# Patient Record
Sex: FEMALE | Race: WHITE | Employment: OTHER | URBAN - METROPOLITAN AREA
[De-identification: names, ages, dates, MRNs, and addresses within clinical notes are randomized per-mention and may not be internally consistent; named-entity substitution may affect disease eponyms.]

---

## 2017-01-05 ENCOUNTER — OFFICE VISIT (OUTPATIENT)
Dept: FAMILY MEDICINE CLINIC | Age: 82
End: 2017-01-05

## 2017-01-05 ENCOUNTER — TELEPHONE (OUTPATIENT)
Dept: CARDIOLOGY CLINIC | Age: 82
End: 2017-01-05

## 2017-01-05 ENCOUNTER — HOSPITAL ENCOUNTER (OUTPATIENT)
Dept: NON INVASIVE DIAGNOSTICS | Age: 82
Discharge: HOME | End: 2017-01-05
Attending: FAMILY MEDICINE | Admitting: FAMILY MEDICINE

## 2017-01-05 VITALS
WEIGHT: 152 LBS | BODY MASS INDEX: 26.93 KG/M2 | HEIGHT: 63 IN | HEART RATE: 56 BPM | OXYGEN SATURATION: 97 % | TEMPERATURE: 97.4 F | SYSTOLIC BLOOD PRESSURE: 136 MMHG | DIASTOLIC BLOOD PRESSURE: 60 MMHG

## 2017-01-05 DIAGNOSIS — E55.9 VITAMIN D DEFICIENCY: ICD-10-CM

## 2017-01-05 DIAGNOSIS — E78.2 MIXED HYPERLIPIDEMIA: Chronic | ICD-10-CM

## 2017-01-05 DIAGNOSIS — R73.09 ELEVATED GLUCOSE: ICD-10-CM

## 2017-01-05 DIAGNOSIS — E03.2 HYPOTHYROIDISM DUE TO MEDICATION: ICD-10-CM

## 2017-01-05 DIAGNOSIS — I48.0 PAROXYSMAL ATRIAL FIBRILLATION (HCC): ICD-10-CM

## 2017-01-05 DIAGNOSIS — E78.2 MIXED HYPERLIPIDEMIA: Primary | Chronic | ICD-10-CM

## 2017-01-05 DIAGNOSIS — J06.9 UPPER RESPIRATORY TRACT INFECTION, UNSPECIFIED TYPE: ICD-10-CM

## 2017-01-05 LAB
A/G RATIO: 1.5 (ref 1.1–2.2)
ALBUMIN SERPL-MCNC: 4.2 G/DL (ref 3.4–5)
ALP BLD-CCNC: 84 U/L (ref 40–129)
ALT SERPL-CCNC: 20 U/L (ref 10–40)
ANION GAP SERPL CALCULATED.3IONS-SCNC: 12 MMOL/L (ref 3–16)
AST SERPL-CCNC: 18 U/L (ref 15–37)
BILIRUB SERPL-MCNC: 0.3 MG/DL (ref 0–1)
BUN BLDV-MCNC: 16 MG/DL (ref 7–20)
CALCIUM SERPL-MCNC: 8.8 MG/DL (ref 8.3–10.6)
CHLORIDE BLD-SCNC: 90 MMOL/L (ref 99–110)
CO2: 30 MMOL/L (ref 21–32)
CREAT SERPL-MCNC: 1.1 MG/DL (ref 0.6–1.2)
ESTIMATED AVERAGE GLUCOSE: 114 MG/DL
GFR AFRICAN AMERICAN: 57
GFR NON-AFRICAN AMERICAN: 47
GLOBULIN: 2.8 G/DL
GLUCOSE BLD-MCNC: 98 MG/DL (ref 70–99)
HBA1C MFR BLD: 5.6 %
POTASSIUM SERPL-SCNC: 4 MMOL/L (ref 3.5–5.1)
SODIUM BLD-SCNC: 132 MMOL/L (ref 136–145)
T4 FREE: 1.1 NG/DL (ref 0.9–1.8)
TOTAL PROTEIN: 7 G/DL (ref 6.4–8.2)
TSH SERPL DL<=0.05 MIU/L-ACNC: 13.45 UIU/ML (ref 0.27–4.2)
VITAMIN D 25-HYDROXY: 20 NG/ML

## 2017-01-05 PROCEDURE — 99214 OFFICE O/P EST MOD 30 MIN: CPT | Performed by: FAMILY MEDICINE

## 2017-01-05 ASSESSMENT — ENCOUNTER SYMPTOMS
NAUSEA: 0
BLOOD IN STOOL: 0
ABDOMINAL DISTENTION: 0
CONSTIPATION: 0
ABDOMINAL PAIN: 0
CHEST TIGHTNESS: 0
DIARRHEA: 0
VOMITING: 0

## 2017-01-09 ENCOUNTER — TELEPHONE (OUTPATIENT)
Dept: FAMILY MEDICINE CLINIC | Age: 82
End: 2017-01-09

## 2017-01-11 RX ORDER — APIXABAN 5 MG/1
TABLET, FILM COATED ORAL
Qty: 60 TABLET | Refills: 3 | Status: SHIPPED | OUTPATIENT
Start: 2017-01-11 | End: 2017-05-23 | Stop reason: SDUPTHER

## 2017-01-16 ENCOUNTER — TELEPHONE (OUTPATIENT)
Dept: FAMILY MEDICINE CLINIC | Age: 82
End: 2017-01-16

## 2017-01-16 RX ORDER — ROPINIROLE 1 MG/1
1 TABLET, FILM COATED ORAL EVERY EVENING
Qty: 30 TABLET | Refills: 1 | Status: SHIPPED | OUTPATIENT
Start: 2017-01-16 | End: 2017-03-23 | Stop reason: SDUPTHER

## 2017-01-24 RX ORDER — AMITRIPTYLINE HYDROCHLORIDE 10 MG/1
TABLET, FILM COATED ORAL
Qty: 30 TABLET | Refills: 1 | Status: SHIPPED | OUTPATIENT
Start: 2017-01-24 | End: 2017-03-13 | Stop reason: SDUPTHER

## 2017-01-30 ENCOUNTER — OFFICE VISIT (OUTPATIENT)
Dept: FAMILY MEDICINE CLINIC | Age: 82
End: 2017-01-30

## 2017-01-30 VITALS
TEMPERATURE: 98 F | HEIGHT: 63 IN | SYSTOLIC BLOOD PRESSURE: 120 MMHG | BODY MASS INDEX: 26.19 KG/M2 | HEART RATE: 56 BPM | DIASTOLIC BLOOD PRESSURE: 60 MMHG | WEIGHT: 147.8 LBS

## 2017-01-30 DIAGNOSIS — E78.2 MIXED HYPERLIPIDEMIA: Primary | Chronic | ICD-10-CM

## 2017-01-30 DIAGNOSIS — E03.8 OTHER SPECIFIED HYPOTHYROIDISM: ICD-10-CM

## 2017-01-30 PROBLEM — E03.9 HYPOTHYROIDISM: Status: ACTIVE | Noted: 2017-01-30

## 2017-01-30 PROCEDURE — 99213 OFFICE O/P EST LOW 20 MIN: CPT | Performed by: FAMILY MEDICINE

## 2017-01-30 RX ORDER — LEVOTHYROXINE SODIUM 0.05 MG/1
75 TABLET ORAL DAILY
Qty: 45 TABLET | Refills: 3 | Status: SHIPPED | OUTPATIENT
Start: 2017-01-30 | End: 2017-03-02 | Stop reason: SDUPTHER

## 2017-01-30 RX ORDER — LANOLIN ALCOHOL/MO/W.PET/CERES
1000 CREAM (GRAM) TOPICAL DAILY
COMMUNITY
End: 2020-07-09

## 2017-01-30 RX ORDER — AMIODARONE HYDROCHLORIDE 200 MG/1
100 TABLET ORAL DAILY
Qty: 60 TABLET | Refills: 6
Start: 2017-01-30 | End: 2017-03-01 | Stop reason: SDUPTHER

## 2017-01-31 ENCOUNTER — TELEPHONE (OUTPATIENT)
Dept: FAMILY MEDICINE CLINIC | Age: 82
End: 2017-01-31

## 2017-02-03 ENCOUNTER — CARE COORDINATION (OUTPATIENT)
Dept: FAMILY MEDICINE CLINIC | Age: 82
End: 2017-02-03

## 2017-02-08 ENCOUNTER — TELEPHONE (OUTPATIENT)
Dept: FAMILY MEDICINE CLINIC | Age: 82
End: 2017-02-08

## 2017-02-17 ENCOUNTER — TELEPHONE (OUTPATIENT)
Dept: FAMILY MEDICINE CLINIC | Age: 82
End: 2017-02-17

## 2017-03-01 ENCOUNTER — OFFICE VISIT (OUTPATIENT)
Dept: FAMILY MEDICINE CLINIC | Age: 82
End: 2017-03-01

## 2017-03-01 VITALS
TEMPERATURE: 97.6 F | BODY MASS INDEX: 26.44 KG/M2 | DIASTOLIC BLOOD PRESSURE: 72 MMHG | HEART RATE: 72 BPM | SYSTOLIC BLOOD PRESSURE: 122 MMHG | HEIGHT: 63 IN | WEIGHT: 149.2 LBS

## 2017-03-01 DIAGNOSIS — I83.92 VARICOSE VEINS OF LEFT LOWER EXTREMITY: ICD-10-CM

## 2017-03-01 DIAGNOSIS — E03.8 OTHER SPECIFIED HYPOTHYROIDISM: Primary | ICD-10-CM

## 2017-03-01 DIAGNOSIS — E03.8 OTHER SPECIFIED HYPOTHYROIDISM: ICD-10-CM

## 2017-03-01 LAB
T4 FREE: 1.2 NG/DL (ref 0.9–1.8)
TSH SERPL DL<=0.05 MIU/L-ACNC: 5.14 UIU/ML (ref 0.27–4.2)

## 2017-03-01 PROCEDURE — 99213 OFFICE O/P EST LOW 20 MIN: CPT | Performed by: FAMILY MEDICINE

## 2017-03-02 RX ORDER — AMIODARONE HYDROCHLORIDE 200 MG/1
TABLET ORAL
Qty: 15 TABLET | Refills: 6 | Status: SHIPPED | OUTPATIENT
Start: 2017-03-02 | End: 2017-09-23 | Stop reason: SDUPTHER

## 2017-03-02 RX ORDER — LEVOTHYROXINE SODIUM 88 UG/1
88 TABLET ORAL DAILY
Qty: 30 TABLET | Refills: 4 | Status: SHIPPED | OUTPATIENT
Start: 2017-03-02 | End: 2017-05-26 | Stop reason: SDUPTHER

## 2017-03-02 RX ORDER — AMIODARONE HYDROCHLORIDE 200 MG/1
100 TABLET ORAL DAILY
Qty: 30 TABLET | Refills: 2 | Status: SHIPPED | OUTPATIENT
Start: 2017-03-02 | End: 2017-04-03

## 2017-03-13 RX ORDER — AMITRIPTYLINE HYDROCHLORIDE 10 MG/1
TABLET, FILM COATED ORAL
Qty: 30 TABLET | Refills: 0 | Status: SHIPPED | OUTPATIENT
Start: 2017-03-13 | End: 2017-04-10 | Stop reason: SDUPTHER

## 2017-03-22 ENCOUNTER — TELEPHONE (OUTPATIENT)
Dept: FAMILY MEDICINE CLINIC | Age: 82
End: 2017-03-22

## 2017-03-23 RX ORDER — ROPINIROLE 1 MG/1
2 TABLET, FILM COATED ORAL NIGHTLY
Qty: 60 TABLET | Refills: 2 | Status: SHIPPED | OUTPATIENT
Start: 2017-03-23 | End: 2017-06-01 | Stop reason: SDUPTHER

## 2017-03-27 ENCOUNTER — TELEPHONE (OUTPATIENT)
Dept: CARDIOLOGY CLINIC | Age: 82
End: 2017-03-27

## 2017-04-03 ENCOUNTER — OFFICE VISIT (OUTPATIENT)
Dept: FAMILY MEDICINE CLINIC | Age: 82
End: 2017-04-03

## 2017-04-03 ENCOUNTER — HOSPITAL ENCOUNTER (OUTPATIENT)
Dept: NON INVASIVE DIAGNOSTICS | Age: 82
Discharge: HOME | End: 2017-04-03
Attending: FAMILY MEDICINE | Admitting: FAMILY MEDICINE

## 2017-04-03 VITALS
BODY MASS INDEX: 25.91 KG/M2 | DIASTOLIC BLOOD PRESSURE: 60 MMHG | HEART RATE: 56 BPM | HEIGHT: 63 IN | WEIGHT: 146.25 LBS | SYSTOLIC BLOOD PRESSURE: 112 MMHG | TEMPERATURE: 98 F

## 2017-04-03 DIAGNOSIS — M79.601 PAIN OF RIGHT ARM: ICD-10-CM

## 2017-04-03 DIAGNOSIS — M25.552 PAIN OF LEFT HIP JOINT: ICD-10-CM

## 2017-04-03 DIAGNOSIS — M25.552 PAIN OF LEFT HIP JOINT: Primary | ICD-10-CM

## 2017-04-03 PROCEDURE — 99213 OFFICE O/P EST LOW 20 MIN: CPT | Performed by: FAMILY MEDICINE

## 2017-04-03 PROCEDURE — 3288F FALL RISK ASSESSMENT DOCD: CPT | Performed by: FAMILY MEDICINE

## 2017-04-11 RX ORDER — AMITRIPTYLINE HYDROCHLORIDE 10 MG/1
TABLET, FILM COATED ORAL
Qty: 30 TABLET | Refills: 0 | Status: SHIPPED | OUTPATIENT
Start: 2017-04-11 | End: 2017-05-08 | Stop reason: SDUPTHER

## 2017-04-26 DIAGNOSIS — J45.909 MODERATE ASTHMA: ICD-10-CM

## 2017-05-03 ENCOUNTER — CARE COORDINATION (OUTPATIENT)
Dept: FAMILY MEDICINE CLINIC | Age: 82
End: 2017-05-03

## 2017-05-03 ENCOUNTER — OFFICE VISIT (OUTPATIENT)
Dept: PULMONOLOGY | Age: 82
End: 2017-05-03

## 2017-05-03 VITALS
HEART RATE: 54 BPM | HEIGHT: 63 IN | OXYGEN SATURATION: 93 % | DIASTOLIC BLOOD PRESSURE: 60 MMHG | BODY MASS INDEX: 27.07 KG/M2 | SYSTOLIC BLOOD PRESSURE: 128 MMHG | TEMPERATURE: 97.8 F | WEIGHT: 152.8 LBS | RESPIRATION RATE: 16 BRPM

## 2017-05-03 DIAGNOSIS — J30.2 SEASONAL ALLERGIC RHINITIS, UNSPECIFIED ALLERGIC RHINITIS TRIGGER: ICD-10-CM

## 2017-05-03 DIAGNOSIS — R05.9 COUGH: ICD-10-CM

## 2017-05-03 DIAGNOSIS — J45.909 MODERATE ASTHMA: Primary | ICD-10-CM

## 2017-05-03 PROCEDURE — 99214 OFFICE O/P EST MOD 30 MIN: CPT | Performed by: INTERNAL MEDICINE

## 2017-05-03 RX ORDER — BENZONATATE 100 MG/1
200 CAPSULE ORAL 3 TIMES DAILY PRN
Qty: 60 CAPSULE | Refills: 1 | Status: SHIPPED | OUTPATIENT
Start: 2017-05-03 | End: 2017-05-10

## 2017-05-03 ASSESSMENT — SLEEP AND FATIGUE QUESTIONNAIRES
HOW LIKELY ARE YOU TO NOD OFF OR FALL ASLEEP WHEN YOU ARE A PASSENGER IN A CAR FOR AN HOUR WITHOUT A BREAK: 0
HOW LIKELY ARE YOU TO NOD OFF OR FALL ASLEEP WHILE SITTING INACTIVE IN A PUBLIC PLACE: 0
HOW LIKELY ARE YOU TO NOD OFF OR FALL ASLEEP WHILE WATCHING TV: 0
HOW LIKELY ARE YOU TO NOD OFF OR FALL ASLEEP WHILE LYING DOWN TO REST IN THE AFTERNOON WHEN CIRCUMSTANCES PERMIT: 0
HOW LIKELY ARE YOU TO NOD OFF OR FALL ASLEEP WHILE SITTING AND READING: 0

## 2017-05-03 ASSESSMENT — ASTHMA QUESTIONNAIRES
QUESTION_1 LAST FOUR WEEKS HOW MUCH OF THE TIME DID YOUR ASTHMA KEEP YOU FROM GETTING AS MUCH DONE AT WORK, SCHOOL OR AT HOME: 2
QUESTION_4 LAST FOUR WEEKS HOW OFTEN HAVE YOU USED YOUR RESCUE INHALER OR NEBULIZER MEDICATION (SUCH AS ALBUTEROL): 1
QUESTION_2 LAST FOUR WEEKS HOW OFTEN HAVE YOU HAD SHORTNESS OF BREATH: 2
QUESTION_3 LAST FOUR WEEKS HOW OFTEN DID YOUR ASTHMA SYMPTOMS (WHEEZING, COUGHING, SHORTNESS OF BREATH, CHEST TIGHTNESS OR PAIN) WAKE YOU UP AT NIGHT OR EARLIER THAN USUAL IN THE MORNING: 1
ACT_TOTALSCORE: 8
QUESTION_5 LAST FOUR WEEKS HOW WOULD YOU RATE YOUR ASTHMA CONTROL: 2

## 2017-05-08 RX ORDER — AMITRIPTYLINE HYDROCHLORIDE 10 MG/1
TABLET, FILM COATED ORAL
Qty: 30 TABLET | Refills: 1 | Status: SHIPPED | OUTPATIENT
Start: 2017-05-08 | End: 2017-06-23 | Stop reason: ALTCHOICE

## 2017-05-17 ENCOUNTER — OFFICE VISIT (OUTPATIENT)
Dept: FAMILY MEDICINE CLINIC | Age: 82
End: 2017-05-17

## 2017-05-17 VITALS
BODY MASS INDEX: 26.26 KG/M2 | SYSTOLIC BLOOD PRESSURE: 124 MMHG | HEART RATE: 60 BPM | HEIGHT: 63 IN | DIASTOLIC BLOOD PRESSURE: 70 MMHG | WEIGHT: 148.2 LBS

## 2017-05-17 DIAGNOSIS — E55.9 VITAMIN D DEFICIENCY: Primary | ICD-10-CM

## 2017-05-17 DIAGNOSIS — E03.8 OTHER SPECIFIED HYPOTHYROIDISM: ICD-10-CM

## 2017-05-17 DIAGNOSIS — J06.9 UPPER RESPIRATORY TRACT INFECTION, UNSPECIFIED TYPE: ICD-10-CM

## 2017-05-17 PROCEDURE — 99213 OFFICE O/P EST LOW 20 MIN: CPT | Performed by: FAMILY MEDICINE

## 2017-05-17 RX ORDER — DOXYCYCLINE HYCLATE 100 MG/1
100 CAPSULE ORAL 2 TIMES DAILY
Qty: 20 CAPSULE | Refills: 0 | Status: SHIPPED | OUTPATIENT
Start: 2017-05-17 | End: 2017-05-27

## 2017-05-17 RX ORDER — ERGOCALCIFEROL 1.25 MG/1
50000 CAPSULE ORAL WEEKLY
Qty: 4 CAPSULE | Refills: 2 | Status: SHIPPED | OUTPATIENT
Start: 2017-05-17 | End: 2017-07-03 | Stop reason: ALTCHOICE

## 2017-05-26 ENCOUNTER — OFFICE VISIT (OUTPATIENT)
Dept: FAMILY MEDICINE CLINIC | Age: 82
End: 2017-05-26

## 2017-05-26 VITALS
HEIGHT: 63 IN | DIASTOLIC BLOOD PRESSURE: 60 MMHG | SYSTOLIC BLOOD PRESSURE: 136 MMHG | TEMPERATURE: 97.6 F | WEIGHT: 150 LBS | BODY MASS INDEX: 26.58 KG/M2 | HEART RATE: 56 BPM

## 2017-05-26 DIAGNOSIS — E03.8 OTHER SPECIFIED HYPOTHYROIDISM: Primary | ICD-10-CM

## 2017-05-26 DIAGNOSIS — R41.3 MEMORY LOSS: ICD-10-CM

## 2017-05-26 DIAGNOSIS — R60.9 DEPENDENT EDEMA: ICD-10-CM

## 2017-05-26 DIAGNOSIS — E55.9 VITAMIN D DEFICIENCY: ICD-10-CM

## 2017-05-26 PROCEDURE — 99213 OFFICE O/P EST LOW 20 MIN: CPT | Performed by: FAMILY MEDICINE

## 2017-05-26 RX ORDER — LANOLIN ALCOHOL/MO/W.PET/CERES
400 CREAM (GRAM) TOPICAL DAILY
COMMUNITY
End: 2017-10-25 | Stop reason: ALTCHOICE

## 2017-05-26 RX ORDER — LEVOTHYROXINE SODIUM 88 UG/1
88 TABLET ORAL DAILY
Qty: 30 TABLET | Refills: 2 | Status: SHIPPED | OUTPATIENT
Start: 2017-05-26 | End: 2017-08-14 | Stop reason: SDUPTHER

## 2017-05-26 RX ORDER — DONEPEZIL HYDROCHLORIDE 5 MG/1
5 TABLET, FILM COATED ORAL NIGHTLY
Qty: 30 TABLET | Refills: 2 | Status: SHIPPED | OUTPATIENT
Start: 2017-05-26 | End: 2017-10-30 | Stop reason: DRUGHIGH

## 2017-06-01 RX ORDER — ROPINIROLE 1 MG/1
TABLET, FILM COATED ORAL
Qty: 60 TABLET | Refills: 2 | Status: SHIPPED | OUTPATIENT
Start: 2017-06-01 | End: 2017-08-22 | Stop reason: SDUPTHER

## 2017-06-12 ENCOUNTER — OFFICE VISIT (OUTPATIENT)
Dept: CARDIOLOGY CLINIC | Age: 82
End: 2017-06-12

## 2017-06-12 VITALS
HEIGHT: 63 IN | HEART RATE: 54 BPM | OXYGEN SATURATION: 96 % | SYSTOLIC BLOOD PRESSURE: 138 MMHG | WEIGHT: 147 LBS | DIASTOLIC BLOOD PRESSURE: 66 MMHG | BODY MASS INDEX: 26.05 KG/M2

## 2017-06-12 DIAGNOSIS — I10 ESSENTIAL HYPERTENSION: ICD-10-CM

## 2017-06-12 DIAGNOSIS — I48.0 PAROXYSMAL ATRIAL FIBRILLATION (HCC): Primary | ICD-10-CM

## 2017-06-12 DIAGNOSIS — E78.2 MIXED HYPERLIPIDEMIA: Chronic | ICD-10-CM

## 2017-06-12 DIAGNOSIS — R00.2 PALPITATIONS: ICD-10-CM

## 2017-06-12 PROCEDURE — 93000 ELECTROCARDIOGRAM COMPLETE: CPT | Performed by: INTERNAL MEDICINE

## 2017-06-12 PROCEDURE — 99214 OFFICE O/P EST MOD 30 MIN: CPT | Performed by: INTERNAL MEDICINE

## 2017-06-19 ENCOUNTER — TELEPHONE (OUTPATIENT)
Dept: FAMILY MEDICINE CLINIC | Age: 82
End: 2017-06-19

## 2017-06-23 ENCOUNTER — OFFICE VISIT (OUTPATIENT)
Dept: FAMILY MEDICINE CLINIC | Age: 82
End: 2017-06-23

## 2017-06-23 ENCOUNTER — TELEPHONE (OUTPATIENT)
Dept: FAMILY MEDICINE CLINIC | Age: 82
End: 2017-06-23

## 2017-06-23 VITALS
HEIGHT: 63 IN | SYSTOLIC BLOOD PRESSURE: 138 MMHG | WEIGHT: 149 LBS | BODY MASS INDEX: 26.4 KG/M2 | DIASTOLIC BLOOD PRESSURE: 70 MMHG | TEMPERATURE: 97.6 F

## 2017-06-23 DIAGNOSIS — E03.8 OTHER SPECIFIED HYPOTHYROIDISM: ICD-10-CM

## 2017-06-23 DIAGNOSIS — F03.90 DEMENTIA WITHOUT BEHAVIORAL DISTURBANCE, UNSPECIFIED DEMENTIA TYPE: Primary | ICD-10-CM

## 2017-06-23 DIAGNOSIS — E55.9 VITAMIN D DEFICIENCY: ICD-10-CM

## 2017-06-23 LAB
T4 FREE: 1.6 NG/DL (ref 0.9–1.8)
TSH SERPL DL<=0.05 MIU/L-ACNC: 0.87 UIU/ML (ref 0.27–4.2)
VITAMIN D 25-HYDROXY: 20.1 NG/ML

## 2017-06-23 PROCEDURE — 99213 OFFICE O/P EST LOW 20 MIN: CPT | Performed by: FAMILY MEDICINE

## 2017-06-23 RX ORDER — FUROSEMIDE 40 MG/1
40 TABLET ORAL DAILY
Qty: 30 TABLET | Refills: 2 | Status: SHIPPED | OUTPATIENT
Start: 2017-06-23 | End: 2017-09-29 | Stop reason: SDUPTHER

## 2017-06-26 ENCOUNTER — CARE COORDINATION (OUTPATIENT)
Dept: CARE COORDINATION | Age: 82
End: 2017-06-26

## 2017-06-29 ENCOUNTER — OFFICE VISIT (OUTPATIENT)
Dept: ORTHOPEDIC SURGERY | Age: 82
End: 2017-06-29

## 2017-06-29 VITALS
TEMPERATURE: 98.1 F | HEART RATE: 49 BPM | DIASTOLIC BLOOD PRESSURE: 53 MMHG | SYSTOLIC BLOOD PRESSURE: 142 MMHG | BODY MASS INDEX: 26.4 KG/M2 | HEIGHT: 63 IN | RESPIRATION RATE: 16 BRPM | WEIGHT: 149 LBS

## 2017-06-29 DIAGNOSIS — S80.11XA CONTUSION OF RIGHT LEG, INITIAL ENCOUNTER: ICD-10-CM

## 2017-06-29 DIAGNOSIS — S80.01XA CONTUSION OF RIGHT KNEE, INITIAL ENCOUNTER: Primary | ICD-10-CM

## 2017-06-29 PROCEDURE — 99203 OFFICE O/P NEW LOW 30 MIN: CPT | Performed by: ORTHOPAEDIC SURGERY

## 2017-07-03 ENCOUNTER — OFFICE VISIT (OUTPATIENT)
Dept: FAMILY MEDICINE CLINIC | Age: 82
End: 2017-07-03

## 2017-07-03 VITALS
DIASTOLIC BLOOD PRESSURE: 82 MMHG | HEART RATE: 52 BPM | SYSTOLIC BLOOD PRESSURE: 122 MMHG | TEMPERATURE: 97.7 F | HEIGHT: 63 IN | WEIGHT: 150 LBS | BODY MASS INDEX: 26.58 KG/M2

## 2017-07-03 DIAGNOSIS — J45.909 MODERATE ASTHMA: ICD-10-CM

## 2017-07-03 DIAGNOSIS — I83.93 VARICOSE VEINS OF BOTH LOWER EXTREMITIES: Primary | ICD-10-CM

## 2017-07-03 PROCEDURE — 99213 OFFICE O/P EST LOW 20 MIN: CPT | Performed by: FAMILY MEDICINE

## 2017-07-03 RX ORDER — AMITRIPTYLINE HYDROCHLORIDE 10 MG/1
10 TABLET, FILM COATED ORAL NIGHTLY PRN
Qty: 30 TABLET | Refills: 1 | Status: SHIPPED | OUTPATIENT
Start: 2017-07-03 | End: 2017-09-05 | Stop reason: SDUPTHER

## 2017-07-03 RX ORDER — BUDESONIDE AND FORMOTEROL FUMARATE DIHYDRATE 160; 4.5 UG/1; UG/1
AEROSOL RESPIRATORY (INHALATION)
Qty: 11 INHALER | Refills: 0 | Status: SHIPPED | OUTPATIENT
Start: 2017-07-03 | End: 2017-08-03 | Stop reason: SDUPTHER

## 2017-07-10 ENCOUNTER — HOSPITAL ENCOUNTER (OUTPATIENT)
Dept: NON INVASIVE DIAGNOSTICS | Age: 82
Discharge: HOME | End: 2017-07-10
Attending: FAMILY MEDICINE | Admitting: FAMILY MEDICINE

## 2017-07-10 ENCOUNTER — OFFICE VISIT (OUTPATIENT)
Dept: FAMILY MEDICINE CLINIC | Age: 82
End: 2017-07-10

## 2017-07-10 ENCOUNTER — TELEPHONE (OUTPATIENT)
Dept: FAMILY MEDICINE CLINIC | Age: 82
End: 2017-07-10

## 2017-07-10 VITALS
HEIGHT: 63 IN | DIASTOLIC BLOOD PRESSURE: 70 MMHG | TEMPERATURE: 97.7 F | WEIGHT: 150 LBS | BODY MASS INDEX: 26.58 KG/M2 | SYSTOLIC BLOOD PRESSURE: 136 MMHG

## 2017-07-10 DIAGNOSIS — M79.672 LEFT FOOT PAIN: ICD-10-CM

## 2017-07-10 DIAGNOSIS — M79.672 LEFT FOOT PAIN: Primary | ICD-10-CM

## 2017-07-10 PROCEDURE — 99213 OFFICE O/P EST LOW 20 MIN: CPT | Performed by: FAMILY MEDICINE

## 2017-07-10 RX ORDER — DOXYCYCLINE HYCLATE 100 MG/1
100 CAPSULE ORAL 2 TIMES DAILY
Qty: 14 CAPSULE | Refills: 0 | Status: SHIPPED | OUTPATIENT
Start: 2017-07-10 | End: 2017-07-17

## 2017-07-31 ENCOUNTER — TELEPHONE (OUTPATIENT)
Dept: FAMILY MEDICINE CLINIC | Age: 82
End: 2017-07-31

## 2017-07-31 ENCOUNTER — HOSPITAL ENCOUNTER (OUTPATIENT)
Dept: NON INVASIVE DIAGNOSTICS | Age: 82
Discharge: HOME | End: 2017-07-31
Attending: FAMILY MEDICINE | Admitting: FAMILY MEDICINE

## 2017-07-31 DIAGNOSIS — R05.9 COUGH: ICD-10-CM

## 2017-07-31 DIAGNOSIS — R05.9 COUGH: Primary | ICD-10-CM

## 2017-08-01 RX ORDER — PREDNISONE 10 MG/1
TABLET ORAL
Qty: 12 TABLET | Refills: 0 | Status: SHIPPED | OUTPATIENT
Start: 2017-08-01 | End: 2017-08-08 | Stop reason: ALTCHOICE

## 2017-08-03 DIAGNOSIS — J45.909 MODERATE ASTHMA: ICD-10-CM

## 2017-08-03 RX ORDER — BUDESONIDE AND FORMOTEROL FUMARATE DIHYDRATE 160; 4.5 UG/1; UG/1
AEROSOL RESPIRATORY (INHALATION)
Qty: 11 INHALER | Refills: 0 | Status: SHIPPED | OUTPATIENT
Start: 2017-08-03 | End: 2017-09-01 | Stop reason: SDUPTHER

## 2017-08-08 ENCOUNTER — OFFICE VISIT (OUTPATIENT)
Dept: FAMILY MEDICINE CLINIC | Age: 82
End: 2017-08-08

## 2017-08-08 VITALS
TEMPERATURE: 98 F | WEIGHT: 152.8 LBS | HEIGHT: 63 IN | DIASTOLIC BLOOD PRESSURE: 60 MMHG | BODY MASS INDEX: 27.07 KG/M2 | SYSTOLIC BLOOD PRESSURE: 120 MMHG

## 2017-08-08 DIAGNOSIS — R05.9 COUGH: Primary | ICD-10-CM

## 2017-08-08 PROCEDURE — 99213 OFFICE O/P EST LOW 20 MIN: CPT | Performed by: FAMILY MEDICINE

## 2017-08-08 RX ORDER — SULFAMETHOXAZOLE AND TRIMETHOPRIM 800; 160 MG/1; MG/1
1 TABLET ORAL 2 TIMES DAILY
Qty: 14 TABLET | Refills: 0 | Status: SHIPPED | OUTPATIENT
Start: 2017-08-08 | End: 2017-08-15

## 2017-08-14 RX ORDER — LEVOTHYROXINE SODIUM 88 UG/1
TABLET ORAL
Qty: 30 TABLET | Refills: 5 | Status: SHIPPED | OUTPATIENT
Start: 2017-08-14 | End: 2018-01-16 | Stop reason: SDUPTHER

## 2017-08-22 RX ORDER — ROPINIROLE 1 MG/1
TABLET, FILM COATED ORAL
Qty: 60 TABLET | Refills: 1 | Status: SHIPPED | OUTPATIENT
Start: 2017-08-22 | End: 2017-10-12 | Stop reason: SDUPTHER

## 2017-08-28 ENCOUNTER — TELEPHONE (OUTPATIENT)
Dept: FAMILY MEDICINE CLINIC | Age: 82
End: 2017-08-28

## 2017-08-28 ENCOUNTER — OFFICE VISIT (OUTPATIENT)
Dept: FAMILY MEDICINE CLINIC | Age: 82
End: 2017-08-28

## 2017-08-28 VITALS
WEIGHT: 151 LBS | SYSTOLIC BLOOD PRESSURE: 124 MMHG | TEMPERATURE: 97.8 F | BODY MASS INDEX: 26.75 KG/M2 | HEART RATE: 64 BPM | HEIGHT: 63 IN | DIASTOLIC BLOOD PRESSURE: 76 MMHG

## 2017-08-28 DIAGNOSIS — E55.9 VITAMIN D DEFICIENCY: ICD-10-CM

## 2017-08-28 DIAGNOSIS — F03.90 DEMENTIA WITHOUT BEHAVIORAL DISTURBANCE, UNSPECIFIED DEMENTIA TYPE: ICD-10-CM

## 2017-08-28 DIAGNOSIS — R79.9 ABNORMAL BLOOD CHEMISTRY: ICD-10-CM

## 2017-08-28 DIAGNOSIS — M79.673 PAIN OF FOOT, UNSPECIFIED LATERALITY: ICD-10-CM

## 2017-08-28 DIAGNOSIS — R79.9 ABNORMAL BLOOD CHEMISTRY: Primary | ICD-10-CM

## 2017-08-28 LAB
ANION GAP SERPL CALCULATED.3IONS-SCNC: 13 MMOL/L (ref 3–16)
BUN BLDV-MCNC: 23 MG/DL (ref 7–20)
CALCIUM SERPL-MCNC: 9.2 MG/DL (ref 8.3–10.6)
CHLORIDE BLD-SCNC: 94 MMOL/L (ref 99–110)
CO2: 31 MMOL/L (ref 21–32)
CREAT SERPL-MCNC: 1 MG/DL (ref 0.6–1.2)
GFR AFRICAN AMERICAN: >60
GFR NON-AFRICAN AMERICAN: 52
GLUCOSE BLD-MCNC: 113 MG/DL (ref 70–99)
POTASSIUM SERPL-SCNC: 5 MMOL/L (ref 3.5–5.1)
SODIUM BLD-SCNC: 138 MMOL/L (ref 136–145)
VITAMIN D 25-HYDROXY: 24.7 NG/ML

## 2017-08-28 PROCEDURE — 99213 OFFICE O/P EST LOW 20 MIN: CPT | Performed by: FAMILY MEDICINE

## 2017-08-30 ENCOUNTER — TELEPHONE (OUTPATIENT)
Dept: FAMILY MEDICINE CLINIC | Age: 82
End: 2017-08-30

## 2017-09-01 DIAGNOSIS — J45.909 MODERATE ASTHMA: ICD-10-CM

## 2017-09-01 RX ORDER — BUDESONIDE AND FORMOTEROL FUMARATE DIHYDRATE 160; 4.5 UG/1; UG/1
AEROSOL RESPIRATORY (INHALATION)
Qty: 11 INHALER | Refills: 3 | Status: SHIPPED | OUTPATIENT
Start: 2017-09-01 | End: 2018-10-05 | Stop reason: SDUPTHER

## 2017-09-05 ENCOUNTER — OFFICE VISIT (OUTPATIENT)
Dept: FAMILY MEDICINE CLINIC | Age: 82
End: 2017-09-05

## 2017-09-05 VITALS
SYSTOLIC BLOOD PRESSURE: 138 MMHG | WEIGHT: 152.2 LBS | HEIGHT: 63 IN | HEART RATE: 72 BPM | DIASTOLIC BLOOD PRESSURE: 62 MMHG | TEMPERATURE: 97.9 F | BODY MASS INDEX: 26.97 KG/M2

## 2017-09-05 DIAGNOSIS — M79.672 LEFT FOOT PAIN: ICD-10-CM

## 2017-09-05 DIAGNOSIS — R05.9 COUGH: ICD-10-CM

## 2017-09-05 DIAGNOSIS — L84 PRE-ULCERATIVE CORN OR CALLOUS: Primary | ICD-10-CM

## 2017-09-05 DIAGNOSIS — Z23 NEEDS FLU SHOT: ICD-10-CM

## 2017-09-05 PROCEDURE — 90662 IIV NO PRSV INCREASED AG IM: CPT | Performed by: FAMILY MEDICINE

## 2017-09-05 PROCEDURE — 99213 OFFICE O/P EST LOW 20 MIN: CPT | Performed by: FAMILY MEDICINE

## 2017-09-05 PROCEDURE — G0008 ADMIN INFLUENZA VIRUS VAC: HCPCS | Performed by: FAMILY MEDICINE

## 2017-09-05 RX ORDER — MONTELUKAST SODIUM 10 MG/1
10 TABLET ORAL NIGHTLY
Qty: 30 TABLET | Refills: 3 | Status: SHIPPED | OUTPATIENT
Start: 2017-09-05 | End: 2018-01-09 | Stop reason: SDUPTHER

## 2017-09-05 RX ORDER — AMITRIPTYLINE HYDROCHLORIDE 10 MG/1
TABLET, FILM COATED ORAL
Qty: 30 TABLET | Refills: 1 | Status: SHIPPED | OUTPATIENT
Start: 2017-09-05 | End: 2017-09-11 | Stop reason: SDUPTHER

## 2017-09-07 ENCOUNTER — TELEPHONE (OUTPATIENT)
Dept: FAMILY MEDICINE CLINIC | Age: 82
End: 2017-09-07

## 2017-09-11 ENCOUNTER — OFFICE VISIT (OUTPATIENT)
Dept: FAMILY MEDICINE CLINIC | Age: 82
End: 2017-09-11

## 2017-09-11 VITALS
WEIGHT: 149.4 LBS | SYSTOLIC BLOOD PRESSURE: 134 MMHG | HEIGHT: 63 IN | BODY MASS INDEX: 26.47 KG/M2 | TEMPERATURE: 97.9 F | DIASTOLIC BLOOD PRESSURE: 74 MMHG

## 2017-09-11 DIAGNOSIS — R05.9 COUGH: Primary | ICD-10-CM

## 2017-09-11 PROCEDURE — 99213 OFFICE O/P EST LOW 20 MIN: CPT | Performed by: FAMILY MEDICINE

## 2017-09-11 RX ORDER — AMITRIPTYLINE HYDROCHLORIDE 10 MG/1
15 TABLET, FILM COATED ORAL NIGHTLY
Qty: 45 TABLET | Refills: 1 | Status: SHIPPED | OUTPATIENT
Start: 2017-09-11 | End: 2017-10-25 | Stop reason: ALTCHOICE

## 2017-09-12 ENCOUNTER — TELEPHONE (OUTPATIENT)
Dept: FAMILY MEDICINE CLINIC | Age: 82
End: 2017-09-12

## 2017-09-12 DIAGNOSIS — F03.90 DEMENTIA WITHOUT BEHAVIORAL DISTURBANCE, UNSPECIFIED DEMENTIA TYPE: Primary | ICD-10-CM

## 2017-09-13 ENCOUNTER — TELEPHONE (OUTPATIENT)
Dept: FAMILY MEDICINE CLINIC | Age: 82
End: 2017-09-13

## 2017-09-15 ENCOUNTER — TELEPHONE (OUTPATIENT)
Dept: FAMILY MEDICINE CLINIC | Age: 82
End: 2017-09-15

## 2017-09-18 RX ORDER — APIXABAN 5 MG/1
TABLET, FILM COATED ORAL
Qty: 60 TABLET | Refills: 3 | Status: SHIPPED | OUTPATIENT
Start: 2017-09-18 | End: 2018-02-06 | Stop reason: SDUPTHER

## 2017-09-19 ENCOUNTER — TELEPHONE (OUTPATIENT)
Dept: FAMILY MEDICINE CLINIC | Age: 82
End: 2017-09-19

## 2017-09-22 ENCOUNTER — TELEPHONE (OUTPATIENT)
Dept: FAMILY MEDICINE CLINIC | Age: 82
End: 2017-09-22

## 2017-09-25 RX ORDER — AMIODARONE HYDROCHLORIDE 200 MG/1
TABLET ORAL
Qty: 15 TABLET | Refills: 5 | Status: SHIPPED | OUTPATIENT
Start: 2017-09-25 | End: 2018-03-30 | Stop reason: SDUPTHER

## 2017-09-29 RX ORDER — FUROSEMIDE 40 MG/1
TABLET ORAL
Qty: 30 TABLET | Refills: 1 | Status: SHIPPED | OUTPATIENT
Start: 2017-09-29 | End: 2017-10-25 | Stop reason: ALTCHOICE

## 2017-10-02 ENCOUNTER — TELEPHONE (OUTPATIENT)
Dept: FAMILY MEDICINE CLINIC | Age: 82
End: 2017-10-02

## 2017-10-04 ENCOUNTER — TELEPHONE (OUTPATIENT)
Dept: FAMILY MEDICINE CLINIC | Age: 82
End: 2017-10-04

## 2017-10-04 ENCOUNTER — OFFICE VISIT (OUTPATIENT)
Dept: FAMILY MEDICINE CLINIC | Age: 82
End: 2017-10-04

## 2017-10-04 VITALS
DIASTOLIC BLOOD PRESSURE: 60 MMHG | TEMPERATURE: 97.4 F | HEIGHT: 63 IN | SYSTOLIC BLOOD PRESSURE: 130 MMHG | HEART RATE: 68 BPM | BODY MASS INDEX: 26.75 KG/M2 | WEIGHT: 151 LBS

## 2017-10-04 DIAGNOSIS — R35.0 URINE FREQUENCY: ICD-10-CM

## 2017-10-04 DIAGNOSIS — R20.0 NUMBNESS OF FINGER: Primary | ICD-10-CM

## 2017-10-04 DIAGNOSIS — R05.9 COUGH: ICD-10-CM

## 2017-10-04 LAB
BILIRUBIN URINE: NEGATIVE
BLOOD, URINE: NEGATIVE
CLARITY: CLEAR
COLOR: YELLOW
EPITHELIAL CELLS, UA: 0 /HPF (ref 0–5)
GLUCOSE URINE: NEGATIVE MG/DL
HYALINE CASTS: 0 /LPF (ref 0–8)
KETONES, URINE: NEGATIVE MG/DL
LEUKOCYTE ESTERASE, URINE: NEGATIVE
MICROSCOPIC EXAMINATION: NORMAL
NITRITE, URINE: NEGATIVE
PH UA: 6.5
PROTEIN UA: NEGATIVE MG/DL
RBC UA: 1 /HPF (ref 0–4)
SPECIFIC GRAVITY UA: 1.01
UROBILINOGEN, URINE: 0.2 E.U./DL
WBC UA: 0 /HPF (ref 0–5)

## 2017-10-04 PROCEDURE — 99214 OFFICE O/P EST MOD 30 MIN: CPT | Performed by: FAMILY MEDICINE

## 2017-10-04 RX ORDER — DIVALPROEX SODIUM 125 MG/1
250 CAPSULE, COATED PELLETS ORAL 2 TIMES DAILY
Status: ON HOLD | COMMUNITY
Start: 2017-09-19 | End: 2019-09-03

## 2017-10-04 RX ORDER — TRAZODONE HYDROCHLORIDE 50 MG/1
25 TABLET ORAL NIGHTLY
Status: ON HOLD | COMMUNITY
Start: 2017-09-19 | End: 2019-09-12 | Stop reason: HOSPADM

## 2017-10-04 NOTE — TELEPHONE ENCOUNTER
Eva Tavera pt's PT called to let us know he set up for pt to have PT in the home  This is a courtesy call

## 2017-10-04 NOTE — PROGRESS NOTES
Left Arm        Position:     Sitting        Cuff Size:  Medium Adult      Pulse:         68           Temp:   97.4 °F (36.3 °C)   TempSrc:      Oral          Weight: 151 lb (68.5 kg)    Height:   5' 3\" (1.6 m)    ---------------------------  Body mass index is 26.75 kg/(m^2). Wt Readings from Last 3 Encounters:  10/04/17 : 151 lb (68.5 kg)  09/11/17 : 149 lb 6.4 oz (67.8 kg)  09/05/17 : 152 lb 3.2 oz (69 kg)    BP Readings from Last 3 Encounters:  10/04/17 : 130/60  09/11/17 : 134/74  09/05/17 : 138/62                        Review of Systems    Objective:   Physical Exam   Constitutional: She appears well-developed and well-nourished. No distress. HENT:   Head: Normocephalic and atraumatic. Mouth/Throat: Uvula is midline, oropharynx is clear and moist and mucous membranes are normal. No oral lesions. Eyes: No scleral icterus. Neck: Neck supple. No thyroid mass and no thyromegaly present. Cardiovascular: Normal rate, regular rhythm and normal heart sounds. Exam reveals no gallop and no friction rub. No murmur heard. Pulses:       Radial pulses are 2+ on the right side   She has also felt a right ulnar pulse  Trace edema  Lower legs   Pulmonary/Chest: Effort normal and breath sounds normal. No accessory muscle usage. No tachypnea. No respiratory distress. She has no decreased breath sounds. She has no wheezes. She has no rhonchi. She has no rales. Abdominal: Soft. Normal appearance and bowel sounds are normal. She exhibits no distension, no abdominal bruit, no pulsatile midline mass and no mass. There is no hepatosplenomegaly. There is no tenderness. There is no rigidity, no guarding and no CVA tenderness. Musculoskeletal:   rom of the hand and fingers are fine and no swelling no marks and pink and warm both sides  Normal cap refill of the fingers.  She has normal movement of the wrist as well  She does have mild oa changes at the mcp joint and dip joint Lymphadenopathy:        Head (right side): No submental, no submandibular, no preauricular and no posterior auricular adenopathy present. Head (left side): No submental, no submandibular, no preauricular and no posterior auricular adenopathy present. She has no cervical adenopathy. Right axillary: No pectoral and no lateral adenopathy present. Right: No supraclavicular and no epitrochlear adenopathy present. Left: No supraclavicular adenopathy present. Neurological: She is alert. Reflex Scores:       Tricep reflexes are 2+ on the right side. Bicep reflexes are 2+ on the right side. Skin: Skin is warm, dry and intact. She is not diaphoretic. No cyanosis. No pallor. Nails show no clubbing. No markings on the right arm or hand   Psychiatric: She has a normal mood and affect. Her speech is normal.       Assessment:       1. Numbness of finger     2. Cough     3.  Urine frequency  Urinalysis with Microscopic    Urine Culture       I again discussed the need for the staying off the fosamax due to her inability to take meds correctly at times and because she is on eliquis  I did tell the daughter to be sure she is using her meds especially the inhalers      Plan:      Get the urine testing  Do see the lung doctor  Continue the current medication  See in 3 months

## 2017-10-04 NOTE — MR AVS SNAPSHOT
Additional Information about your Body Mass Index (BMI)           Your BMI as listed above is considered overweight (25.0-29.9). BMI is an estimate of body fat, calculated from your height and weight. The higher your BMI, the greater your risk of heart disease, high blood pressure, type 2 diabetes, stroke, gallstones, arthritis, sleep apnea, and certain cancers. BMI is not perfect. It may overestimate body fat in athletes and people who are more muscular. If your body fat is high you can improve your BMI by decreasing your calorie intake and becoming more physically active.      Learn more at: [email or web address]          Instructions      Get the urine testing  Do see the lung doctor  Continue the current medication  See in 3 months              Medications and Orders      Your Current Medications Are              traZODone (DESYREL) 50 MG tablet Take 25 mg by mouth nightly     divalproex (DEPAKOTE SPRINKLE) 125 MG capsule     furosemide (LASIX) 40 MG tablet TAKE 1 TABLET BY MOUTH DAILY    amiodarone (CORDARONE) 200 MG tablet TAKE ONE-HALF OF A TABLET DAILY    ELIQUIS 5 MG TABS tablet TAKE 1 TABLET BY MOUTH 2 TIMES DAILY    amitriptyline (ELAVIL) 10 MG tablet Take 1.5 tablets by mouth nightly    montelukast (SINGULAIR) 10 MG tablet Take 1 tablet by mouth nightly    SYMBICORT 160-4.5 MCG/ACT AERO INHALE 2 PUFFS TWICE A DAY INTO THE LUNGS    Cholecalciferol (VITAMIN D3) 5000 units TABS Take by mouth    rOPINIRole (REQUIP) 1 MG tablet TAKE TWO TABLETS BY MOUTH NIGHTLY    levothyroxine (SYNTHROID) 88 MCG tablet TAKE 1 TABLET BY MOUTH DAILY    metoprolol tartrate (LOPRESSOR) 25 MG tablet TAKE 1 TABLET BY MOUTH 2 TIMES DAILY    folic acid (FOLVITE) 845 MCG tablet Take 400 mcg by mouth daily    donepezil (ARICEPT) 5 MG tablet Take 1 tablet by mouth nightly    PROAIR  (90 BASE) MCG/ACT inhaler 2 PUFFS INTO LUNGS EVERY 4 HOURS AS NEEDED FOR WHEEZING OR FORSHORTNESS OF BREATH

## 2017-10-05 LAB — URINE CULTURE, ROUTINE: NORMAL

## 2017-10-13 RX ORDER — ROPINIROLE 1 MG/1
TABLET, FILM COATED ORAL
Qty: 60 TABLET | Refills: 0 | Status: SHIPPED | OUTPATIENT
Start: 2017-10-13 | End: 2017-11-07 | Stop reason: SDUPTHER

## 2017-10-19 ENCOUNTER — OFFICE VISIT (OUTPATIENT)
Dept: FAMILY MEDICINE CLINIC | Age: 82
End: 2017-10-19

## 2017-10-19 VITALS
HEART RATE: 60 BPM | TEMPERATURE: 97.9 F | DIASTOLIC BLOOD PRESSURE: 76 MMHG | BODY MASS INDEX: 26.93 KG/M2 | WEIGHT: 152 LBS | SYSTOLIC BLOOD PRESSURE: 126 MMHG | HEIGHT: 63 IN

## 2017-10-19 DIAGNOSIS — J01.00 ACUTE MAXILLARY SINUSITIS, RECURRENCE NOT SPECIFIED: Primary | ICD-10-CM

## 2017-10-19 PROCEDURE — 99213 OFFICE O/P EST LOW 20 MIN: CPT | Performed by: FAMILY MEDICINE

## 2017-10-19 RX ORDER — SULFAMETHOXAZOLE AND TRIMETHOPRIM 800; 160 MG/1; MG/1
1 TABLET ORAL 2 TIMES DAILY
Qty: 20 TABLET | Refills: 0 | Status: SHIPPED | OUTPATIENT
Start: 2017-10-19 | End: 2017-10-29

## 2017-10-19 ASSESSMENT — ENCOUNTER SYMPTOMS
SINUS PRESSURE: 1
SHORTNESS OF BREATH: 0
RHINORRHEA: 1
WHEEZING: 0
CHEST TIGHTNESS: 0
COUGH: 1

## 2017-10-19 NOTE — PROGRESS NOTES
Subjective:      Patient ID: Abena Duvall is a 80 y.o. female. HPI  Chief Complaint   Patient presents with    URI     cough, chest/nasal congestion, and sinus pressure x 3 days. no otc meds. Here for c/o cough nasal congestion, mucus x 3 days. No fever  + asthma- not using alb inhaler  Feels little SOB but no wheezing  Used otc thraot lozenges  Vitals:    10/19/17 0956   BP: 126/76   Pulse: 60   Temp: 97.9 °F (36.6 °C)   TempSrc: Oral   Weight: 152 lb (68.9 kg)   Height: 5' 3\" (1.6 m)     Body mass index is 26.93 kg/m². Wt Readings from Last 3 Encounters:   10/19/17 152 lb (68.9 kg)   10/04/17 151 lb (68.5 kg)   09/11/17 149 lb 6.4 oz (67.8 kg)     BP Readings from Last 3 Encounters:   10/19/17 126/76   10/04/17 130/60   09/11/17 134/74        Review of Systems   Constitutional: Negative for chills and fever. HENT: Positive for congestion, postnasal drip, rhinorrhea and sinus pressure. Respiratory: Positive for cough. Negative for chest tightness, shortness of breath and wheezing. Cardiovascular: Negative for chest pain. Objective:   Physical Exam   Constitutional: She is oriented to person, place, and time. She appears well-developed and well-nourished. No distress. HENT:   Head: Normocephalic. Right Ear: External ear normal.   Left Ear: External ear normal.   ++nasal edema and erythema   Post pharynx red   Eyes: EOM are normal. Pupils are equal, round, and reactive to light. Right eye exhibits no discharge. Left eye exhibits no discharge. Neck: Neck supple. Cardiovascular: Normal rate, regular rhythm and normal heart sounds. Pulmonary/Chest: Effort normal and breath sounds normal. No respiratory distress. She has no wheezes. Musculoskeletal: Normal range of motion. Neurological: She is alert and oriented to person, place, and time.    Psychiatric: Judgment normal.       Assessment:      Sinusitis- add abx      Plan:      Orders Placed This Encounter   Medications   

## 2017-10-19 NOTE — PATIENT INSTRUCTIONS
Please call your pharmacy if you need any refills of your medication(s). Please call our office at [phone] if you don't hear from us about your test results. Bring an accurate list of your medications with you at every appointment to ensure that we have the correct information. Our office hours are: Monday - Friday 7 am- 5 pm; Saturdays vary on doctor working.     Phone lines turn on at 8 am.

## 2017-10-24 PROCEDURE — G0180 MD CERTIFICATION HHA PATIENT: HCPCS | Performed by: FAMILY MEDICINE

## 2017-10-25 ENCOUNTER — OFFICE VISIT (OUTPATIENT)
Dept: FAMILY MEDICINE CLINIC | Age: 82
End: 2017-10-25

## 2017-10-25 ENCOUNTER — HOSPITAL ENCOUNTER (OUTPATIENT)
Dept: CT IMAGING | Age: 82
Discharge: HOME | End: 2017-10-25

## 2017-10-25 VITALS
SYSTOLIC BLOOD PRESSURE: 126 MMHG | HEART RATE: 52 BPM | HEIGHT: 63 IN | DIASTOLIC BLOOD PRESSURE: 78 MMHG | BODY MASS INDEX: 27.32 KG/M2 | WEIGHT: 154.2 LBS | TEMPERATURE: 97.4 F

## 2017-10-25 DIAGNOSIS — R26.9 GAIT ABNORMALITY: ICD-10-CM

## 2017-10-25 DIAGNOSIS — R42 DIZZY: Primary | ICD-10-CM

## 2017-10-25 DIAGNOSIS — R42 DIZZY: ICD-10-CM

## 2017-10-25 DIAGNOSIS — R35.0 FREQUENT URINATION: ICD-10-CM

## 2017-10-25 LAB
BILIRUBIN, POC: NORMAL
BLOOD URINE, POC: NORMAL
CLARITY, POC: CLEAR
COLOR, POC: YELLOW
GLUCOSE URINE, POC: NORMAL
KETONES, POC: NORMAL
LEUKOCYTE EST, POC: NORMAL
NITRITE, POC: NORMAL
PH, POC: 7
PROTEIN, POC: NORMAL
SPECIFIC GRAVITY, POC: 1.01
UROBILINOGEN, POC: 0.2

## 2017-10-25 PROCEDURE — 81002 URINALYSIS NONAUTO W/O SCOPE: CPT | Performed by: FAMILY MEDICINE

## 2017-10-25 PROCEDURE — 99213 OFFICE O/P EST LOW 20 MIN: CPT | Performed by: FAMILY MEDICINE

## 2017-10-25 RX ORDER — DONEPEZIL HYDROCHLORIDE 10 MG/1
TABLET, FILM COATED ORAL
COMMUNITY
Start: 2017-10-19 | End: 2017-12-04 | Stop reason: ALTCHOICE

## 2017-10-26 ENCOUNTER — TELEPHONE (OUTPATIENT)
Dept: FAMILY MEDICINE CLINIC | Age: 82
End: 2017-10-26

## 2017-10-26 DIAGNOSIS — R00.2 PALPITATIONS: Primary | ICD-10-CM

## 2017-10-26 ASSESSMENT — ENCOUNTER SYMPTOMS
ABDOMINAL PAIN: 0
EYE REDNESS: 0
DIARRHEA: 0
BLOOD IN STOOL: 0
CONSTIPATION: 0
COUGH: 0
CHEST TIGHTNESS: 0
WHEEZING: 0
RHINORRHEA: 0
SHORTNESS OF BREATH: 0
EYE PAIN: 0
VOMITING: 0
SINUS PRESSURE: 0
EYE DISCHARGE: 0

## 2017-10-26 NOTE — PROGRESS NOTES
Subjective:      Patient ID: Sherlyn Dumont is a 80 y.o. female. HPI  Chief Complaint   Patient presents with    Dizziness     dizzy, wobbly, shaky, frequent urination x 3 days. states she was prescibed bactrim on 10/19 and symptoms worsened since she started the abx.  Urinary Frequency     Here for c/o dizziness. Brought in by daughter, states after starting bactrim dizziness worsened  States walk wobbly today. Unsteady. Needing walker  States not positional dizziness  No HA, vomiting or diarrhea  Also states inc urination  X 3 days. No blood in urine  Vitals:    10/25/17 1543   BP: 126/78   Pulse: 52   Temp: 97.4 °F (36.3 °C)   TempSrc: Oral   Weight: 154 lb 3.2 oz (69.9 kg)   Height: 5' 3\" (1.6 m)     Body mass index is 27.32 kg/m². Wt Readings from Last 3 Encounters:   10/25/17 154 lb 3.2 oz (69.9 kg)   10/19/17 152 lb (68.9 kg)   10/04/17 151 lb (68.5 kg)     BP Readings from Last 3 Encounters:   10/25/17 126/78   10/19/17 126/76   10/04/17 130/60        Review of Systems   Constitutional: Negative for fatigue, fever and unexpected weight change. HENT: Negative for ear discharge, ear pain, hearing loss, rhinorrhea, sinus pressure and tinnitus. Eyes: Negative for pain, discharge, redness and visual disturbance. Respiratory: Negative for cough, chest tightness, shortness of breath and wheezing. Cardiovascular: Negative for chest pain and palpitations. Gastrointestinal: Negative for abdominal pain, blood in stool, constipation, diarrhea and vomiting. Genitourinary: Negative for difficulty urinating, dysuria and hematuria. Musculoskeletal: Positive for gait problem. Neurological: Negative for seizures, syncope and headaches. Psychiatric/Behavioral: Negative for dysphoric mood and sleep disturbance. The patient is not nervous/anxious. Objective:   Physical Exam   Constitutional: She is oriented to person, place, and time. She appears well-developed and well-nourished.  No distress. HENT:   Head: Normocephalic. Mouth/Throat: Oropharynx is clear and moist. No oropharyngeal exudate. Eyes: Conjunctivae and EOM are normal. Pupils are equal, round, and reactive to light.   + left sided nystagmus   Neck: Neck supple. Cardiovascular: Regular rhythm. Bradycardic- this is her norm   Pulmonary/Chest: Effort normal and breath sounds normal.   Abdominal: Soft. Bowel sounds are normal. There is no tenderness. There is no guarding. Musculoskeletal: Normal range of motion. Neurological: She is alert and oriented to person, place, and time. No cranial nerve deficit. Coordination abnormal.   Skin: Skin is warm. Psychiatric: She has a normal mood and affect.  Her behavior is normal. Judgment and thought content normal.     Results for POC orders placed in visit on 10/25/17   POCT Urinalysis no Micro   Result Value Ref Range    Color, UA YELLOW     Clarity, UA CLEAR     Glucose, UA POC NEG     Bilirubin, UA NEG     Ketones, UA NEG     Spec Grav, UA 1.010     Blood, UA POC NEG     pH, UA 7.0     Protein, UA POC NEG     Urobilinogen, UA 0.2     Leukocytes, UA NEG     Nitrite, UA NEG        Assessment:      Dizziness- acute change, check ct for cerebellar stroke  Gait abn  Urine freq- UA neg      Plan:      Orders Placed This Encounter   Procedures    CT Head WO Contrast     Standing Status:   Future     Number of Occurrences:   1     Standing Expiration Date:   10/25/2018     Order Specific Question:   Reason for exam:     Answer:   dizzy, gait abnormality    POCT Urinalysis no Micro     Stop bactrim

## 2017-10-27 ENCOUNTER — TELEPHONE (OUTPATIENT)
Dept: FAMILY MEDICINE CLINIC | Age: 82
End: 2017-10-27

## 2017-10-27 PROCEDURE — G0180 MD CERTIFICATION HHA PATIENT: HCPCS | Performed by: FAMILY MEDICINE

## 2017-10-27 NOTE — TELEPHONE ENCOUNTER
Michelle Castelan w/ [facility] would like to speak w/ Dr. Luna Breaker    Pt had a fall last night  - no injuries, walking ok   Weakness using her walker     Michelle Castelan would like to know if you want to   See again ? Or Order for PT for eval     Pl advise.    [phone]

## 2017-10-30 ENCOUNTER — OFFICE VISIT (OUTPATIENT)
Dept: CARDIOLOGY CLINIC | Age: 82
End: 2017-10-30

## 2017-10-30 VITALS
OXYGEN SATURATION: 96 % | BODY MASS INDEX: 27.11 KG/M2 | HEIGHT: 63 IN | DIASTOLIC BLOOD PRESSURE: 60 MMHG | HEART RATE: 41 BPM | SYSTOLIC BLOOD PRESSURE: 138 MMHG | WEIGHT: 153 LBS

## 2017-10-30 DIAGNOSIS — R53.83 FATIGUE, UNSPECIFIED TYPE: Primary | ICD-10-CM

## 2017-10-30 DIAGNOSIS — I48.0 PAROXYSMAL ATRIAL FIBRILLATION (HCC): ICD-10-CM

## 2017-10-30 DIAGNOSIS — R00.1 BRADYCARDIA: ICD-10-CM

## 2017-10-30 DIAGNOSIS — R00.2 PALPITATIONS: ICD-10-CM

## 2017-10-30 PROCEDURE — 99214 OFFICE O/P EST MOD 30 MIN: CPT | Performed by: NURSE PRACTITIONER

## 2017-10-30 PROCEDURE — 93000 ELECTROCARDIOGRAM COMPLETE: CPT | Performed by: NURSE PRACTITIONER

## 2017-10-30 NOTE — PROGRESS NOTES
family member; Stroke in her father. Review of System:    · General ROS: negative for chills, fever, change in weight   · Psychological ROS: negative for  anxiety or depression  · Ophthalmic ROS: negative for  eye pain or loss of vision  · ENT ROS: negative for  headaches, sore throat   · Allergy and Immunology ROS: negative for  hives  · Hematological and Lymphatic ROS: negative for  bleeding problems, blood clots, bruising or jaundice  · Endocrine ROS: negative for  skin changes, temperature intolerance or unexpected weight changes  · Respiratory ROS: negative for  cough, sputum, wheezing, shortness of breath   · Cardiovascular ROS: Per HPI. · Gastrointestinal ROS: negative for abdominal pain, diarrhea, nausea/vomiting, bleeding   · Musculoskeletal ROS: negative for  joint swelling, pain    · Neurological ROS: negative for  confusion, numbness/tingling, seizures, weakness  · Dermatological ROS: negative for rash, changes in skin color, lesions     Physical Examination:  /60 (Site: Left Arm, Position: Sitting, Cuff Size: Large Adult)   Pulse (!) 41   Ht 5' 3\" (1.6 m)   Wt 153 lb (69.4 kg)   SpO2 96%   BMI 27.10 kg/m²     · Constitutional: Oriented. No distress. · Head: Normocephalic and atraumatic. · Mouth/Throat: Oropharynx is clear and moist.   · Eyes: Conjunctivae normal. EOM are normal.   · Neck: Normal range of motion. Neck supple. No rigidity. No JVD present. · Cardiovascular: Normal rate, regular rhythm, S1&S2 and intact distal pulses. · Pulmonary/Chest: Bilateral respiratory sounds. No wheezes. No rhonchi. · Abdominal: Soft. Bowel sounds present. No distension, No tenderness. · Musculoskeletal: No tenderness. No edema    · Neurological: Alert and oriented. Cranial nerve appears intact, No Gross deficit   · Skin: Skin is warm and dry. No rash noted.    · Psychiatric: Has a normal mood, forgetful, flat affect       Labs:  Lab Results   Component Value Date    CREATININE 1.0 08/28/2017    BUN 23 (H) 08/28/2017     08/28/2017    K 5.0 08/28/2017    CL 94 (L) 08/28/2017    CO2 31 08/28/2017       ECG: personally reviewed     Echo: 6/30/15  Patient appears to be in atrial fibrillation. Left ventricle size is normal. Normal left ventricular wall thickness. Ejection fraction is visually estimated to be 55-60 %. No regional wall motion abnormalities are noted. Normal right ventricular size and function. The left atrium is mildly dilated. Mild mitral, tricuspid, and pulmonic regurgitation. Trace aortic regurgitation. Estimated pulmonary artery systolic pressure is 30 mmHg assuming a right atrial pressure of 5 mmHg.     Stress Test: 3/2013  No EKG evidence for stress induced ischemia. No echocardiographic evidence for stress induced ischemia. Normal stress echo. Assessment:   Patient Active Problem List    Diagnosis Date Noted    Palpitations 07/18/2011     Priority: High    Hypothyroidism 01/30/2017    Alopecia 11/22/2016    Actinic keratosis 11/22/2016    Bradycardia 06/15/2016    Vitamin D deficiency 09/28/2015    Low back pain 09/21/2015    Atrial fibrillation ([facility] [address] 06/29/2015    Chest pain 06/29/2015    Anxiety 03/30/2015    Sinusitis 12/16/2014    Hematoma 81/29/6192    Uncomplicated asthma 30/07/1875    Abdominal wall pain 04/16/2014    Osteoarthritis of left hip 12/18/2013    Lumbar radiculopathy 12/18/2013    Back pain 11/20/2013    Mixed hyperlipidemia 08/24/2011    Osteoporosis 08/24/2011    Insomnia 08/24/2011    Restless leg syndrome 08/24/2011    Fatigue 07/19/2011    Dyspnea 07/19/2011    Cervical pain 07/30/2010    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  Bradycardia   On beta-blocker therapy    Stop beta-blocker   2. Fatigue and dizziness   Patient's heart rate is stable compared to previous   OK to trial stopping beta-blocker therapy however would also discuss with neurology consideration of decreasing Aricept   Family reports onset of symptoms correlate to dosage increase    Continue to monitor BP and HR off beta-blocker      3. Atrial Fibrillation    Paroxysmal    Symptomatic with shortness of breath and palpitations   Hold beta-blocker secondary to worsening symptoms    On anticoagulation therapy    Continue     Thank you for allowing me to participate in the care of Army Molt. Further evaluation will be based upon the patient's clinical course and testing results. All questions and concerns were addressed to the patient/family. Alternatives to my treatment were discussed. Anthony Ruiz, [address]  Electrophysiology   10/30/2017  11:48 AM      Patient has atrial fibrillation: Yes     CHADSVASC:  3   EF: 55-60  Patient is on anticoagulation therapy:  Yes     Patient has CAD:  No      Patient instructed on life style modifications   Tobacco use was discussed with the patient and patient educated on the negative effects. I have asked the patient to not utilize these agents.       FASTING LIPID PANEL:  Lab Results   Component Value Date    HDL 42 02/29/2016    HDL 44 04/27/2012    LDLCALC 71 02/29/2016    TRIG 74 02/29/2016

## 2017-10-30 NOTE — PATIENT INSTRUCTIONS
Stop taking the Lopressor    Call your neurologist about the dosage of the Aricept        Please monitor your heart rate

## 2017-11-01 ENCOUNTER — TELEPHONE (OUTPATIENT)
Dept: FAMILY MEDICINE CLINIC | Age: 82
End: 2017-11-01

## 2017-11-01 DIAGNOSIS — F03.90 SENILE DEMENTIA, UNCOMPLICATED (HCC): Primary | ICD-10-CM

## 2017-11-07 ENCOUNTER — OFFICE VISIT (OUTPATIENT)
Dept: FAMILY MEDICINE CLINIC | Age: 82
End: 2017-11-07

## 2017-11-07 VITALS
BODY MASS INDEX: 26.86 KG/M2 | DIASTOLIC BLOOD PRESSURE: 82 MMHG | TEMPERATURE: 97.5 F | SYSTOLIC BLOOD PRESSURE: 148 MMHG | HEIGHT: 63 IN | WEIGHT: 151.6 LBS

## 2017-11-07 DIAGNOSIS — N39.0 URINARY TRACT INFECTION WITHOUT HEMATURIA, SITE UNSPECIFIED: ICD-10-CM

## 2017-11-07 DIAGNOSIS — N39.0 URINARY TRACT INFECTION WITHOUT HEMATURIA, SITE UNSPECIFIED: Primary | ICD-10-CM

## 2017-11-07 LAB
BILIRUBIN URINE: NEGATIVE
BLOOD, URINE: NEGATIVE
CLARITY: CLEAR
COLOR: YELLOW
EPITHELIAL CELLS, UA: 0 /HPF (ref 0–5)
GLUCOSE URINE: NEGATIVE MG/DL
HYALINE CASTS: 0 /LPF (ref 0–8)
KETONES, URINE: NEGATIVE MG/DL
LEUKOCYTE ESTERASE, URINE: NEGATIVE
MICROSCOPIC EXAMINATION: NORMAL
NITRITE, URINE: NEGATIVE
PH UA: 7
PROTEIN UA: NEGATIVE MG/DL
RBC UA: 1 /HPF (ref 0–4)
SPECIFIC GRAVITY UA: 1.01
UROBILINOGEN, URINE: 0.2 E.U./DL
WBC UA: 0 /HPF (ref 0–5)

## 2017-11-07 PROCEDURE — 99213 OFFICE O/P EST LOW 20 MIN: CPT | Performed by: FAMILY MEDICINE

## 2017-11-07 RX ORDER — NITROFURANTOIN 25; 75 MG/1; MG/1
100 CAPSULE ORAL 2 TIMES DAILY
Qty: 14 CAPSULE | Refills: 0 | Status: SHIPPED | OUTPATIENT
Start: 2017-11-07 | End: 2017-12-29 | Stop reason: ALTCHOICE

## 2017-11-07 NOTE — PROGRESS NOTES
Subjective:      Patient ID: Olman Martínez is a 80 y.o. female. Patient presents with:  Urinary Tract Infection: frequency, weakness   Shaking    3 days of frequency and some suprapubic pain  No fever no flank pain  No blood  Still has not seen the lung doctor for the cough  I did again offer to make appt and her and daughter declined    She has these episodes of tremors or shakes some times with anxiety her daughter thinks  However it seems also to have coincided with the increase in the aricept     height is 5' 3\" (1.6 m) and weight is 151 lb 9.6 oz (68.8 kg). Her oral temperature is 97.5 °F (36.4 °C). Her blood pressure is 148/82 (abnormal). Review of Systems    Objective:   Physical Exam   Constitutional: She appears well-developed and well-nourished. No distress. Abdominal: Soft. Normal appearance. She exhibits no distension, no pulsatile midline mass and no mass. There is no hepatosplenomegaly. There is no tenderness. There is no rigidity, no guarding and no CVA tenderness. Neurological: She is alert. Skin: Skin is warm and dry. She is not diaphoretic. No pallor. Assessment:      1.  Urinary tract infection without hematuria, site unspecified  Urinalysis with Microscopic    Urine Culture     I do not see any tremors or shakes at this time no tics  I see that aricept could cause some tremors       Plan:      Do see the lung doctor for the cough  Do stop the aricept for 3 days then restart it at one half pill a day      Orders Placed This Encounter   Medications    nitrofurantoin, macrocrystal-monohydrate, (MACROBID) 100 MG capsule     Sig: Take 1 capsule by mouth 2 times daily     Dispense:  14 capsule     Refill:  0

## 2017-11-08 DIAGNOSIS — J45.909 MODERATE ASTHMA: ICD-10-CM

## 2017-11-08 RX ORDER — ROPINIROLE 1 MG/1
TABLET, FILM COATED ORAL
Qty: 60 TABLET | Refills: 1 | Status: SHIPPED | OUTPATIENT
Start: 2017-11-08 | End: 2017-12-20 | Stop reason: SDUPTHER

## 2017-11-09 LAB — URINE CULTURE, ROUTINE: NORMAL

## 2017-11-10 ENCOUNTER — TELEPHONE (OUTPATIENT)
Dept: FAMILY MEDICINE CLINIC | Age: 82
End: 2017-11-10

## 2017-11-14 ENCOUNTER — TELEPHONE (OUTPATIENT)
Dept: FAMILY MEDICINE CLINIC | Age: 82
End: 2017-11-14

## 2017-11-16 ENCOUNTER — TELEPHONE (OUTPATIENT)
Dept: FAMILY MEDICINE CLINIC | Age: 82
End: 2017-11-16

## 2017-11-16 NOTE — TELEPHONE ENCOUNTER
[phone] - Is not the correct number    [phone] - Corie Revering advised and verbalized understanding.

## 2017-11-16 NOTE — TELEPHONE ENCOUNTER
Pt stated that she is needing something to calm her down, she has the shakes and she is very nervous. Pl advise.   [phone]

## 2017-11-16 NOTE — TELEPHONE ENCOUNTER
I will not give her any sedatives due to safety concerns  She will need to discuss this with dr Kelvin Cortez office  I am not sure what his policy might be

## 2017-11-20 ENCOUNTER — CARE COORDINATION (OUTPATIENT)
Dept: CARE COORDINATION | Age: 82
End: 2017-11-20

## 2017-11-20 NOTE — CARE COORDINATION
Ambulatory Care Coordination ED Follow up Call       Reason for ED Visit:  Epistaxis    Care Management Risk Score: CMRS 5  How are you feeling? :     significantly improved  Patient Reports the following:  none             Contact RN[facility] regarding any worsening symptoms from above. Did you call your PCP prior to going to the ED? No          Post Discharge Status:  What health concerns since you left the Emergency Room? None     Do you have wounds that you are caring for at home? No    Do you have a follow up appt scheduled?  no - Did not want to schedule a follow up appointment with PCP Dr. Sayra Razo     Review of Instructions:                                 Do you have any questions regarding your discharge instructions?:  No  Medications:    What questions do you have about your medications? None   Are you taking your medications as directed? If not - why? Yes   Can you afford your medications? yes  ADLS:  Do you need assistance of any kind at home? No   What assistance is needed? FU appts/Provider:    Future Appointments  Date Time Provider Marysol Cabral   11/20/2017 3:00 PM Jef Garcia [facility]   12/4/2017 10:15 AM Paul Mullins NP [facility]   1/10/2018 10:00 AM Jef Garcia [facility]       There are no preventive care reminders to display for this patient. Patient advised to contact PCP office to have HM items/records faxed to PCP Office directly?   [facility]   [phone]

## 2017-11-27 ENCOUNTER — OFFICE VISIT (OUTPATIENT)
Dept: FAMILY MEDICINE CLINIC | Age: 82
End: 2017-11-27

## 2017-11-27 VITALS
BODY MASS INDEX: 26.05 KG/M2 | SYSTOLIC BLOOD PRESSURE: 138 MMHG | WEIGHT: 147 LBS | TEMPERATURE: 97.6 F | HEIGHT: 63 IN | DIASTOLIC BLOOD PRESSURE: 72 MMHG

## 2017-11-27 DIAGNOSIS — K59.01 SLOW TRANSIT CONSTIPATION: ICD-10-CM

## 2017-11-27 DIAGNOSIS — R35.0 FREQUENCY OF URINATION: ICD-10-CM

## 2017-11-27 LAB
BILIRUBIN, POC: NEGATIVE
BLOOD URINE, POC: NEGATIVE
CLARITY, POC: CLEAR
COLOR, POC: YELLOW
GLUCOSE URINE, POC: NEGATIVE
KETONES, POC: NEGATIVE
LEUKOCYTE EST, POC: NEGATIVE
NITRITE, POC: NEGATIVE
PH, POC: 7.5
PROTEIN, POC: ABNORMAL
SPECIFIC GRAVITY, POC: 1.01
UROBILINOGEN, POC: 0.2

## 2017-11-27 PROCEDURE — 81002 URINALYSIS NONAUTO W/O SCOPE: CPT | Performed by: INTERNAL MEDICINE

## 2017-11-27 PROCEDURE — 99213 OFFICE O/P EST LOW 20 MIN: CPT | Performed by: INTERNAL MEDICINE

## 2017-11-27 ASSESSMENT — ENCOUNTER SYMPTOMS
VOMITING: 0
BLOOD IN STOOL: 0
CONSTIPATION: 0
ABDOMINAL DISTENTION: 1
NAUSEA: 0
COUGH: 1
DIARRHEA: 0

## 2017-11-27 NOTE — PROGRESS NOTES
Subjective:      Patient ID: Pascale Yousif is a 80 y.o. female.     HPI   Chief Complaint   Patient presents with    Abdominal Pain     feels bloated and pain radiating around to back x 1 week    Cough     complains of still spitting up yellow phlegm     Pascale Yousif is a 80 y.o. female with the following history as recorded in [employer]:  Patient Active Problem List    Diagnosis Date Noted    Palpitations 07/18/2011     Priority: High    Hypothyroidism 01/30/2017    Alopecia 11/22/2016    Actinic keratosis 11/22/2016    Bradycardia 06/15/2016    Vitamin D deficiency 09/28/2015    Low back pain 09/21/2015    Atrial fibrillation (HCC) 06/29/2015    Chest pain 06/29/2015    Anxiety 03/30/2015    Sinusitis 12/16/2014    Hematoma 20/69/0462    Uncomplicated asthma 82/11/9264    Abdominal wall pain 04/16/2014    Osteoarthritis of left hip 12/18/2013    Lumbar radiculopathy 12/18/2013    Back pain 11/20/2013    Mixed hyperlipidemia 08/24/2011    Osteoporosis 08/24/2011    Insomnia 08/24/2011    Restless leg syndrome 08/24/2011    Fatigue 07/19/2011    Dyspnea 07/19/2011    Cervical pain 07/30/2010    Other specified extrapyramidal and movement disorders 03/12/2010    Acute upper respiratory infection 01/11/2010    Decreased potassium in the blood 10/09/2009    Anemia 10/09/2009    Acute maxillary sinusitis 08/19/2009    Cough 04/17/2009    Iron deficiency anemia 04/15/2009     Current Outpatient Prescriptions   Medication Sig Dispense Refill    rOPINIRole (REQUIP) 1 MG tablet TAKE TWO TABLETS BY MOUTH NIGHTLY 60 tablet 1    PROAIR  (90 Base) MCG/ACT inhaler 2 PUFFS INTO LUNGS EVERY 4 HOURS AS NEEDED FOR WHEEZING OR FORSHORTNESS OF BREATH 9 Inhaler 0    nitrofurantoin, macrocrystal-monohydrate, (MACROBID) 100 MG capsule Take 1 capsule by mouth 2 times daily 14 capsule 0    Polyethylene Glycol 3350 (MIRALAX PO) Take by mouth as needed (constipation)      donepezil (ARICEPT) 10 MG tablet       traZODone (DESYREL) 50 MG tablet Take 25 mg by mouth nightly       divalproex (DEPAKOTE SPRINKLE) 125 MG capsule 2 times daily       amiodarone (CORDARONE) 200 MG tablet TAKE ONE-HALF OF A TABLET DAILY 15 tablet 5    ELIQUIS 5 MG TABS tablet TAKE 1 TABLET BY MOUTH 2 TIMES DAILY 60 tablet 3    montelukast (SINGULAIR) 10 MG tablet Take 1 tablet by mouth nightly 30 tablet 3    SYMBICORT 160-4.5 MCG/ACT AERO INHALE 2 PUFFS TWICE A DAY INTO THE LUNGS 11 Inhaler 3    Cholecalciferol (VITAMIN D3) 5000 units TABS Take by mouth      levothyroxine (SYNTHROID) 88 MCG tablet TAKE 1 TABLET BY MOUTH DAILY 30 tablet 5    vitamin B-12 (CYANOCOBALAMIN) 1000 MCG tablet Take 1,000 mcg by mouth daily      Compression Stockings MISC by Does not apply route Bilateral lower extremity compression stockings, 15-20 mmHg 2 each 1    mometasone (NASONEX) 50 MCG/ACT nasal spray 2 sprays by Nasal route daily 1 Inhaler 0    Spacer/Aero-Holding Chambers (E-Z SPACER) POLLY 1 Device by Does not apply route daily as needed. 1 Device 0    Calcium Carbonate-Vit D-Min (CALTRATE PLUS PO) Take  by mouth. No current facility-administered medications for this visit. Allergies: Augmentin [amoxicillin-pot clavulanate]; Demerol; Lidocaine hcl; Other; Procaine; Tetanus toxoid, adsorbed; Vicodin [hydrocodone-acetaminophen];  Hydrocodone-acetaminophen; Meperidine; Prednisone; and Tetanus toxoids  Past Medical History:   Diagnosis Date    Abdominal wall pain     Arthritis     left hip    Asthma     Atrial fibrillation (HCC)     Broken nose 2016    Cough variant asthma     Dyspnea     Fatigue     Hematoma     Hyperlipidemia     Insomnia     Lumbar radiculopathy     Osteoporosis     Palpitation     Restless leg syndrome     Shingles     Sinusitis      Past Surgical History:   Procedure Laterality Date    ANKLE SURGERY  1997    left ankle surgery    COLONOSCOPY  10/25/10    normal dr Gomez Link

## 2017-12-01 ENCOUNTER — CARE COORDINATION (OUTPATIENT)
Dept: CARE COORDINATION | Age: 82
End: 2017-12-01

## 2017-12-04 ENCOUNTER — TELEPHONE (OUTPATIENT)
Dept: FAMILY MEDICINE CLINIC | Age: 82
End: 2017-12-04

## 2017-12-04 ENCOUNTER — OFFICE VISIT (OUTPATIENT)
Dept: CARDIOLOGY CLINIC | Age: 82
End: 2017-12-04

## 2017-12-04 VITALS
OXYGEN SATURATION: 97 % | BODY MASS INDEX: 26.4 KG/M2 | DIASTOLIC BLOOD PRESSURE: 78 MMHG | SYSTOLIC BLOOD PRESSURE: 122 MMHG | HEART RATE: 80 BPM | HEIGHT: 63 IN | WEIGHT: 149 LBS

## 2017-12-04 DIAGNOSIS — I48.91 ATRIAL FIBRILLATION, UNSPECIFIED TYPE (HCC): Primary | ICD-10-CM

## 2017-12-04 DIAGNOSIS — R42 DIZZINESS AND GIDDINESS: ICD-10-CM

## 2017-12-04 DIAGNOSIS — R00.1 BRADYCARDIA: ICD-10-CM

## 2017-12-04 PROCEDURE — 93000 ELECTROCARDIOGRAM COMPLETE: CPT | Performed by: NURSE PRACTITIONER

## 2017-12-04 PROCEDURE — 99213 OFFICE O/P EST LOW 20 MIN: CPT | Performed by: NURSE PRACTITIONER

## 2017-12-04 NOTE — PROGRESS NOTES
[address]   Electrophysiology   Date: 12/4/2017  CC:    Chief Complaint   Patient presents with    Atrial Fibrillation     HLD/1 month follow up     I had the privilege of visiting Micaela Ling in the office. She presents today for follow up. Patient was last seen in office about concerns for bradycardia. Family reported increased fatigue and dizziness. At last OV beta-blocker dosage was stopped. Since OV patient's Aricept has also been stopped. Family reports patient had immediate improvement in symptoms off Aricept. Daughter reports overall she has been doing well however anxiety and memory have become worse off Aricept. Patient also was seen in the ED for a nose bleed. Patient denies lightheadedness, dizziness, shortness of breath, chest pain, palpitations, orthopnea, edema, presyncope or syncope. HPI: Micaela Ling is a 80 y.o. Past Medical History:   Diagnosis Date    Abdominal wall pain     Arthritis     left hip    Asthma     Atrial fibrillation (HCC)     Broken nose 2016    Cough variant asthma     Dyspnea     Fatigue     Hematoma     Hyperlipidemia     Insomnia     Lumbar radiculopathy     Osteoporosis     Palpitation     Restless leg syndrome     Shingles     Sinusitis         Past Surgical History:   Procedure Laterality Date    ANKLE SURGERY  1997    left ankle surgery    COLONOSCOPY  10/25/10    normal dr Tracie Lord      right elbow    OVARY REMOVAL Left        Allergies:   Allergies   Allergen Reactions    Augmentin [Amoxicillin-Pot Clavulanate] Itching    Demerol     Lidocaine Hcl     Other     Procaine     Tetanus Toxoid, Adsorbed     Vicodin [Hydrocodone-Acetaminophen]     Hydrocodone-Acetaminophen Palpitations    Meperidine Palpitations    Prednisone Palpitations    Tetanus Toxoids Palpitations       Medication:  Current Outpatient Prescriptions   Medication Sig Dispense Refill    ondansetron (ZOFRAN ODT) 4 MG disintegrating tablet Take 1 tablet by mouth every 12 hours as needed for Nausea 6 tablet 0    acetaminophen-codeine (TYLENOL/CODEINE #3) 300-30 MG per tablet Take 1 tablet by mouth every 6 hours as needed for Pain . 10 tablet 0    rOPINIRole (REQUIP) 1 MG tablet TAKE TWO TABLETS BY MOUTH NIGHTLY 60 tablet 1    PROAIR  (90 Base) MCG/ACT inhaler 2 PUFFS INTO LUNGS EVERY 4 HOURS AS NEEDED FOR WHEEZING OR FORSHORTNESS OF BREATH 9 Inhaler 0    nitrofurantoin, macrocrystal-monohydrate, (MACROBID) 100 MG capsule Take 1 capsule by mouth 2 times daily 14 capsule 0    Polyethylene Glycol 3350 (MIRALAX PO) Take by mouth as needed (constipation)      traZODone (DESYREL) 50 MG tablet Take 25 mg by mouth nightly       divalproex (DEPAKOTE SPRINKLE) 125 MG capsule 2 times daily       amiodarone (CORDARONE) 200 MG tablet TAKE ONE-HALF OF A TABLET DAILY 15 tablet 5    ELIQUIS 5 MG TABS tablet TAKE 1 TABLET BY MOUTH 2 TIMES DAILY 60 tablet 3    montelukast (SINGULAIR) 10 MG tablet Take 1 tablet by mouth nightly 30 tablet 3    SYMBICORT 160-4.5 MCG/ACT AERO INHALE 2 PUFFS TWICE A DAY INTO THE LUNGS 11 Inhaler 3    Cholecalciferol (VITAMIN D3) 5000 units TABS Take by mouth      levothyroxine (SYNTHROID) 88 MCG tablet TAKE 1 TABLET BY MOUTH DAILY 30 tablet 5    vitamin B-12 (CYANOCOBALAMIN) 1000 MCG tablet Take 1,000 mcg by mouth daily      Compression Stockings MISC by Does not apply route Bilateral lower extremity compression stockings, 15-20 mmHg 2 each 1    mometasone (NASONEX) 50 MCG/ACT nasal spray 2 sprays by Nasal route daily 1 Inhaler 0    Spacer/Aero-Holding Chambers (E-Z SPACER) POLLY 1 Device by Does not apply route daily as needed. 1 Device 0    Calcium Carbonate-Vit D-Min (CALTRATE PLUS PO) Take  by mouth. No current facility-administered medications for this visit. Social History:  Reviewed. reports that she quit smoking about 47 years ago.  She has a 5.00 pack-year smoking history. She has never used smokeless tobacco. She reports that she does not drink alcohol or use drugs. Family History:  Reviewed. family history includes Arthritis in an other family member; Asthma in an other family member; Cancer in her mother; Diabetes in an other family member; Heart Disease in an other family member; Stroke in her father. Review of System:    · General ROS: negative for chills, fever, change in weight   · Psychological ROS: negative for  anxiety or depression  · Ophthalmic ROS: negative for  eye pain or loss of vision  · ENT ROS: negative for  headaches, sore throat   · Allergy and Immunology ROS: negative for  hives  · Hematological and Lymphatic ROS: negative for  bleeding problems, blood clots, bruising or jaundice  · Endocrine ROS: negative for  skin changes, temperature intolerance or unexpected weight changes  · Respiratory ROS: negative for  cough, sputum, wheezing, shortness of breath   · Cardiovascular ROS: Per HPI. · Gastrointestinal ROS: negative for abdominal pain, diarrhea, nausea/vomiting, bleeding   · Musculoskeletal ROS: negative for  joint swelling, pain    · Neurological ROS: negative for  confusion, numbness/tingling, seizures, weakness, positive for memory issues   · Dermatological ROS: negative for rash, changes in skin color, lesions     Physical Examination:  /78 (Site: Right Arm, Position: Sitting)   Pulse 80   Ht 5' 3\" (1.6 m)   Wt 149 lb (67.6 kg) Comment: shoes on  SpO2 97%   Breastfeeding? No   BMI 26.39 kg/m²     · Constitutional: Oriented. No distress. · Head: Normocephalic and atraumatic. · Mouth/Throat: Oropharynx is clear and moist.   · Eyes: Conjunctivae normal. EOM are normal.   · Neck: Normal range of motion. Neck supple. No rigidity. No JVD present. · Cardiovascular: Normal rate, regular rhythm, S1&S2 and intact distal pulses. · Pulmonary/Chest: Bilateral respiratory sounds. No wheezes.  No rhonchi. · Abdominal: Soft. Bowel sounds present. No distension, No tenderness. · Musculoskeletal: No tenderness. No edema    · Neurological: Alert and oriented. Cranial nerve appears intact, No Gross deficit   · Skin: Skin is warm and dry. No rash noted. · Psychiatric: Has a normal mood, forgetful, flat affect       Labs:  Lab Results   Component Value Date    CREATININE 0.7 11/30/2017    BUN 12 11/30/2017     11/30/2017    K 4.2 11/30/2017    CL 97 (L) 11/30/2017    CO2 28 11/30/2017       ECG: personally reviewed   12/4/17  AF rate controlled     Echo: 6/30/15  Patient appears to be in atrial fibrillation. Left ventricle size is normal. Normal left ventricular wall thickness. Ejection fraction is visually estimated to be 55-60 %. No regional wall motion abnormalities are noted. Normal right ventricular size and function. The left atrium is mildly dilated. Mild mitral, tricuspid, and pulmonic regurgitation. Trace aortic regurgitation. Estimated pulmonary artery systolic pressure is 30 mmHg assuming a right atrial pressure of 5 mmHg.     Stress Test: 3/2013  No EKG evidence for stress induced ischemia. No echocardiographic evidence for stress induced ischemia. Normal stress echo.     Assessment:   Patient Active Problem List    Diagnosis Date Noted    Palpitations 07/18/2011     Priority: High    Frequency of urination 11/27/2017    Slow transit constipation 11/27/2017    Hypothyroidism 01/30/2017    Alopecia 11/22/2016    Actinic keratosis 11/22/2016    Bradycardia 06/15/2016    Vitamin D deficiency 09/28/2015    Low back pain 09/21/2015    Atrial fibrillation ([facility] [address] 06/29/2015    Chest pain 06/29/2015    Anxiety 03/30/2015    Sinusitis 12/16/2014    Hematoma 82/47/7826    Uncomplicated asthma 51/29/0511    Abdominal wall pain 04/16/2014    Osteoarthritis of left hip 12/18/2013    Lumbar radiculopathy 12/18/2013    Back pain 11/20/2013    Mixed hyperlipidemia 08/24/2011    Osteoporosis 08/24/2011    Insomnia 08/24/2011    Restless leg syndrome 08/24/2011    Fatigue 07/19/2011    Dyspnea 07/19/2011    Cervical pain 07/30/2010    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 Bradycardia   Improved off beta-blocker therapy      2. Fatigue and dizziness   Improved   Likely secondary to dosage of Aricept    F/u with PCP to consider restarting lower dose  3. Atrial Fibrillation    Paroxysmal    Rate controlled    On low dose Amiodarone    On Eliquis    Symptomatic with shortness of breath and palpitations  4. Alzheimer's    Defer further management to PCP       Thank you for allowing me to participate in the care of Caden Adler. Further evaluation will be based upon the patient's clinical course and testing results. All questions and concerns were addressed to the patient/family. Alternatives to my treatment were discussed. Mabel Puente, [address]  Electrophysiology   12/4/2017  10:20 AM      Patient has atrial fibrillation: Yes     CHADSVASC:  3   EF: 55-60  Patient is on anticoagulation therapy:  Yes     Patient has CAD:  No      Patient instructed on life style modifications   Tobacco use was discussed with the patient and patient educated on the negative effects. I have asked the patient to not utilize these agents.       FASTING LIPID PANEL:  Lab Results   Component Value Date    HDL 42 02/29/2016    HDL 44 04/27/2012    LDLCALC 71 02/29/2016    TRIG 74 02/29/2016

## 2017-12-04 NOTE — PATIENT INSTRUCTIONS
Follow up with Dr Meaghan Hui concerning restarting the Aricept and what dosage (did not tolerate the 10 mg)     Use normal saline spray and humidifier at night to help with nose bleeds     No changes to medications today

## 2017-12-21 RX ORDER — ROPINIROLE 1 MG/1
TABLET, FILM COATED ORAL
Qty: 60 TABLET | Refills: 3 | Status: SHIPPED | OUTPATIENT
Start: 2017-12-21 | End: 2018-05-01 | Stop reason: SDUPTHER

## 2017-12-29 PROCEDURE — G0179 MD RECERTIFICATION HHA PT: HCPCS | Performed by: FAMILY MEDICINE

## 2018-01-02 ENCOUNTER — TELEPHONE (OUTPATIENT)
Dept: FAMILY MEDICINE CLINIC | Age: 83
End: 2018-01-02

## 2018-01-02 ENCOUNTER — OFFICE VISIT (OUTPATIENT)
Dept: FAMILY MEDICINE CLINIC | Age: 83
End: 2018-01-02

## 2018-01-02 VITALS
HEIGHT: 63 IN | TEMPERATURE: 98 F | BODY MASS INDEX: 26.51 KG/M2 | WEIGHT: 149.6 LBS | DIASTOLIC BLOOD PRESSURE: 62 MMHG | SYSTOLIC BLOOD PRESSURE: 134 MMHG

## 2018-01-02 DIAGNOSIS — E03.8 OTHER SPECIFIED HYPOTHYROIDISM: ICD-10-CM

## 2018-01-02 DIAGNOSIS — M54.50 ACUTE LEFT-SIDED LOW BACK PAIN WITHOUT SCIATICA: ICD-10-CM

## 2018-01-02 DIAGNOSIS — M54.50 ACUTE LEFT-SIDED LOW BACK PAIN WITHOUT SCIATICA: Primary | ICD-10-CM

## 2018-01-02 DIAGNOSIS — R00.2 PALPITATIONS: Primary | ICD-10-CM

## 2018-01-02 LAB
BILIRUBIN URINE: NEGATIVE
BLOOD, URINE: NEGATIVE
CLARITY: CLEAR
COLOR: YELLOW
EPITHELIAL CELLS, UA: 1 /HPF (ref 0–5)
GLUCOSE URINE: NEGATIVE MG/DL
HYALINE CASTS: 0 /LPF (ref 0–8)
KETONES, URINE: NEGATIVE MG/DL
LEUKOCYTE ESTERASE, URINE: NEGATIVE
MICROSCOPIC EXAMINATION: NORMAL
NITRITE, URINE: NEGATIVE
PH UA: 7.5
PROTEIN UA: NEGATIVE MG/DL
RBC UA: 1 /HPF (ref 0–4)
SPECIFIC GRAVITY UA: 1.01
T4 FREE: 1.3 NG/DL (ref 0.9–1.8)
TSH SERPL DL<=0.05 MIU/L-ACNC: 3.26 UIU/ML (ref 0.27–4.2)
UROBILINOGEN, URINE: 0.2 E.U./DL
WBC UA: 0 /HPF (ref 0–5)

## 2018-01-02 PROCEDURE — 99213 OFFICE O/P EST LOW 20 MIN: CPT | Performed by: FAMILY MEDICINE

## 2018-01-02 NOTE — PROGRESS NOTES
Subjective:      Patient ID: Douglas Oshea is a 80 y.o. female. Patient presents with:  Cough: still with cough  Leg Swelling: right leg worse than left  Back Pain  Abdominal Pain  Anxiety    \"ineed nerve pills\"  She has cough   Some leg swelling  Back pain. Back pain on the left this weekend  She is not using the inhaler reg at all, ie the albuterol  When she uses it with the spacer the daughter notes improvement  She does see dr Alta Lundborg for the urine    I reviewed the meds with her and daughter. Patient did not know but the daughter gave me info   She is seeing the geriatrician. She is now on linzess 290.  Daughter tells me the bm are doing better and the depakote has helped a lot  No fever no cp no sob    Current Outpatient Prescriptions:     metoprolol tartrate (LOPRESSOR) 25 MG tablet,     donepezil (ARICEPT) 5 MG tablet,     rOPINIRole (REQUIP) 1 MG tablet, TAKE TWO TABLETS BY MOUTH NIGHTLY    PROAIR  (90 Base) MCG/ACT inhaler, 2 PUFFS INTO LUNGS EVERY 4 HOURS AS NEEDED FOR WHEEZING OR FORSHORTNESS OF BREATH,    Polyethylene Glycol 3350 (MIRALAX PO), Take by mouth as needed (constipation)    traZODone (DESYREL) 50 MG tablet, Take 25 mg by mouth nightly     divalproex (DEPAKOTE SPRINKLE) 125 MG capsule, 2 times daily ,    amiodarone (CORDARONE) 200 MG tablet, TAKE ONE-HALF OF A TABLET DAILY    ELIQUIS 5 MG TABS tablet, TAKE 1 TABLET BY MOUTH 2 TIMES DAILY    montelukast (SINGULAIR) 10 MG tablet, Take 1 tablet by mouth nightly    SYMBICORT 160-4.5 MCG/ACT AERO, INHALE 2 PUFFS TWICE A DAY INTO THE LUNGS    Cholecalciferol (VITAMIN D3) 5000 units TABS, Take by mouth    levothyroxine (SYNTHROID) 88 MCG tablet, TAKE 1 TABLET BY MOUTH DAILY    vitamin B-12 (CYANOCOBALAMIN) 1000 MCG tablet, Take 1,000 mcg by mouth daily    Compression Stockings MISC, by Does not apply route Bilateral lower extremity compression stockings, 15-20 mmHg    mometasone (NASONEX) 50 MCG/ACT nasal spray, 2 sprays

## 2018-01-08 ENCOUNTER — HOSPITAL ENCOUNTER (OUTPATIENT)
Dept: NON INVASIVE DIAGNOSTICS | Age: 83
Discharge: HOME | End: 2018-01-08
Attending: FAMILY MEDICINE | Admitting: FAMILY MEDICINE

## 2018-01-08 ENCOUNTER — TELEPHONE (OUTPATIENT)
Dept: FAMILY MEDICINE CLINIC | Age: 83
End: 2018-01-08

## 2018-01-08 ENCOUNTER — OFFICE VISIT (OUTPATIENT)
Dept: FAMILY MEDICINE CLINIC | Age: 83
End: 2018-01-08

## 2018-01-08 VITALS
HEIGHT: 63 IN | BODY MASS INDEX: 26.05 KG/M2 | SYSTOLIC BLOOD PRESSURE: 124 MMHG | DIASTOLIC BLOOD PRESSURE: 78 MMHG | WEIGHT: 147 LBS | TEMPERATURE: 97.6 F

## 2018-01-08 DIAGNOSIS — M79.672 LEFT FOOT PAIN: Primary | ICD-10-CM

## 2018-01-08 DIAGNOSIS — M79.672 LEFT FOOT PAIN: ICD-10-CM

## 2018-01-08 DIAGNOSIS — R10.10 PAIN LOCALIZED TO UPPER ABDOMEN: ICD-10-CM

## 2018-01-08 PROCEDURE — 99213 OFFICE O/P EST LOW 20 MIN: CPT | Performed by: FAMILY MEDICINE

## 2018-01-08 RX ORDER — SULFAMETHOXAZOLE AND TRIMETHOPRIM 800; 160 MG/1; MG/1
1 TABLET ORAL 2 TIMES DAILY
Qty: 14 TABLET | Refills: 0 | Status: SHIPPED | OUTPATIENT
Start: 2018-01-08 | End: 2018-01-29 | Stop reason: ALTCHOICE

## 2018-01-08 NOTE — PROGRESS NOTES
Subjective:      Patient ID: Stefania Angel is a 80 y.o. female. Patient presents with: Ankle Pain: left ankle swollen and red, left foot and toe numbness  Abdominal Pain: feels like cramps radiates around to back x weeks  Anxiety    Sx for 2 days per daughter here today  She has no trauma  No fever    on going abdomen discomfort. Upper abdomen. I need something for my nerves  I need something for sleep     height is 5' 3\" (1.6 m) and weight is 147 lb (66.7 kg). Her oral temperature is 97.6 °F (36.4 °C). Her blood pressure is 124/78. Review of Systems    Objective:   Physical Exam   Constitutional: She appears well-developed and well-nourished. No distress. Cardiovascular:   Pulses:       Dorsalis pedis pulses are 2+ on the left side. Posterior tibial pulses are 2+ on the left side. Varicose veins in the lower legs  Trace edema about the foot and ankle   Abdominal: Soft. Normal appearance and bowel sounds are normal. She exhibits no mass. There is no hepatosplenomegaly. There is no tenderness. There is no rigidity and no guarding. Abdomen exam is unremarkable   Musculoskeletal:   She has mild red along the lateral left foot no increase warmth. She  Has a irritated callous on the lateral aspect of the 5th MTP joint on the left no d/c no open area   Neurological: She is alert. She displays no tremor. Repetitive. Does have poor memory    Skin: Skin is warm, dry and intact. She is not diaphoretic. No pallor. Psychiatric: Her speech is normal.       Assessment:      1.  Left foot pain  XR FOOT LEFT (MIN 3 VIEWS)    CANCELED: XR FOOT LEFT (2 VIEWS)   2. Pain localized to upper abdomen  US Gallbladder Ruq     She has a hx of vague pain and has seen gi and has had w/u   Will check us of the gallbladder      Plan:      Do take the antibiotic  See me in one week   Get the xray        Orders Placed This Encounter   Medications    sulfamethoxazole-trimethoprim (BACTRIM DS) 800-160 MG per tablet

## 2018-01-09 RX ORDER — MONTELUKAST SODIUM 10 MG/1
10 TABLET ORAL NIGHTLY
Qty: 30 TABLET | Refills: 5 | Status: SHIPPED | OUTPATIENT
Start: 2018-01-09 | End: 2018-07-26 | Stop reason: SDUPTHER

## 2018-01-10 ENCOUNTER — OFFICE VISIT (OUTPATIENT)
Dept: PULMONOLOGY | Age: 83
End: 2018-01-10

## 2018-01-10 ENCOUNTER — HOSPITAL ENCOUNTER (OUTPATIENT)
Dept: ULTRASOUND IMAGING | Age: 83
Discharge: HOME | End: 2018-01-10
Attending: FAMILY MEDICINE | Admitting: FAMILY MEDICINE

## 2018-01-10 VITALS
HEART RATE: 99 BPM | BODY MASS INDEX: 26.58 KG/M2 | RESPIRATION RATE: 16 BRPM | OXYGEN SATURATION: 96 % | TEMPERATURE: 97.2 F | SYSTOLIC BLOOD PRESSURE: 132 MMHG | WEIGHT: 150 LBS | HEIGHT: 63 IN | DIASTOLIC BLOOD PRESSURE: 76 MMHG

## 2018-01-10 DIAGNOSIS — J45.40 MODERATE PERSISTENT ASTHMA WITHOUT COMPLICATION: Primary | ICD-10-CM

## 2018-01-10 DIAGNOSIS — R05.9 COUGH: ICD-10-CM

## 2018-01-10 DIAGNOSIS — J40 BRONCHITIS: ICD-10-CM

## 2018-01-10 DIAGNOSIS — R10.10 PAIN LOCALIZED TO UPPER ABDOMEN: ICD-10-CM

## 2018-01-10 DIAGNOSIS — R10.10 UPPER ABDOMINAL PAIN: ICD-10-CM

## 2018-01-10 PROCEDURE — 99214 OFFICE O/P EST MOD 30 MIN: CPT | Performed by: INTERNAL MEDICINE

## 2018-01-10 RX ORDER — METHYLPREDNISOLONE 8 MG/1
8 TABLET ORAL 2 TIMES DAILY
Qty: 14 TABLET | Refills: 0 | Status: SHIPPED | OUTPATIENT
Start: 2018-01-10 | End: 2018-01-17

## 2018-01-10 RX ORDER — AZITHROMYCIN 250 MG/1
250 TABLET, FILM COATED ORAL DAILY
Qty: 6 TABLET | Refills: 0 | Status: SHIPPED | OUTPATIENT
Start: 2018-01-10 | End: 2018-01-29 | Stop reason: ALTCHOICE

## 2018-01-10 RX ORDER — MIRABEGRON 50 MG/1
TABLET, FILM COATED, EXTENDED RELEASE ORAL
COMMUNITY
Start: 2017-12-20 | End: 2018-07-02 | Stop reason: ALTCHOICE

## 2018-01-10 RX ORDER — LINACLOTIDE 290 UG/1
290 CAPSULE, GELATIN COATED ORAL
COMMUNITY
Start: 2017-12-11

## 2018-01-10 ASSESSMENT — ASTHMA QUESTIONNAIRES
QUESTION_1 LAST FOUR WEEKS HOW MUCH OF THE TIME DID YOUR ASTHMA KEEP YOU FROM GETTING AS MUCH DONE AT WORK, SCHOOL OR AT HOME: 2
QUESTION_2 LAST FOUR WEEKS HOW OFTEN HAVE YOU HAD SHORTNESS OF BREATH: 1
ACT_TOTALSCORE: 8
QUESTION_3 LAST FOUR WEEKS HOW OFTEN DID YOUR ASTHMA SYMPTOMS (WHEEZING, COUGHING, SHORTNESS OF BREATH, CHEST TIGHTNESS OR PAIN) WAKE YOU UP AT NIGHT OR EARLIER THAN USUAL IN THE MORNING: 1
QUESTION_5 LAST FOUR WEEKS HOW WOULD YOU RATE YOUR ASTHMA CONTROL: 2
QUESTION_4 LAST FOUR WEEKS HOW OFTEN HAVE YOU USED YOUR RESCUE INHALER OR NEBULIZER MEDICATION (SUCH AS ALBUTEROL): 2

## 2018-01-10 NOTE — PROGRESS NOTES
clubbing  NEURO:  No localizing deficits, CN II-XII intact    Pulmonary Function Testing 3/9/2015  FEV1 1.66 L at 102% predicted (normal)  FVC 2.32 L at 105% predicted (normal)  FEV1/FVC ratio at 72 (normal)  TLC na L at na% predicted (na)  DLCO 15.1 at 83% predicted (normal)  Overall Normal Spirometry. Normal diffusion    Chest CT from 2/16 is reviewed. My interpretation is bilateral nodules. Non-calcified with calcified granuloma    Stress ECHO  No EKG evidence for stress induced ischemia  No echocardiographic evidence for stress induced ischemia. Normal stress echo  Lab Results   Component Value Date    WBC 9.3 11/30/2017    HGB 11.2 (L) 11/30/2017    HCT 35.2 (L) 11/30/2017    MCV 88.6 11/30/2017     11/30/2017     Total IgE Level:  6  Significant allergies:  None    ASTHMA CONTROL TEST 1/10/2018 5/3/2017 10/4/2016 10/4/2016 3/10/2016   In the past 4 weeks, how much of the time did your asthma keep you from getting as much done at work, school or at home? 2 2 5 5 3   During the past 4 weeks, how often have you had shortness of breath? 1 2 3 3 3   During the past 4 weeks, how often did your asthma symptoms (wheezing, coughing, shortness of breath, chest tightness or pain) wake you up at night or earlier than usual in the morning? 1 1 4 4 4   During the past 4 weeks, how often have you used your rescue inhaler or nebulizer medication (such as albuterol)? 2 1 1 1 2   How would you rate your asthma control during the past 4 weeks? 2 2 3 3 3   Asthma Control Test Total Score 8 8 16 16 15       No results found for: BNP    Lab Results   Component Value Date    CREATININE 0.7 11/30/2017    BUN 12 11/30/2017     11/30/2017    K 4.2 11/30/2017    CL 97 (L) 11/30/2017    CO2 28 11/30/2017     Assessment/Plan:  1. Moderate persistent asthma without complication  Slight worse due to bronchitis. Continue with symbicort, albuterol and singulair    2.  Cough  Due to acute infection/resolving infection and

## 2018-01-10 NOTE — PATIENT INSTRUCTIONS
Take medrol twice a day for 7 days    Take Zpak    Continue with inhalers and singulair    Ok to use over the counter decongestant    Follow up in 4 months

## 2018-01-12 ENCOUNTER — TELEPHONE (OUTPATIENT)
Dept: FAMILY MEDICINE CLINIC | Age: 83
End: 2018-01-12

## 2018-01-12 ENCOUNTER — TELEPHONE (OUTPATIENT)
Dept: CARDIOLOGY CLINIC | Age: 83
End: 2018-01-12

## 2018-01-12 NOTE — TELEPHONE ENCOUNTER
Salbador Cantu called in wanting to know if Dede Benavides NP would be able to send her in a prescription or something for nerves. She said that she is so shaky and doesn't know why. I asked her if she spoke to her PCP, she said that she would try them again. I told her that I would at least get a message in for her.     Salbador Cantu can be reached at [phone]

## 2018-01-15 ENCOUNTER — TELEPHONE (OUTPATIENT)
Dept: FAMILY MEDICINE CLINIC | Age: 83
End: 2018-01-15

## 2018-01-16 ENCOUNTER — OFFICE VISIT (OUTPATIENT)
Dept: FAMILY MEDICINE CLINIC | Age: 83
End: 2018-01-16

## 2018-01-16 VITALS
WEIGHT: 146.2 LBS | SYSTOLIC BLOOD PRESSURE: 132 MMHG | DIASTOLIC BLOOD PRESSURE: 70 MMHG | HEIGHT: 63 IN | BODY MASS INDEX: 25.91 KG/M2 | TEMPERATURE: 97.6 F

## 2018-01-16 DIAGNOSIS — M79.672 LEFT FOOT PAIN: Primary | ICD-10-CM

## 2018-01-16 PROBLEM — R35.0 FREQUENCY OF URINATION: Status: RESOLVED | Noted: 2017-11-27 | Resolved: 2018-01-16

## 2018-01-16 PROBLEM — K59.01 SLOW TRANSIT CONSTIPATION: Status: RESOLVED | Noted: 2017-11-27 | Resolved: 2018-01-16

## 2018-01-16 PROCEDURE — 99213 OFFICE O/P EST LOW 20 MIN: CPT | Performed by: FAMILY MEDICINE

## 2018-01-16 RX ORDER — SULFAMETHOXAZOLE AND TRIMETHOPRIM 800; 160 MG/1; MG/1
TABLET ORAL
Qty: 14 TABLET | Refills: 0 | OUTPATIENT
Start: 2018-01-16

## 2018-01-16 RX ORDER — LEVOTHYROXINE SODIUM 88 UG/1
TABLET ORAL
Qty: 30 TABLET | Refills: 3 | Status: SHIPPED | OUTPATIENT
Start: 2018-01-16 | End: 2018-05-30 | Stop reason: SDUPTHER

## 2018-01-17 ENCOUNTER — OFFICE VISIT (OUTPATIENT)
Dept: ORTHOPEDIC SURGERY | Age: 83
End: 2018-01-17

## 2018-01-17 VITALS
DIASTOLIC BLOOD PRESSURE: 68 MMHG | RESPIRATION RATE: 15 BRPM | WEIGHT: 146 LBS | BODY MASS INDEX: 25.87 KG/M2 | HEART RATE: 80 BPM | SYSTOLIC BLOOD PRESSURE: 135 MMHG | HEIGHT: 63 IN

## 2018-01-17 DIAGNOSIS — M77.42 METATARSALGIA OF LEFT FOOT: Primary | ICD-10-CM

## 2018-01-17 PROCEDURE — 99214 OFFICE O/P EST MOD 30 MIN: CPT | Performed by: ORTHOPAEDIC SURGERY

## 2018-01-17 NOTE — PROGRESS NOTES
88 MCG tablet TAKE 1 TABLET BY MOUTH DAILY 30 tablet 3    MYRBETRIQ 50 MG TB24       LINZESS 290 MCG CAPS capsule       methylPREDNISolone (MEDROL) 8 MG tablet Take 1 tablet by mouth 2 times daily for 7 days 14 tablet 0    azithromycin (ZITHROMAX) 250 MG tablet Take 1 tablet by mouth daily Two pills on first day, then one pill daily for 4 more days. 6 tablet 0    montelukast (SINGULAIR) 10 MG tablet TAKE 1 TABLET BY MOUTH NIGHTLY 30 tablet 5    sulfamethoxazole-trimethoprim (BACTRIM DS) 800-160 MG per tablet Take 1 tablet by mouth 2 times daily 14 tablet 0    donepezil (ARICEPT) 5 MG tablet       rOPINIRole (REQUIP) 1 MG tablet TAKE TWO TABLETS BY MOUTH NIGHTLY 60 tablet 3    PROAIR  (90 Base) MCG/ACT inhaler 2 PUFFS INTO LUNGS EVERY 4 HOURS AS NEEDED FOR WHEEZING OR FORSHORTNESS OF BREATH 9 Inhaler 0    Polyethylene Glycol 3350 (MIRALAX PO) Take by mouth as needed (constipation)      traZODone (DESYREL) 50 MG tablet Take 25 mg by mouth nightly       divalproex (DEPAKOTE SPRINKLE) 125 MG capsule 2 times daily       amiodarone (CORDARONE) 200 MG tablet TAKE ONE-HALF OF A TABLET DAILY 15 tablet 5    ELIQUIS 5 MG TABS tablet TAKE 1 TABLET BY MOUTH 2 TIMES DAILY 60 tablet 3    SYMBICORT 160-4.5 MCG/ACT AERO INHALE 2 PUFFS TWICE A DAY INTO THE LUNGS 11 Inhaler 3    Cholecalciferol (VITAMIN D3) 5000 units TABS Take by mouth      vitamin B-12 (CYANOCOBALAMIN) 1000 MCG tablet Take 1,000 mcg by mouth daily      Compression Stockings MISC by Does not apply route Bilateral lower extremity compression stockings, 15-20 mmHg 2 each 1    mometasone (NASONEX) 50 MCG/ACT nasal spray 2 sprays by Nasal route daily 1 Inhaler 0    Spacer/Aero-Holding Chambers (E-Z SPACER) POLLY 1 Device by Does not apply route daily as needed. 1 Device 0    Calcium Carbonate-Vit D-Min (CALTRATE PLUS PO) Take  by mouth. No current facility-administered medications on file prior to visit.         Pertinent items are noted

## 2018-01-26 ENCOUNTER — CARE COORDINATION (OUTPATIENT)
Dept: CASE MANAGEMENT | Age: 83
End: 2018-01-26

## 2018-01-29 ENCOUNTER — OFFICE VISIT (OUTPATIENT)
Dept: FAMILY MEDICINE CLINIC | Age: 83
End: 2018-01-29

## 2018-01-29 VITALS
HEIGHT: 63 IN | WEIGHT: 144.6 LBS | SYSTOLIC BLOOD PRESSURE: 134 MMHG | TEMPERATURE: 97.8 F | BODY MASS INDEX: 25.62 KG/M2 | HEART RATE: 80 BPM | DIASTOLIC BLOOD PRESSURE: 70 MMHG

## 2018-01-29 DIAGNOSIS — M79.641 PAIN IN BOTH HANDS: ICD-10-CM

## 2018-01-29 DIAGNOSIS — T14.8XXA ABRASION: Primary | ICD-10-CM

## 2018-01-29 DIAGNOSIS — M79.642 PAIN IN BOTH HANDS: ICD-10-CM

## 2018-01-29 PROCEDURE — 99213 OFFICE O/P EST LOW 20 MIN: CPT | Performed by: FAMILY MEDICINE

## 2018-02-05 ENCOUNTER — OFFICE VISIT (OUTPATIENT)
Dept: FAMILY MEDICINE CLINIC | Age: 83
End: 2018-02-05

## 2018-02-05 VITALS
DIASTOLIC BLOOD PRESSURE: 82 MMHG | HEIGHT: 63 IN | BODY MASS INDEX: 25.59 KG/M2 | SYSTOLIC BLOOD PRESSURE: 130 MMHG | TEMPERATURE: 97.8 F | WEIGHT: 144.4 LBS

## 2018-02-05 DIAGNOSIS — R10.13 EPIGASTRIC PAIN: Primary | ICD-10-CM

## 2018-02-05 PROCEDURE — 99213 OFFICE O/P EST LOW 20 MIN: CPT | Performed by: FAMILY MEDICINE

## 2018-02-05 RX ORDER — PANTOPRAZOLE SODIUM 40 MG/1
40 TABLET, DELAYED RELEASE ORAL DAILY
Qty: 30 TABLET | Refills: 0 | Status: SHIPPED | OUTPATIENT
Start: 2018-02-05 | End: 2018-02-28 | Stop reason: SDUPTHER

## 2018-02-05 NOTE — PROGRESS NOTES
Subjective:      Patient ID: Todd Crum is a 80 y.o. female. Patient presents with:  Abdominal Pain    She notes stomach pain intermittent for 2 months  She is taking the linzess  bm regular except today some diarrhea today  No v  No fever  Appetite is fair. Mid abdomen pain and aches.  And gurgles  She did not see the gi doctor and had the appt date wrong  Eating will worsen it   Nothing seems to help  She has had us of gallbladder and also a ct   Her daughter jimmy gave her tums and that took the sx away    YOB: 1929    Date of Visit:  2/5/2018     -- Augmentin (Amoxicillin-Pot Clavulanate) -- Itching   -- Demerol    -- Lidocaine Hcl    -- Other    -- Procaine    -- Tetanus Toxoid, Adsorbed    -- Vicodin (Hydrocodone-Acetaminophen)    -- Hydrocodone-Acetaminophen -- Palpitations   -- Meperidine -- Palpitations   -- Prednisone -- Palpitations   -- Tetanus Toxoids -- Palpitations    Current Outpatient Prescriptions:  levothyroxine (SYNTHROID) 88 MCG tablet, TAKE 1 TABLET BY MOUTH DAILY, Disp: 30 tablet, Rfl: 3  MYRBETRIQ 50 MG TB24, , Disp: , Rfl:   LINZESS 290 MCG CAPS capsule, , Disp: , Rfl:   montelukast (SINGULAIR) 10 MG tablet, TAKE 1 TABLET BY MOUTH NIGHTLY, Disp: 30 tablet, Rfl: 5  donepezil (ARICEPT) 5 MG tablet, , Disp: , Rfl:   rOPINIRole (REQUIP) 1 MG tablet, TAKE TWO TABLETS BY MOUTH NIGHTLY, Disp: 60 tablet, Rfl: 3  PROAIR  (90 Base) MCG/ACT inhaler, 2 PUFFS INTO LUNGS EVERY 4 HOURS AS NEEDED FOR WHEEZING OR FORSHORTNESS OF BREATH, Disp: 9 Inhaler, Rfl: 0  Polyethylene Glycol 3350 (MIRALAX PO), Take by mouth as needed (constipation), Disp: , Rfl:   traZODone (DESYREL) 50 MG tablet, Take 25 mg by mouth nightly , Disp: , Rfl:   divalproex (DEPAKOTE SPRINKLE) 125 MG capsule, 2 times daily , Disp: , Rfl:    amiodarone (CORDARONE) 200 MG tablet, TAKE ONE-HALF OF A TABLET DAILY, Disp: 15 tablet, Rfl: 5  ELIQUIS 5 MG TABS tablet, TAKE 1 TABLET BY MOUTH 2 TIMES DAILY, Disp: 60 tablet, Rfl: 3  SYMBICORT 160-4.5 MCG/ACT AERO, INHALE 2 PUFFS TWICE A DAY INTO THE LUNGS, Disp: 11 Inhaler, Rfl: 3  Cholecalciferol (VITAMIN D3) 5000 units TABS, Take by mouth, Disp: , Rfl:   vitamin B-12 (CYANOCOBALAMIN) 1000 MCG tablet, Take 1,000 mcg by mouth daily, Disp: , Rfl:   Compression Stockings MISC, by Does not apply route Bilateral lower extremity compression stockings, 15-20 mmHg, Disp: 2 each, Rfl: 1  mometasone (NASONEX) 50 MCG/ACT nasal spray, 2 sprays by Nasal route daily, Disp: 1 Inhaler, Rfl: 0  Spacer/Aero-Holding Chambers (E-Z SPACER) POLLY, 1 Device by Does not apply route daily as needed. , Disp: 1 Device, Rfl: 0  Calcium Carbonate-Vit D-Min (CALTRATE PLUS PO), Take  by mouth., Disp: , Rfl:     No current facility-administered medications for this visit.       ---------------------------------                  02/05/18                            1340            ---------------------------------   BP:             130/82            Site:          Left Arm           Position:        Sitting           Cuff Size:     Medium Adult         Temp:      97.8 °F (36.6 °C)      TempSrc:         Oral             Weight: 144 lb 6.4 oz (65.5 kg)   Height:      5' 3\" (1.6 m)       ---------------------------------  Body mass index is 25.58 kg/m². Wt Readings from Last 3 Encounters:  02/05/18 : 144 lb 6.4 oz (65.5 kg)  01/29/18 : 144 lb 9.6 oz (65.6 kg)  01/25/18 : 146 lb 6.2 oz (66.4 kg)    BP Readings from Last 3 Encounters:  02/05/18 : 130/82  01/29/18 : 134/70  01/25/18 : 117/80            Review of Systems    Objective:   Physical Exam   Constitutional: She appears well-developed and well-nourished. No distress. Abdominal: Soft. Normal appearance and bowel sounds are normal. She exhibits no distension, no abdominal bruit, no pulsatile midline mass and no mass. There is no hepatosplenomegaly. There is tenderness in the epigastric area.  There is no rigidity and no

## 2018-02-06 NOTE — TELEPHONE ENCOUNTER
Medication Refill    Medication needing refilled: eliquis     Doseage of the medication: 5mg    How are you taking this medication (QD, BID, TID, QID, PRN): 2x daily     Patient want a 30 or 90 day supply called in: 30 days    Which Pharmacy are we sending the medication to:    Pharmacy:  [address] [place] [address] - [address] [facility]

## 2018-02-08 ENCOUNTER — TELEPHONE (OUTPATIENT)
Dept: FAMILY MEDICINE CLINIC | Age: 83
End: 2018-02-08

## 2018-02-09 RX ORDER — FUROSEMIDE 40 MG/1
TABLET ORAL
Qty: 30 TABLET | Refills: 0 | Status: SHIPPED | OUTPATIENT
Start: 2018-02-09 | End: 2018-03-30 | Stop reason: SDUPTHER

## 2018-02-09 NOTE — TELEPHONE ENCOUNTER
Marcial Nephew (daughter, on HIPAA) states Blanca Oviedo had some leftover lasix and she was holding water and weight was going up and down. Marcial Nephew states the Home Nurse told her to take Lasix and it seems to be helping.

## 2018-02-13 ENCOUNTER — TELEPHONE (OUTPATIENT)
Dept: CARDIOLOGY CLINIC | Age: 83
End: 2018-02-13

## 2018-02-22 PROCEDURE — G0179 MD RECERTIFICATION HHA PT: HCPCS | Performed by: FAMILY MEDICINE

## 2018-02-27 ENCOUNTER — TELEPHONE (OUTPATIENT)
Dept: FAMILY MEDICINE CLINIC | Age: 83
End: 2018-02-27

## 2018-02-27 DIAGNOSIS — I48.91 ATRIAL FIBRILLATION, UNSPECIFIED TYPE (HCC): Primary | ICD-10-CM

## 2018-03-01 RX ORDER — PANTOPRAZOLE SODIUM 40 MG/1
40 TABLET, DELAYED RELEASE ORAL DAILY
Qty: 30 TABLET | Refills: 3 | Status: SHIPPED | OUTPATIENT
Start: 2018-03-01 | End: 2018-07-02 | Stop reason: SDUPTHER

## 2018-03-05 ENCOUNTER — TELEPHONE (OUTPATIENT)
Dept: FAMILY MEDICINE CLINIC | Age: 83
End: 2018-03-05

## 2018-03-06 ENCOUNTER — OFFICE VISIT (OUTPATIENT)
Dept: FAMILY MEDICINE CLINIC | Age: 83
End: 2018-03-06

## 2018-03-06 VITALS
DIASTOLIC BLOOD PRESSURE: 70 MMHG | TEMPERATURE: 98.2 F | BODY MASS INDEX: 25.09 KG/M2 | WEIGHT: 141.6 LBS | SYSTOLIC BLOOD PRESSURE: 122 MMHG | HEIGHT: 63 IN

## 2018-03-06 DIAGNOSIS — J06.9 UPPER RESPIRATORY TRACT INFECTION, UNSPECIFIED TYPE: Primary | ICD-10-CM

## 2018-03-06 DIAGNOSIS — H61.23 BILATERAL IMPACTED CERUMEN: ICD-10-CM

## 2018-03-06 PROCEDURE — 99213 OFFICE O/P EST LOW 20 MIN: CPT | Performed by: FAMILY MEDICINE

## 2018-03-06 RX ORDER — CEFUROXIME AXETIL 250 MG/1
250 TABLET ORAL 2 TIMES DAILY
Qty: 14 TABLET | Refills: 0 | Status: SHIPPED | OUTPATIENT
Start: 2018-03-06 | End: 2018-04-13 | Stop reason: ALTCHOICE

## 2018-03-06 NOTE — TELEPHONE ENCOUNTER
[phone] Idris Lam states she didn't have a question she just wanted to let you know that she set a record during her colonoscopy. The doctor told her that she set a record with having 17 polyps removed.

## 2018-03-06 NOTE — PROGRESS NOTES
Subjective:      Patient ID: Douglas Oshea is a 80 y.o. female. Patient presents with:  Pharyngitis: x 2 days - started with ear and went to throat  Discuss Medications: wanting cut back on Linzess    Her ears hurt and are clogged up  She has had a sore throat  Going on for 2 days  No fever  No chills  She also has a cough productive yellow     height is 5' 3\" (1.6 m) and weight is 141 lb 9.6 oz (64.2 kg). Her oral temperature is 98.2 °F (36.8 °C). Her blood pressure is 122/70. She had diarrhea with the linzess  For a few days  It is better now          Review of Systems    Objective:   Physical Exam   Constitutional: She appears well-developed and well-nourished. No distress. HENT:   Head: Normocephalic and atraumatic. Right Ear: Ear canal normal.   Left Ear: Tympanic membrane and ear canal normal.   Nose: Nose normal.   Mouth/Throat: Uvula is midline, oropharynx is clear and moist and mucous membranes are normal. No oral lesions. Wax impaction in the right canal and partial in the left  pnd in the throat   Neck: Neck supple. Pulmonary/Chest: Effort normal and breath sounds normal. No accessory muscle usage. No tachypnea. No respiratory distress. She has no decreased breath sounds. She has no wheezes. She has no rhonchi. She has no rales. Lymphadenopathy:        Head (right side): No submental and no submandibular adenopathy present. Head (left side): No submental and no submandibular adenopathy present. She has no cervical adenopathy. Right: No supraclavicular adenopathy present. Left: No supraclavicular adenopathy present. Neurological: She is alert. Skin: Skin is warm, dry and intact. She is not diaphoretic. No pallor. Assessment:       1. Upper respiratory tract infection, unspecified type     2.  Bilateral impacted cerumen         Need respite care through the home health agency, personal touch   Needs another referral  We discussed and daughter will hold linzess if needed for the diarrhea   She normally sees gi  Her abdomen sx overall are better since the med used  I spoke to patient and daughter she had itch rash to amox no breathing issues   If they do get a rash they will stop and let me know      Plan:       Do take the antibiotic  See the ENT physician for the wax in the ears  Let me know if a problem

## 2018-03-22 ENCOUNTER — TELEPHONE (OUTPATIENT)
Dept: FAMILY MEDICINE CLINIC | Age: 83
End: 2018-03-22

## 2018-03-23 ENCOUNTER — OFFICE VISIT (OUTPATIENT)
Dept: FAMILY MEDICINE CLINIC | Age: 83
End: 2018-03-23

## 2018-03-23 ENCOUNTER — HOSPITAL ENCOUNTER (OUTPATIENT)
Dept: NON INVASIVE DIAGNOSTICS | Age: 83
Discharge: HOME | End: 2018-03-23
Attending: FAMILY MEDICINE | Admitting: FAMILY MEDICINE

## 2018-03-23 VITALS
HEIGHT: 63 IN | SYSTOLIC BLOOD PRESSURE: 136 MMHG | WEIGHT: 141.1 LBS | BODY MASS INDEX: 25 KG/M2 | DIASTOLIC BLOOD PRESSURE: 82 MMHG | TEMPERATURE: 97.8 F

## 2018-03-23 DIAGNOSIS — M79.605 PAIN OF LEFT LOWER EXTREMITY: ICD-10-CM

## 2018-03-23 DIAGNOSIS — R10.84 GENERALIZED ABDOMINAL PAIN: ICD-10-CM

## 2018-03-23 DIAGNOSIS — K14.8 LESION OF TONGUE: Primary | ICD-10-CM

## 2018-03-23 PROCEDURE — G8510 SCR DEP NEG, NO PLAN REQD: HCPCS | Performed by: FAMILY MEDICINE

## 2018-03-23 PROCEDURE — 99214 OFFICE O/P EST MOD 30 MIN: CPT | Performed by: FAMILY MEDICINE

## 2018-03-23 ASSESSMENT — PATIENT HEALTH QUESTIONNAIRE - PHQ9
SUM OF ALL RESPONSES TO PHQ QUESTIONS 1-9: 0
SUM OF ALL RESPONSES TO PHQ9 QUESTIONS 1 & 2: 0
1. LITTLE INTEREST OR PLEASURE IN DOING THINGS: 0
2. FEELING DOWN, DEPRESSED OR HOPELESS: 0

## 2018-03-23 NOTE — PROGRESS NOTES
Subjective:      Patient ID: Clarice Doshi is a 80 y.o. female. Patient presents with:  Abdominal Pain  Fall: x 2 weeks ago  - left knee pain/swollen    1. She fell onto the left lower leg. Still at times tender  She is using ice. She is tender at the lateral calf she got her foot caught in the backyard. She did not have the cane or walker with her. She still walks about on the leg well she has chronic edema both sides and uses stockings     2. Spot under the tongue for 2 days. Does not recall biting it. Non tender. 3. She has noted lot of gassiness. no v no d. Her appetite is fair      No heartburn no dysphagia. bm are ok no blood no melena. No fever no urine sx. No pain no blood.       -------------------------------                 03/23/18                          1259           -------------------------------   BP:            136/82           Site:         Left Arm          Position:       Sitting          Cuff Size:    Medium Adult        Temp:     97.8 °F (36.6 °C)     TempSrc:        Oral            Weight: 141 lb 1.6 oz (64 kg)   Height:     5' 3\" (1.6 m)      -------------------------------  Wt Readings from Last 3 Encounters:  03/23/18 : 141 lb 1.6 oz (64 kg)  03/06/18 : 141 lb 9.6 oz (64.2 kg)  02/05/18 : 144 lb 6.4 oz (65.5 kg)    BP Readings from Last 3 Encounters:  03/23/18 : 136/82  03/06/18 : 122/70  02/05/18 : 130/82    Current Outpatient Prescriptions:     cefUROXime (CEFTIN) 250 MG tablet, Take 1 tablet by mouth 2 times daily, Disp: 14 tablet, Rfl: 0    pantoprazole (PROTONIX) 40 MG tablet, TAKE 1 TABLET BY MOUTH DAILY, Disp: 30 tablet, Rfl: 3    furosemide (LASIX) 40 MG tablet, TAKE 1 TABLET BY MOUTH DAILY, Disp: 30 tablet, Rfl: 0    apixaban (ELIQUIS) 5 MG TABS tablet, Take 1 tablet by mouth 2 times daily, Disp: 60 tablet, Rfl: 5    levothyroxine (SYNTHROID) 88 MCG tablet, TAKE 1 TABLET BY MOUTH DAILY, Disp: 30 tablet, Rfl: 3    MYRBETRIQ 50 MG Normal appearance and bowel sounds are normal. She exhibits no abdominal bruit, no pulsatile midline mass and no mass. There is no hepatosplenomegaly. There is no tenderness. There is no rigidity and no guarding. Able to examine easily the abdomen and no pain and benign exam   Musculoskeletal:   Aging bruise on the left knee. No local swelling no marks on the lower leg of note. She does not have calf pain to palpate and has negatvie homans on the left  She is tender to palpate the lateral and anterior mid leg she has chronic edema both sides mild  No skin markings on the left leg     Lymphadenopathy:        Head (right side): No submental and no submandibular adenopathy present. Head (left side): No submental and no submandibular adenopathy present. She has no cervical adenopathy. Right: No supraclavicular adenopathy present. Left: No supraclavicular adenopathy present. Skin: Skin is warm and dry. No rash noted. She is not diaphoretic. No pallor. Assessment:       1. Lesion of tongue     2. Pain of left lower extremity  XR TIBIA FIBULA LEFT (2 VIEWS)   3. Generalized abdominal pain         On 11/30/2017 she had a normal ct of the abdomen. Diverticulosis and some thickening of the colon but not definitive findings  US of the gallbladder on 1/10/2018 showed stones no inflammation  Consult with Gi on 2/12/18 noted and above reviewed. A colon and egd planned    We have tried repeatedly and so has her daughter to have the patient keep her devices with her and she refuses to do so. She does not use her walker or her cane.  She is at risk for fall  She has seen physical therapy        Plan:      Do try gas x over the counter  Heat to the lower leg and some tylenol  I will want to check the tongue back at the next visit  See me in 3 weeks

## 2018-03-30 RX ORDER — FUROSEMIDE 40 MG/1
TABLET ORAL
Qty: 30 TABLET | Refills: 1 | Status: SHIPPED | OUTPATIENT
Start: 2018-03-30 | End: 2018-05-24 | Stop reason: SDUPTHER

## 2018-04-02 RX ORDER — AMIODARONE HYDROCHLORIDE 200 MG/1
TABLET ORAL
Qty: 45 TABLET | Refills: 3 | Status: SHIPPED | OUTPATIENT
Start: 2018-04-02 | End: 2018-06-19 | Stop reason: ALTCHOICE

## 2018-04-04 ENCOUNTER — TELEPHONE (OUTPATIENT)
Dept: FAMILY MEDICINE CLINIC | Age: 83
End: 2018-04-04

## 2018-04-05 ENCOUNTER — TELEPHONE (OUTPATIENT)
Dept: FAMILY MEDICINE CLINIC | Age: 83
End: 2018-04-05

## 2018-04-13 ENCOUNTER — OFFICE VISIT (OUTPATIENT)
Dept: FAMILY MEDICINE CLINIC | Age: 83
End: 2018-04-13

## 2018-04-13 VITALS
WEIGHT: 139 LBS | DIASTOLIC BLOOD PRESSURE: 60 MMHG | TEMPERATURE: 97.8 F | HEIGHT: 63 IN | BODY MASS INDEX: 24.63 KG/M2 | SYSTOLIC BLOOD PRESSURE: 128 MMHG

## 2018-04-13 DIAGNOSIS — R10.84 GENERALIZED ABDOMINAL PAIN: Primary | ICD-10-CM

## 2018-04-13 DIAGNOSIS — K90.0 CELIAC DISEASE: ICD-10-CM

## 2018-04-13 PROCEDURE — 99213 OFFICE O/P EST LOW 20 MIN: CPT | Performed by: FAMILY MEDICINE

## 2018-04-23 ENCOUNTER — OFFICE VISIT (OUTPATIENT)
Dept: FAMILY MEDICINE CLINIC | Age: 83
End: 2018-04-23

## 2018-04-23 VITALS
SYSTOLIC BLOOD PRESSURE: 144 MMHG | TEMPERATURE: 97.8 F | HEART RATE: 72 BPM | HEIGHT: 63 IN | DIASTOLIC BLOOD PRESSURE: 78 MMHG | BODY MASS INDEX: 24.66 KG/M2 | WEIGHT: 139.2 LBS

## 2018-04-23 DIAGNOSIS — R05.9 COUGH: Primary | ICD-10-CM

## 2018-04-23 DIAGNOSIS — R10.84 GENERALIZED ABDOMINAL PAIN: ICD-10-CM

## 2018-04-23 PROCEDURE — 99213 OFFICE O/P EST LOW 20 MIN: CPT | Performed by: FAMILY MEDICINE

## 2018-04-23 PROCEDURE — 3288F FALL RISK ASSESSMENT DOCD: CPT | Performed by: FAMILY MEDICINE

## 2018-04-23 RX ORDER — DOXYCYCLINE HYCLATE 100 MG/1
100 CAPSULE ORAL 2 TIMES DAILY
Qty: 14 CAPSULE | Refills: 0 | Status: SHIPPED | OUTPATIENT
Start: 2018-04-23 | End: 2018-04-30

## 2018-04-25 ENCOUNTER — TELEPHONE (OUTPATIENT)
Dept: FAMILY MEDICINE CLINIC | Age: 83
End: 2018-04-25

## 2018-05-01 RX ORDER — ROPINIROLE 1 MG/1
TABLET, FILM COATED ORAL
Qty: 60 TABLET | Refills: 3 | Status: SHIPPED | OUTPATIENT
Start: 2018-05-01 | End: 2018-09-06 | Stop reason: SDUPTHER

## 2018-05-02 ENCOUNTER — HOSPITAL ENCOUNTER (OUTPATIENT)
Dept: OTHER | Age: 83
Discharge: HOME | End: 2018-05-31
Attending: FAMILY MEDICINE | Admitting: FAMILY MEDICINE

## 2018-05-07 ENCOUNTER — HOSPITAL ENCOUNTER (OUTPATIENT)
Dept: CT IMAGING | Age: 83
Discharge: HOME | End: 2018-05-07
Attending: INTERNAL MEDICINE | Admitting: INTERNAL MEDICINE

## 2018-05-07 DIAGNOSIS — R10.30 LOWER ABDOMINAL PAIN: ICD-10-CM

## 2018-05-07 DIAGNOSIS — R10.9 ABDOMINAL PAIN: ICD-10-CM

## 2018-05-07 LAB
GFR AFRICAN AMERICAN: >60
GFR NON-AFRICAN AMERICAN: >60
PERFORMED ON: ABNORMAL
POC CREATININE: 0.5 MG/DL (ref 0.6–1.2)
POC SAMPLE TYPE: ABNORMAL

## 2018-05-09 DIAGNOSIS — J45.909 MODERATE ASTHMA: ICD-10-CM

## 2018-05-11 ENCOUNTER — OFFICE VISIT (OUTPATIENT)
Dept: FAMILY MEDICINE CLINIC | Age: 83
End: 2018-05-11

## 2018-05-11 VITALS
HEART RATE: 76 BPM | DIASTOLIC BLOOD PRESSURE: 70 MMHG | WEIGHT: 137 LBS | HEIGHT: 63 IN | TEMPERATURE: 97.6 F | BODY MASS INDEX: 24.27 KG/M2 | SYSTOLIC BLOOD PRESSURE: 122 MMHG

## 2018-05-11 DIAGNOSIS — R91.8 LUNG NODULES: Primary | ICD-10-CM

## 2018-05-11 DIAGNOSIS — R22.2 NODULE OF BUTTOCK: ICD-10-CM

## 2018-05-11 PROCEDURE — 99213 OFFICE O/P EST LOW 20 MIN: CPT | Performed by: FAMILY MEDICINE

## 2018-05-17 ENCOUNTER — HOSPITAL ENCOUNTER (OUTPATIENT)
Dept: CT IMAGING | Age: 83
Discharge: HOME | End: 2018-05-17
Attending: FAMILY MEDICINE | Admitting: FAMILY MEDICINE

## 2018-05-17 DIAGNOSIS — R91.8 OTHER NONSPECIFIC ABNORMAL FINDING OF LUNG FIELD (CODE): ICD-10-CM

## 2018-05-17 DIAGNOSIS — R91.8 LUNG NODULES: ICD-10-CM

## 2018-05-23 ENCOUNTER — INITIAL CONSULT (OUTPATIENT)
Dept: SURGERY | Age: 83
End: 2018-05-23

## 2018-05-23 VITALS
HEART RATE: 95 BPM | WEIGHT: 134 LBS | BODY MASS INDEX: 23.74 KG/M2 | SYSTOLIC BLOOD PRESSURE: 147 MMHG | DIASTOLIC BLOOD PRESSURE: 80 MMHG | HEIGHT: 63 IN

## 2018-05-23 DIAGNOSIS — R22.2 BUTTOCKS NODULE: Primary | ICD-10-CM

## 2018-05-23 PROCEDURE — 99204 OFFICE O/P NEW MOD 45 MIN: CPT | Performed by: SURGERY

## 2018-05-23 ASSESSMENT — ENCOUNTER SYMPTOMS
DIARRHEA: 1
RESPIRATORY NEGATIVE: 1
NAUSEA: 0
VOMITING: 0
ABDOMINAL PAIN: 1

## 2018-05-24 RX ORDER — FUROSEMIDE 40 MG/1
TABLET ORAL
Qty: 30 TABLET | Refills: 1 | Status: SHIPPED | OUTPATIENT
Start: 2018-05-24 | End: 2018-07-19

## 2018-05-31 RX ORDER — LEVOTHYROXINE SODIUM 88 UG/1
TABLET ORAL
Qty: 30 TABLET | Refills: 5 | Status: SHIPPED | OUTPATIENT
Start: 2018-05-31 | End: 2018-12-31 | Stop reason: SDUPTHER

## 2018-06-01 ENCOUNTER — HOSPITAL ENCOUNTER (OUTPATIENT)
Dept: OTHER | Age: 83
Discharge: HOME | End: 2018-06-30
Attending: FAMILY MEDICINE | Admitting: FAMILY MEDICINE

## 2018-06-19 ENCOUNTER — OFFICE VISIT (OUTPATIENT)
Dept: CARDIOLOGY CLINIC | Age: 83
End: 2018-06-19

## 2018-06-19 VITALS
HEART RATE: 106 BPM | DIASTOLIC BLOOD PRESSURE: 74 MMHG | SYSTOLIC BLOOD PRESSURE: 120 MMHG | BODY MASS INDEX: 24.27 KG/M2 | HEIGHT: 63 IN | WEIGHT: 137 LBS | OXYGEN SATURATION: 98 %

## 2018-06-19 DIAGNOSIS — R00.2 PALPITATIONS: ICD-10-CM

## 2018-06-19 DIAGNOSIS — I48.0 PAROXYSMAL ATRIAL FIBRILLATION (HCC): Primary | ICD-10-CM

## 2018-06-19 DIAGNOSIS — E78.2 MIXED HYPERLIPIDEMIA: Chronic | ICD-10-CM

## 2018-06-19 PROCEDURE — 93000 ELECTROCARDIOGRAM COMPLETE: CPT | Performed by: INTERNAL MEDICINE

## 2018-06-19 PROCEDURE — 99214 OFFICE O/P EST MOD 30 MIN: CPT | Performed by: INTERNAL MEDICINE

## 2018-06-19 RX ORDER — METOPROLOL TARTRATE 50 MG/1
50 TABLET, FILM COATED ORAL 2 TIMES DAILY
Qty: 180 TABLET | Refills: 3 | Status: SHIPPED | OUTPATIENT
Start: 2018-06-19 | End: 2019-02-28 | Stop reason: SDUPTHER

## 2018-07-02 RX ORDER — PANTOPRAZOLE SODIUM 40 MG/1
40 TABLET, DELAYED RELEASE ORAL DAILY
Qty: 30 TABLET | Refills: 0 | Status: SHIPPED | OUTPATIENT
Start: 2018-07-02 | End: 2018-07-26 | Stop reason: SDUPTHER

## 2018-07-09 ENCOUNTER — TELEPHONE (OUTPATIENT)
Dept: FAMILY MEDICINE CLINIC | Age: 83
End: 2018-07-09

## 2018-07-09 NOTE — TELEPHONE ENCOUNTER
Pt calling requesting a back brace & neck brace, no injuries just old age per pt.         Please advise, [phone]

## 2018-07-17 ENCOUNTER — TELEPHONE (OUTPATIENT)
Dept: FAMILY MEDICINE CLINIC | Age: 83
End: 2018-07-17

## 2018-07-17 NOTE — TELEPHONE ENCOUNTER
Eugenia calling to check on paper work from [facility].  Paper work was faxed here 2x     Please advise  [phone] ext [phone]

## 2018-07-17 NOTE — TELEPHONE ENCOUNTER
[phone] ext [address] [place] [address] advised that Dr. Danielle Olvera will not sign for back or neck brace.

## 2018-07-23 ENCOUNTER — OFFICE VISIT (OUTPATIENT)
Dept: FAMILY MEDICINE CLINIC | Age: 83
End: 2018-07-23

## 2018-07-23 VITALS
HEIGHT: 64 IN | BODY MASS INDEX: 24.65 KG/M2 | TEMPERATURE: 97.7 F | DIASTOLIC BLOOD PRESSURE: 70 MMHG | SYSTOLIC BLOOD PRESSURE: 128 MMHG | WEIGHT: 144.4 LBS

## 2018-07-23 DIAGNOSIS — L03.115 CELLULITIS OF RIGHT LOWER EXTREMITY: ICD-10-CM

## 2018-07-23 DIAGNOSIS — R60.9 DEPENDENT EDEMA: Primary | ICD-10-CM

## 2018-07-23 PROCEDURE — 99213 OFFICE O/P EST LOW 20 MIN: CPT | Performed by: FAMILY MEDICINE

## 2018-07-23 RX ORDER — CEPHALEXIN 250 MG/1
250 CAPSULE ORAL 4 TIMES DAILY
Qty: 28 CAPSULE | Refills: 0 | Status: ON HOLD | OUTPATIENT
Start: 2018-07-23 | End: 2018-08-03 | Stop reason: HOSPADM

## 2018-07-23 NOTE — PROGRESS NOTES
daily , Disp: , Rfl:    SYMBICORT 160-4.5 MCG/ACT AERO, INHALE 2 PUFFS TWICE A DAY INTO THE LUNGS, Disp: 11 Inhaler, Rfl: 3  Cholecalciferol (VITAMIN D3) 5000 units TABS, Take by mouth, Disp: , Rfl:   vitamin B-12 (CYANOCOBALAMIN) 1000 MCG tablet, Take 1,000 mcg by mouth daily, Disp: , Rfl:   Compression Stockings MISC, by Does not apply route Bilateral lower extremity compression stockings, 15-20 mmHg, Disp: 2 each, Rfl: 1  mometasone (NASONEX) 50 MCG/ACT nasal spray, 2 sprays by Nasal route daily, Disp: 1 Inhaler, Rfl: 0  Spacer/Aero-Holding Chambers (E-Z SPACER) POLLY, 1 Device by Does not apply route daily as needed. , Disp: 1 Device, Rfl: 0  Calcium Carbonate-Vit D-Min (CALTRATE PLUS PO), Take  by mouth., Disp: , Rfl:     No current facility-administered medications for this visit.       ---------------------------------                  07/23/18                            1513            ---------------------------------   BP:             128/70            Site:          Left Arm           Position:        Sitting           Cuff Size:     Medium Adult         Temp:      97.7 °F (36.5 °C)      TempSrc:         Oral             Weight: 144 lb 6.4 oz (65.5 kg)   Height:     5' 4\" (1.626 m)      ---------------------------------  Body mass index is 24.79 kg/m². Wt Readings from Last 3 Encounters:  07/23/18 : 144 lb 6.4 oz (65.5 kg)  07/19/18 : 146 lb 2.6 oz (66.3 kg)  07/14/18 : 143 lb 1.3 oz (64.9 kg)    BP Readings from Last 3 Encounters:  07/23/18 : 128/70  07/19/18 : (!) 156/90  07/14/18 : 130/73          Review of Systems    Objective:   Physical Exam   Constitutional: She appears well-developed and well-nourished. No distress. Pulmonary/Chest: Effort normal. No accessory muscle usage. No respiratory distress. She has no decreased breath sounds. She has no wheezes. She has no rales.    Musculoskeletal:   She has one plus edema on the lower legs below the calves  Her

## 2018-07-23 NOTE — PATIENT INSTRUCTIONS
03/28/18 1700   Provider Notification   Provider Name/Title Dr. Anthony   Method of Notification At Bedside   Request Evaluate in Person   Notification Reason Status Update   MDA at bedside for epidural placement.    Take the medications  Keep the feet up when sitting   See in one month

## 2018-07-26 RX ORDER — MONTELUKAST SODIUM 10 MG/1
10 TABLET ORAL NIGHTLY
Qty: 30 TABLET | Refills: 5 | Status: SHIPPED | OUTPATIENT
Start: 2018-07-26 | End: 2019-02-26 | Stop reason: SDUPTHER

## 2018-07-26 RX ORDER — PANTOPRAZOLE SODIUM 40 MG/1
40 TABLET, DELAYED RELEASE ORAL DAILY
Qty: 30 TABLET | Refills: 5 | Status: SHIPPED | OUTPATIENT
Start: 2018-07-26 | End: 2018-07-31 | Stop reason: ALTCHOICE

## 2018-08-01 PROBLEM — I50.9 CHF WITH UNKNOWN LVEF (HCC): Status: ACTIVE | Noted: 2018-08-01

## 2018-08-01 PROBLEM — I10 ESSENTIAL HYPERTENSION: Status: ACTIVE | Noted: 2018-08-01

## 2018-08-04 ENCOUNTER — CARE COORDINATION (OUTPATIENT)
Dept: CASE MANAGEMENT | Age: 83
End: 2018-08-04

## 2018-08-04 NOTE — CARE COORDINATION
Lucía ORTIZ   8/22/2018 9:00 AM Coryb Benites MD [facility]       Albert Dobbins RN

## 2018-08-08 ENCOUNTER — TELEPHONE (OUTPATIENT)
Dept: FAMILY MEDICINE CLINIC | Age: 83
End: 2018-08-08

## 2018-08-08 ENCOUNTER — CARE COORDINATION (OUTPATIENT)
Dept: CASE MANAGEMENT | Age: 83
End: 2018-08-08

## 2018-08-08 NOTE — TELEPHONE ENCOUNTER
Pt needs refill on Lasix sent to [address] [place] [address] [phone].       Please advise,

## 2018-08-08 NOTE — CARE COORDINATION
[facility] Transitions Initial Follow Up Call    Call within 2 business days of discharge: Yes    Patient: Chana Gonzalez Patient : 3/2/1929   MRN: [id number]  Reason for Admission: Chest Pain   Discharge Date: 18 RARS: Readmission Risk Score: 18     Spoke with: [address]: [facility]     Non-face-to-face services provided:  Obtained and reviewed discharge summary and/or continuity of care documents    Care Transitions 24 Hour Call    Do you have any ongoing symptoms?:  Yes  Patient-reported symptoms:  Chest Pain  Interventions for patient-reported symptoms:  Other (Comment: pt states CP same as was in ED no worse )  Do you have a copy of your discharge instructions?:  Yes  Do you have all of your prescriptions and are they filled?:  Yes  Have you been contacted by a [facility] Pharmacist?:  No  Have you scheduled your follow up appointment?:  No  Were you discharged with any Home Care or Post Acute Services:  No  Do you feel like you have everything you need to keep you well at home?:  Yes  Are you an active caregiver in your home?:  No  Care Transitions Interventions     Spoke with patient who states she is doing \"alright\" patient stated I have been \"a little wobbly\". Patient reports she is using a walker and cane to help with stability. Patient also reported CP/discomfort still which is unchanged from hospitalization and stated MD is aware. Patient denied any other symptoms such as n/v, arm, jaw, or back pain. Patient reported she will obtain her new prescriptions from pharmacy today. Patient stated she would also call PCP office and schedule f/u apt. CTC encouraged patient to reach out to day and important for patient to be seen within 7 days of discharge. Patient denied any further needs at this time and agreed to follow up calls. Reminded patient that if they have any questions/concerns at anytime, they can always utilize CTC or call PCP/specialist as they have an MD on call .

## 2018-08-09 RX ORDER — FUROSEMIDE 40 MG/1
TABLET ORAL
Qty: 30 TABLET | Refills: 1 | Status: SHIPPED | OUTPATIENT
Start: 2018-08-09 | End: 2018-10-26 | Stop reason: SDUPTHER

## 2018-08-10 ENCOUNTER — CARE COORDINATION (OUTPATIENT)
Dept: CASE MANAGEMENT | Age: 83
End: 2018-08-10

## 2018-08-14 ENCOUNTER — OFFICE VISIT (OUTPATIENT)
Dept: FAMILY MEDICINE CLINIC | Age: 83
End: 2018-08-14

## 2018-08-14 VITALS
DIASTOLIC BLOOD PRESSURE: 80 MMHG | HEART RATE: 80 BPM | BODY MASS INDEX: 24.41 KG/M2 | WEIGHT: 143 LBS | TEMPERATURE: 98 F | HEIGHT: 64 IN | SYSTOLIC BLOOD PRESSURE: 116 MMHG

## 2018-08-14 DIAGNOSIS — R07.89 CHEST WALL PAIN: Primary | ICD-10-CM

## 2018-08-14 PROCEDURE — 99213 OFFICE O/P EST LOW 20 MIN: CPT | Performed by: FAMILY MEDICINE

## 2018-08-14 RX ORDER — PREDNISONE 10 MG/1
TABLET ORAL
Qty: 12 TABLET | Refills: 0 | Status: SHIPPED | OUTPATIENT
Start: 2018-08-14 | End: 2018-08-24 | Stop reason: ALTCHOICE

## 2018-08-14 NOTE — PROGRESS NOTES
Subjective:      Patient ID: Lulú Bonds is a 80 y.o. female. Patient presents with:   Follow-Up from Hospital: [facility] 8-1-2018 & 8-7-2018    She was in hospital two occ with cp  W/u negative  She did see cardiology as well  She does have Diastolic failure but no acute presentation at all  She was treated with nsaid with improvemtn    Ct of chest on 8/1 and 8/7 both ok no embolism  No failure  Chest xray plain no failure    No fever no cough  Still with some soreness  The daughter tells me that she is better  No n no v  Appetite is not good per daughter    YOB: 1929    Date of Visit:  8/14/2018     -- Augmentin (Amoxicillin-Pot Clavulanate) -- Itching   -- Demerol    -- Gluten Meal -- Diarrhea   -- Lidocaine Hcl    -- Other    -- Procaine    -- Tetanus Toxoid, Adsorbed    -- Vicodin (Hydrocodone-Acetaminophen)    -- Hydrocodone-Acetaminophen -- Palpitations   -- Meperidine -- Palpitations   -- Prednisone -- Palpitations   -- Tetanus Toxoids -- Palpitations    Current Outpatient Prescriptions:  furosemide (LASIX) 40 MG tablet, TAKE 1 TABLET BY MOUTH DAILY, Disp: 30 tablet, Rfl: 1  lidocaine (LIDODERM) 5 %, Place 1 patch onto the skin daily 12 hours on, 12 hours off., Disp: 30 patch, Rfl: 0  lidocaine (LIDODERM) 5 %, Place 1 patch onto the skin daily 12 hours on, 12 hours off., Disp: 30 patch, Rfl: 0  DULoxetine (CYMBALTA) 20 MG extended release capsule, Take 20 mg by mouth daily, Disp: , Rfl:   montelukast (SINGULAIR) 10 MG tablet, TAKE 1 TABLET BY MOUTH NIGHTLY, Disp: 30 tablet, Rfl: 5  metoprolol tartrate (LOPRESSOR) 50 MG tablet, Take 1 tablet by mouth 2 times daily, Disp: 180 tablet, Rfl: 3  levothyroxine (SYNTHROID) 88 MCG tablet, TAKE 1 TABLET BY MOUTH DAILY, Disp: 30 tablet, Rfl: 5  PROAIR  (90 Base) MCG/ACT inhaler, 2 PUFFS INTO LUNGS EVERY 4 HOURS AS NEEDED FOR WHEEZING OR FORSHORTNESS OF BREATH, Disp: 9 Inhaler, Rfl: 0  rOPINIRole (REQUIP) 1 MG tablet, TAKE TWO TABLETS BY days,  then take 1 po daily for 2 days then stop     Dispense:  12 tablet     Refill:  0       Malik Grubbs MD

## 2018-08-15 ENCOUNTER — TELEPHONE (OUTPATIENT)
Dept: CARDIOLOGY CLINIC | Age: 83
End: 2018-08-15

## 2018-08-21 ENCOUNTER — TELEPHONE (OUTPATIENT)
Dept: PHARMACY | Facility: CLINIC | Age: 83
End: 2018-08-21

## 2018-08-21 DIAGNOSIS — R07.9 CHEST PAIN, UNSPECIFIED TYPE: Primary | ICD-10-CM

## 2018-08-21 PROCEDURE — 1111F DSCHRG MED/CURRENT MED MERGE: CPT

## 2018-08-21 NOTE — LETTER
[address]  [address], [address]  Phone: [phone]  Fax: [address] [phone]           08/23/18     Dear Michel Hall,    We tried to reach you recently, after your hospital stay, to review your medications. I was unable to reach you on the telephone. We understand that medications can be confusing after a hospital stay. If you are interested in a pharmacist reviewing your medications, please call [phone] option #7.       Sincerely,     Patricio Mejia, PharmD  [address]  Phone: [phone], option 7

## 2018-08-22 ENCOUNTER — OFFICE VISIT (OUTPATIENT)
Dept: CARDIOLOGY CLINIC | Age: 83
End: 2018-08-22

## 2018-08-22 VITALS
BODY MASS INDEX: 24.1 KG/M2 | WEIGHT: 136 LBS | HEART RATE: 68 BPM | DIASTOLIC BLOOD PRESSURE: 60 MMHG | SYSTOLIC BLOOD PRESSURE: 92 MMHG | HEIGHT: 63 IN | OXYGEN SATURATION: 99 %

## 2018-08-22 DIAGNOSIS — I50.22 CHRONIC SYSTOLIC HEART FAILURE (HCC): ICD-10-CM

## 2018-08-22 DIAGNOSIS — I27.20 PULMONARY HYPERTENSION (HCC): ICD-10-CM

## 2018-08-22 DIAGNOSIS — I25.10 CORONARY ARTERY DISEASE INVOLVING NATIVE CORONARY ARTERY OF NATIVE HEART WITHOUT ANGINA PECTORIS: ICD-10-CM

## 2018-08-22 DIAGNOSIS — I48.20 CHRONIC ATRIAL FIBRILLATION (HCC): Primary | ICD-10-CM

## 2018-08-22 PROCEDURE — 99214 OFFICE O/P EST MOD 30 MIN: CPT | Performed by: INTERNAL MEDICINE

## 2018-08-22 NOTE — PROGRESS NOTES
[facility]      Cardiology Consult    Chaitanya Barron  3/2/1929    August 22, 2018    Primary Physician: Dr. Lauri Curtis  Reason for Visit: atrial fibrillation    CC: \"Feel fine\"     HPI:  The patient is 80 y.o. female with a past medical history significant for CHF, chronic atrial fibrillation, and HTN presents for follow-up. She originally presented to [facility] 6/2015 with nonexertional chest pain. The pain was a very focal left sided sensation that occurred at rest. The pain happened multiple times over 2 days so she sought medical attention. In the ED, she was found to be in atrial fibrillation. She was not having the chest pain at the time and she denied the sensation of her heart racing. She described a vague sensation of not feeling normal. On 12/20/15, she awoke with a sensation of her heart racing and skipping beats. She went to [facility] ED for evaluation and was given an extra beta blocker for her symptoms resolved. She underwent a successful CV on 2/26/16 but has since converted back to Afib. She previously followed with EP who recommended a rate control strategy and amiodarone was discontinued. She was again admitted to the hospital 8/2018 with complaints of chest pain for a week and dyspnea. She was evaluated by cardiology and had reproducible chest wall pain. Her dementia was more pronounced as she did not remember much from her admission. However, her BNP was elevated and she was found to have a mildly reduced EF. Today, she is accompanied by her daughter who reports she is generally weak and fatigued. She has chronic NEAL but denies any acute changes. The patient has no new sounding cardiac complaints. She is on chronic anticoagulation and denies any abnormal bleeding or melena. Patient currently denies palpitations, chest pain/pressure, dyspnea at rest, PND, orthopnea, lightheadedness, weight changes, and syncope. She is compliant with her medications.      Past Medical History: Review of Systems:  · Constitutional: no unanticipated weight loss. There's been no change in energy level, sleep pattern, or activity level. No fevers, chills. · Eyes: No visual changes or diplopia. No scleral icterus. · ENT: No Headaches, hearing loss or vertigo. No mouth sores or sore throat. · Cardiovascular: as reviewed in HPI  · Respiratory: No cough or wheezing, no sputum production. No hematemesis. · Gastrointestinal: No abdominal pain, appetite loss, blood in stools. No change in bowel or bladder habits. · Genitourinary: No dysuria, trouble voiding, or hematuria. · Musculoskeletal:  No gait disturbance, no joint complaints. · Integumentary: No rash or pruritis. · Neurological: No headache, diplopia, change in muscle strength, numbness or tingling. · Psychiatric: No anxiety or depression. · Endocrine: No temperature intolerance. No excessive thirst, fluid intake, or urination. No tremor. · Hematologic/Lymphatic: No abnormal bruising or bleeding, blood clots or swollen lymph nodes. · Allergic/Immunologic: No nasal congestion or hives. Physical Exam:   BP 92/60 (Site: Left Arm, Position: Sitting, Cuff Size: Medium Adult)   Pulse 68   Ht 5' 3\" (1.6 m)   Wt 136 lb (61.7 kg)   SpO2 99%   BMI 24.09 kg/m²   Wt Readings from Last 3 Encounters:   08/22/18 136 lb (61.7 kg)   08/14/18 143 lb (64.9 kg)   08/06/18 134 lb 7.7 oz (61 kg)     Constitutional: She is oriented to person and place. She appears well-developed and well-nourished. In no acute distress. Head: Normocephalic and atraumatic. Pupils equal and round. Neck: Neck supple. No JVP or carotid bruit appreciated. No mass and no thyromegaly present. No lymphadenopathy present. Cardiovascular: Irregularly irregular with a normal rate. Normal heart sounds. Exam reveals no gallop and no friction rub. No murmur heard. Pulmonary/Chest: Effort normal and breath sounds normal. No respiratory distress.  She has no wheezes, rhonchi or

## 2018-08-22 NOTE — PATIENT INSTRUCTIONS
about a medical condition or this instruction, always ask your healthcare professional. [place] [address] any warranty or liability for your use of this information.

## 2018-08-23 ENCOUNTER — TELEPHONE (OUTPATIENT)
Dept: CARDIOLOGY CLINIC | Age: 83
End: 2018-08-23

## 2018-08-24 ENCOUNTER — CARE COORDINATION (OUTPATIENT)
Dept: CASE MANAGEMENT | Age: 83
End: 2018-08-24

## 2018-08-24 RX ORDER — PANTOPRAZOLE SODIUM 40 MG/1
40 TABLET, DELAYED RELEASE ORAL DAILY
COMMUNITY
End: 2018-10-09 | Stop reason: CLARIF

## 2018-08-24 NOTE — CARE COORDINATION
[facility] Transitions Follow Up Call    2018    Patient: Chaitanya Barron  Patient : 3/2/1929   MRN: [id number]  Reason for Admission: There are no discharge diagnoses documented for the most recent discharge. Discharge Date: 18 RARS: Readmission Risk Score: 18       Spoke with: Collette Valero (patient)    Care Transitions Subsequent and Final Call    Subsequent and Final Calls  Do you have any ongoing symptoms?:  No  Have your medications changed?:  No  Do you have any questions related to your medications?:  No  Do you currently have any active services?:  No  Do you have any needs or concerns that I can assist you with?:  No  Identified Barriers:  None  Care Transitions Interventions  Other Interventions: Follow Up: Spoke with Claudia Tatum. She states she is okay. She states she is \"sore at the bottom of my neck\". Claudia Tatum states her weight today = 136lbs. She is unsure how this compares to yesterday's weight. She states she doesn't write it down and doesn't remember. Writer encouraged to write her weights down daily. Reviewed parameters of when to call her physician - Claudia Tatum wrote that all down. She states she is slightly SOB and has some slight chest pain. She denies any edema. She states she is following a low sodium/no added salt diet and watching her fluid intake. Writer began to discuss her medications and Claudia Tatum states her daughter takes care of them. She stated her daughter was there. Writer asked to speak with her. Claudia Tatum was unable to find her daughter. Writer called daughter's number and left a message. Informed Claudia Tatum this would be the final CTC call. Encouraged her to call her PCP with any future issues or concerns.   Future Appointments  Date Time Provider Marysol Cabral   2018 9:30 AM MD Rakesh Torres RN

## 2018-08-24 NOTE — TELEPHONE ENCOUNTER
Spoke with Davon Em, she reports it is the same pain she experienced in when she was in the ED 8/7/18. She stated she took the Ibuprofen and the pain is gone. She reports her neck is stiff but denies any chets pain or shortness of breath. Instructed to call with any worsening symptoms. She v/u.

## 2018-08-24 NOTE — TELEPHONE ENCOUNTER
Therapy complete and Dr. Roz Romero note states to stop ibuprofen. These are medications you told us you were taking at home, CONTINUE taking them after you leave the hospital   Medication Sig Comments    apixaban (ELIQUIS) 5 MG TABS tablet Take 1 tablet by mouth 2 times daily Taking as prescribed   Last filled 8/15/18 for 30 ds.  furosemide (LASIX) 40 MG tablet TAKE 1 TABLET BY MOUTH DAILY Taking as prescribed   Last fills 6/27/18 and 8/9/18 for 30 ds.  montelukast (SINGULAIR) 10 MG tablet TAKE 1 TABLET BY MOUTH NIGHTLY Taking as prescribed  Last fills 6/27/18 and 7/26/18 for 30 ds.  metoprolol tartrate (LOPRESSOR) 50 MG tablet Take 1 tablet by mouth 2 times daily Taking as prescribed   Latest fills: 12/4/17 for 30 ds and 6/19/18 for 90 ds.  levothyroxine (SYNTHROID) 88 MCG tablet TAKE 1 TABLET BY MOUTH DAILY Taking as prescribed  Latest fills: 5/31/18, 6/27/18, and 7/20/18 for 30 ds.  PROAIR  (90 Base) MCG/ACT inhaler 2 PUFFS INTO LUNGS EVERY 4 HOURS AS NEEDED FOR WHEEZING OR FORSHORTNESS OF BREATH Taking as prescribed   Last filled 5/9/18 for 17 day supply. Patient states she used a few times last week. Patient seemed unsure of which inhalers she was actually using.  rOPINIRole (REQUIP) 1 MG tablet TAKE TWO TABLETS BY MOUTH NIGHTLY Taking as prescribed   Last fills: 6/5/18, 7/2/18, 7/26/18 for 30 ds.  Polyethylene Glycol 3350 (MIRALAX PO) Take 17 g by mouth daily as needed (constipation)  Taking as prescribed      traZODone (DESYREL) 50 MG tablet Take 25 mg by mouth nightly  May not be taking as prescribed  Patient prescribed 50 mg tablets, 30 tablets for 30 day supply. Last fills: 5/30/18, 6/27/18, 8/6/18 for 30 ds.  divalproex (DEPAKOTE SPRINKLE) 125 MG capsule 250 mg 2 times daily  Dose appears to be increased per dispense report between June and July. Picked up 60 tablets on 6/27/18, but 120 tablets on 7/11/18 and 8/20/18. All for 30 day supply.       SYMBICORT complete  - Ibuprofen - therapy complete and PCP said to stop  - Furosemide - duplicate    Meds marked as reviewed    Meds to Beds:No    Estimated Creatinine Clearance: 26 mL/min (based on SCr of 1.2 mg/dL). eGFR = 42    Assessment/Plan:  - Medication reconciliation completed. Number of medications reviewed: 23    - Pt is not taking medications as directed by discharging physician. Number of discrepancies: 2. Instructions per discharge list provided except per below documentation. Identified medication discrepancies/issues:   · Category 3 (1):  1. Lidocaine - patient did not  Lidocaine after discharge due to cost  · Category 4 (1):  1. Donepezil - patient appears to be taking 5 mg daily, but 10 mg was given in hospital and is on her discharge paperwork. - [facility] active medication list updated:  · Medications Added (0):    · Medications Removed (3):  Ibuprofen, Prednisone, Furosemide  · Medications Changed (1):  Protonix    - Identified Potential Medication Interactions: The following clinically significant interactions were identified via [employer] Interaction Analysis as category D or higher: Metoprolol/Duloxetine (Category D) - monitor pulse and BP for metoprolol toxicity. - Renal Dosing: According to [employer], the following should have renal adjustment: Duloxetine should be avoided in patients with CrCl < 30 ml/min.     - Follow up appointment date (7 days for more severe illness, 14 days for others):    · Patient has followed up with PCP and cardiology since discharge    Thank you,    Loyda Bledsoe, PharmD  [address]  Phone: [phone]    For Pharmacy Admin Tracking Only    TCM Call Made?: No  [facility] Select Patient?: Yes  Total # of Interventions Recommended: 2 - Discontinued Medication #: 1  - Updated Order #: 1  Total # Interventions Accepted: 0  Intervention Severity:   - Level 1 Intervention Present?: No   - Level 2 #: 0   - Level 3 #: 0  Outreach

## 2018-08-30 ENCOUNTER — OFFICE VISIT (OUTPATIENT)
Dept: FAMILY MEDICINE CLINIC | Age: 83
End: 2018-08-30

## 2018-08-30 VITALS
BODY MASS INDEX: 24.27 KG/M2 | TEMPERATURE: 97.5 F | SYSTOLIC BLOOD PRESSURE: 128 MMHG | DIASTOLIC BLOOD PRESSURE: 82 MMHG | WEIGHT: 137 LBS | HEIGHT: 63 IN

## 2018-08-30 DIAGNOSIS — R05.9 COUGH: Primary | ICD-10-CM

## 2018-08-30 PROCEDURE — 99213 OFFICE O/P EST LOW 20 MIN: CPT | Performed by: FAMILY MEDICINE

## 2018-08-30 RX ORDER — AZITHROMYCIN 250 MG/1
TABLET, FILM COATED ORAL
Qty: 1 PACKET | Refills: 0 | Status: SHIPPED | OUTPATIENT
Start: 2018-08-30 | End: 2018-09-09

## 2018-08-30 NOTE — PROGRESS NOTES
morning (before breakfast) , Disp: , Rfl:   donepezil (ARICEPT) 10 MG tablet, Take 5 mg by mouth nightly , Disp: , Rfl:    Polyethylene Glycol 3350 (MIRALAX PO), Take 17 g by mouth daily as needed (constipation) , Disp: , Rfl:   traZODone (DESYREL) 50 MG tablet, Take 25 mg by mouth nightly , Disp: , Rfl:   divalproex (DEPAKOTE SPRINKLE) 125 MG capsule, Take 250 mg by mouth 2 times daily , Disp: , Rfl:   SYMBICORT 160-4.5 MCG/ACT AERO, INHALE 2 PUFFS TWICE A DAY INTO THE LUNGS, Disp: 11 Inhaler, Rfl: 3  Cholecalciferol (VITAMIN D3) 5000 units TABS, Take 1 tablet by mouth daily , Disp: , Rfl:   vitamin B-12 (CYANOCOBALAMIN) 1000 MCG tablet, Take 1,000 mcg by mouth daily, Disp: , Rfl:   Compression Stockings MISC, by Does not apply route Bilateral lower extremity compression stockings, 15-20 mmHg, Disp: 2 each, Rfl: 1  mometasone (NASONEX) 50 MCG/ACT nasal spray, 2 sprays by Nasal route daily, Disp: 1 Inhaler, Rfl: 0  Spacer/Aero-Holding Chambers (E-Z SPACER) POLLY, 1 Device by Does not apply route daily as needed. , Disp: 1 Device, Rfl: 0  Calcium Carbonate-Vit D-Min (CALTRATE PLUS PO), Take 1 tablet by mouth daily , Disp: , Rfl:     No current facility-administered medications for this visit.       ---------------------------               08/30/18                      1133         ---------------------------   BP:          128/82         Site:       Left Arm        Position:     Sitting        Cuff Size:  Medium Adult      Temp:   97.5 °F (36.4 °C)   TempSrc:      Oral          Weight: 137 lb (62.1 kg)    Height:   5' 3\" (1.6 m)    ---------------------------  Body mass index is 24.27 kg/m².      Wt Readings from Last 3 Encounters:  08/30/18 : 137 lb (62.1 kg)  08/22/18 : 136 lb (61.7 kg)  08/14/18 : 143 lb (64.9 kg)    BP Readings from Last 3 Encounters:  08/30/18 : 128/82  08/22/18 : 92/60  08/14/18 : 116/80            Review of Systems    Objective:   Physical Exam Constitutional: She appears well-developed and well-nourished. No distress. Pulmonary/Chest: Effort normal and breath sounds normal. No accessory muscle usage. No respiratory distress. She has no wheezes. She has no rhonchi. She has no rales. Abdominal: Soft. Normal appearance and bowel sounds are normal. She exhibits no distension, no abdominal bruit, no pulsatile midline mass and no mass. There is no hepatosplenomegaly. There is no tenderness. There is no rigidity, no guarding and no CVA tenderness. Very soft no guarding  She notes some generalized pain     Neurological: She is alert. Skin: Skin is warm, dry and intact. She is not diaphoretic. No pallor. Assessment:        Diagnosis Orders   1. Cough       The addomen pain c/o are not new and seem to surround bm issues and concerns  No findings of note on exam  3 unremarkable ct of the abdomen this year  She has seen gi        Plan:      In addition to fluids  Add prune juice daily  Do take the antibiotic  If worse call      Orders Placed This Encounter   Medications    azithromycin (ZITHROMAX) 250 MG tablet     Sig: Take 2 tabs (500 mg) on Day 1, and take 1 tab (250 mg) on days 2 through 5.      Dispense:  1 packet     Refill:  0       Khadar Eubanks MD

## 2018-09-06 RX ORDER — ROPINIROLE 1 MG/1
TABLET, FILM COATED ORAL
Qty: 60 TABLET | Refills: 2 | Status: SHIPPED | OUTPATIENT
Start: 2018-09-06 | End: 2018-12-12 | Stop reason: SDUPTHER

## 2018-09-07 RX ORDER — TRAMADOL HYDROCHLORIDE 50 MG/1
TABLET ORAL
Qty: 60 TABLET | Refills: 0 | OUTPATIENT
Start: 2018-09-07

## 2018-09-21 ENCOUNTER — TELEPHONE (OUTPATIENT)
Dept: FAMILY MEDICINE CLINIC | Age: 83
End: 2018-09-21

## 2018-09-21 DIAGNOSIS — R53.1 WEAKNESS: Primary | ICD-10-CM

## 2018-09-21 NOTE — TELEPHONE ENCOUNTER
Scott Lynn (daughter, on HIPAA) is requesting Physical Therapy from [address] [place] [address] to help with patient's weakness. Please advise or does she need appt?

## 2018-09-24 ENCOUNTER — TELEPHONE (OUTPATIENT)
Dept: CARDIOLOGY CLINIC | Age: 83
End: 2018-09-24

## 2018-09-24 NOTE — TELEPHONE ENCOUNTER
Spoke with Abhijeet Munguia, she reports she just feels \"jittery\" and complains of left sided focal chest pain with inspiration. She reports she is compliant with her medications. She states she feels the pain is the same as it was in August. Reviewed documentation and patient had a negative workup and was instructed to take a pain reliever and was started on Protonix. Catarina Raineyle that she should proceed to the ED if her symptoms continue to get worse. She denies any palpitations or her heart racing. Instructed Dr. Mane Swain is OOT and could arrange for a follow up with another provider and she states she would rather call Dr. Martinez Funez to get it for her anxiety. She states she will try some Tylenol and will go to the ED if her symptoms persist or worsen.

## 2018-09-25 NOTE — TELEPHONE ENCOUNTER
[facility] [place] closed operations. Leobardo Morales advised and states she will check with [facility] [employer].

## 2018-09-25 NOTE — TELEPHONE ENCOUNTER
Leonila Ortega advised and state [facility] has seen Emma Hinkle before. Faxed referral to [facility] of [address] [place] [address].

## 2018-09-28 ENCOUNTER — APPOINTMENT (OUTPATIENT)
Dept: GENERAL RADIOLOGY | Age: 83
DRG: 291 | End: 2018-09-28
Payer: MEDICARE

## 2018-09-28 ENCOUNTER — APPOINTMENT (OUTPATIENT)
Dept: CT IMAGING | Age: 83
DRG: 291 | End: 2018-09-28
Payer: MEDICARE

## 2018-09-28 ENCOUNTER — HOSPITAL ENCOUNTER (INPATIENT)
Age: 83
LOS: 4 days | Discharge: HOME | DRG: 291 | End: 2018-10-02
Attending: EMERGENCY MEDICINE | Admitting: INTERNAL MEDICINE
Payer: MEDICARE

## 2018-09-28 DIAGNOSIS — I30.9 ACUTE PERICARDIAL EFFUSION: Primary | ICD-10-CM

## 2018-09-28 DIAGNOSIS — J90 PLEURAL EFFUSION, LEFT: ICD-10-CM

## 2018-09-28 DIAGNOSIS — R07.9 CHEST PAIN, UNSPECIFIED TYPE: ICD-10-CM

## 2018-09-28 DIAGNOSIS — R00.0 TACHYCARDIA: ICD-10-CM

## 2018-09-28 PROBLEM — J18.9 PNEUMONIA: Status: ACTIVE | Noted: 2018-09-28

## 2018-09-28 LAB
A/G RATIO: 1 (ref 1.1–2.2)
ALBUMIN SERPL-MCNC: 3.4 G/DL (ref 3.4–5)
ALP BLD-CCNC: 118 U/L (ref 40–129)
ALT SERPL-CCNC: 17 U/L (ref 10–40)
ANION GAP SERPL CALCULATED.3IONS-SCNC: 11 MMOL/L (ref 3–16)
AST SERPL-CCNC: 18 U/L (ref 15–37)
BASOPHILS ABSOLUTE: 0.1 K/UL (ref 0–0.2)
BASOPHILS RELATIVE PERCENT: 0.9 %
BILIRUB SERPL-MCNC: 0.7 MG/DL (ref 0–1)
BUN BLDV-MCNC: 16 MG/DL (ref 7–20)
CALCIUM SERPL-MCNC: 8.6 MG/DL (ref 8.3–10.6)
CHLORIDE BLD-SCNC: 95 MMOL/L (ref 99–110)
CO2: 28 MMOL/L (ref 21–32)
CREAT SERPL-MCNC: 0.6 MG/DL (ref 0.6–1.2)
EOSINOPHILS ABSOLUTE: 0 K/UL (ref 0–0.6)
EOSINOPHILS RELATIVE PERCENT: 0.2 %
GFR AFRICAN AMERICAN: >60
GFR NON-AFRICAN AMERICAN: >60
GLOBULIN: 3.3 G/DL
GLUCOSE BLD-MCNC: 99 MG/DL (ref 70–99)
HCT VFR BLD CALC: 33.7 % (ref 36–48)
HEMATOLOGY PATH CONSULT: NO
HEMOGLOBIN: 10.9 G/DL (ref 12–16)
LYMPHOCYTES ABSOLUTE: 2.3 K/UL (ref 1–5.1)
LYMPHOCYTES RELATIVE PERCENT: 19.6 %
MCH RBC QN AUTO: 27.3 PG (ref 26–34)
MCHC RBC AUTO-ENTMCNC: 32.5 G/DL (ref 31–36)
MCV RBC AUTO: 83.9 FL (ref 80–100)
MONOCYTES ABSOLUTE: 1.7 K/UL (ref 0–1.3)
MONOCYTES RELATIVE PERCENT: 14.7 %
NEUTROPHILS ABSOLUTE: 7.5 K/UL (ref 1.7–7.7)
NEUTROPHILS RELATIVE PERCENT: 64.6 %
PDW BLD-RTO: 17 % (ref 12.4–15.4)
PLATELET # BLD: 192 K/UL (ref 135–450)
PMV BLD AUTO: 8.2 FL (ref 5–10.5)
POTASSIUM REFLEX MAGNESIUM: 3.7 MMOL/L (ref 3.5–5.1)
PRO-BNP: 4419 PG/ML (ref 0–449)
PROCALCITONIN: 0.06 NG/ML (ref 0–0.15)
RBC # BLD: 4.01 M/UL (ref 4–5.2)
SODIUM BLD-SCNC: 134 MMOL/L (ref 136–145)
TOTAL PROTEIN: 6.7 G/DL (ref 6.4–8.2)
TROPONIN: <0.01 NG/ML
WBC # BLD: 11.6 K/UL (ref 4–11)

## 2018-09-28 PROCEDURE — 83880 ASSAY OF NATRIURETIC PEPTIDE: CPT

## 2018-09-28 PROCEDURE — 71046 X-RAY EXAM CHEST 2 VIEWS: CPT

## 2018-09-28 PROCEDURE — 6360000002 HC RX W HCPCS: Performed by: INTERNAL MEDICINE

## 2018-09-28 PROCEDURE — 6370000000 HC RX 637 (ALT 250 FOR IP): Performed by: INTERNAL MEDICINE

## 2018-09-28 PROCEDURE — 6370000000 HC RX 637 (ALT 250 FOR IP): Performed by: PHYSICIAN ASSISTANT

## 2018-09-28 PROCEDURE — 71260 CT THORAX DX C+: CPT

## 2018-09-28 PROCEDURE — 2580000003 HC RX 258

## 2018-09-28 PROCEDURE — 84145 PROCALCITONIN (PCT): CPT

## 2018-09-28 PROCEDURE — 99285 EMERGENCY DEPT VISIT HI MDM: CPT

## 2018-09-28 PROCEDURE — 2060000000 HC ICU INTERMEDIATE R&B

## 2018-09-28 PROCEDURE — 84484 ASSAY OF TROPONIN QUANT: CPT

## 2018-09-28 PROCEDURE — 87449 NOS EACH ORGANISM AG IA: CPT

## 2018-09-28 PROCEDURE — 36415 COLL VENOUS BLD VENIPUNCTURE: CPT

## 2018-09-28 PROCEDURE — 87641 MR-STAPH DNA AMP PROBE: CPT

## 2018-09-28 PROCEDURE — 93005 ELECTROCARDIOGRAM TRACING: CPT | Performed by: PHYSICIAN ASSISTANT

## 2018-09-28 PROCEDURE — 94760 N-INVAS EAR/PLS OXIMETRY 1: CPT

## 2018-09-28 PROCEDURE — 99223 1ST HOSP IP/OBS HIGH 75: CPT | Performed by: INTERNAL MEDICINE

## 2018-09-28 PROCEDURE — 85025 COMPLETE CBC W/AUTO DIFF WBC: CPT

## 2018-09-28 PROCEDURE — 80053 COMPREHEN METABOLIC PANEL: CPT

## 2018-09-28 PROCEDURE — 94664 DEMO&/EVAL PT USE INHALER: CPT

## 2018-09-28 PROCEDURE — 94640 AIRWAY INHALATION TREATMENT: CPT

## 2018-09-28 PROCEDURE — 2580000003 HC RX 258: Performed by: INTERNAL MEDICINE

## 2018-09-28 PROCEDURE — 6360000004 HC RX CONTRAST MEDICATION: Performed by: PHYSICIAN ASSISTANT

## 2018-09-28 RX ORDER — IPRATROPIUM BROMIDE AND ALBUTEROL SULFATE 2.5; .5 MG/3ML; MG/3ML
1 SOLUTION RESPIRATORY (INHALATION)
Status: DISCONTINUED | OUTPATIENT
Start: 2018-09-28 | End: 2018-10-02 | Stop reason: HOSPADM

## 2018-09-28 RX ORDER — SODIUM CHLORIDE 0.9 % (FLUSH) 0.9 %
10 SYRINGE (ML) INJECTION PRN
Status: DISCONTINUED | OUTPATIENT
Start: 2018-09-28 | End: 2018-10-02 | Stop reason: HOSPADM

## 2018-09-28 RX ORDER — FERROUS SULFATE 325(65) MG
325 TABLET ORAL
COMMUNITY

## 2018-09-28 RX ORDER — FUROSEMIDE 10 MG/ML
40 INJECTION INTRAMUSCULAR; INTRAVENOUS 2 TIMES DAILY
Status: DISCONTINUED | OUTPATIENT
Start: 2018-09-28 | End: 2018-09-29

## 2018-09-28 RX ORDER — ASPIRIN 81 MG/1
324 TABLET, CHEWABLE ORAL ONCE
Status: COMPLETED | OUTPATIENT
Start: 2018-09-28 | End: 2018-09-28

## 2018-09-28 RX ORDER — DONEPEZIL HYDROCHLORIDE 5 MG/1
5 TABLET, FILM COATED ORAL NIGHTLY
Status: DISCONTINUED | OUTPATIENT
Start: 2018-09-28 | End: 2018-10-02 | Stop reason: HOSPADM

## 2018-09-28 RX ORDER — PANTOPRAZOLE SODIUM 40 MG/1
40 TABLET, DELAYED RELEASE ORAL DAILY
Status: DISCONTINUED | OUTPATIENT
Start: 2018-09-28 | End: 2018-10-02 | Stop reason: HOSPADM

## 2018-09-28 RX ORDER — METOPROLOL TARTRATE 50 MG/1
50 TABLET, FILM COATED ORAL 2 TIMES DAILY
Status: DISCONTINUED | OUTPATIENT
Start: 2018-09-28 | End: 2018-10-02 | Stop reason: HOSPADM

## 2018-09-28 RX ORDER — METHYLPREDNISOLONE SODIUM SUCCINATE 40 MG/ML
40 INJECTION, POWDER, LYOPHILIZED, FOR SOLUTION INTRAMUSCULAR; INTRAVENOUS DAILY
Status: DISCONTINUED | OUTPATIENT
Start: 2018-09-28 | End: 2018-09-28

## 2018-09-28 RX ORDER — FUROSEMIDE 40 MG/1
40 TABLET ORAL DAILY
Status: DISCONTINUED | OUTPATIENT
Start: 2018-09-28 | End: 2018-09-28

## 2018-09-28 RX ORDER — SODIUM CHLORIDE 0.9 % (FLUSH) 0.9 %
10 SYRINGE (ML) INJECTION EVERY 12 HOURS SCHEDULED
Status: DISCONTINUED | OUTPATIENT
Start: 2018-09-28 | End: 2018-10-02 | Stop reason: HOSPADM

## 2018-09-28 RX ORDER — SODIUM CHLORIDE 9 MG/ML
INJECTION, SOLUTION INTRAVENOUS
Status: COMPLETED
Start: 2018-09-28 | End: 2018-09-28

## 2018-09-28 RX ORDER — TRAZODONE HYDROCHLORIDE 50 MG/1
25 TABLET ORAL NIGHTLY
Status: DISCONTINUED | OUTPATIENT
Start: 2018-09-28 | End: 2018-10-02 | Stop reason: HOSPADM

## 2018-09-28 RX ORDER — MONTELUKAST SODIUM 10 MG/1
10 TABLET ORAL NIGHTLY
Status: DISCONTINUED | OUTPATIENT
Start: 2018-09-28 | End: 2018-10-02 | Stop reason: HOSPADM

## 2018-09-28 RX ORDER — DULOXETIN HYDROCHLORIDE 20 MG/1
20 CAPSULE, DELAYED RELEASE ORAL DAILY
Status: DISCONTINUED | OUTPATIENT
Start: 2018-09-28 | End: 2018-10-02 | Stop reason: HOSPADM

## 2018-09-28 RX ORDER — POLYETHYLENE GLYCOL 3350 17 G/17G
17 POWDER, FOR SOLUTION ORAL DAILY
Status: DISCONTINUED | OUTPATIENT
Start: 2018-09-28 | End: 2018-10-02 | Stop reason: HOSPADM

## 2018-09-28 RX ORDER — LANOLIN ALCOHOL/MO/W.PET/CERES
1000 CREAM (GRAM) TOPICAL DAILY
Status: DISCONTINUED | OUTPATIENT
Start: 2018-09-28 | End: 2018-10-02 | Stop reason: HOSPADM

## 2018-09-28 RX ORDER — ONDANSETRON 2 MG/ML
4 INJECTION INTRAMUSCULAR; INTRAVENOUS EVERY 6 HOURS PRN
Status: DISCONTINUED | OUTPATIENT
Start: 2018-09-28 | End: 2018-10-02 | Stop reason: HOSPADM

## 2018-09-28 RX ORDER — M-VIT,TX,IRON,MINS/CALC/FOLIC 27MG-0.4MG
1 TABLET ORAL DAILY
COMMUNITY

## 2018-09-28 RX ORDER — ROPINIROLE 1 MG/1
1 TABLET, FILM COATED ORAL NIGHTLY
Status: DISCONTINUED | OUTPATIENT
Start: 2018-09-28 | End: 2018-10-02 | Stop reason: HOSPADM

## 2018-09-28 RX ORDER — ACETAMINOPHEN 500 MG
1000 TABLET ORAL ONCE
Status: COMPLETED | OUTPATIENT
Start: 2018-09-28 | End: 2018-09-28

## 2018-09-28 RX ORDER — DIVALPROEX SODIUM 125 MG/1
250 CAPSULE, COATED PELLETS ORAL 2 TIMES DAILY
Status: DISCONTINUED | OUTPATIENT
Start: 2018-09-28 | End: 2018-10-02 | Stop reason: HOSPADM

## 2018-09-28 RX ORDER — LEVOTHYROXINE SODIUM 88 UG/1
88 TABLET ORAL DAILY
Status: DISCONTINUED | OUTPATIENT
Start: 2018-09-28 | End: 2018-10-02 | Stop reason: HOSPADM

## 2018-09-28 RX ADMIN — ASPIRIN 81 MG CHEWABLE TABLET 324 MG: 81 TABLET CHEWABLE at 06:59

## 2018-09-28 RX ADMIN — SODIUM CHLORIDE 250 ML: 9 INJECTION, SOLUTION INTRAVENOUS at 13:06

## 2018-09-28 RX ADMIN — IPRATROPIUM BROMIDE AND ALBUTEROL SULFATE 1 AMPULE: .5; 3 SOLUTION RESPIRATORY (INHALATION) at 20:49

## 2018-09-28 RX ADMIN — IOPAMIDOL 75 ML: 755 INJECTION, SOLUTION INTRAVENOUS at 07:24

## 2018-09-28 RX ADMIN — CYANOCOBALAMIN TAB 1000 MCG 1000 MCG: 1000 TAB at 11:01

## 2018-09-28 RX ADMIN — DIVALPROEX SODIUM 250 MG: 125 CAPSULE, COATED PELLETS ORAL at 11:00

## 2018-09-28 RX ADMIN — FUROSEMIDE 40 MG: 10 INJECTION, SOLUTION INTRAMUSCULAR; INTRAVENOUS at 13:07

## 2018-09-28 RX ADMIN — ACETAMINOPHEN 1000 MG: 500 TABLET ORAL at 06:59

## 2018-09-28 RX ADMIN — FUROSEMIDE 40 MG: 40 TABLET ORAL at 11:01

## 2018-09-28 RX ADMIN — DIVALPROEX SODIUM 250 MG: 125 CAPSULE, COATED PELLETS ORAL at 20:55

## 2018-09-28 RX ADMIN — Medication 10 ML: at 20:56

## 2018-09-28 RX ADMIN — APIXABAN 5 MG: 5 TABLET, FILM COATED ORAL at 10:59

## 2018-09-28 RX ADMIN — LEVOTHYROXINE SODIUM 88 MCG: 88 TABLET ORAL at 11:01

## 2018-09-28 RX ADMIN — IPRATROPIUM BROMIDE AND ALBUTEROL SULFATE 1 AMPULE: .5; 3 SOLUTION RESPIRATORY (INHALATION) at 12:00

## 2018-09-28 RX ADMIN — POLYETHYLENE GLYCOL 3350 17 G: 17 POWDER, FOR SOLUTION ORAL at 17:49

## 2018-09-28 RX ADMIN — MOMETASONE FUROATE AND FORMOTEROL FUMARATE DIHYDRATE 2 PUFF: 200; 5 AEROSOL RESPIRATORY (INHALATION) at 20:49

## 2018-09-28 RX ADMIN — Medication 10 ML: at 10:51

## 2018-09-28 RX ADMIN — PANTOPRAZOLE SODIUM 40 MG: 40 TABLET, DELAYED RELEASE ORAL at 10:59

## 2018-09-28 RX ADMIN — IPRATROPIUM BROMIDE AND ALBUTEROL SULFATE 1 AMPULE: .5; 3 SOLUTION RESPIRATORY (INHALATION) at 16:43

## 2018-09-28 RX ADMIN — TRAZODONE HYDROCHLORIDE 25 MG: 50 TABLET ORAL at 20:56

## 2018-09-28 RX ADMIN — DONEPEZIL HYDROCHLORIDE 5 MG: 5 TABLET, FILM COATED ORAL at 20:56

## 2018-09-28 RX ADMIN — METOPROLOL TARTRATE 50 MG: 50 TABLET ORAL at 11:01

## 2018-09-28 RX ADMIN — FUROSEMIDE 40 MG: 10 INJECTION, SOLUTION INTRAMUSCULAR; INTRAVENOUS at 17:49

## 2018-09-28 RX ADMIN — MONTELUKAST SODIUM 10 MG: 10 TABLET, COATED ORAL at 20:56

## 2018-09-28 RX ADMIN — CEFTRIAXONE 1 G: 1 INJECTION, POWDER, FOR SOLUTION INTRAMUSCULAR; INTRAVENOUS at 10:48

## 2018-09-28 RX ADMIN — ROPINIROLE HYDROCHLORIDE 1 MG: 1 TABLET, FILM COATED ORAL at 20:55

## 2018-09-28 RX ADMIN — METHYLPREDNISOLONE SODIUM SUCCINATE 40 MG: 40 INJECTION, POWDER, FOR SOLUTION INTRAMUSCULAR; INTRAVENOUS at 11:52

## 2018-09-28 RX ADMIN — VITAMIN D, TAB 1000IU (100/BT) 5000 UNITS: 25 TAB at 11:01

## 2018-09-28 RX ADMIN — AZITHROMYCIN MONOHYDRATE 500 MG: 500 INJECTION, POWDER, LYOPHILIZED, FOR SOLUTION INTRAVENOUS at 11:52

## 2018-09-28 RX ADMIN — METOPROLOL TARTRATE 50 MG: 50 TABLET ORAL at 20:56

## 2018-09-28 RX ADMIN — DULOXETINE HYDROCHLORIDE 20 MG: 20 CAPSULE, DELAYED RELEASE ORAL at 11:01

## 2018-09-28 ASSESSMENT — PAIN DESCRIPTION - LOCATION
LOCATION: CHEST
LOCATION: CHEST

## 2018-09-28 ASSESSMENT — PAIN - FUNCTIONAL ASSESSMENT: PAIN_FUNCTIONAL_ASSESSMENT: 0-10

## 2018-09-28 ASSESSMENT — PAIN DESCRIPTION - PAIN TYPE
TYPE: ACUTE PAIN
TYPE: ACUTE PAIN

## 2018-09-28 ASSESSMENT — PAIN SCALES - GENERAL
PAINLEVEL_OUTOF10: 4
PAINLEVEL_OUTOF10: 2
PAINLEVEL_OUTOF10: 4
PAINLEVEL_OUTOF10: 6

## 2018-09-28 ASSESSMENT — PAIN DESCRIPTION - DESCRIPTORS: DESCRIPTORS: ACHING

## 2018-09-28 ASSESSMENT — PAIN DESCRIPTION - ORIENTATION
ORIENTATION: LEFT
ORIENTATION: LEFT

## 2018-09-28 NOTE — ED TRIAGE NOTES
Pt presents to the ED with c/o left side chest pain that radiates up to her neck. States pain woke her up at approx. 3am. Describes pain as \"throbbing\". Pt states pain worsens when she takes a deep breath. Squad reports patient is in AFIB RVR. Pt denies any cardiac history. EKC completed at bedside. Pt states chest pain has subsided but her neck hurts at this time.

## 2018-09-28 NOTE — H&P
[id number], BEART, [id number], PHART, THGBART, [id number], PO2ART, [place]        Active Hospital Problems    Diagnosis Date Noted    Pneumonia [J18.9] 09/28/2018         PHYSICIANS CERTIFICATION:    I certify that Judie Ramos is expected to be hospitalized for more than 2 midnights based on the following assessment and plan:      ASSESSMENT/PLAN:      Left Pleural Effusion with pleuritic chest pain. Concerning for inflammatory effusion considering pleurisy, but afebrile, WBC mildly elevated and procalcitonin negative. This would speak against infection and may suggest malignant effusion. Can not rule out CHF effusion with significant cardiomegaly and tail tail signs of CHF on exam.   Plan:    - pulm to eval for possible thoracentesis - pt on eliquis. - cover with zithro rocephin empiric.    - get MRSA, Resp culture, legionella and strep.    - lasix to continue - note switched to IV per pulm. Afib Chronic rate controlled. Cont metoprolol BID and eliquis - may need to hold if thoracentesis planned. COPD stable - cont PTA regimen of inhalers. No need for systemic steroid at this time - stop solumedrol. CHF acute on chronic grade II diastolic. Lasix IV as above. Trend troponin. Hypothyroidism - on synthroid. DVT Prophylaxis: lovenox - (eliquis on hold) - will need to hold Sunday evening for thoracentesis Monday. Diet: DIET GENERAL;  Code Status: Full Code      Dispo - inpatient. Geri Leyva MD    Thank you Feli Barraza MD for the opportunity to be involved in this patient's care. If you have any questions or concerns please feel free to contact me at [phone].

## 2018-09-28 NOTE — PROGRESS NOTES
Patient arrived to room 5104 via stretcher at 8832. Ambulates to bed with no complications. VSS. Telemetry applied and CMU verfied atrial fibrillation. Patient oriented to room and call light but has short term memory loss. Patient in chair with chair alarm on.  Electronically signed by Miky Anglin RN on 9/28/2018 at 12:33 PM

## 2018-09-28 NOTE — ED PROVIDER NOTES
rOPINIRole (REQUIP) 1 MG tablet TAKE TWO TABLETS BY MOUTH NIGHTLY 60 tablet 2    pantoprazole (PROTONIX) 40 MG tablet Take 40 mg by mouth daily      apixaban (ELIQUIS) 5 MG TABS tablet Take 1 tablet by mouth 2 times daily 60 tablet 5    furosemide (LASIX) 40 MG tablet TAKE 1 TABLET BY MOUTH DAILY 30 tablet 1    DULoxetine (CYMBALTA) 20 MG extended release capsule Take 20 mg by mouth daily      montelukast (SINGULAIR) 10 MG tablet TAKE 1 TABLET BY MOUTH NIGHTLY 30 tablet 5    metoprolol tartrate (LOPRESSOR) 50 MG tablet Take 1 tablet by mouth 2 times daily 180 tablet 3    levothyroxine (SYNTHROID) 88 MCG tablet TAKE 1 TABLET BY MOUTH DAILY 30 tablet 5    PROAIR  (90 Base) MCG/ACT inhaler 2 PUFFS INTO LUNGS EVERY 4 HOURS AS NEEDED FOR WHEEZING OR FORSHORTNESS OF BREATH 9 Inhaler 0    LINZESS 290 MCG CAPS capsule Take 290 mcg by mouth every morning (before breakfast)       donepezil (ARICEPT) 10 MG tablet Take 5 mg by mouth nightly       Polyethylene Glycol 3350 (MIRALAX PO) Take 17 g by mouth daily as needed (constipation)       traZODone (DESYREL) 50 MG tablet Take 25 mg by mouth nightly       divalproex (DEPAKOTE SPRINKLE) 125 MG capsule Take 250 mg by mouth 2 times daily       SYMBICORT 160-4.5 MCG/ACT AERO INHALE 2 PUFFS TWICE A DAY INTO THE LUNGS 11 Inhaler 3    Cholecalciferol (VITAMIN D3) 5000 units TABS Take 1 tablet by mouth daily       vitamin B-12 (CYANOCOBALAMIN) 1000 MCG tablet Take 1,000 mcg by mouth daily      Compression Stockings MISC by Does not apply route Bilateral lower extremity compression stockings, 15-20 mmHg 2 each 1    mometasone (NASONEX) 50 MCG/ACT nasal spray 2 sprays by Nasal route daily 1 Inhaler 0    Spacer/Aero-Holding Chambers (E-Z SPACER) POLLY 1 Device by Does not apply route daily as needed.  1 Device 0    Calcium Carbonate-Vit D-Min (CALTRATE PLUS PO) Take 1 tablet by mouth daily          ALLERGIES    Allergies   Allergen Reactions    Augmentin [Amoxicillin-Pot Clavulanate] Itching    Demerol     Gluten Meal Diarrhea    Lidocaine Hcl     Other     Procaine     Tetanus Toxoid, Adsorbed     Vicodin [Hydrocodone-Acetaminophen]     Hydrocodone-Acetaminophen Palpitations    Meperidine Palpitations    Prednisone Palpitations    Tetanus Toxoids Palpitations       SOCIAL & FAMILY HISTORY    Social History     Social History    Marital status:      Spouse name: N/A    Number of children: N/A    Years of education: N/A     Occupational History    Retired teacher      Social History Main Topics    Smoking status: Former Smoker     Packs/day: 0.25     Years: 20.00     Quit date: 8/24/1970    Smokeless tobacco: Never Used    Alcohol use No    Drug use: No    Sexual activity: Not Currently     Other Topics Concern    Not on file     Social History Narrative    No narrative on file     Family History   Problem Relation Age of Onset    Cancer Mother     Stroke Father     Arthritis Other     Asthma Other     Diabetes Other     Heart Disease Other        PHYSICAL EXAM    VITAL SIGNS: BP (!) 125/59   Pulse 101   Temp 99 °F (37.2 °C) (Oral)   Resp 27   SpO2 93%    Constitutional:  Well developed, well nourished, no acute distress   HENT:  Atraumatic, moist mucus membranes  Neck: supple, no JVD   Respiratory:  Lungs clear to auscultation bilaterally, no retractions  Cardiovascular:  tachycardic rate, no murmurs  Vascular: Radial and DP pulses 2+ and equal bilaterally  GI:  Soft, nontender, normal bowel sounds  Musculoskeletal:  no acute deformities, no lower extremity edema, no lower extremity asymmetry, no calf tenderness, no thigh tenderness  Integument:  Skin warm and dry, no petechiae   Neurologic:  Alert & oriented, no slurred speech  Psych: Pleasant affect, no hallucinations    EKG    Please see the ED Physician note for EKG interpretation.     RADIOLOGY/PROCEDURES    CT Chest Pulmonary Embolism W Contrast   Preliminary Result   No (estimated RA pressure   of 15 mmHg included)   c/w moderate pulmonary HTN.   The right atrium is mildly dilated. Differential Diagnosis: Acute bronchitis, Musculoskeletal chest pain, Pleurisy, Pulmonary edema, Congestive Heart Failure, Myocardial Infarction, Pulmonary Embolus, Thoracic Dissection, Pericarditis, Pericardial Effusion, Pneumonia, Pneumothorax, Boerhaave's syndrome, Ischemic Bowel, Bowel Obstruction, PUD, GERD, Acute Cholecystitis, Pancreatitis, Hepatitis, Colitis, other    Patient is afebrile and nontoxic in appearance. Tachycardic and tachypneic upon arrival.  Mild leukocytosis of 11,600. Hgb 10.9, near baseline. Sodium 134, slightly low. Metabolic panel otherwise unremarkable. EKG nonischemic as interpreted by ED physician, troponin negative. CTA findings as above, no PE. New pericardial effusion noted. Cardiomegaly with new left-sided pleural effusion also noted. Patient was admitted in August for chest pain and ACS was ruled out. However, considering her new findings of pericardial effusion and pleural effusion I believe she would benefit from admission for a repeat ECHO. Consultation with hospitalist at 8 AM: I contacted Dr. Sean Conner via [employer], and the patient was accepted for admission. The patient was also seen and evaluated by the ED attending, Dr. Shania Mayes. Please see the attending note for further details. FINAL IMPRESSION    1. Acute pericardial effusion    2. Chest pain, unspecified type    3. Pleural effusion, left    4.  Tachycardia        PLAN  Admission to the hospital      (Please note that this note was completed with a voice recognition program.  Every attempt was made to edit the dictations, but inevitably there remain words that are mis-transcribed.)            Charlene King  09/28/18 0887

## 2018-09-28 NOTE — PLAN OF CARE
Problem: Falls - Risk of:  Goal: Will remain free from falls  Will remain free from falls   Outcome: Ongoing  Bed alarm kept on. Call light in reach. Side rails up x2. Pt reminded to use call light for assistance getting out of bed. Hourly rounding done to anticipate pt needs.    Electronically signed by Celia Carrion RN on 9/28/2018 at 6:27 PM      Problem: Discharge Planning:  Goal: Discharged to appropriate level of care  Discharged to appropriate level of care  Outcome: Ongoing

## 2018-09-28 NOTE — CONSULTS
vitamin D  5,000 Units Oral Daily    divalproex  250 mg Oral BID    donepezil  5 mg Oral Nightly    DULoxetine  20 mg Oral Daily    furosemide  40 mg Oral Daily    levothyroxine  88 mcg Oral Daily    metoprolol tartrate  50 mg Oral BID    montelukast  10 mg Oral Nightly    pantoprazole  40 mg Oral Daily    rOPINIRole  1 mg Oral Nightly    mometasone-formoterol  2 puff Inhalation BID    traZODone  25 mg Oral Nightly    vitamin B-12  1,000 mcg Oral Daily    azithromycin  500 mg Intravenous Q24H    cefTRIAXone (ROCEPHIN) IV  1 g Intravenous Q24H    sodium chloride flush  10 mL Intravenous 2 times per day    ipratropium-albuterol  1 ampule Inhalation Q4H WA    methylPREDNISolone  40 mg Intravenous Daily       Continuous Infusions:      PRN Meds:  sodium chloride flush, magnesium hydroxide, ondansetron    ALLERGIES:  Patient is allergic to augmentin [amoxicillin-pot clavulanate]; demerol; gluten meal; lidocaine hcl; other; procaine; tetanus toxoid, adsorbed; vicodin [hydrocodone-acetaminophen]; hydrocodone-acetaminophen; meperidine; prednisone; and tetanus toxoids. REVIEW OF SYSTEMS:  Constitutional: Negative for fever  HENT: Negative for sore throat  Eyes: Negative for redness   Respiratory: + for dyspnea, cough  Cardiovascular: Negative for chest pain  Gastrointestinal: Negative for vomiting, diarrhea   Genitourinary: Negative for hematuria   Musculoskeletal: Negative for arthralgias   Skin: Negative for rash  Neurological: Negative for syncope  Hematological: Negative for adenopathy  Psychiatric/Behavorial: Negative for anxiety    Objective:   PHYSICAL EXAM:  Blood pressure 109/70, pulse 79, temperature 97.8 °F (36.6 °C), temperature source Oral, resp. rate 16, height 5' 4\" (1.626 m), weight 143 lb 3.2 oz (65 kg), SpO2 98 %, not currently breastfeeding.'  Gen: No distress. Eyes: PERRL. No sclera icterus. No conjunctival injection. ENT: No discharge. Pharynx clear.  External appearance of ears weight based adjustment of the  mA/kV was utilized to reduce the radiation dose to as low as reasonably  achievable. COMPARISON:  02/29/2016, 10/21/2008    HISTORY:  ORDERING SYSTEM PROVIDED HISTORY: Nodule of left lung  TECHNOLOGIST PROVIDED HISTORY:  Ordering Physician Provided Reason for Exam: F/U pulmonary nodule  Acuity: Chronic  Type of Exam: Subsequent/Follow-up    FINDINGS:  Mediastinum: No suspicious lymphadenopathy. Calcified lymph nodes are  present related to prior granulomatous disease. Coronary artery  calcifications are present. Mild atherosclerotic calcification of the aorta  and great vessel origins. Esophagus is unremarkable. Lungs/pleura: The central airways are patent. 6 x 5 mm nodule in the right  lower lobe has been present since the 10/21/2008 study, consistent with a  benign etiology. There is a nodular opacity in the lingula which is  unchanged compared to the prior examination. This may have been present on  the 2008 study, however it appears slightly more nodular on the more recent  studies. As before, there is a linear quality to this opacity. No new  nodules identified. Lungs are otherwise clear. No pleural effusion or  pneumothorax. Upper Abdomen: Limited images of the upper abdomen demonstrate no gross  abnormality. Soft Tissues/Bones: No acute soft tissue abnormality. No destructive osseous  lesion. Impression 1. The focal nodular opacity in the lingula is again demonstrated, unchanged  from the most recent prior examination. As before, the linear nature of the  opacity favors that this represents chronic atelectasis or scarring. However, given that there is a slightly more nodular component on the more  recent studies, follow-up examination in 6 months is recommended to document  continued stability (see below for further detail). 2. 6 mm nodule in the right lower lobe, unchanged from 2008.   Given the  chronic stability, this is consistent with a benign etiology. RECOMMENDATIONS:  Fleischner Society guidelines for follow-up and management of pulmonary  nodules:    Nodule size greater than 8 mm    In low-risk and high-risk patients follow-up CT at around 3, 9, and 24  months, contrast-enhanced CT, PET, and/or biopsy. Low risk patients include individuals with minimal or absent history of  smoking and other known risk factors. High risk patients include individuals with a history of smoking or other  known risk factors. Radiology 2005; [phone]         CTPA:   Results for orders placed during the hospital encounter of 09/28/18   CT Chest Pulmonary Embolism W Contrast    Narrative EXAMINATION:  CTA OF THE CHEST 9/28/2018 7:25 am    TECHNIQUE:  CTA of the chest was performed after the administration of intravenous  contrast.  Multiplanar reformatted images are provided for review. MIP  images are provided for review. Dose modulation, iterative reconstruction,  and/or weight based adjustment of the mA/kV was utilized to reduce the  radiation dose to as low as reasonably achievable. COMPARISON:  08/07/2018    HISTORY:  ORDERING SYSTEM PROVIDED HISTORY: shortness of breath R/O PE  TECHNOLOGIST PROVIDED HISTORY:  Ordering Physician Provided Reason for Exam: chest pain Sob no surg hx  Acuity: Acute  Type of Exam: Initial    FINDINGS:  Pulmonary Arteries: There is adequate opacification of the pulmonary arteries  with contrast.  No acute pulmonary embolus is identified. Prominent central  pulmonary arteries are redemonstrated. Mediastinum: There is a small pericardial effusion which appears new compared  to the prior study. Coronary artery calcifications are noted. The heart is  enlarged. There is reflux of contrast from the right side of the heart into  the hepatic veins which suggests right heart dysfunction. Lungs/pleura:  There is a moderate sized left-sided pleural effusion which  appears new with associated moderate volume loss within transcribed using [address] [employer]. Please disregard any translational errors.     Thank you for the consult    Jaquelin Barnes [facility]  [phone]

## 2018-09-29 ENCOUNTER — APPOINTMENT (OUTPATIENT)
Dept: GENERAL RADIOLOGY | Age: 83
DRG: 291 | End: 2018-09-29
Payer: MEDICARE

## 2018-09-29 LAB
ALBUMIN SERPL-MCNC: 3.4 G/DL (ref 3.4–5)
ANION GAP SERPL CALCULATED.3IONS-SCNC: 15 MMOL/L (ref 3–16)
BASOPHILS ABSOLUTE: 0.1 K/UL (ref 0–0.2)
BASOPHILS RELATIVE PERCENT: 0.5 %
BUN BLDV-MCNC: 24 MG/DL (ref 7–20)
CALCIUM SERPL-MCNC: 8.2 MG/DL (ref 8.3–10.6)
CHLORIDE BLD-SCNC: 91 MMOL/L (ref 99–110)
CO2: 28 MMOL/L (ref 21–32)
CREAT SERPL-MCNC: 0.8 MG/DL (ref 0.6–1.2)
EKG ATRIAL RATE: 108 BPM
EKG DIAGNOSIS: NORMAL
EKG Q-T INTERVAL: 306 MS
EKG QRS DURATION: 98 MS
EKG QTC CALCULATION (BAZETT): 396 MS
EKG R AXIS: 13 DEGREES
EKG T AXIS: 12 DEGREES
EKG VENTRICULAR RATE: 101 BPM
EOSINOPHILS ABSOLUTE: 0 K/UL (ref 0–0.6)
EOSINOPHILS RELATIVE PERCENT: 0 %
GFR AFRICAN AMERICAN: >60
GFR NON-AFRICAN AMERICAN: >60
GLUCOSE BLD-MCNC: 161 MG/DL (ref 70–99)
HCT VFR BLD CALC: 31 % (ref 36–48)
HEMOGLOBIN: 10.1 G/DL (ref 12–16)
L. PNEUMOPHILA SEROGP 1 UR AG: NORMAL
LYMPHOCYTES ABSOLUTE: 0.9 K/UL (ref 1–5.1)
LYMPHOCYTES RELATIVE PERCENT: 7.6 %
MCH RBC QN AUTO: 27.1 PG (ref 26–34)
MCHC RBC AUTO-ENTMCNC: 32.4 G/DL (ref 31–36)
MCV RBC AUTO: 83.8 FL (ref 80–100)
MONOCYTES ABSOLUTE: 1 K/UL (ref 0–1.3)
MONOCYTES RELATIVE PERCENT: 7.7 %
MRSA SCREEN RT-PCR: NORMAL
NEUTROPHILS ABSOLUTE: 10.4 K/UL (ref 1.7–7.7)
NEUTROPHILS RELATIVE PERCENT: 84.2 %
PDW BLD-RTO: 17.2 % (ref 12.4–15.4)
PHOSPHORUS: 3.4 MG/DL (ref 2.5–4.9)
PLATELET # BLD: 186 K/UL (ref 135–450)
PMV BLD AUTO: 8.4 FL (ref 5–10.5)
POTASSIUM SERPL-SCNC: 3.7 MMOL/L (ref 3.5–5.1)
PROCALCITONIN: 0.06 NG/ML (ref 0–0.15)
RBC # BLD: 3.71 M/UL (ref 4–5.2)
SODIUM BLD-SCNC: 134 MMOL/L (ref 136–145)
STREP PNEUMONIAE ANTIGEN, URINE: NORMAL
WBC # BLD: 12.4 K/UL (ref 4–11)

## 2018-09-29 PROCEDURE — 94761 N-INVAS EAR/PLS OXIMETRY MLT: CPT

## 2018-09-29 PROCEDURE — 6370000000 HC RX 637 (ALT 250 FOR IP): Performed by: INTERNAL MEDICINE

## 2018-09-29 PROCEDURE — 80069 RENAL FUNCTION PANEL: CPT

## 2018-09-29 PROCEDURE — 6360000002 HC RX W HCPCS: Performed by: INTERNAL MEDICINE

## 2018-09-29 PROCEDURE — 84145 PROCALCITONIN (PCT): CPT

## 2018-09-29 PROCEDURE — 36415 COLL VENOUS BLD VENIPUNCTURE: CPT

## 2018-09-29 PROCEDURE — 93005 ELECTROCARDIOGRAM TRACING: CPT | Performed by: INTERNAL MEDICINE

## 2018-09-29 PROCEDURE — 71046 X-RAY EXAM CHEST 2 VIEWS: CPT

## 2018-09-29 PROCEDURE — 85025 COMPLETE CBC W/AUTO DIFF WBC: CPT

## 2018-09-29 PROCEDURE — 2580000003 HC RX 258: Performed by: INTERNAL MEDICINE

## 2018-09-29 PROCEDURE — 94664 DEMO&/EVAL PT USE INHALER: CPT

## 2018-09-29 PROCEDURE — 2060000000 HC ICU INTERMEDIATE R&B

## 2018-09-29 PROCEDURE — 94640 AIRWAY INHALATION TREATMENT: CPT

## 2018-09-29 PROCEDURE — 93010 ELECTROCARDIOGRAM REPORT: CPT | Performed by: INTERNAL MEDICINE

## 2018-09-29 RX ORDER — GUAIFENESIN/DEXTROMETHORPHAN 100-10MG/5
10 SYRUP ORAL EVERY 4 HOURS PRN
Status: DISCONTINUED | OUTPATIENT
Start: 2018-09-29 | End: 2018-10-02 | Stop reason: HOSPADM

## 2018-09-29 RX ORDER — FUROSEMIDE 10 MG/ML
40 INJECTION INTRAMUSCULAR; INTRAVENOUS DAILY
Status: DISCONTINUED | OUTPATIENT
Start: 2018-09-30 | End: 2018-09-30

## 2018-09-29 RX ADMIN — DILTIAZEM HYDROCHLORIDE 30 MG: 30 TABLET, FILM COATED ORAL at 16:23

## 2018-09-29 RX ADMIN — Medication 10 ML: at 09:44

## 2018-09-29 RX ADMIN — CYANOCOBALAMIN TAB 1000 MCG 1000 MCG: 1000 TAB at 09:43

## 2018-09-29 RX ADMIN — DILTIAZEM HYDROCHLORIDE 30 MG: 30 TABLET, FILM COATED ORAL at 23:30

## 2018-09-29 RX ADMIN — Medication 10 ML: at 19:56

## 2018-09-29 RX ADMIN — VITAMIN D, TAB 1000IU (100/BT) 5000 UNITS: 25 TAB at 09:43

## 2018-09-29 RX ADMIN — POLYETHYLENE GLYCOL 3350 17 G: 17 POWDER, FOR SOLUTION ORAL at 09:44

## 2018-09-29 RX ADMIN — MOMETASONE FUROATE AND FORMOTEROL FUMARATE DIHYDRATE 2 PUFF: 200; 5 AEROSOL RESPIRATORY (INHALATION) at 21:01

## 2018-09-29 RX ADMIN — DULOXETINE HYDROCHLORIDE 20 MG: 20 CAPSULE, DELAYED RELEASE ORAL at 09:43

## 2018-09-29 RX ADMIN — METOPROLOL TARTRATE 50 MG: 50 TABLET ORAL at 09:43

## 2018-09-29 RX ADMIN — FUROSEMIDE 40 MG: 10 INJECTION, SOLUTION INTRAMUSCULAR; INTRAVENOUS at 10:30

## 2018-09-29 RX ADMIN — IPRATROPIUM BROMIDE AND ALBUTEROL SULFATE 1 AMPULE: .5; 3 SOLUTION RESPIRATORY (INHALATION) at 21:01

## 2018-09-29 RX ADMIN — PANTOPRAZOLE SODIUM 40 MG: 40 TABLET, DELAYED RELEASE ORAL at 09:43

## 2018-09-29 RX ADMIN — MOMETASONE FUROATE AND FORMOTEROL FUMARATE DIHYDRATE 2 PUFF: 200; 5 AEROSOL RESPIRATORY (INHALATION) at 08:27

## 2018-09-29 RX ADMIN — MONTELUKAST SODIUM 10 MG: 10 TABLET, COATED ORAL at 19:56

## 2018-09-29 RX ADMIN — TRAZODONE HYDROCHLORIDE 25 MG: 50 TABLET ORAL at 19:56

## 2018-09-29 RX ADMIN — IPRATROPIUM BROMIDE AND ALBUTEROL SULFATE 1 AMPULE: .5; 3 SOLUTION RESPIRATORY (INHALATION) at 12:07

## 2018-09-29 RX ADMIN — ENOXAPARIN SODIUM 40 MG: 40 INJECTION SUBCUTANEOUS at 09:44

## 2018-09-29 RX ADMIN — IPRATROPIUM BROMIDE AND ALBUTEROL SULFATE 1 AMPULE: .5; 3 SOLUTION RESPIRATORY (INHALATION) at 16:11

## 2018-09-29 RX ADMIN — GUAIFENESIN AND DEXTROMETHORPHAN 10 ML: 100; 10 SYRUP ORAL at 16:23

## 2018-09-29 RX ADMIN — IPRATROPIUM BROMIDE AND ALBUTEROL SULFATE 1 AMPULE: .5; 3 SOLUTION RESPIRATORY (INHALATION) at 08:27

## 2018-09-29 RX ADMIN — CEFTRIAXONE 1 G: 1 INJECTION, POWDER, FOR SOLUTION INTRAMUSCULAR; INTRAVENOUS at 09:44

## 2018-09-29 RX ADMIN — AZITHROMYCIN MONOHYDRATE 500 MG: 500 INJECTION, POWDER, LYOPHILIZED, FOR SOLUTION INTRAVENOUS at 12:01

## 2018-09-29 RX ADMIN — METOPROLOL TARTRATE 50 MG: 50 TABLET ORAL at 19:56

## 2018-09-29 RX ADMIN — DIVALPROEX SODIUM 250 MG: 125 CAPSULE, COATED PELLETS ORAL at 19:56

## 2018-09-29 RX ADMIN — ROPINIROLE HYDROCHLORIDE 1 MG: 1 TABLET, FILM COATED ORAL at 19:56

## 2018-09-29 RX ADMIN — DIVALPROEX SODIUM 250 MG: 125 CAPSULE, COATED PELLETS ORAL at 09:43

## 2018-09-29 RX ADMIN — DONEPEZIL HYDROCHLORIDE 5 MG: 5 TABLET, FILM COATED ORAL at 19:57

## 2018-09-29 ASSESSMENT — PAIN SCALES - GENERAL: PAINLEVEL_OUTOF10: 0

## 2018-09-29 NOTE — PROGRESS NOTES
motion. Lungs: diminished breath sound left lung, no rales. Fairly clear R lung. Heart: Regular rate and rhythm with Normal S1/S2 without murmurs, rubs or gallops, point of maximum impulse non-displaced  Abdomen: Soft, non-tender or non-distended without rigidity or guarding and positive bowel sounds all four quadrants. Extremities: No clubbing, cyanosis, or edema bilaterally. Full range of motion without deformity and normal gait intact. Skin: Skin color, texture, turgor normal.  No rashes or lesions. Neurologic: Alert and oriented X 3, neurovascularly intact with sensory/motor intact upper extremities/lower extremities, bilaterally. Cranial nerves: II-XII intact, grossly non-focal.  Mental status: Alert, oriented, thought content appropriate. Capillary Refill: Acceptable  < 3 seconds  Peripheral Pulses: +3 Easily felt, not easily obliterated with pressure       Labs:   Recent Labs      09/28/18   0637  09/29/18   0522   WBC  11.6*  12.4*   HGB  10.9*  10.1*   HCT  33.7*  31.0*   PLT  192  186     Recent Labs      09/28/18   0637  09/29/18   0522   NA  134*  134*   K  3.7  3.7   CL  95*  91*   CO2  28  28   BUN  16  24*   CREATININE  0.6  0.8   CALCIUM  8.6  8.2*   PHOS   --   3.4     Recent Labs      09/28/18   0637   AST  18   ALT  17   BILITOT  0.7   ALKPHOS  118     No results for input(s): INR in the last 72 hours. Recent Labs      09/28/18   0637  09/28/18   1033  09/28/18   1535   TROPONINI  <0.01  <0.01  <0.01       Urinalysis:    Lab Results   Component Value Date    NITRU Negative 08/07/2018    WBCUA 0 01/02/2018    RBCUA 1 01/02/2018    BLOODU Negative 08/07/2018    SPECGRAV 1.020 08/07/2018    GLUCOSEU Negative 08/07/2018       Radiology:  XR CHEST STANDARD (2 VW)   Final Result   No significant change in moderate left pleural effusion left basilar opacity. CT Chest Pulmonary Embolism W Contrast   Final Result   No acute pulmonary embolus is identified.       New small pericardial effusion. Marked cardiomegaly. The right-sided cardiac chambers appear enlarged. There is contrast reflux into the hepatic veins which suggests right heart   failure or dysfunction. Clinical correlation is recommended. Echocardiography could be performed. When compared to prior CT scan referenced above, there is a new moderately   sized left-sided pleural effusion. There is moderate volume loss within the   left lower lobe with some volume loss in the inferior lingula. Pneumonia may   be present. Additionally, there is scattered ground-glass opacities in the lower lobes   with smooth interlobular septal thickening which may reflect pulmonary edema   as well. Clinical correlation is recommended. XR CHEST STANDARD (2 VW)   Final Result   Small left pleural effusion and basilar opacities which could reflect   atelectasis, aspiration or pneumonia. Assessment/Plan:    Active Hospital Problems    Diagnosis Date Noted    Pneumonia [J18.9] 09/28/2018     Left Pleural Effusion with pleuritic chest pain. Concerning for inflammatory effusion considering pleurisy, but afebrile, WBC mildly elevated and procalcitonin negative. This would speak against infection and may suggest malignant effusion. Can not rule out CHF effusion with significant cardiomegaly and tail tail signs of CHF on exam.   Plan:               - pulm involved - eliquis on hold, plan for thoracentesis Monday. - cover with zithro rocephin empiric.               - get MRSA, Resp culture, legionella and strep.               - lasix to continue - note switched to IV per pulm - will decrease to daily.         Afib Chronic - RVR this am.   Cont metoprolol BID   Add diltiazem. Eliquis on hold for thoracentesis.       COPD stable - cont PTA regimen of inhalers. No need for systemic steroid at this time - stopped solumedrol.         CHF acute on chronic grade II diastolic. Lasix IV as above.

## 2018-09-29 NOTE — PROGRESS NOTES
Patient having difficulty with short term memory retention. Has pulled leads off repeatedly. Upset that she cannot keep her \"pill box\" in her room. Does not believe she is in a hospital. States her room does not look like a hospital room. Let lab draw but wanted to call the police stating she didn't believe the  was from the lab. Will continue to monitor.

## 2018-09-30 LAB
ALBUMIN SERPL-MCNC: 3.2 G/DL (ref 3.4–5)
ANION GAP SERPL CALCULATED.3IONS-SCNC: 13 MMOL/L (ref 3–16)
BASOPHILS ABSOLUTE: 0.1 K/UL (ref 0–0.2)
BASOPHILS RELATIVE PERCENT: 0.6 %
BUN BLDV-MCNC: 34 MG/DL (ref 7–20)
CALCIUM SERPL-MCNC: 8 MG/DL (ref 8.3–10.6)
CHLORIDE BLD-SCNC: 94 MMOL/L (ref 99–110)
CO2: 29 MMOL/L (ref 21–32)
CREAT SERPL-MCNC: 0.8 MG/DL (ref 0.6–1.2)
EKG ATRIAL RATE: 136 BPM
EKG DIAGNOSIS: NORMAL
EKG Q-T INTERVAL: 314 MS
EKG QRS DURATION: 86 MS
EKG QTC CALCULATION (BAZETT): 443 MS
EKG R AXIS: -3 DEGREES
EKG T AXIS: 165 DEGREES
EKG VENTRICULAR RATE: 120 BPM
EOSINOPHILS ABSOLUTE: 0 K/UL (ref 0–0.6)
EOSINOPHILS RELATIVE PERCENT: 0.2 %
GFR AFRICAN AMERICAN: >60
GFR NON-AFRICAN AMERICAN: >60
GLUCOSE BLD-MCNC: 87 MG/DL (ref 70–99)
HCT VFR BLD CALC: 31.6 % (ref 36–48)
HEMOGLOBIN: 10.3 G/DL (ref 12–16)
LYMPHOCYTES ABSOLUTE: 1.8 K/UL (ref 1–5.1)
LYMPHOCYTES RELATIVE PERCENT: 16.9 %
MCH RBC QN AUTO: 27.3 PG (ref 26–34)
MCHC RBC AUTO-ENTMCNC: 32.7 G/DL (ref 31–36)
MCV RBC AUTO: 83.5 FL (ref 80–100)
MONOCYTES ABSOLUTE: 1.2 K/UL (ref 0–1.3)
MONOCYTES RELATIVE PERCENT: 11.4 %
NEUTROPHILS ABSOLUTE: 7.5 K/UL (ref 1.7–7.7)
NEUTROPHILS RELATIVE PERCENT: 70.9 %
PDW BLD-RTO: 17.2 % (ref 12.4–15.4)
PHOSPHORUS: 4.2 MG/DL (ref 2.5–4.9)
PLATELET # BLD: 219 K/UL (ref 135–450)
PMV BLD AUTO: 8.5 FL (ref 5–10.5)
POTASSIUM SERPL-SCNC: 4.1 MMOL/L (ref 3.5–5.1)
PROCALCITONIN: 0.06 NG/ML (ref 0–0.15)
RBC # BLD: 3.79 M/UL (ref 4–5.2)
SODIUM BLD-SCNC: 136 MMOL/L (ref 136–145)
WBC # BLD: 10.5 K/UL (ref 4–11)

## 2018-09-30 PROCEDURE — 84145 PROCALCITONIN (PCT): CPT

## 2018-09-30 PROCEDURE — 6370000000 HC RX 637 (ALT 250 FOR IP): Performed by: INTERNAL MEDICINE

## 2018-09-30 PROCEDURE — 6360000002 HC RX W HCPCS: Performed by: INTERNAL MEDICINE

## 2018-09-30 PROCEDURE — 94640 AIRWAY INHALATION TREATMENT: CPT

## 2018-09-30 PROCEDURE — 99231 SBSQ HOSP IP/OBS SF/LOW 25: CPT | Performed by: INTERNAL MEDICINE

## 2018-09-30 PROCEDURE — 85025 COMPLETE CBC W/AUTO DIFF WBC: CPT

## 2018-09-30 PROCEDURE — 2060000000 HC ICU INTERMEDIATE R&B

## 2018-09-30 PROCEDURE — 94761 N-INVAS EAR/PLS OXIMETRY MLT: CPT

## 2018-09-30 PROCEDURE — 93010 ELECTROCARDIOGRAM REPORT: CPT | Performed by: INTERNAL MEDICINE

## 2018-09-30 PROCEDURE — 36415 COLL VENOUS BLD VENIPUNCTURE: CPT

## 2018-09-30 PROCEDURE — 2580000003 HC RX 258: Performed by: INTERNAL MEDICINE

## 2018-09-30 PROCEDURE — 80069 RENAL FUNCTION PANEL: CPT

## 2018-09-30 RX ORDER — ACETAMINOPHEN 325 MG/1
650 TABLET ORAL EVERY 6 HOURS PRN
Status: DISCONTINUED | OUTPATIENT
Start: 2018-09-30 | End: 2018-10-02 | Stop reason: HOSPADM

## 2018-09-30 RX ORDER — AZITHROMYCIN 250 MG/1
250 TABLET, FILM COATED ORAL ONCE
Status: COMPLETED | OUTPATIENT
Start: 2018-09-30 | End: 2018-09-30

## 2018-09-30 RX ADMIN — LEVOTHYROXINE SODIUM 88 MCG: 88 TABLET ORAL at 07:50

## 2018-09-30 RX ADMIN — IPRATROPIUM BROMIDE AND ALBUTEROL SULFATE 1 AMPULE: .5; 3 SOLUTION RESPIRATORY (INHALATION) at 08:46

## 2018-09-30 RX ADMIN — CEFTRIAXONE 1 G: 1 INJECTION, POWDER, FOR SOLUTION INTRAMUSCULAR; INTRAVENOUS at 10:30

## 2018-09-30 RX ADMIN — ROPINIROLE HYDROCHLORIDE 1 MG: 1 TABLET, FILM COATED ORAL at 20:24

## 2018-09-30 RX ADMIN — Medication 10 ML: at 07:51

## 2018-09-30 RX ADMIN — MOMETASONE FUROATE AND FORMOTEROL FUMARATE DIHYDRATE 2 PUFF: 200; 5 AEROSOL RESPIRATORY (INHALATION) at 08:46

## 2018-09-30 RX ADMIN — DILTIAZEM HYDROCHLORIDE 30 MG: 30 TABLET, FILM COATED ORAL at 06:17

## 2018-09-30 RX ADMIN — CYANOCOBALAMIN TAB 1000 MCG 1000 MCG: 1000 TAB at 07:50

## 2018-09-30 RX ADMIN — IPRATROPIUM BROMIDE AND ALBUTEROL SULFATE 1 AMPULE: .5; 3 SOLUTION RESPIRATORY (INHALATION) at 20:08

## 2018-09-30 RX ADMIN — TRAZODONE HYDROCHLORIDE 25 MG: 50 TABLET ORAL at 20:25

## 2018-09-30 RX ADMIN — METOPROLOL TARTRATE 50 MG: 50 TABLET ORAL at 20:25

## 2018-09-30 RX ADMIN — PANTOPRAZOLE SODIUM 40 MG: 40 TABLET, DELAYED RELEASE ORAL at 07:50

## 2018-09-30 RX ADMIN — FUROSEMIDE 40 MG: 10 INJECTION, SOLUTION INTRAMUSCULAR; INTRAVENOUS at 07:50

## 2018-09-30 RX ADMIN — DIVALPROEX SODIUM 250 MG: 125 CAPSULE, COATED PELLETS ORAL at 07:50

## 2018-09-30 RX ADMIN — VITAMIN D, TAB 1000IU (100/BT) 5000 UNITS: 25 TAB at 17:49

## 2018-09-30 RX ADMIN — MOMETASONE FUROATE AND FORMOTEROL FUMARATE DIHYDRATE 2 PUFF: 200; 5 AEROSOL RESPIRATORY (INHALATION) at 20:08

## 2018-09-30 RX ADMIN — ENOXAPARIN SODIUM 40 MG: 40 INJECTION SUBCUTANEOUS at 07:50

## 2018-09-30 RX ADMIN — DONEPEZIL HYDROCHLORIDE 5 MG: 5 TABLET, FILM COATED ORAL at 20:25

## 2018-09-30 RX ADMIN — IPRATROPIUM BROMIDE AND ALBUTEROL SULFATE 1 AMPULE: .5; 3 SOLUTION RESPIRATORY (INHALATION) at 12:30

## 2018-09-30 RX ADMIN — IPRATROPIUM BROMIDE AND ALBUTEROL SULFATE 1 AMPULE: .5; 3 SOLUTION RESPIRATORY (INHALATION) at 16:39

## 2018-09-30 RX ADMIN — MONTELUKAST SODIUM 10 MG: 10 TABLET, COATED ORAL at 20:24

## 2018-09-30 RX ADMIN — ACETAMINOPHEN 650 MG: 325 TABLET, FILM COATED ORAL at 15:09

## 2018-09-30 RX ADMIN — DILTIAZEM HYDROCHLORIDE 30 MG: 30 TABLET, FILM COATED ORAL at 12:53

## 2018-09-30 RX ADMIN — DULOXETINE HYDROCHLORIDE 20 MG: 20 CAPSULE, DELAYED RELEASE ORAL at 07:50

## 2018-09-30 RX ADMIN — DIVALPROEX SODIUM 250 MG: 125 CAPSULE, COATED PELLETS ORAL at 20:24

## 2018-09-30 RX ADMIN — DILTIAZEM HYDROCHLORIDE 30 MG: 30 TABLET, FILM COATED ORAL at 17:49

## 2018-09-30 RX ADMIN — METOPROLOL TARTRATE 50 MG: 50 TABLET ORAL at 07:50

## 2018-09-30 RX ADMIN — Medication 10 ML: at 20:28

## 2018-09-30 RX ADMIN — AZITHROMYCIN 250 MG: 250 TABLET, FILM COATED ORAL at 15:09

## 2018-09-30 RX ADMIN — POLYETHYLENE GLYCOL 3350 17 G: 17 POWDER, FOR SOLUTION ORAL at 07:49

## 2018-09-30 RX ADMIN — AZITHROMYCIN MONOHYDRATE 500 MG: 500 INJECTION, POWDER, LYOPHILIZED, FOR SOLUTION INTRAVENOUS at 12:52

## 2018-09-30 ASSESSMENT — PAIN SCALES - GENERAL: PAINLEVEL_OUTOF10: 4

## 2018-10-01 ENCOUNTER — APPOINTMENT (OUTPATIENT)
Dept: GENERAL RADIOLOGY | Age: 83
DRG: 291 | End: 2018-10-01
Payer: MEDICARE

## 2018-10-01 LAB
ALBUMIN SERPL-MCNC: 3.2 G/DL (ref 3.4–5)
ANION GAP SERPL CALCULATED.3IONS-SCNC: 12 MMOL/L (ref 3–16)
BASOPHILS ABSOLUTE: 0.1 K/UL (ref 0–0.2)
BASOPHILS RELATIVE PERCENT: 1 %
BUN BLDV-MCNC: 27 MG/DL (ref 7–20)
CALCIUM SERPL-MCNC: 8.1 MG/DL (ref 8.3–10.6)
CHLORIDE BLD-SCNC: 94 MMOL/L (ref 99–110)
CO2: 29 MMOL/L (ref 21–32)
CREAT SERPL-MCNC: 0.7 MG/DL (ref 0.6–1.2)
EOSINOPHILS ABSOLUTE: 0.1 K/UL (ref 0–0.6)
EOSINOPHILS RELATIVE PERCENT: 1.4 %
GFR AFRICAN AMERICAN: >60
GFR NON-AFRICAN AMERICAN: >60
GLUCOSE BLD-MCNC: 87 MG/DL (ref 70–99)
HCT VFR BLD CALC: 29.2 % (ref 36–48)
HEMOGLOBIN: 9.6 G/DL (ref 12–16)
LYMPHOCYTES ABSOLUTE: 1.6 K/UL (ref 1–5.1)
LYMPHOCYTES RELATIVE PERCENT: 21.3 %
MCH RBC QN AUTO: 27.5 PG (ref 26–34)
MCHC RBC AUTO-ENTMCNC: 33 G/DL (ref 31–36)
MCV RBC AUTO: 83.4 FL (ref 80–100)
MONOCYTES ABSOLUTE: 1 K/UL (ref 0–1.3)
MONOCYTES RELATIVE PERCENT: 13.9 %
NEUTROPHILS ABSOLUTE: 4.6 K/UL (ref 1.7–7.7)
NEUTROPHILS RELATIVE PERCENT: 62.4 %
PDW BLD-RTO: 17.1 % (ref 12.4–15.4)
PHOSPHORUS: 4.3 MG/DL (ref 2.5–4.9)
PLATELET # BLD: 217 K/UL (ref 135–450)
PMV BLD AUTO: 8.2 FL (ref 5–10.5)
POTASSIUM SERPL-SCNC: 3.9 MMOL/L (ref 3.5–5.1)
PRO-BNP: 1956 PG/ML (ref 0–449)
PROCALCITONIN: 0.05 NG/ML (ref 0–0.15)
RBC # BLD: 3.5 M/UL (ref 4–5.2)
SODIUM BLD-SCNC: 135 MMOL/L (ref 136–145)
WBC # BLD: 7.3 K/UL (ref 4–11)

## 2018-10-01 PROCEDURE — 99232 SBSQ HOSP IP/OBS MODERATE 35: CPT | Performed by: INTERNAL MEDICINE

## 2018-10-01 PROCEDURE — 71046 X-RAY EXAM CHEST 2 VIEWS: CPT

## 2018-10-01 PROCEDURE — 94664 DEMO&/EVAL PT USE INHALER: CPT

## 2018-10-01 PROCEDURE — 94760 N-INVAS EAR/PLS OXIMETRY 1: CPT

## 2018-10-01 PROCEDURE — 6370000000 HC RX 637 (ALT 250 FOR IP): Performed by: INTERNAL MEDICINE

## 2018-10-01 PROCEDURE — 84145 PROCALCITONIN (PCT): CPT

## 2018-10-01 PROCEDURE — 2060000000 HC ICU INTERMEDIATE R&B

## 2018-10-01 PROCEDURE — 83880 ASSAY OF NATRIURETIC PEPTIDE: CPT

## 2018-10-01 PROCEDURE — 80069 RENAL FUNCTION PANEL: CPT

## 2018-10-01 PROCEDURE — 6360000002 HC RX W HCPCS: Performed by: INTERNAL MEDICINE

## 2018-10-01 PROCEDURE — 94640 AIRWAY INHALATION TREATMENT: CPT

## 2018-10-01 PROCEDURE — 2580000003 HC RX 258: Performed by: INTERNAL MEDICINE

## 2018-10-01 PROCEDURE — 93005 ELECTROCARDIOGRAM TRACING: CPT | Performed by: FAMILY MEDICINE

## 2018-10-01 PROCEDURE — 85025 COMPLETE CBC W/AUTO DIFF WBC: CPT

## 2018-10-01 PROCEDURE — 36415 COLL VENOUS BLD VENIPUNCTURE: CPT

## 2018-10-01 RX ORDER — FUROSEMIDE 10 MG/ML
20 INJECTION INTRAMUSCULAR; INTRAVENOUS ONCE
Status: COMPLETED | OUTPATIENT
Start: 2018-10-01 | End: 2018-10-01

## 2018-10-01 RX ADMIN — CYANOCOBALAMIN TAB 1000 MCG 1000 MCG: 1000 TAB at 09:17

## 2018-10-01 RX ADMIN — GUAIFENESIN AND DEXTROMETHORPHAN 10 ML: 100; 10 SYRUP ORAL at 06:16

## 2018-10-01 RX ADMIN — LEVOTHYROXINE SODIUM 88 MCG: 88 TABLET ORAL at 09:22

## 2018-10-01 RX ADMIN — Medication 10 ML: at 09:23

## 2018-10-01 RX ADMIN — MOMETASONE FUROATE AND FORMOTEROL FUMARATE DIHYDRATE 2 PUFF: 200; 5 AEROSOL RESPIRATORY (INHALATION) at 08:50

## 2018-10-01 RX ADMIN — DILTIAZEM HYDROCHLORIDE 30 MG: 30 TABLET, FILM COATED ORAL at 23:09

## 2018-10-01 RX ADMIN — IPRATROPIUM BROMIDE AND ALBUTEROL SULFATE 1 AMPULE: .5; 3 SOLUTION RESPIRATORY (INHALATION) at 08:50

## 2018-10-01 RX ADMIN — TRAZODONE HYDROCHLORIDE 25 MG: 50 TABLET ORAL at 20:07

## 2018-10-01 RX ADMIN — DULOXETINE HYDROCHLORIDE 20 MG: 20 CAPSULE, DELAYED RELEASE ORAL at 09:17

## 2018-10-01 RX ADMIN — FUROSEMIDE 20 MG: 10 INJECTION, SOLUTION INTRAMUSCULAR; INTRAVENOUS at 16:02

## 2018-10-01 RX ADMIN — IPRATROPIUM BROMIDE AND ALBUTEROL SULFATE 1 AMPULE: .5; 3 SOLUTION RESPIRATORY (INHALATION) at 11:38

## 2018-10-01 RX ADMIN — Medication 10 ML: at 20:08

## 2018-10-01 RX ADMIN — METOPROLOL TARTRATE 50 MG: 50 TABLET ORAL at 20:07

## 2018-10-01 RX ADMIN — DIVALPROEX SODIUM 250 MG: 125 CAPSULE, COATED PELLETS ORAL at 20:07

## 2018-10-01 RX ADMIN — ROPINIROLE HYDROCHLORIDE 1 MG: 1 TABLET, FILM COATED ORAL at 20:07

## 2018-10-01 RX ADMIN — CEFTRIAXONE 1 G: 1 INJECTION, POWDER, FOR SOLUTION INTRAMUSCULAR; INTRAVENOUS at 09:22

## 2018-10-01 RX ADMIN — DIVALPROEX SODIUM 250 MG: 125 CAPSULE, COATED PELLETS ORAL at 09:17

## 2018-10-01 RX ADMIN — PANTOPRAZOLE SODIUM 40 MG: 40 TABLET, DELAYED RELEASE ORAL at 09:17

## 2018-10-01 RX ADMIN — DILTIAZEM HYDROCHLORIDE 30 MG: 30 TABLET, FILM COATED ORAL at 00:30

## 2018-10-01 RX ADMIN — IPRATROPIUM BROMIDE AND ALBUTEROL SULFATE 1 AMPULE: .5; 3 SOLUTION RESPIRATORY (INHALATION) at 16:15

## 2018-10-01 RX ADMIN — DONEPEZIL HYDROCHLORIDE 5 MG: 5 TABLET, FILM COATED ORAL at 20:08

## 2018-10-01 RX ADMIN — VITAMIN D, TAB 1000IU (100/BT) 5000 UNITS: 25 TAB at 09:17

## 2018-10-01 RX ADMIN — DILTIAZEM HYDROCHLORIDE 30 MG: 30 TABLET, FILM COATED ORAL at 17:47

## 2018-10-01 RX ADMIN — GUAIFENESIN AND DEXTROMETHORPHAN 10 ML: 100; 10 SYRUP ORAL at 13:08

## 2018-10-01 RX ADMIN — DILTIAZEM HYDROCHLORIDE 30 MG: 30 TABLET, FILM COATED ORAL at 05:15

## 2018-10-01 RX ADMIN — MONTELUKAST SODIUM 10 MG: 10 TABLET, COATED ORAL at 20:15

## 2018-10-01 RX ADMIN — POLYETHYLENE GLYCOL 3350 17 G: 17 POWDER, FOR SOLUTION ORAL at 16:03

## 2018-10-01 RX ADMIN — APIXABAN 5 MG: 5 TABLET, FILM COATED ORAL at 20:07

## 2018-10-01 RX ADMIN — MOMETASONE FUROATE AND FORMOTEROL FUMARATE DIHYDRATE 2 PUFF: 200; 5 AEROSOL RESPIRATORY (INHALATION) at 19:52

## 2018-10-01 RX ADMIN — METOPROLOL TARTRATE 50 MG: 50 TABLET ORAL at 09:17

## 2018-10-01 RX ADMIN — LEVOTHYROXINE SODIUM 88 MCG: 88 TABLET ORAL at 05:15

## 2018-10-01 RX ADMIN — DILTIAZEM HYDROCHLORIDE 30 MG: 30 TABLET, FILM COATED ORAL at 13:05

## 2018-10-01 RX ADMIN — IPRATROPIUM BROMIDE AND ALBUTEROL SULFATE 1 AMPULE: .5; 3 SOLUTION RESPIRATORY (INHALATION) at 19:52

## 2018-10-01 ASSESSMENT — PAIN SCALES - GENERAL
PAINLEVEL_OUTOF10: 0

## 2018-10-01 NOTE — PROGRESS NOTES
Pulmonary Progress Note    CC:  Follow up pleural effusion    Subjective:    Some SOB but having a hard time expressing her degree of SOB  On no oxygen  Coughing up clear phlegm       Intake/Output Summary (Last 24 hours) at 10/01/18 0755  Last data filed at 09/30/18 1514   Gross per 24 hour   Intake             1010 ml   Output                0 ml   Net             1010 ml         PHYSICAL EXAM:  Blood pressure 135/72, pulse 99, temperature 97.9 °F (36.6 °C), temperature source Oral, resp. rate 16, height 5' 4\" (1.626 m), weight 137 lb 5.6 oz (62.3 kg), SpO2 90 %, not currently breastfeeding.'  Gen: No distress. Eyes: PERRL. No sclera icterus. No conjunctival injection. ENT: No discharge. Pharynx clear. External appearance of ears and nose normal.  Neck: Trachea midline. No obvious mass. Resp: Diminished . CV: Regular rate. Regular rhythm. No murmur or rub. GI: Non-tender. Non-distended. No hernia. Skin: Warm, dry, normal texture and turgor. No nodule on exposed extremities. Lymph: No cervical LAD. No supraclavicular LAD. M/S: No cyanosis. No clubbing. No joint deformity. Neuro: Moves all four extremities. CN 2-12 tested, no defect noted.   Ext:   trace edema    Medications:    Scheduled Meds:   diltiazem  30 mg Oral 4 times per day    vitamin D  5,000 Units Oral Daily    divalproex  250 mg Oral BID    donepezil  5 mg Oral Nightly    DULoxetine  20 mg Oral Daily    levothyroxine  88 mcg Oral Daily    metoprolol tartrate  50 mg Oral BID    montelukast  10 mg Oral Nightly    pantoprazole  40 mg Oral Daily    rOPINIRole  1 mg Oral Nightly    mometasone-formoterol  2 puff Inhalation BID    traZODone  25 mg Oral Nightly    vitamin B-12  1,000 mcg Oral Daily    cefTRIAXone (ROCEPHIN) IV  1 g Intravenous Q24H    sodium chloride flush  10 mL Intravenous 2 times per day    ipratropium-albuterol  1 ampule Inhalation Q4H WA    polyethylene glycol  17 g Oral Daily       Continuous

## 2018-10-02 VITALS
SYSTOLIC BLOOD PRESSURE: 115 MMHG | RESPIRATION RATE: 18 BRPM | DIASTOLIC BLOOD PRESSURE: 66 MMHG | WEIGHT: 137.57 LBS | HEIGHT: 64 IN | HEART RATE: 89 BPM | BODY MASS INDEX: 23.49 KG/M2 | OXYGEN SATURATION: 97 % | TEMPERATURE: 98.1 F

## 2018-10-02 DIAGNOSIS — J90 PLEURAL EFFUSION: Primary | ICD-10-CM

## 2018-10-02 LAB
ALBUMIN SERPL-MCNC: 3.2 G/DL (ref 3.4–5)
ANION GAP SERPL CALCULATED.3IONS-SCNC: 11 MMOL/L (ref 3–16)
BASOPHILS ABSOLUTE: 0.1 K/UL (ref 0–0.2)
BASOPHILS RELATIVE PERCENT: 1 %
BUN BLDV-MCNC: 19 MG/DL (ref 7–20)
CALCIUM SERPL-MCNC: 8.7 MG/DL (ref 8.3–10.6)
CHLORIDE BLD-SCNC: 95 MMOL/L (ref 99–110)
CO2: 28 MMOL/L (ref 21–32)
CREAT SERPL-MCNC: 0.7 MG/DL (ref 0.6–1.2)
EKG ATRIAL RATE: 178 BPM
EKG DIAGNOSIS: NORMAL
EKG Q-T INTERVAL: 354 MS
EKG QRS DURATION: 98 MS
EKG QTC CALCULATION (BAZETT): 437 MS
EKG R AXIS: 21 DEGREES
EKG T AXIS: 226 DEGREES
EKG VENTRICULAR RATE: 92 BPM
EOSINOPHILS ABSOLUTE: 0.1 K/UL (ref 0–0.6)
EOSINOPHILS RELATIVE PERCENT: 1.8 %
GFR AFRICAN AMERICAN: >60
GFR NON-AFRICAN AMERICAN: >60
GLUCOSE BLD-MCNC: 94 MG/DL (ref 70–99)
HCT VFR BLD CALC: 32.2 % (ref 36–48)
HEMOGLOBIN: 10.3 G/DL (ref 12–16)
LYMPHOCYTES ABSOLUTE: 1.4 K/UL (ref 1–5.1)
LYMPHOCYTES RELATIVE PERCENT: 21.5 %
MCH RBC QN AUTO: 26.5 PG (ref 26–34)
MCHC RBC AUTO-ENTMCNC: 32 G/DL (ref 31–36)
MCV RBC AUTO: 82.8 FL (ref 80–100)
MONOCYTES ABSOLUTE: 0.9 K/UL (ref 0–1.3)
MONOCYTES RELATIVE PERCENT: 13.5 %
NEUTROPHILS ABSOLUTE: 4 K/UL (ref 1.7–7.7)
NEUTROPHILS RELATIVE PERCENT: 62.2 %
PDW BLD-RTO: 17 % (ref 12.4–15.4)
PHOSPHORUS: 4.1 MG/DL (ref 2.5–4.9)
PLATELET # BLD: 245 K/UL (ref 135–450)
PMV BLD AUTO: 8.1 FL (ref 5–10.5)
POTASSIUM SERPL-SCNC: 3.7 MMOL/L (ref 3.5–5.1)
PROCALCITONIN: 0.03 NG/ML (ref 0–0.15)
RBC # BLD: 3.89 M/UL (ref 4–5.2)
SODIUM BLD-SCNC: 134 MMOL/L (ref 136–145)
WBC # BLD: 6.4 K/UL (ref 4–11)

## 2018-10-02 PROCEDURE — 94640 AIRWAY INHALATION TREATMENT: CPT

## 2018-10-02 PROCEDURE — 90686 IIV4 VACC NO PRSV 0.5 ML IM: CPT

## 2018-10-02 PROCEDURE — 6370000000 HC RX 637 (ALT 250 FOR IP): Performed by: INTERNAL MEDICINE

## 2018-10-02 PROCEDURE — 93010 ELECTROCARDIOGRAM REPORT: CPT | Performed by: INTERNAL MEDICINE

## 2018-10-02 PROCEDURE — 84145 PROCALCITONIN (PCT): CPT

## 2018-10-02 PROCEDURE — 2580000003 HC RX 258: Performed by: INTERNAL MEDICINE

## 2018-10-02 PROCEDURE — 99232 SBSQ HOSP IP/OBS MODERATE 35: CPT | Performed by: INTERNAL MEDICINE

## 2018-10-02 PROCEDURE — 6360000002 HC RX W HCPCS: Performed by: INTERNAL MEDICINE

## 2018-10-02 PROCEDURE — 6360000002 HC RX W HCPCS

## 2018-10-02 PROCEDURE — 80069 RENAL FUNCTION PANEL: CPT

## 2018-10-02 PROCEDURE — 36415 COLL VENOUS BLD VENIPUNCTURE: CPT

## 2018-10-02 PROCEDURE — G0008 ADMIN INFLUENZA VIRUS VAC: HCPCS

## 2018-10-02 PROCEDURE — 94760 N-INVAS EAR/PLS OXIMETRY 1: CPT

## 2018-10-02 PROCEDURE — 85025 COMPLETE CBC W/AUTO DIFF WBC: CPT

## 2018-10-02 RX ORDER — DILTIAZEM HYDROCHLORIDE 120 MG/1
120 CAPSULE, COATED, EXTENDED RELEASE ORAL DAILY
Qty: 30 CAPSULE | Refills: 0 | Status: SHIPPED | OUTPATIENT
Start: 2018-10-02 | End: 2018-10-31 | Stop reason: SDUPTHER

## 2018-10-02 RX ORDER — CEFUROXIME AXETIL 500 MG/1
500 TABLET ORAL 2 TIMES DAILY
Qty: 4 TABLET | Refills: 0 | Status: SHIPPED | OUTPATIENT
Start: 2018-10-02 | End: 2018-10-04

## 2018-10-02 RX ADMIN — PANTOPRAZOLE SODIUM 40 MG: 40 TABLET, DELAYED RELEASE ORAL at 09:09

## 2018-10-02 RX ADMIN — ONDANSETRON 4 MG: 2 INJECTION INTRAMUSCULAR; INTRAVENOUS at 10:00

## 2018-10-02 RX ADMIN — APIXABAN 5 MG: 5 TABLET, FILM COATED ORAL at 09:09

## 2018-10-02 RX ADMIN — METOPROLOL TARTRATE 50 MG: 50 TABLET ORAL at 09:09

## 2018-10-02 RX ADMIN — INFLUENZA A VIRUS A/MICHIGAN/45/2015 X-275 (H1N1) ANTIGEN (FORMALDEHYDE INACTIVATED), INFLUENZA A VIRUS A/SINGAPORE/INFIMH-16-0019/2016 IVR-186 (H3N2) ANTIGEN (FORMALDEHYDE INACTIVATED), INFLUENZA B VIRUS B/PHUKET/3073/2013 ANTIGEN (FORMALDEHYDE INACTIVATED), AND INFLUENZA B VIRUS B/MARYLAND/15/2016 BX-69A ANTIGEN (FORMALDEHYDE INACTIVATED) 0.5 ML: 15; 15; 15; 15 INJECTION, SUSPENSION INTRAMUSCULAR at 09:09

## 2018-10-02 RX ADMIN — VITAMIN D, TAB 1000IU (100/BT) 5000 UNITS: 25 TAB at 09:09

## 2018-10-02 RX ADMIN — DULOXETINE HYDROCHLORIDE 20 MG: 20 CAPSULE, DELAYED RELEASE ORAL at 09:09

## 2018-10-02 RX ADMIN — CEFTRIAXONE 1 G: 1 INJECTION, POWDER, FOR SOLUTION INTRAMUSCULAR; INTRAVENOUS at 09:10

## 2018-10-02 RX ADMIN — DILTIAZEM HYDROCHLORIDE 30 MG: 30 TABLET, FILM COATED ORAL at 12:34

## 2018-10-02 RX ADMIN — IPRATROPIUM BROMIDE AND ALBUTEROL SULFATE 1 AMPULE: .5; 3 SOLUTION RESPIRATORY (INHALATION) at 08:25

## 2018-10-02 RX ADMIN — DILTIAZEM HYDROCHLORIDE 30 MG: 30 TABLET, FILM COATED ORAL at 05:59

## 2018-10-02 RX ADMIN — POLYETHYLENE GLYCOL 3350 17 G: 17 POWDER, FOR SOLUTION ORAL at 09:09

## 2018-10-02 RX ADMIN — CYANOCOBALAMIN TAB 1000 MCG 1000 MCG: 1000 TAB at 09:09

## 2018-10-02 RX ADMIN — MOMETASONE FUROATE AND FORMOTEROL FUMARATE DIHYDRATE 2 PUFF: 200; 5 AEROSOL RESPIRATORY (INHALATION) at 08:15

## 2018-10-02 RX ADMIN — LEVOTHYROXINE SODIUM 88 MCG: 88 TABLET ORAL at 05:59

## 2018-10-02 RX ADMIN — DIVALPROEX SODIUM 250 MG: 125 CAPSULE, COATED PELLETS ORAL at 09:09

## 2018-10-02 RX ADMIN — Medication 10 ML: at 09:13

## 2018-10-02 NOTE — DISCHARGE SUMMARY
contrast reflux into the hepatic veins which suggests right heart   failure or dysfunction. Clinical correlation is recommended. Echocardiography could be performed. When compared to prior CT scan referenced above, there is a new moderately   sized left-sided pleural effusion. There is moderate volume loss within the   left lower lobe with some volume loss in the inferior lingula. Pneumonia may   be present. Additionally, there is scattered ground-glass opacities in the lower lobes   with smooth interlobular septal thickening which may reflect pulmonary edema   as well. Clinical correlation is recommended. XR CHEST STANDARD (2 VW)   Final Result   Small left pleural effusion and basilar opacities which could reflect   atelectasis, aspiration or pneumonia. Consults:     IP CONSULT TO HOSPITALIST  IP CONSULT TO PULMONOLOGY    Disposition:  home     Condition at Discharge: Stable    Discharge Instructions/Follow-up:  Cardiology, pulmonary     Code Status:  Full Code     Activity: activity as tolerated    Diet: cardiac diet      Discharge Medications:     Current Discharge Medication List           Details   ferrous sulfate 325 (65 Fe) MG tablet Take 325 mg by mouth daily (with breakfast)      Multiple Vitamins-Minerals (THERAPEUTIC MULTIVITAMIN-MINERALS) tablet Take 1 tablet by mouth daily      rOPINIRole (REQUIP) 1 MG tablet TAKE TWO TABLETS BY MOUTH NIGHTLY  Qty: 60 tablet, Refills: 2      pantoprazole (PROTONIX) 40 MG tablet Take 40 mg by mouth daily      apixaban (ELIQUIS) 5 MG TABS tablet Take 1 tablet by mouth 2 times daily  Qty: 60 tablet, Refills: 5    Comments: Please keep appt on 8/22/18.       furosemide (LASIX) 40 MG tablet TAKE 1 TABLET BY MOUTH DAILY  Qty: 30 tablet, Refills: 1      DULoxetine (CYMBALTA) 20 MG extended release capsule Take 20 mg by mouth daily      montelukast (SINGULAIR) 10 MG tablet TAKE 1 TABLET BY MOUTH NIGHTLY  Qty: 30 tablet, Refills: 5 feel free to contact me at [phone].

## 2018-10-02 NOTE — PLAN OF CARE
Problem: Falls - Risk of:  Goal: Will remain free from falls    Will remain free from falls   Outcome: Ongoing  Fall risk assessment completed every shift. All precautions in place. Pt has call light within reach at all times. Room clear of clutter. Pt aware to call for assistance when getting up. Problem: Injury - Risk of, Physical Injury:  Goal: Will remain free from falls    Will remain free from falls   Outcome: Ongoing  Fall risk assessment completed every shift. All precautions in place. Pt has call light within reach at all times. Room clear of clutter. Pt aware to call for assistance when getting up. Problem: HEMODYNAMIC STATUS  Goal: Patient has stable vital signs and fluid balance  Outcome: Ongoing  Educated patient/family on CHF signs/ symptoms, causes, treatments, limited fluid intake, daily weights, low sodium diet, and follow-up. Pt to call attending physician if weight gain of 3 pounds in one day or 5 pounds in one week.

## 2018-10-02 NOTE — PROGRESS NOTES
Pharmacy Heart Failure Medication Reconciliation Note    Pt discharged from [facility] today after admission for pleural effusion. Liset Courtney has a diagnosis of combined diastolic and systolic heart failure (last EF = 45-50% on 8/1/18). Patient taking an ACEI / ARB / Entresto:  No: EF > 40%      Patient taking a BETA BLOCKER:  Yes, lopressor, EF > 40%  Patient taking a LOOP DIURETIC: Yes  Patient taking a ALDOSTERONE RECEPTOR ANTAGONIST: No: EF > 40%     Corrections to discharge medications include:  1. Clarified trazodone instructions    Discharge Medications:         Medication List        START taking these medications      cefUROXime 500 MG tablet  Commonly known as:  CEFTIN  Take 1 tablet by mouth 2 times daily for 2 days     diltiazem 120 MG extended release capsule  Commonly known as:  CARDIZEM CD  Take 1 capsule by mouth daily            CONTINUE taking these medications      apixaban 5 MG Tabs tablet  Commonly known as:  ELIQUIS  Take 1 tablet by mouth 2 times daily     CALTRATE PLUS PO     Compression Stockings Misc  by Does not apply route Bilateral lower extremity compression stockings, 15-20 mmHg     divalproex 125 MG capsule  Commonly known as:  DEPAKOTE SPRINKLE     donepezil 10 MG tablet  Commonly known as:  ARICEPT     DULoxetine 20 MG extended release capsule  Commonly known as:  CYMBALTA     E-Z SPACER Diana  1 Device by Does not apply route daily as needed.      ferrous sulfate 325 (65 Fe) MG tablet     furosemide 40 MG tablet  Commonly known as:  LASIX  TAKE 1 TABLET BY MOUTH DAILY     levothyroxine 88 MCG tablet  Commonly known as:  SYNTHROID  TAKE 1 TABLET BY MOUTH DAILY     LINZESS 290 MCG Caps capsule  Generic drug:  linaclotide     metoprolol tartrate 50 MG tablet  Commonly known as:  LOPRESSOR  Take 1 tablet by mouth 2 times daily     MIRALAX PO     mometasone 50 MCG/ACT nasal spray  Commonly known as:  NASONEX  2 sprays by Nasal route daily     montelukast 10 MG tablet  Commonly known as:

## 2018-10-03 ENCOUNTER — CARE COORDINATION (OUTPATIENT)
Dept: CASE MANAGEMENT | Age: 83
End: 2018-10-03

## 2018-10-04 ENCOUNTER — TELEPHONE (OUTPATIENT)
Dept: PHARMACY | Facility: CLINIC | Age: 83
End: 2018-10-04

## 2018-10-04 DIAGNOSIS — J90 PLEURAL EFFUSION, LEFT: Primary | ICD-10-CM

## 2018-10-04 PROCEDURE — 1111F DSCHRG MED/CURRENT MED MERGE: CPT

## 2018-10-04 NOTE — TELEPHONE ENCOUNTER
CLINICAL PHARMACY NOTE  Post-Discharge Transitions of Care (SHIRA)    Attempted to reach patient for transitions of care follow-up after discharge from [facility] on 10/2/18. Left voicemail for patient to return phone call. Will attempt again tomorrow.      Jean-Paul Paez, PharmD, [address] [place] [address] Avenue: [phone] C: [phone]  [facility], toll free [phone], option 7

## 2018-10-05 DIAGNOSIS — J45.909 MODERATE ASTHMA: ICD-10-CM

## 2018-10-05 RX ORDER — BUDESONIDE AND FORMOTEROL FUMARATE DIHYDRATE 160; 4.5 UG/1; UG/1
AEROSOL RESPIRATORY (INHALATION)
Qty: 11 INHALER | Refills: 0 | Status: SHIPPED | OUTPATIENT
Start: 2018-10-05

## 2018-10-09 RX ORDER — IBUPROFEN 800 MG/1
800 TABLET ORAL EVERY 8 HOURS PRN
COMMUNITY
End: 2018-11-17 | Stop reason: ALTCHOICE

## 2018-10-11 ENCOUNTER — TELEPHONE (OUTPATIENT)
Dept: PULMONOLOGY | Age: 83
End: 2018-10-11

## 2018-10-11 NOTE — TELEPHONE ENCOUNTER
Left a message for pt to call back  Just needed to ensure she saw her HFU appt on her discharge summary and let her know that he would like a chest xray prior to appt, she can come an hr before her appt and he will be able to see results

## 2018-10-17 ENCOUNTER — OFFICE VISIT (OUTPATIENT)
Dept: CARDIOLOGY CLINIC | Age: 83
End: 2018-10-17
Payer: MEDICARE

## 2018-10-17 VITALS
OXYGEN SATURATION: 99 % | WEIGHT: 137 LBS | SYSTOLIC BLOOD PRESSURE: 128 MMHG | BODY MASS INDEX: 23.39 KG/M2 | HEIGHT: 64 IN | HEART RATE: 63 BPM | DIASTOLIC BLOOD PRESSURE: 62 MMHG

## 2018-10-17 DIAGNOSIS — I48.20 CHRONIC ATRIAL FIBRILLATION (HCC): Primary | ICD-10-CM

## 2018-10-17 DIAGNOSIS — F03.90 DEMENTIA WITHOUT BEHAVIORAL DISTURBANCE, UNSPECIFIED DEMENTIA TYPE: ICD-10-CM

## 2018-10-17 DIAGNOSIS — I27.20 PULMONARY HYPERTENSION (HCC): ICD-10-CM

## 2018-10-17 DIAGNOSIS — I50.22 CHRONIC SYSTOLIC HEART FAILURE (HCC): ICD-10-CM

## 2018-10-17 DIAGNOSIS — I25.10 CAD IN NATIVE ARTERY: ICD-10-CM

## 2018-10-17 PROCEDURE — 93000 ELECTROCARDIOGRAM COMPLETE: CPT | Performed by: NURSE PRACTITIONER

## 2018-10-17 PROCEDURE — 99214 OFFICE O/P EST MOD 30 MIN: CPT | Performed by: NURSE PRACTITIONER

## 2018-10-18 NOTE — COMMUNICATION BODY
HPI:  The patient is 80 y.o. female with a past medical history significant for CHF, chronic atrial fib, HTN, dementia, CAD and pulmonary HTN who is here for hospital follow up. She was last seen by Dr. Jennifer May in 8/2018. Hospitalized from 9/28/2018-10/2/2018 with PNA, pleural effusion diastolic HF, cardiomyopathy and atrial fib with RVR. She was diuresed and meds adjusted for better HR control. She was not seen by cardiology during her stay. Overall unsure of her medications. Has issues with restless legs at times. Remains busy around the house. Has chronic neck pain. Denies chest pain/discomfort, SOB, orthopnea/PND, cough, palpitations, dizziness, syncope, weight change or claudication. Has chronic edema in her ankles. Improves with compression hose. She monitors her sodium and fluid intake. Assessment:    1. Chronic atrial fibrillation (HCC)  -controlled VR on BB and CCB (she does not want to change to long acting metoprolol)  -on chronic anticoagulation with Eliquis  -CHADS VAsc score is at least 5    2. Chronic systolic heart failure (HCC)  -LVEF 45-50%  -currently well compensated-continue lasix and BB  -not on ACE-I/ARB/aldactone d/t hypotension    3. Pulmonary hypertension ([address]  -continue diuretic  -avoid hypoxia    4. CAD in native artery  -based on coronary artery calcification  -not on statin d/t advanced age and dementia    5. Dementia without behavioral disturbance, unspecified dementia type  -on Aricept    Plan:    Continue metoprolol, lasix, diltiazem, Eliquis  Emphasized and discussed low-fat/low sodium diet, monitoring of daily weights, fluid restriction, worsening signs and symptoms of heart failure and when to call, and the importance of regular exercise and activity.   Labs from 10/2/2018 reviewed  Approximately 25 minutes spent with pt and > 50% of time spent on pt education, and answering questions  Follow up in 6 months with Dr. Jennifer May in [place] office or sooner if

## 2018-10-26 ENCOUNTER — TELEPHONE (OUTPATIENT)
Dept: FAMILY MEDICINE CLINIC | Age: 83
End: 2018-10-26

## 2018-10-26 RX ORDER — FUROSEMIDE 40 MG/1
TABLET ORAL
Qty: 30 TABLET | Refills: 1 | Status: SHIPPED | OUTPATIENT
Start: 2018-10-26 | End: 2018-12-31 | Stop reason: SDUPTHER

## 2018-10-29 ENCOUNTER — TELEPHONE (OUTPATIENT)
Dept: PULMONOLOGY | Age: 83
End: 2018-10-29

## 2018-10-30 ENCOUNTER — HOSPITAL ENCOUNTER (OUTPATIENT)
Dept: GENERAL RADIOLOGY | Age: 83
Discharge: HOME | End: 2018-10-30
Payer: MEDICARE

## 2018-10-30 ENCOUNTER — HOSPITAL ENCOUNTER (OUTPATIENT)
Age: 83
Discharge: HOME | End: 2018-10-30
Payer: MEDICARE

## 2018-10-30 ENCOUNTER — OFFICE VISIT (OUTPATIENT)
Dept: PULMONOLOGY | Age: 83
End: 2018-10-30
Payer: MEDICARE

## 2018-10-30 VITALS
RESPIRATION RATE: 16 BRPM | WEIGHT: 135 LBS | TEMPERATURE: 97.2 F | SYSTOLIC BLOOD PRESSURE: 104 MMHG | BODY MASS INDEX: 23.05 KG/M2 | DIASTOLIC BLOOD PRESSURE: 58 MMHG | HEIGHT: 64 IN | OXYGEN SATURATION: 100 % | HEART RATE: 63 BPM

## 2018-10-30 DIAGNOSIS — J45.40 MODERATE PERSISTENT ASTHMA WITHOUT COMPLICATION: Primary | ICD-10-CM

## 2018-10-30 DIAGNOSIS — J90 PLEURAL EFFUSION: ICD-10-CM

## 2018-10-30 PROCEDURE — 99214 OFFICE O/P EST MOD 30 MIN: CPT | Performed by: INTERNAL MEDICINE

## 2018-10-30 PROCEDURE — 71046 X-RAY EXAM CHEST 2 VIEWS: CPT

## 2018-10-30 NOTE — PROGRESS NOTES
Chief complaint  This is a 80y.o. year old female  who comes to see me with a chief complaint of   Chief Complaint   Patient presents with   [address] from Hospital     [facility]  Here with cc on follow up asthma/hospitalization, effusion    She was inpatient last month for CHF flare and pleural effusion. Effusion was being evaluated for thoracentesis but I felt it was small. I recommended diuresis and follow up with me with CXR prior to visit. She had CXR prior to the visit. She is feeling fine. Breathing is not an issue. Her memory is poor due to dementia so I am not sure if she comprehends all my questions. She is not sick. Had flu shot already.   Using her inhalers as directed but cannot recall the names     Past Medical History:   Diagnosis Date    Abdominal wall pain     Arrhythmia     afib,bradycardia    Arthritis     left hip    Asthma     Atrial fibrillation ([address]     Broken nose 2016    CHF (congestive heart failure) ([address] 08/2018    Cough variant asthma     Dementia     Dyspnea     Essential hypertension 8/1/2018    Fatigue     Hematoma     Hyperlipidemia     Insomnia     Lumbar radiculopathy     Osteoporosis     Palpitation     Restless leg syndrome     Shingles     Sinusitis        Past Surgical History:   Procedure Laterality Date    ANKLE SURGERY  1997    left ankle surgery    APPENDECTOMY      CARDIOVERSION      COLONOSCOPY  10/25/10    normal dr bob Ramon      right elbow    OVARY REMOVAL Left      Current Outpatient Prescriptions   Medication Sig Dispense Refill    furosemide (LASIX) 40 MG tablet TAKE 1 TABLET BY MOUTH DAILY 30 tablet 1    ibuprofen (ADVIL;MOTRIN) 800 MG tablet Take 800 mg by mouth every 8 hours as needed for Pain      SYMBICORT 160-4.5 MCG/ACT AERO INHALE 2 PUFFS TWICE A DAY INTO THE LUNGS 11 Inhaler 0    diltiazem (CARDIZEM CD) 120 MG extended release capsule Take 1 capsule by mouth daily facility-administered medications for this visit. ROS:  GENERAL:  No fevers, chills, or night sweats; recent admission for CHF  HEENT:  Less nasal congestion  HEART:  No chest pain, no palpitations  LUNGS:  Less coughing, recent pleural effusion on imaging  ABDOMEN:  No abdominal pains, no changes in stools  GENITOURINARY:  No increased frequency, no blood in urine  EXTREMITIES:  No swelling, no lesions  NEURO:  No numbness or tingling, no seizures  SKIN:  No new rashes, no changes in hair or nails  LYMPH:  No masses, no swelling neck, armpits, or groin    PHYSICAL EXAM:  Vitals:    10/30/18 1134   BP: (!) 104/58   Pulse: 63   Resp: 16   Temp: 97.2 °F (36.2 °C)   SpO2: 100%     GENERAL:  Well nourished, alert, appears stated age, no distress. HEENT:  No scleral icterus, no conjunctival irritation  NECK:  No thyromegaly, no bruits  LYMPH:  No cervical or supraclavicular adenopathy  HEART:  RRR, no murmurs  LUNGS:  Clear bilaterally, some coughing during lung exam  ABDOMEN:  No distention, no organomegaly  EXTREMITIES:  No edema, no digital clubbing  NEURO:  No localizing deficits, CN II-XII intact    Pulmonary Function Testing 3/9/2015  FEV1 1.66 L at 102% predicted (normal)  FVC 2.32 L at 105% predicted (normal)  FEV1/FVC ratio at 72 (normal)  TLC na L at na% predicted (na)  DLCO 15.1 at 83% predicted (normal)  Overall Normal Spirometry. Normal diffusion    Chest CT from 9/2018 is reviewed. My interpretation is left effusion, ground glass changes, atelectasis    Chest XR from 10/2018 is reviewed. My interpretation is miniscule effusion on left    ECHO 8/2018  Left ventricle size is normal.   Normal left ventricular wall thickness.   Ejection fraction is visually estimated to be 45-50 %.    Grade II diastolic dysfunction with elevated LV filling pressures.   Mitral annular calcification is present.   The mitral valve leaflets are slightly thickened.   mild mitral regurgitation is present.   Moderate left

## 2018-10-31 RX ORDER — DILTIAZEM HYDROCHLORIDE 120 MG/1
120 CAPSULE, COATED, EXTENDED RELEASE ORAL DAILY
Qty: 30 CAPSULE | Refills: 0 | Status: SHIPPED | OUTPATIENT
Start: 2018-10-31 | End: 2018-12-10 | Stop reason: SDUPTHER

## 2018-11-13 ENCOUNTER — TELEPHONE (OUTPATIENT)
Dept: FAMILY MEDICINE CLINIC | Age: 83
End: 2018-11-13
Payer: MEDICARE

## 2018-11-13 DIAGNOSIS — I50.9 CHF WITH UNKNOWN LVEF (HCC): Primary | ICD-10-CM

## 2018-11-13 PROCEDURE — G0180 MD CERTIFICATION HHA PATIENT: HCPCS | Performed by: FAMILY MEDICINE

## 2018-11-17 ENCOUNTER — HOSPITAL ENCOUNTER (EMERGENCY)
Age: 83
Discharge: HOME | End: 2018-11-17
Attending: EMERGENCY MEDICINE
Payer: MEDICARE

## 2018-11-17 ENCOUNTER — APPOINTMENT (OUTPATIENT)
Dept: CT IMAGING | Age: 83
End: 2018-11-17
Payer: MEDICARE

## 2018-11-17 VITALS
SYSTOLIC BLOOD PRESSURE: 153 MMHG | TEMPERATURE: 97.2 F | HEIGHT: 64 IN | RESPIRATION RATE: 18 BRPM | DIASTOLIC BLOOD PRESSURE: 78 MMHG | HEART RATE: 73 BPM | OXYGEN SATURATION: 96 % | WEIGHT: 134.7 LBS | BODY MASS INDEX: 23 KG/M2

## 2018-11-17 DIAGNOSIS — S50.01XA CONTUSION OF RIGHT ELBOW, INITIAL ENCOUNTER: ICD-10-CM

## 2018-11-17 DIAGNOSIS — S30.0XXA CONTUSION OF BUTTOCK, INITIAL ENCOUNTER: ICD-10-CM
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
## 2018-11-17 LAB
BILIRUBIN URINE: NEGATIVE
BLOOD, URINE: NEGATIVE
CLARITY: CLEAR
COLOR: YELLOW
GLUCOSE URINE: NEGATIVE MG/DL
KETONES, URINE: ABNORMAL MG/DL
LEUKOCYTE ESTERASE, URINE: NEGATIVE
MICROSCOPIC EXAMINATION: ABNORMAL
NITRITE, URINE: NEGATIVE
PH UA: 6
PROTEIN UA: NEGATIVE MG/DL
SPECIFIC GRAVITY UA: 1.02
URINE REFLEX TO CULTURE: ABNORMAL
URINE TYPE: ABNORMAL
UROBILINOGEN, URINE: 1 E.U./DL

## 2018-11-17 PROCEDURE — 99284 EMERGENCY DEPT VISIT MOD MDM: CPT

## 2018-11-17 PROCEDURE — 81003 URINALYSIS AUTO W/O SCOPE: CPT

## 2018-11-17 PROCEDURE — 74176 CT ABD & PELVIS W/O CONTRAST: CPT

## 2018-11-17 PROCEDURE — 70450 CT HEAD/BRAIN W/O DYE: CPT

## 2018-11-17 ASSESSMENT — ENCOUNTER SYMPTOMS
BACK PAIN: 0
COUGH: 0
EYE PAIN: 0
ABDOMINAL PAIN: 0
WHEEZING: 0
DIARRHEA: 0
SORE THROAT: 0
NAUSEA: 0
SHORTNESS OF BREATH: 0
RHINORRHEA: 0
VOMITING: 0
EYE DISCHARGE: 0

## 2018-11-17 ASSESSMENT — PAIN - FUNCTIONAL ASSESSMENT: PAIN_FUNCTIONAL_ASSESSMENT: 0-10

## 2018-11-17 ASSESSMENT — PAIN DESCRIPTION - ORIENTATION: ORIENTATION: RIGHT

## 2018-11-17 ASSESSMENT — PAIN DESCRIPTION - LOCATION: LOCATION: BUTTOCKS

## 2018-11-17 ASSESSMENT — PAIN SCALES - GENERAL
PAINLEVEL_OUTOF10: 4
PAINLEVEL_OUTOF10: 4

## 2018-11-17 NOTE — ED TRIAGE NOTES
Patient states she slipped on the hard wood step in her home and landed on her right buttock. Was able to get up independently and is ambulating without a limp. Family wanted her checked out. They dropped her off. Unable to reconcile her medications, because patient is not sure what she takes. Also complains of right arm pain after the fall. Slight redness noted on the right buttock. No impaired skin integrity to her right arm.

## 2018-11-29 ENCOUNTER — TELEPHONE (OUTPATIENT)
Dept: FAMILY MEDICINE CLINIC | Age: 83
End: 2018-11-29

## 2018-12-03 ENCOUNTER — TELEPHONE (OUTPATIENT)
Dept: FAMILY MEDICINE CLINIC | Age: 83
End: 2018-12-03

## 2018-12-05 ENCOUNTER — TELEPHONE (OUTPATIENT)
Dept: FAMILY MEDICINE CLINIC | Age: 83
End: 2018-12-05

## 2018-12-07 ENCOUNTER — TELEPHONE (OUTPATIENT)
Dept: FAMILY MEDICINE CLINIC | Age: 83
End: 2018-12-07

## 2018-12-10 RX ORDER — DILTIAZEM HYDROCHLORIDE 120 MG/1
120 CAPSULE, COATED, EXTENDED RELEASE ORAL DAILY
Qty: 30 CAPSULE | Refills: 3 | Status: SHIPPED | OUTPATIENT
Start: 2018-12-10 | End: 2019-02-28 | Stop reason: SDUPTHER

## 2018-12-28 ENCOUNTER — TELEPHONE (OUTPATIENT)
Dept: FAMILY MEDICINE CLINIC | Age: 83
End: 2018-12-28
Payer: MEDICARE

## 2018-12-28 DIAGNOSIS — I48.20 CHRONIC ATRIAL FIBRILLATION (HCC): Primary | ICD-10-CM

## 2018-12-28 PROCEDURE — G0179 MD RECERTIFICATION HHA PT: HCPCS | Performed by: FAMILY MEDICINE

## 2018-12-31 RX ORDER — FUROSEMIDE 40 MG/1
TABLET ORAL
Qty: 30 TABLET | Refills: 1 | Status: SHIPPED | OUTPATIENT
Start: 2018-12-31 | End: 2019-02-26 | Stop reason: SDUPTHER

## 2018-12-31 RX ORDER — LEVOTHYROXINE SODIUM 88 UG/1
TABLET ORAL
Qty: 30 TABLET | Refills: 5 | Status: SHIPPED | OUTPATIENT
Start: 2018-12-31 | End: 2019-02-28 | Stop reason: SDUPTHER

## 2019-01-25 ENCOUNTER — TELEPHONE (OUTPATIENT)
Dept: FAMILY MEDICINE CLINIC | Age: 84
End: 2019-01-25

## 2019-01-28 ENCOUNTER — TELEPHONE (OUTPATIENT)
Dept: FAMILY MEDICINE CLINIC | Age: 84
End: 2019-01-28

## 2019-02-19 ENCOUNTER — TELEPHONE (OUTPATIENT)
Dept: FAMILY MEDICINE CLINIC | Age: 84
End: 2019-02-19

## 2019-02-27 ENCOUNTER — TELEPHONE (OUTPATIENT)
Dept: FAMILY MEDICINE CLINIC | Age: 84
End: 2019-02-27

## 2019-02-27 ENCOUNTER — TELEPHONE (OUTPATIENT)
Dept: CARDIOLOGY CLINIC | Age: 84
End: 2019-02-27

## 2019-02-28 RX ORDER — METOPROLOL TARTRATE 50 MG/1
50 TABLET, FILM COATED ORAL 2 TIMES DAILY
Qty: 180 TABLET | Refills: 3 | Status: ON HOLD | OUTPATIENT
Start: 2019-02-28 | End: 2019-09-06 | Stop reason: HOSPADM

## 2019-02-28 RX ORDER — DILTIAZEM HYDROCHLORIDE 120 MG/1
120 CAPSULE, COATED, EXTENDED RELEASE ORAL DAILY
Qty: 90 CAPSULE | Refills: 3 | Status: ON HOLD | OUTPATIENT
Start: 2019-02-28 | End: 2020-06-02 | Stop reason: HOSPADM

## 2019-03-01 PROCEDURE — G0179 MD RECERTIFICATION HHA PT: HCPCS | Performed by: FAMILY MEDICINE

## 2019-03-01 RX ORDER — FUROSEMIDE 40 MG/1
40 TABLET ORAL DAILY
Qty: 90 TABLET | Refills: 0 | Status: SHIPPED | OUTPATIENT
Start: 2019-03-01 | End: 2019-05-06 | Stop reason: SDUPTHER

## 2019-03-01 RX ORDER — PANTOPRAZOLE SODIUM 40 MG/1
40 TABLET, DELAYED RELEASE ORAL DAILY
Qty: 90 TABLET | Refills: 0 | Status: SHIPPED | OUTPATIENT
Start: 2019-03-01 | End: 2019-05-06 | Stop reason: SDUPTHER

## 2019-03-01 RX ORDER — ROPINIROLE 1 MG/1
2 TABLET, FILM COATED ORAL NIGHTLY
Qty: 180 TABLET | Refills: 0 | Status: SHIPPED | OUTPATIENT
Start: 2019-03-01 | End: 2019-05-06 | Stop reason: SDUPTHER

## 2019-03-01 RX ORDER — MONTELUKAST SODIUM 10 MG/1
10 TABLET ORAL NIGHTLY
Qty: 90 TABLET | Refills: 0 | Status: SHIPPED | OUTPATIENT
Start: 2019-03-01 | End: 2019-10-07 | Stop reason: SDUPTHER

## 2019-03-01 RX ORDER — LEVOTHYROXINE SODIUM 88 UG/1
88 TABLET ORAL DAILY
Qty: 90 TABLET | Refills: 0 | Status: SHIPPED | OUTPATIENT
Start: 2019-03-01 | End: 2019-08-22 | Stop reason: SDUPTHER

## 2019-03-04 ENCOUNTER — OFFICE VISIT (OUTPATIENT)
Dept: FAMILY MEDICINE CLINIC | Age: 84
End: 2019-03-04
Payer: MEDICARE

## 2019-03-04 VITALS
BODY MASS INDEX: 24.14 KG/M2 | SYSTOLIC BLOOD PRESSURE: 128 MMHG | HEIGHT: 64 IN | WEIGHT: 141.4 LBS | DIASTOLIC BLOOD PRESSURE: 70 MMHG | HEART RATE: 80 BPM

## 2019-03-04 DIAGNOSIS — I10 ESSENTIAL HYPERTENSION: ICD-10-CM

## 2019-03-04 DIAGNOSIS — I10 ESSENTIAL HYPERTENSION: Primary | ICD-10-CM

## 2019-03-04 DIAGNOSIS — E03.9 ACQUIRED HYPOTHYROIDISM: ICD-10-CM

## 2019-03-04 LAB
A/G RATIO: 1.4 (ref 1.1–2.2)
ALBUMIN SERPL-MCNC: 3.9 G/DL (ref 3.4–5)
ALP BLD-CCNC: 81 U/L (ref 40–129)
ALT SERPL-CCNC: 15 U/L (ref 10–40)
ANION GAP SERPL CALCULATED.3IONS-SCNC: 13 MMOL/L (ref 3–16)
AST SERPL-CCNC: 17 U/L (ref 15–37)
BASOPHILS ABSOLUTE: 0 K/UL (ref 0–0.2)
BASOPHILS RELATIVE PERCENT: 0.4 %
BILIRUB SERPL-MCNC: 0.4 MG/DL (ref 0–1)
BUN BLDV-MCNC: 17 MG/DL (ref 7–20)
CALCIUM SERPL-MCNC: 8.8 MG/DL (ref 8.3–10.6)
CHLORIDE BLD-SCNC: 100 MMOL/L (ref 99–110)
CO2: 25 MMOL/L (ref 21–32)
CREAT SERPL-MCNC: 0.7 MG/DL (ref 0.6–1.2)
EOSINOPHILS ABSOLUTE: 0.1 K/UL (ref 0–0.6)
EOSINOPHILS RELATIVE PERCENT: 1.6 %
GFR AFRICAN AMERICAN: >60
GFR NON-AFRICAN AMERICAN: >60
GLOBULIN: 2.8 G/DL
GLUCOSE BLD-MCNC: 75 MG/DL (ref 70–99)
HCT VFR BLD CALC: 33.7 % (ref 36–48)
HEMOGLOBIN: 10.8 G/DL (ref 12–16)
LYMPHOCYTES ABSOLUTE: 2.5 K/UL (ref 1–5.1)
LYMPHOCYTES RELATIVE PERCENT: 37.4 %
MCH RBC QN AUTO: 27.7 PG (ref 26–34)
MCHC RBC AUTO-ENTMCNC: 32.1 G/DL (ref 31–36)
MCV RBC AUTO: 86.1 FL (ref 80–100)
MONOCYTES ABSOLUTE: 1.1 K/UL (ref 0–1.3)
MONOCYTES RELATIVE PERCENT: 15.8 %
NEUTROPHILS ABSOLUTE: 3 K/UL (ref 1.7–7.7)
NEUTROPHILS RELATIVE PERCENT: 44.8 %
PDW BLD-RTO: 17.8 % (ref 12.4–15.4)
PLATELET # BLD: 183 K/UL (ref 135–450)
PMV BLD AUTO: 9.6 FL (ref 5–10.5)
POTASSIUM SERPL-SCNC: 3.8 MMOL/L (ref 3.5–5.1)
RBC # BLD: 3.92 M/UL (ref 4–5.2)
SODIUM BLD-SCNC: 138 MMOL/L (ref 136–145)
T4 FREE: 1.3 NG/DL (ref 0.9–1.8)
TOTAL PROTEIN: 6.7 G/DL (ref 6.4–8.2)
TSH SERPL DL<=0.05 MIU/L-ACNC: 2.88 UIU/ML (ref 0.27–4.2)
WBC # BLD: 6.7 K/UL (ref 4–11)

## 2019-03-04 PROCEDURE — G8510 SCR DEP NEG, NO PLAN REQD: HCPCS | Performed by: FAMILY MEDICINE

## 2019-03-04 PROCEDURE — 99213 OFFICE O/P EST LOW 20 MIN: CPT | Performed by: FAMILY MEDICINE

## 2019-03-04 ASSESSMENT — PATIENT HEALTH QUESTIONNAIRE - PHQ9
SUM OF ALL RESPONSES TO PHQ QUESTIONS 1-9: 0
SUM OF ALL RESPONSES TO PHQ QUESTIONS 1-9: 0
SUM OF ALL RESPONSES TO PHQ9 QUESTIONS 1 & 2: 0
2. FEELING DOWN, DEPRESSED OR HOPELESS: 0
1. LITTLE INTEREST OR PLEASURE IN DOING THINGS: 0

## 2019-03-05 ENCOUNTER — TELEPHONE (OUTPATIENT)
Dept: FAMILY MEDICINE CLINIC | Age: 84
End: 2019-03-05
Payer: MEDICARE

## 2019-03-05 DIAGNOSIS — I50.30 DIASTOLIC CONGESTIVE HEART FAILURE, UNSPECIFIED HF CHRONICITY (HCC): Primary | ICD-10-CM

## 2019-05-06 RX ORDER — APIXABAN 5 MG/1
TABLET, FILM COATED ORAL
Qty: 180 TABLET | Refills: 0 | Status: SHIPPED | OUTPATIENT
Start: 2019-05-06 | End: 2019-08-13 | Stop reason: SDUPTHER

## 2019-05-06 RX ORDER — PANTOPRAZOLE SODIUM 40 MG/1
40 TABLET, DELAYED RELEASE ORAL DAILY
Qty: 90 TABLET | Refills: 0 | Status: SHIPPED | OUTPATIENT
Start: 2019-05-06 | End: 2019-08-13 | Stop reason: SDUPTHER

## 2019-05-06 RX ORDER — ROPINIROLE 1 MG/1
2 TABLET, FILM COATED ORAL NIGHTLY
Qty: 180 TABLET | Refills: 0 | Status: SHIPPED | OUTPATIENT
Start: 2019-05-06 | End: 2019-08-13 | Stop reason: SDUPTHER

## 2019-05-06 RX ORDER — FUROSEMIDE 40 MG/1
TABLET ORAL
Qty: 90 TABLET | Refills: 0 | Status: SHIPPED
Start: 2019-05-06 | End: 2019-07-15

## 2019-06-12 ENCOUNTER — HOSPITAL ENCOUNTER (EMERGENCY)
Age: 84
Discharge: HOME | End: 2019-06-12
Attending: EMERGENCY MEDICINE
Payer: MEDICARE

## 2019-06-12 ENCOUNTER — HOSPITAL ENCOUNTER (OUTPATIENT)
Dept: VASCULAR LAB | Age: 84
Discharge: HOME | End: 2019-06-12
Payer: MEDICARE

## 2019-06-12 VITALS
HEART RATE: 78 BPM | RESPIRATION RATE: 14 BRPM | TEMPERATURE: 97.4 F | OXYGEN SATURATION: 95 % | DIASTOLIC BLOOD PRESSURE: 70 MMHG | BODY MASS INDEX: 24.75 KG/M2 | WEIGHT: 144.18 LBS | SYSTOLIC BLOOD PRESSURE: 119 MMHG

## 2019-06-12 DIAGNOSIS — R60.0 LEG EDEMA, RIGHT: ICD-10-CM

## 2019-06-12 DIAGNOSIS — R60.0 LEG EDEMA, RIGHT: Primary | ICD-10-CM

## 2019-06-12 LAB
ANION GAP SERPL CALCULATED.3IONS-SCNC: 11 MMOL/L (ref 3–16)
BASOPHILS ABSOLUTE: 0.1 K/UL (ref 0–0.2)
BASOPHILS RELATIVE PERCENT: 0.9 %
BUN BLDV-MCNC: 25 MG/DL (ref 7–20)
CALCIUM SERPL-MCNC: 8.7 MG/DL (ref 8.3–10.6)
CHLORIDE BLD-SCNC: 106 MMOL/L (ref 99–110)
CO2: 25 MMOL/L (ref 21–32)
CREAT SERPL-MCNC: 0.6 MG/DL (ref 0.6–1.2)
D DIMER: 1.67 UG/ML FEU
EOSINOPHILS ABSOLUTE: 0.1 K/UL (ref 0–0.6)
EOSINOPHILS RELATIVE PERCENT: 1.3 %
GFR AFRICAN AMERICAN: >60
GFR NON-AFRICAN AMERICAN: >60
GLUCOSE BLD-MCNC: 103 MG/DL (ref 70–99)
HCT VFR BLD CALC: 32.8 % (ref 36–48)
HEMOGLOBIN: 10.1 G/DL (ref 12–16)
LYMPHOCYTES ABSOLUTE: 1.7 K/UL (ref 1–5.1)
LYMPHOCYTES RELATIVE PERCENT: 27.2 %
MCH RBC QN AUTO: 25.8 PG (ref 26–34)
MCHC RBC AUTO-ENTMCNC: 30.8 G/DL (ref 31–36)
MCV RBC AUTO: 83.8 FL (ref 80–100)
MONOCYTES ABSOLUTE: 1 K/UL (ref 0–1.3)
MONOCYTES RELATIVE PERCENT: 16.8 %
NEUTROPHILS ABSOLUTE: 3.2 K/UL (ref 1.7–7.7)
NEUTROPHILS RELATIVE PERCENT: 53.8 %
PDW BLD-RTO: 16.1 % (ref 12.4–15.4)
PLATELET # BLD: 250 K/UL (ref 135–450)
PMV BLD AUTO: 8.6 FL (ref 5–10.5)
POTASSIUM SERPL-SCNC: 4.1 MMOL/L (ref 3.5–5.1)
RBC # BLD: 3.92 M/UL (ref 4–5.2)
SODIUM BLD-SCNC: 142 MMOL/L (ref 136–145)
WBC # BLD: 6.1 K/UL (ref 4–11)

## 2019-06-12 PROCEDURE — 99283 EMERGENCY DEPT VISIT LOW MDM: CPT

## 2019-06-12 PROCEDURE — 80048 BASIC METABOLIC PNL TOTAL CA: CPT

## 2019-06-12 PROCEDURE — 36415 COLL VENOUS BLD VENIPUNCTURE: CPT

## 2019-06-12 PROCEDURE — 85379 FIBRIN DEGRADATION QUANT: CPT

## 2019-06-12 PROCEDURE — 85025 COMPLETE CBC W/AUTO DIFF WBC: CPT

## 2019-06-12 PROCEDURE — 93971 EXTREMITY STUDY: CPT

## 2019-06-12 RX ORDER — AMMONIUM LACTATE 12 G/100G
CREAM TOPICAL
Refills: 6 | COMMUNITY
Start: 2019-06-06 | End: 2020-07-09

## 2019-06-12 ASSESSMENT — PAIN - FUNCTIONAL ASSESSMENT: PAIN_FUNCTIONAL_ASSESSMENT: 0-10

## 2019-06-12 ASSESSMENT — PAIN SCALES - GENERAL
PAINLEVEL_OUTOF10: 2

## 2019-06-12 ASSESSMENT — PAIN DESCRIPTION - ORIENTATION: ORIENTATION: RIGHT;LEFT

## 2019-06-12 ASSESSMENT — PAIN DESCRIPTION - PAIN TYPE: TYPE: ACUTE PAIN

## 2019-06-12 ASSESSMENT — PAIN DESCRIPTION - DESCRIPTORS: DESCRIPTORS: BURNING

## 2019-06-12 ASSESSMENT — PAIN DESCRIPTION - FREQUENCY: FREQUENCY: CONTINUOUS

## 2019-06-12 NOTE — ED NOTES
Vascular Tech at [facility] contacted to see if there is an opening for doppler study today. Spoke with Hansa Amaya and said she has one at . Will call Central scheduling.       Amelia Thornton RN  06/12/19 2337

## 2019-06-12 NOTE — ED PROVIDER NOTES
[facility]  eMERGENCY dEPARTMENT eNCOUnter      Pt Name: Melina Domínguez  MRN: [id number]  [place] 3/2/1929  Date of evaluation: 6/12/2019  Provider: MD Kelvin Salinas       Chief Complaint   Patient presents with    Rash     skin rash to bilateral lower legs noted yesterday, denies itching, \"thinks it is from the medicine she's using\"         HISTORY OF PRESENT ILLNESS  (Location/Symptom, Timing/Onset, Context/Setting, Quality, Duration, Modifying Factors, Severity.)   Melina Domínguez is a 80 y.o. female who presents to the emergency department planing of discomfort in her right leg that started yesterday. She has some burning sensation. Her right leg is swollen. She thinks there may be some rash there. Her left leg is not really bothering her. The symptoms are primarily in her right leg. No chest pain or shortness of breath. Nursing Notes were reviewed and I agree. REVIEW OF SYSTEMS    (2-9 systems for level 4, 10 or more for level 5)     General: No fever or chills. Cardiovascular: No chest pain. Pulmonary: No shortness of breath. Musculoskeletal: Right leg burning discomfort and swelling. Skin: Rash. Except as noted above the remainder of the review of systems was reviewed and negative.        PAST MEDICAL HISTORY         Diagnosis Date    Abdominal wall pain     Arrhythmia     afib,bradycardia    Arthritis     left hip    Asthma     Atrial fibrillation (HCC)     Broken nose 2016    CHF (congestive heart failure) ([facility] [address] 08/2018    Cough variant asthma     Dementia     Dyspnea     Essential hypertension 8/1/2018    Fatigue     Hematoma     Hyperlipidemia     Insomnia     Lumbar radiculopathy     Osteoporosis     Palpitation     Restless leg syndrome     Shingles     Sinusitis        SURGICAL HISTORY           Procedure Laterality Date    ANKLE SURGERY  1997    left ankle surgery    APPENDECTOMY      CARDIOVERSION  COLONOSCOPY  10/25/10    normal dr Jeanne Cross    COLONOSCOPY  02/27/2018    polyps dr Willow Goetz, no repeat needed.  no malignancy    ELBOW SURGERY      HYSTERECTOMY      JOINT REPLACEMENT      right elbow    OVARY REMOVAL Left     UPPER GASTROINTESTINAL ENDOSCOPY  02/27/2018    normal dr Lolly Menon       Previous Medications    AMMONIUM LACTATE (AMLACTIN) 12 % CREAM        CALCIUM CARBONATE-VIT D-MIN (CALTRATE PLUS PO)    Take 1 tablet by mouth daily     CHOLECALCIFEROL (VITAMIN D3) 5000 UNITS TABS    Take 1 tablet by mouth daily     COMPRESSION STOCKINGS MISC    by Does not apply route Bilateral lower extremity compression stockings, 15-20 mmHg    DILTIAZEM (CARDIZEM CD) 120 MG EXTENDED RELEASE CAPSULE    Take 1 capsule by mouth daily    DIVALPROEX (DEPAKOTE SPRINKLE) 125 MG CAPSULE    Take 250 mg by mouth 2 times daily     DONEPEZIL (ARICEPT) 10 MG TABLET    Take 10 mg by mouth nightly     DULOXETINE (CYMBALTA) 20 MG EXTENDED RELEASE CAPSULE    Take 20 mg by mouth daily    ELIQUIS 5 MG TABS TABLET    TAKE 1 TABLET BY MOUTH 2 TIMES DAILY    FERROUS SULFATE 325 (65 FE) MG TABLET    Take 325 mg by mouth daily (with breakfast)    FUROSEMIDE (LASIX) 40 MG TABLET    TAKE 1 TABLET BY MOUTH ONCE DAILY    LEVOTHYROXINE (SYNTHROID) 88 MCG TABLET    Take 1 tablet by mouth Daily    LINZESS 290 MCG CAPS CAPSULE    Take 290 mcg by mouth every morning (before breakfast)     METOPROLOL TARTRATE (LOPRESSOR) 50 MG TABLET    Take 1 tablet by mouth 2 times daily    MOMETASONE (NASONEX) 50 MCG/ACT NASAL SPRAY    2 sprays by Nasal route daily    MONTELUKAST (SINGULAIR) 10 MG TABLET    Take 1 tablet by mouth nightly    MULTIPLE VITAMINS-MINERALS (THERAPEUTIC MULTIVITAMIN-MINERALS) TABLET    Take 1 tablet by mouth daily    PANTOPRAZOLE (PROTONIX) 40 MG TABLET    TAKE 1 TABLET BY MOUTH DAILY    POLYETHYLENE GLYCOL 3350 (MIRALAX PO)    Take 17 g by mouth daily as needed (constipation)     PROAIR  (90 BASE) MCG/ACT INHALER    2 PUFFS INTO LUNGS EVERY 4 HOURS AS NEEDED FOR WHEEZING OR FORSHORTNESS OF BREATH    ROPINIROLE (REQUIP) 1 MG TABLET    TAKE 2 TABLETS BY MOUTH NIGHTLY    SPACER/AERO-HOLDING CHAMBERS (E-Z SPACER) POLLY    1 Device by Does not apply route daily as needed. SYMBICORT 160-4.5 MCG/ACT AERO    INHALE 2 PUFFS TWICE A DAY INTO THE LUNGS    TRAZODONE (DESYREL) 50 MG TABLET    Take 25 mg by mouth nightly 30-60 minutes before bedtime. May take additional 25mg if no sleep    VITAMIN B-12 (CYANOCOBALAMIN) 1000 MCG TABLET    Take 1,000 mcg by mouth daily       ALLERGIES     Augmentin [amoxicillin-pot clavulanate]; Demerol; Gluten meal; Lidocaine hcl; Other; Procaine; Tetanus toxoid, adsorbed; Vicodin [hydrocodone-acetaminophen]; Hydrocodone-acetaminophen; Meperidine; Prednisone; and Tetanus toxoids    FAMILY HISTORY           Problem Relation Age of Onset    Cancer Mother     Stroke Father     Arthritis Other     Asthma Other     Diabetes Other     Heart Disease Other      Family Status   Relation Name Status    Mother   at age 80        cancer    Father   at age 80        stroke    Other  (Not Specified)    Other  (Not Specified)    Other  (Not Specified)    Other  (Not Specified)        SOCIAL HISTORY      reports that she quit smoking about 48 years ago. She has a 5.00 pack-year smoking history. She has never used smokeless tobacco. She reports that she does not drink alcohol or use drugs. PHYSICAL EXAM    (up to 7 for level 4, 8 or more for level 5)     ED Triage Vitals [19 1029]   BP Temp Temp Source Pulse Resp SpO2 Height Weight   119/70 97.4 °F (36.3 °C) Oral 78 14 95 % -- 144 lb 2.9 oz (65.4 kg)       General: Alert elderly female no acute distress. Head: Atraumatic and normocephalic. Eyes: No conjunctival injection. Pupils equal round reactive. ENT: Verónica Shed is clear. Oropharynx is moist without erythema. Neck: Supple without adenopathy. No JVD.   Heart: Regular rate and rhythm. No murmurs or gallops noted. Lungs: Breath sounds equal bilaterally and clear. Abdomen: Soft, nondistended, nontender. Musculoskeletal: 3+ edema right lower extremity including the ankle tib-fib and thigh. There is some diffuse calf tenderness on the right. Distal pulses are not palpable due to the edema, normal capillary refill in the extremity is warm, no evidence of ischemia. Skin: No erythema. The skin over the lower legs bilaterally is somewhat dry, there is somewhat of a nodular consistency over the anterior tibia bilaterally, right greater than left. No significant erythema. Neuro: Awake, alert, appropriate. Symmetrical reactive pupils. Intact extraocular movements. No facial asymmetry. Midline tongue.       DIAGNOSTIC RESULTS     RADIOLOGY:   Non-plain film images such as CT, Ultrasound and MRI are read by the radiologist. Plain radiographic images are visualized and preliminarily interpreted by Sal Montoya MD with the below findings:      Interpretation per the Radiologist below, if available at the time of this note:    VL Extremity Venous Right    (Results Pending)       LABS:  Labs Reviewed   CBC WITH AUTO DIFFERENTIAL - Abnormal; Notable for the following components:       Result Value    RBC 3.92 (*)     Hemoglobin 10.1 (*)     Hematocrit 32.8 (*)     MCH 25.8 (*)     MCHC 30.8 (*)     RDW 16.1 (*)     All other components within normal limits    Narrative:     Performed at:  [facility]) [place] [address] [address] [facility]   Phone [phone]   BASIC METABOLIC PANEL - Abnormal; Notable for the following components:    Glucose 103 (*)     BUN 25 (*)     All other components within normal limits    Narrative:     Performed at:  [facility]) [place] [id number] [facility]   Phone [phone]   D-DIMER, QUANTITATIVE - Abnormal; Notable for the following components:    D-Dimer, Quant 1.67 (*)     All other components within normal limits    Narrative:     Performed at:  [facility]) [facility]  [address] [facility]  [facility]   Phone [phone]       All other labs were within normal range or not returned as of this dictation. EMERGENCY DEPARTMENT COURSE and DIFFERENTIAL DIAGNOSIS/MDM:   Vitals:    Vitals:    06/12/19 1029   BP: 119/70   Pulse: 78   Resp: 14   Temp: 97.4 °F (36.3 °C)   TempSrc: Oral   SpO2: 95%   Weight: 144 lb 2.9 oz (65.4 kg)       Patient has significant edema on her entire right lower leg. She is on Eliquis, but her d-dimer is elevated and I have significant concern that this could be a DVT. We have arranged for an outpatient venous Doppler at [facility].  This will be at [place] [address] today. We will have them send her to the ER if it is positive. They can determine what is most appropriate by way of a vascular consult since she is Bart[place]ome anticoagulated. PROCEDURES:  None    FINAL IMPRESSION      1.  Leg edema, right          DISPOSITION/PLAN   DISPOSITION Decision To Discharge 06/12/2019 11:23:51 AM      PATIENT REFERRED TO:  Kat Patterson MD  [address]  [phone]    In 1 day        DISCHARGE MEDICATIONS:  New Prescriptions    No medications on file       (Please note that portions of this note were completed with a voice recognition program.  Efforts were made to edit the dictations but occasionally words are mis-transcribed.)    Elaine Collazo MD  Attending Emergency Physician        Angela Modi MD  06/12/19 3840

## 2019-06-13 ENCOUNTER — APPOINTMENT (OUTPATIENT)
Dept: VASCULAR LAB | Age: 84
End: 2019-06-13
Payer: MEDICARE

## 2019-07-15 ENCOUNTER — OFFICE VISIT (OUTPATIENT)
Dept: FAMILY MEDICINE CLINIC | Age: 84
End: 2019-07-15
Payer: MEDICARE

## 2019-07-15 VITALS
DIASTOLIC BLOOD PRESSURE: 72 MMHG | SYSTOLIC BLOOD PRESSURE: 130 MMHG | TEMPERATURE: 97.8 F | HEIGHT: 64 IN | BODY MASS INDEX: 25.57 KG/M2 | WEIGHT: 149.8 LBS

## 2019-07-15 DIAGNOSIS — I87.2 VENOUS STASIS DERMATITIS OF BOTH LOWER EXTREMITIES: Primary | ICD-10-CM

## 2019-07-15 PROCEDURE — 99213 OFFICE O/P EST LOW 20 MIN: CPT | Performed by: FAMILY MEDICINE

## 2019-07-15 RX ORDER — CEPHALEXIN 250 MG/1
250 CAPSULE ORAL 4 TIMES DAILY
Qty: 20 CAPSULE | Refills: 0 | Status: SHIPPED | OUTPATIENT
Start: 2019-07-15 | End: 2019-08-27

## 2019-07-15 RX ORDER — FUROSEMIDE 40 MG/1
TABLET ORAL
Qty: 90 TABLET | Refills: 0 | Status: ON HOLD
Start: 2019-07-15 | End: 2019-09-06 | Stop reason: HOSPADM

## 2019-07-15 NOTE — PROGRESS NOTES
DULoxetine (CYMBALTA) 20 MG extended release capsule, Take 20 mg by mouth daily, Disp: , Rfl:   PROAIR  (90 Base) MCG/ACT inhaler, 2 PUFFS INTO LUNGS EVERY 4 HOURS AS NEEDED FOR WHEEZING OR FORSHORTNESS OF BREATH, Disp: 9 Inhaler, Rfl: 0  LINZESS 290 MCG CAPS capsule, Take 290 mcg by mouth every morning (before breakfast) , Disp: , Rfl:   donepezil (ARICEPT) 10 MG tablet, Take 10 mg by mouth nightly , Disp: , Rfl:   Polyethylene Glycol 3350 (MIRALAX PO), Take 17 g by mouth daily as needed (constipation) , Disp: , Rfl:   traZODone (DESYREL) 50 MG tablet, Take 25 mg by mouth nightly 30-60 minutes before bedtime. May take additional 25mg if no sleep, Disp: , Rfl:   divalproex (DEPAKOTE SPRINKLE) 125 MG capsule, Take 250 mg by mouth 2 times daily , Disp: , Rfl:   Cholecalciferol (VITAMIN D3) 5000 units TABS, Take 1 tablet by mouth daily , Disp: , Rfl:   vitamin B-12 (CYANOCOBALAMIN) 1000 MCG tablet, Take 1,000 mcg by mouth daily, Disp: , Rfl:   Compression Stockings MISC, by Does not apply route Bilateral lower extremity compression stockings, 15-20 mmHg, Disp: 2 each, Rfl: 1  mometasone (NASONEX) 50 MCG/ACT nasal spray, 2 sprays by Nasal route daily, Disp: 1 Inhaler, Rfl: 0  Spacer/Aero-Holding Chambers (E-Z SPACER) POLLY, 1 Device by Does not apply route daily as needed. , Disp: 1 Device, Rfl: 0  Calcium Carbonate-Vit D-Min (CALTRATE PLUS PO), Take 1 tablet by mouth daily , Disp: , Rfl:     No current facility-administered medications for this visit.       ----------------------------------                   07/15/19                             1457            ----------------------------------   BP:              130/72            Site:        Left Upper Arm        Position:        Sitting            Cuff Size:      Medium Adult         Temp:      97.8 °F (36.6 °C)       TempSrc:          Oral             Weight: 149 lb 12.8 oz (67.9 kg)   Height:     5' 4\" (1.626 m)

## 2019-07-30 ENCOUNTER — TELEPHONE (OUTPATIENT)
Dept: FAMILY MEDICINE CLINIC | Age: 84
End: 2019-07-30

## 2019-08-13 RX ORDER — ROPINIROLE 1 MG/1
TABLET, FILM COATED ORAL
Qty: 180 TABLET | Refills: 1 | Status: ON HOLD
Start: 2019-08-13 | End: 2020-09-25 | Stop reason: HOSPADM

## 2019-08-13 RX ORDER — PANTOPRAZOLE SODIUM 40 MG/1
TABLET, DELAYED RELEASE ORAL
Qty: 90 TABLET | Refills: 1 | Status: SHIPPED | OUTPATIENT
Start: 2019-08-13

## 2019-08-13 RX ORDER — APIXABAN 5 MG/1
TABLET, FILM COATED ORAL
Qty: 180 TABLET | Refills: 1 | Status: SHIPPED | OUTPATIENT
Start: 2019-08-13 | End: 2019-10-01 | Stop reason: ALTCHOICE

## 2019-08-27 ENCOUNTER — APPOINTMENT (OUTPATIENT)
Dept: GENERAL RADIOLOGY | Age: 84
End: 2019-08-27
Payer: MEDICARE

## 2019-08-27 ENCOUNTER — HOSPITAL ENCOUNTER (EMERGENCY)
Age: 84
Discharge: HOME | End: 2019-08-27
Attending: EMERGENCY MEDICINE
Payer: MEDICARE

## 2019-08-27 VITALS
OXYGEN SATURATION: 97 % | RESPIRATION RATE: 16 BRPM | DIASTOLIC BLOOD PRESSURE: 87 MMHG | BODY MASS INDEX: 29.02 KG/M2 | HEART RATE: 78 BPM | SYSTOLIC BLOOD PRESSURE: 142 MMHG | HEIGHT: 63 IN | WEIGHT: 163.8 LBS | TEMPERATURE: 97.8 F

## 2019-08-27 DIAGNOSIS — S02.2XXA CLOSED FRACTURE OF NASAL BONE, INITIAL ENCOUNTER: Primary | ICD-10-CM

## 2019-08-27 DIAGNOSIS — T14.8XXA ABRASION: ICD-10-CM

## 2019-08-27 PROCEDURE — 99283 EMERGENCY DEPT VISIT LOW MDM: CPT

## 2019-08-27 PROCEDURE — 70160 X-RAY EXAM OF NASAL BONES: CPT

## 2019-08-27 PROCEDURE — 6370000000 HC RX 637 (ALT 250 FOR IP): Performed by: EMERGENCY MEDICINE

## 2019-08-27 RX ORDER — ACETAMINOPHEN 500 MG
1000 TABLET ORAL ONCE
Status: COMPLETED | OUTPATIENT
Start: 2019-08-27 | End: 2019-08-27

## 2019-08-27 RX ADMIN — ACETAMINOPHEN 1000 MG: 500 TABLET ORAL at 18:49

## 2019-08-27 ASSESSMENT — PAIN DESCRIPTION - DESCRIPTORS: DESCRIPTORS: ACHING

## 2019-08-27 ASSESSMENT — PAIN DESCRIPTION - PAIN TYPE
TYPE: ACUTE PAIN
TYPE: ACUTE PAIN

## 2019-08-27 ASSESSMENT — PAIN DESCRIPTION - LOCATION
LOCATION: NOSE
LOCATION: HEAD;NOSE

## 2019-08-27 ASSESSMENT — PAIN - FUNCTIONAL ASSESSMENT: PAIN_FUNCTIONAL_ASSESSMENT: 0-10

## 2019-08-27 ASSESSMENT — PAIN SCALES - GENERAL
PAINLEVEL_OUTOF10: 3
PAINLEVEL_OUTOF10: 2
PAINLEVEL_OUTOF10: 3

## 2019-08-27 NOTE — ED PROVIDER NOTES
 Asthma Other     Diabetes Other     Heart Disease Other      Social History     Socioeconomic History    Marital status:      Spouse name: Not on file    Number of children: Not on file    Years of education: Not on file    Highest education level: Not on file   Occupational History    Occupation: Retired teacher   Social Needs    Financial resource strain: Not on file    Food insecurity:     Worry: Not on file     Inability: Not on file   [employer] needs:     Medical: Not on file     Non-medical: Not on file   Tobacco Use    Smoking status: Former Smoker     Packs/day: 0.25     Years: 20.00     Pack years: 5.00     Last attempt to quit: 1970     Years since quittin.0    Smokeless tobacco: Never Used   Substance and Sexual Activity    Alcohol use: No    Drug use: No    Sexual activity: Not Currently   Lifestyle    Physical activity:     Days per week: Not on file     Minutes per session: Not on file    Stress: Not on file   Relationships    Social connections:     Talks on phone: Not on file     Gets together: Not on file     Attends Rastafari service: Not on file     Active member of club or organization: Not on file     Attends meetings of clubs or organizations: Not on file     Relationship status: Not on file    Intimate partner violence:     Fear of current or ex partner: Not on file     Emotionally abused: Not on file     Physically abused: Not on file     Forced sexual activity: Not on file   Other Topics Concern    Not on file   Social History Narrative    Not on file     No current facility-administered medications for this encounter.       Current Outpatient Medications   Medication Sig Dispense Refill    levothyroxine (SYNTHROID) 88 MCG tablet TAKE 1 TABLET BY MOUTH DAILY 90 tablet 1    ELIQUIS 5 MG TABS tablet TAKE ONE (1) TABLET BY MOUTH TWICE DAILY 180 tablet 1    pantoprazole (PROTONIX) 40 MG tablet TAKE (1) TABLET BY MOUTH DAILY 90 tablet 1    rOPINIRole (REQUIP) 1 MG tablet TAKE TWO (2) TABLETS BY MOUTH NIGHTLY 180 tablet 1    furosemide (LASIX) 40 MG tablet Full tablet in the am and take one half tablet around  1 pm in the afternoon 90 tablet 0    ammonium lactate (AMLACTIN) 12 % cream   6    montelukast (SINGULAIR) 10 MG tablet Take 1 tablet by mouth nightly 90 tablet 0    metoprolol tartrate (LOPRESSOR) 50 MG tablet Take 1 tablet by mouth 2 times daily 180 tablet 3    diltiazem (CARDIZEM CD) 120 MG extended release capsule Take 1 capsule by mouth daily 90 capsule 3    SYMBICORT 160-4.5 MCG/ACT AERO INHALE 2 PUFFS TWICE A DAY INTO THE LUNGS 11 Inhaler 0    ferrous sulfate 325 (65 Fe) MG tablet Take 325 mg by mouth daily (with breakfast)      Multiple Vitamins-Minerals (THERAPEUTIC MULTIVITAMIN-MINERALS) tablet Take 1 tablet by mouth daily      DULoxetine (CYMBALTA) 20 MG extended release capsule Take 20 mg by mouth daily      PROAIR  (90 Base) MCG/ACT inhaler 2 PUFFS INTO LUNGS EVERY 4 HOURS AS NEEDED FOR WHEEZING OR FORSHORTNESS OF BREATH 9 Inhaler 0    LINZESS 290 MCG CAPS capsule Take 290 mcg by mouth every morning (before breakfast)       donepezil (ARICEPT) 10 MG tablet Take 10 mg by mouth nightly       Polyethylene Glycol 3350 (MIRALAX PO) Take 17 g by mouth daily as needed (constipation)       traZODone (DESYREL) 50 MG tablet Take 25 mg by mouth nightly 30-60 minutes before bedtime.  May take additional 25mg if no sleep      divalproex (DEPAKOTE SPRINKLE) 125 MG capsule Take 250 mg by mouth 2 times daily       Cholecalciferol (VITAMIN D3) 5000 units TABS Take 1 tablet by mouth daily       vitamin B-12 (CYANOCOBALAMIN) 1000 MCG tablet Take 1,000 mcg by mouth daily      Compression Stockings MISC by Does not apply route Bilateral lower extremity compression stockings, 15-20 mmHg 2 each 1    mometasone (NASONEX) 50 MCG/ACT nasal spray 2 sprays by Nasal route daily 1 Inhaler 0    Spacer/Aero-Holding Chambers (E-Z SPACER) and sensation is intact. LABS  No results found for this visit on 08/27/19. RADIOLOGY  XR NASAL BONE (MIN 3 VIEWS )   Final Result   Bilateral nasal bone fractures, especially on the left, with overlying soft   tissue swelling. I ordered and interpreted the x-rays of her nose. She has a nondisplaced fracture through the nasal bones, without deviation of the septum. ED COURSE/MDM  Patient seen and evaluated. Patient was evaluated after falling at home. She was up ambulating in the emergency department with no gait disturbance. I considered the potential for intracranial injury, septal hematoma, or other trauma from the fall. She has a nasal fracture, but no bleeding at this point. None is found on examination. She lives with her daughter, who can observe her at home. Since she is on our request, I advised her of the potential that her nose could bleed, and she would need to return to the emergency department. Patient was given:   Medications   acetaminophen (TYLENOL) tablet 1,000 mg (1,000 mg Oral Given 8/27/19 1849)       Pt is to follow up in 2-3 days for a recheck. Patient was given scripts for the following medications. I counseled patient how to take these medications. Current Discharge Medication List              CLINICAL IMPRESSION  1. Closed fracture of nasal bone, initial encounter    2. Abrasion        Blood pressure (!) 142/87, pulse 78, temperature 97.8 °F (36.6 °C), temperature source Oral, resp. rate 16, height 5' 3\" (1.6 m), weight 163 lb 12.8 oz (74.3 kg), SpO2 97 %, not currently breastfeeding. DISPOSITION        Comment: Please note this report has been produced using speech recognition software and may contain errors related to that system including errors in grammar, punctuation, and spelling, as well as words and phrases that may be inappropriate. If there are any questions or concerns please feel free to contact the dictating provider for clarification. Erin Morgan MD  08/27/19 9401

## 2019-08-27 NOTE — ED TRIAGE NOTES
Pt. Tripped over clothes in bedroom, fell hitting her glasses on blanket, abrasion on bridge of nose from glasses. No LOSS OF CONSCIOUSNESS.

## 2019-08-29 ENCOUNTER — APPOINTMENT (OUTPATIENT)
Dept: GENERAL RADIOLOGY | Age: 84
End: 2019-08-29
Payer: MEDICARE

## 2019-08-29 ENCOUNTER — APPOINTMENT (OUTPATIENT)
Dept: CT IMAGING | Age: 84
End: 2019-08-29
Payer: MEDICARE

## 2019-08-29 ENCOUNTER — HOSPITAL ENCOUNTER (EMERGENCY)
Age: 84
Discharge: HOME | End: 2019-08-29
Payer: MEDICARE

## 2019-08-29 VITALS
BODY MASS INDEX: 32.69 KG/M2 | HEART RATE: 80 BPM | DIASTOLIC BLOOD PRESSURE: 80 MMHG | OXYGEN SATURATION: 97 % | TEMPERATURE: 98.4 F | SYSTOLIC BLOOD PRESSURE: 135 MMHG | RESPIRATION RATE: 18 BRPM | WEIGHT: 184.53 LBS

## 2019-08-29 DIAGNOSIS — R04.0 EPISTAXIS: Primary | ICD-10-CM
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
## 2019-08-29 DIAGNOSIS — S00.31XD ABRASION OF NOSE, SUBSEQUENT ENCOUNTER: ICD-10-CM

## 2019-08-29 LAB
ANION GAP SERPL CALCULATED.3IONS-SCNC: 13 MMOL/L (ref 3–16)
BASOPHILS ABSOLUTE: 0.1 K/UL (ref 0–0.2)
BASOPHILS RELATIVE PERCENT: 1.2 %
BILIRUBIN URINE: NEGATIVE
BLOOD, URINE: NEGATIVE
BUN BLDV-MCNC: 17 MG/DL (ref 7–20)
CALCIUM SERPL-MCNC: 8.9 MG/DL (ref 8.3–10.6)
CHLORIDE BLD-SCNC: 99 MMOL/L (ref 99–110)
CLARITY: CLEAR
CO2: 28 MMOL/L (ref 21–32)
COLOR: ABNORMAL
CREAT SERPL-MCNC: 0.7 MG/DL (ref 0.6–1.2)
EKG ATRIAL RATE: 78 BPM
EKG DIAGNOSIS: NORMAL
EKG Q-T INTERVAL: 350 MS
EKG QRS DURATION: 122 MS
EKG QTC CALCULATION (BAZETT): 545 MS
EKG R AXIS: 28 DEGREES
EKG T AXIS: -34 DEGREES
EKG VENTRICULAR RATE: 146 BPM
EOSINOPHILS ABSOLUTE: 0.1 K/UL (ref 0–0.6)
EOSINOPHILS RELATIVE PERCENT: 1.6 %
GFR AFRICAN AMERICAN: >60
GFR NON-AFRICAN AMERICAN: >60
GLUCOSE BLD-MCNC: 84 MG/DL (ref 70–99)
GLUCOSE URINE: NEGATIVE MG/DL
HCT VFR BLD CALC: 31.4 % (ref 36–48)
HEMOGLOBIN: 10 G/DL (ref 12–16)
KETONES, URINE: ABNORMAL MG/DL
LEUKOCYTE ESTERASE, URINE: NEGATIVE
LYMPHOCYTES ABSOLUTE: 1.5 K/UL (ref 1–5.1)
LYMPHOCYTES RELATIVE PERCENT: 24.3 %
MCH RBC QN AUTO: 25.4 PG (ref 26–34)
MCHC RBC AUTO-ENTMCNC: 31.8 G/DL (ref 31–36)
MCV RBC AUTO: 79.8 FL (ref 80–100)
MICROSCOPIC EXAMINATION: ABNORMAL
MONOCYTES ABSOLUTE: 1.1 K/UL (ref 0–1.3)
MONOCYTES RELATIVE PERCENT: 17.5 %
NEUTROPHILS ABSOLUTE: 3.4 K/UL (ref 1.7–7.7)
NEUTROPHILS RELATIVE PERCENT: 55.4 %
NITRITE, URINE: NEGATIVE
PDW BLD-RTO: 19.5 % (ref 12.4–15.4)
PH UA: 6.5 (ref 5–8)
PLATELET # BLD: 271 K/UL (ref 135–450)
PMV BLD AUTO: 8.9 FL (ref 5–10.5)
POTASSIUM SERPL-SCNC: 3.6 MMOL/L (ref 3.5–5.1)
PROTEIN UA: NEGATIVE MG/DL
RBC # BLD: 3.93 M/UL (ref 4–5.2)
SODIUM BLD-SCNC: 140 MMOL/L (ref 136–145)
SPECIFIC GRAVITY UA: 1.02 (ref 1–1.03)
TROPONIN: <0.01 NG/ML
URINE REFLEX TO CULTURE: ABNORMAL
URINE TYPE: ABNORMAL
UROBILINOGEN, URINE: 1 E.U./DL
WBC # BLD: 6.2 K/UL (ref 4–11)

## 2019-08-29 PROCEDURE — 6370000000 HC RX 637 (ALT 250 FOR IP): Performed by: PHYSICIAN ASSISTANT

## 2019-08-29 PROCEDURE — 85025 COMPLETE CBC W/AUTO DIFF WBC: CPT

## 2019-08-29 PROCEDURE — 73560 X-RAY EXAM OF KNEE 1 OR 2: CPT

## 2019-08-29 PROCEDURE — 80048 BASIC METABOLIC PNL TOTAL CA: CPT

## 2019-08-29 PROCEDURE — 73030 X-RAY EXAM OF SHOULDER: CPT

## 2019-08-29 PROCEDURE — 84484 ASSAY OF TROPONIN QUANT: CPT

## 2019-08-29 PROCEDURE — 70486 CT MAXILLOFACIAL W/O DYE: CPT

## 2019-08-29 PROCEDURE — 93005 ELECTROCARDIOGRAM TRACING: CPT | Performed by: PHYSICIAN ASSISTANT

## 2019-08-29 PROCEDURE — 81003 URINALYSIS AUTO W/O SCOPE: CPT

## 2019-08-29 PROCEDURE — 99284 EMERGENCY DEPT VISIT MOD MDM: CPT

## 2019-08-29 PROCEDURE — 93010 ELECTROCARDIOGRAM REPORT: CPT | Performed by: INTERNAL MEDICINE

## 2019-08-29 PROCEDURE — 70450 CT HEAD/BRAIN W/O DYE: CPT

## 2019-08-29 PROCEDURE — 73502 X-RAY EXAM HIP UNI 2-3 VIEWS: CPT

## 2019-08-29 PROCEDURE — 72125 CT NECK SPINE W/O DYE: CPT

## 2019-08-29 RX ORDER — OXYMETAZOLINE HYDROCHLORIDE 0.05 G/100ML
2 SPRAY NASAL ONCE
Status: COMPLETED | OUTPATIENT
Start: 2019-08-29 | End: 2019-08-29

## 2019-08-29 RX ORDER — METOPROLOL TARTRATE 50 MG/1
50 TABLET, FILM COATED ORAL ONCE
Status: COMPLETED | OUTPATIENT
Start: 2019-08-29 | End: 2019-08-29

## 2019-08-29 RX ORDER — OXYMETAZOLINE HYDROCHLORIDE 0.05 G/100ML
SPRAY NASAL
Qty: 1 BOTTLE | Refills: 0 | Status: SHIPPED | OUTPATIENT
Start: 2019-08-29 | End: 2020-07-09

## 2019-08-29 RX ORDER — DILTIAZEM HYDROCHLORIDE 120 MG/1
120 CAPSULE, COATED, EXTENDED RELEASE ORAL ONCE
Status: COMPLETED | OUTPATIENT
Start: 2019-08-29 | End: 2019-08-29

## 2019-08-29 RX ADMIN — METOPROLOL TARTRATE 50 MG: 50 TABLET ORAL at 06:54

## 2019-08-29 RX ADMIN — DILTIAZEM HYDROCHLORIDE 120 MG: 120 CAPSULE, COATED, EXTENDED RELEASE ORAL at 06:54

## 2019-08-29 RX ADMIN — OXYMETAZOLINE HCL 2 SPRAY: 0.05 SPRAY NASAL at 06:54

## 2019-08-29 ASSESSMENT — PAIN DESCRIPTION - LOCATION
LOCATION: GENERALIZED
LOCATION: LEG
LOCATION: ABDOMEN

## 2019-08-29 ASSESSMENT — PAIN DESCRIPTION - FREQUENCY
FREQUENCY: CONTINUOUS
FREQUENCY: CONTINUOUS

## 2019-08-29 ASSESSMENT — PAIN SCALES - GENERAL
PAINLEVEL_OUTOF10: 3
PAINLEVEL_OUTOF10: 6
PAINLEVEL_OUTOF10: 6

## 2019-08-29 ASSESSMENT — ENCOUNTER SYMPTOMS
SHORTNESS OF BREATH: 0
COUGH: 0
BACK PAIN: 0
VOMITING: 0
SORE THROAT: 0
NAUSEA: 0
ABDOMINAL PAIN: 0

## 2019-08-29 ASSESSMENT — PAIN - FUNCTIONAL ASSESSMENT
PAIN_FUNCTIONAL_ASSESSMENT: ACTIVITIES ARE NOT PREVENTED
PAIN_FUNCTIONAL_ASSESSMENT: 0-10
PAIN_FUNCTIONAL_ASSESSMENT: PREVENTS OR INTERFERES SOME ACTIVE ACTIVITIES AND ADLS

## 2019-08-29 ASSESSMENT — PAIN DESCRIPTION - ORIENTATION
ORIENTATION: LEFT
ORIENTATION: LEFT

## 2019-08-29 ASSESSMENT — PAIN DESCRIPTION - PAIN TYPE
TYPE: ACUTE PAIN

## 2019-08-29 ASSESSMENT — PAIN DESCRIPTION - DESCRIPTORS
DESCRIPTORS: SORE
DESCRIPTORS: TENDER;SORE

## 2019-08-29 ASSESSMENT — PAIN DESCRIPTION - PROGRESSION
CLINICAL_PROGRESSION: RESOLVED
CLINICAL_PROGRESSION: NOT CHANGED

## 2019-08-29 ASSESSMENT — PAIN DESCRIPTION - ONSET: ONSET: ON-GOING

## 2019-08-29 NOTE — ED TRIAGE NOTES
EMT states that the pt fell yesterday and this morning and had a nose bleed.  She states that she is sore on the left side and that her stomach hurts

## 2019-08-29 NOTE — ED PROVIDER NOTES
toxoids    FAMILY HISTORY           Problem Relation Age of Onset    Cancer Mother     Stroke Father     Arthritis Other     Asthma Other     Diabetes Other     Heart Disease Other      Family Status   Relation Name Status    Mother   at age 80        cancer   [place] Father   at age 80        stroke    Other  (Not Specified)    Other  (Not Specified)    Other  (Not Specified)    Other  (Not Specified)        SOCIAL HISTORY    reports that she quit smoking about 49 years ago. She has a 5.00 pack-year smoking history. She has never used smokeless tobacco. She reports that she does not drink alcohol or use drugs. PHYSICAL EXAM    (up to 7 for level 4, 8 or more for level 5)     ED Triage Vitals [19 0616]   BP Temp Temp Source Pulse Resp SpO2 Height Weight   134/78 98.4 °F (36.9 °C) Oral 60 16 93 % -- 184 lb 8.4 oz (83.7 kg)       Physical Exam   Constitutional: She is oriented to person, place, and time. She appears well-developed and well-nourished. No distress. HENT:   Head: Normocephalic and atraumatic. Mouth/Throat: Oropharynx is clear and moist.   Dried blood at the base of the nares but no active epistaxis. There is a healing abrasion to the bridge of the nose. Eyes: Conjunctivae are normal.   Neck: Neck supple. Cardiovascular: An irregularly irregular rhythm present. Tachycardia present. Pulmonary/Chest: Effort normal and breath sounds normal. No respiratory distress. She has no wheezes. She has no rales. Abdominal: Soft. There is no tenderness. Musculoskeletal: Normal range of motion. She exhibits edema (2+ bilateral lower extremities) and tenderness (mild diffuse tenderness of left knee, shoulder and hip without acute deformity or loss of ROM). Neurological: She is alert and oriented to person, place, and time. Skin: Skin is warm and dry. Psychiatric: She has a normal mood and affect. Her behavior is normal.   Nursing note and vitals reviewed. DIAGNOSTIC RESULTS     EK-lead EKG interpreted as afib with RVR with RBBB with a rate of 146 bpm. No ST elevation or depression on my review. Please see Dr. Jalil Sanderson note for full interpretation. RADIOLOGY:   Non-plain film images such as CT, Ultrasound and MRI are read by the radiologist.Plain radiographic images are visualized and preliminarily interpreted by Tano Aly PA-C with the below findings:        Interpretation per the Radiologist below, if available at the time of this note:    CT FACIAL BONES WO CONTRAST   Final Result   1. Cerebral atrophy and chronic small vessel ischemic changes in the white   matter. No acute intracranial abnormality. 2. No acute fracture of the cervical spine of facial bones. Old nasal bone   fracture. 3. Moderate diffuse degenerative changes in the cervical spine. CT Head WO Contrast   Final Result   1. Cerebral atrophy and chronic small vessel ischemic changes in the white   matter. No acute intracranial abnormality. 2. No acute fracture of the cervical spine of facial bones. Old nasal bone   fracture. 3. Moderate diffuse degenerative changes in the cervical spine. CT Cervical Spine WO Contrast   Final Result   1. Cerebral atrophy and chronic small vessel ischemic changes in the white   matter. No acute intracranial abnormality. 2. No acute fracture of the cervical spine of facial bones. Old nasal bone   fracture. 3. Moderate diffuse degenerative changes in the cervical spine. XR HIP LEFT (2-3 VIEWS)   Final Result   No evidence for acute fracture or dislocation in the left knee, left shoulder   or left hip. Moderate osteoarthrosis right hip showing progression from 2018. XR KNEE LEFT (1-2 VIEWS)   Final Result   No evidence for acute fracture or dislocation in the left knee, left shoulder   or left hip. Moderate osteoarthrosis right hip showing progression from 2018.          XR SHOULDER LEFT (MIN

## 2019-08-29 NOTE — ED NOTES
Fall risk screening completed. Fall risk bracelet applied to patient. Non-skid socks provided and placed on patient. The fall risk is indicated using  dome light . Based on score, a bed alarm was indicated and applied. The call light is within the patient's reach, and instructions for use were provided. The bed is in the lowest position with wheels locked. The patient has been advised to notify staff, using the call light, if there is a need to get up or use restroom. The patient verbalized understanding of safety precautions and how to contact staff for assistance.         Eduardo Varghese RN  08/29/19 8990

## 2019-08-29 NOTE — ED NOTES
D/C: Order noted for d/c. Pt confirmed d/c paperwork and one prescription have correct name. Discharge and education instructions reviewed with patient. Teach-back successful. Pt verbalized understanding and signed d/c papers. Pt denied questions at this time. No acute distress noted. Patient instructed to follow-up as noted - return to emergency department if symptoms worsen. Patient verbalized understanding. Discharged per EDMD with discharge instructions. Pt discharged to private vehicle. Patient stable upon departure. Thanked patient for choosing [phone]  for care.         Sundar Gardner RN  08/29/19 2822

## 2019-09-02 ENCOUNTER — APPOINTMENT (OUTPATIENT)
Dept: GENERAL RADIOLOGY | Age: 84
DRG: 291 | End: 2019-09-02
Payer: MEDICARE

## 2019-09-02 ENCOUNTER — HOSPITAL ENCOUNTER (INPATIENT)
Age: 84
LOS: 9 days | Discharge: HOME HEALTH | DRG: 291 | End: 2019-09-12
Attending: INTERNAL MEDICINE | Admitting: INTERNAL MEDICINE
Payer: MEDICARE

## 2019-09-02 DIAGNOSIS — R60.0 BILATERAL LOWER EXTREMITY EDEMA: ICD-10-CM

## 2019-09-02 DIAGNOSIS — R09.02 HYPOXIA: Primary | ICD-10-CM

## 2019-09-02 DIAGNOSIS — J22 ACUTE RESPIRATORY INFECTION: ICD-10-CM

## 2019-09-02 DIAGNOSIS — R05.9 COUGH: ICD-10-CM

## 2019-09-02 LAB
A/G RATIO: 1.5 (ref 1.1–2.2)
ALBUMIN SERPL-MCNC: 3.8 G/DL (ref 3.4–5)
ALP BLD-CCNC: 109 U/L (ref 40–129)
ALT SERPL-CCNC: 11 U/L (ref 10–40)
ANION GAP SERPL CALCULATED.3IONS-SCNC: 12 MMOL/L (ref 3–16)
ANISOCYTOSIS: ABNORMAL
AST SERPL-CCNC: 21 U/L (ref 15–37)
BASOPHILS ABSOLUTE: 0 K/UL (ref 0–0.2)
BASOPHILS RELATIVE PERCENT: 0 %
BILIRUB SERPL-MCNC: 1 MG/DL (ref 0–1)
BILIRUBIN URINE: NEGATIVE
BLOOD, URINE: NEGATIVE
BUN BLDV-MCNC: 22 MG/DL (ref 7–20)
CALCIUM SERPL-MCNC: 8.8 MG/DL (ref 8.3–10.6)
CHLORIDE BLD-SCNC: 96 MMOL/L (ref 99–110)
CLARITY: ABNORMAL
CO2: 26 MMOL/L (ref 21–32)
COLOR: YELLOW
CREAT SERPL-MCNC: 0.6 MG/DL (ref 0.6–1.2)
EOSINOPHILS ABSOLUTE: 0 K/UL (ref 0–0.6)
EOSINOPHILS RELATIVE PERCENT: 0 %
EPITHELIAL CELLS, UA: 4 /HPF (ref 0–5)
GFR AFRICAN AMERICAN: >60
GFR NON-AFRICAN AMERICAN: >60
GLOBULIN: 2.5 G/DL
GLUCOSE BLD-MCNC: 145 MG/DL (ref 70–99)
GLUCOSE URINE: NEGATIVE MG/DL
HCT VFR BLD CALC: 30.9 % (ref 36–48)
HEMOGLOBIN: 10 G/DL (ref 12–16)
HYALINE CASTS: 1 /LPF (ref 0–8)
HYPOCHROMIA: ABNORMAL
KETONES, URINE: NEGATIVE MG/DL
LACTIC ACID, SEPSIS: 1.3 MMOL/L (ref 0.4–1.9)
LEUKOCYTE ESTERASE, URINE: ABNORMAL
LYMPHOCYTES ABSOLUTE: 1.9 K/UL (ref 1–5.1)
LYMPHOCYTES RELATIVE PERCENT: 19 %
MCH RBC QN AUTO: 25.4 PG (ref 26–34)
MCHC RBC AUTO-ENTMCNC: 32.5 G/DL (ref 31–36)
MCV RBC AUTO: 78.1 FL (ref 80–100)
MICROCYTES: ABNORMAL
MICROSCOPIC EXAMINATION: YES
MONOCYTES ABSOLUTE: 0.8 K/UL (ref 0–1.3)
MONOCYTES RELATIVE PERCENT: 8 %
NEUTROPHILS ABSOLUTE: 7.3 K/UL (ref 1.7–7.7)
NEUTROPHILS RELATIVE PERCENT: 73 %
NITRITE, URINE: NEGATIVE
PDW BLD-RTO: 19.3 % (ref 12.4–15.4)
PH UA: 6 (ref 5–8)
PLATELET # BLD: 250 K/UL (ref 135–450)
PLATELET SLIDE REVIEW: ADEQUATE
PMV BLD AUTO: 8.5 FL (ref 5–10.5)
POTASSIUM SERPL-SCNC: 3.3 MMOL/L (ref 3.5–5.1)
PROTEIN UA: NEGATIVE MG/DL
RAPID INFLUENZA  B AGN: NEGATIVE
RAPID INFLUENZA A AGN: NEGATIVE
RBC # BLD: 3.95 M/UL (ref 4–5.2)
RBC UA: 13 /HPF (ref 0–4)
SLIDE REVIEW: ABNORMAL
SODIUM BLD-SCNC: 134 MMOL/L (ref 136–145)
SPECIFIC GRAVITY UA: 1.01 (ref 1–1.03)
TARGET CELLS: ABNORMAL
TOTAL PROTEIN: 6.3 G/DL (ref 6.4–8.2)
URINE REFLEX TO CULTURE: YES
URINE TYPE: ABNORMAL
UROBILINOGEN, URINE: 1 E.U./DL
WBC # BLD: 10 K/UL (ref 4–11)
WBC UA: 1 /HPF (ref 0–5)

## 2019-09-02 PROCEDURE — 80053 COMPREHEN METABOLIC PANEL: CPT

## 2019-09-02 PROCEDURE — 2580000003 HC RX 258: Performed by: NURSE PRACTITIONER

## 2019-09-02 PROCEDURE — 99285 EMERGENCY DEPT VISIT HI MDM: CPT

## 2019-09-02 PROCEDURE — 83605 ASSAY OF LACTIC ACID: CPT

## 2019-09-02 PROCEDURE — 87086 URINE CULTURE/COLONY COUNT: CPT

## 2019-09-02 PROCEDURE — 71045 X-RAY EXAM CHEST 1 VIEW: CPT

## 2019-09-02 PROCEDURE — 6370000000 HC RX 637 (ALT 250 FOR IP): Performed by: NURSE PRACTITIONER

## 2019-09-02 PROCEDURE — 81001 URINALYSIS AUTO W/SCOPE: CPT

## 2019-09-02 PROCEDURE — 93005 ELECTROCARDIOGRAM TRACING: CPT | Performed by: NURSE PRACTITIONER

## 2019-09-02 PROCEDURE — 2700000000 HC OXYGEN THERAPY PER DAY

## 2019-09-02 PROCEDURE — G0378 HOSPITAL OBSERVATION PER HR: HCPCS

## 2019-09-02 PROCEDURE — 96365 THER/PROPH/DIAG IV INF INIT: CPT

## 2019-09-02 PROCEDURE — 94640 AIRWAY INHALATION TREATMENT: CPT

## 2019-09-02 PROCEDURE — 2500000003 HC RX 250 WO HCPCS: Performed by: NURSE PRACTITIONER

## 2019-09-02 PROCEDURE — 87804 INFLUENZA ASSAY W/OPTIC: CPT

## 2019-09-02 PROCEDURE — 85025 COMPLETE CBC W/AUTO DIFF WBC: CPT

## 2019-09-02 RX ORDER — IPRATROPIUM BROMIDE AND ALBUTEROL SULFATE 2.5; .5 MG/3ML; MG/3ML
1 SOLUTION RESPIRATORY (INHALATION) ONCE
Status: COMPLETED | OUTPATIENT
Start: 2019-09-02 | End: 2019-09-02

## 2019-09-02 RX ADMIN — DOXYCYCLINE 100 MG: 100 INJECTION, POWDER, LYOPHILIZED, FOR SOLUTION INTRAVENOUS at 22:54

## 2019-09-02 RX ADMIN — IPRATROPIUM BROMIDE AND ALBUTEROL SULFATE 1 AMPULE: .5; 3 SOLUTION RESPIRATORY (INHALATION) at 23:54

## 2019-09-02 ASSESSMENT — PAIN DESCRIPTION - PAIN TYPE: TYPE: ACUTE PAIN

## 2019-09-02 ASSESSMENT — PAIN DESCRIPTION - ONSET: ONSET: ON-GOING

## 2019-09-02 ASSESSMENT — ENCOUNTER SYMPTOMS: COUGH: 1

## 2019-09-02 ASSESSMENT — PAIN DESCRIPTION - LOCATION: LOCATION: ABDOMEN

## 2019-09-02 ASSESSMENT — PAIN SCALES - GENERAL: PAINLEVEL_OUTOF10: 3

## 2019-09-02 ASSESSMENT — PAIN DESCRIPTION - PROGRESSION: CLINICAL_PROGRESSION: NOT CHANGED

## 2019-09-02 ASSESSMENT — PAIN - FUNCTIONAL ASSESSMENT: PAIN_FUNCTIONAL_ASSESSMENT: ACTIVITIES ARE NOT PREVENTED

## 2019-09-02 ASSESSMENT — PAIN DESCRIPTION - FREQUENCY: FREQUENCY: CONTINUOUS

## 2019-09-02 ASSESSMENT — PAIN DESCRIPTION - DESCRIPTORS: DESCRIPTORS: ACHING

## 2019-09-03 LAB
A/G RATIO: 1.2 (ref 1.1–2.2)
ALBUMIN SERPL-MCNC: 3.3 G/DL (ref 3.4–5)
ALP BLD-CCNC: 96 U/L (ref 40–129)
ALT SERPL-CCNC: 11 U/L (ref 10–40)
ANION GAP SERPL CALCULATED.3IONS-SCNC: 13 MMOL/L (ref 3–16)
AST SERPL-CCNC: 19 U/L (ref 15–37)
BILIRUB SERPL-MCNC: 1 MG/DL (ref 0–1)
BUN BLDV-MCNC: 19 MG/DL (ref 7–20)
CALCIUM SERPL-MCNC: 8.5 MG/DL (ref 8.3–10.6)
CHLORIDE BLD-SCNC: 97 MMOL/L (ref 99–110)
CO2: 27 MMOL/L (ref 21–32)
CREAT SERPL-MCNC: 0.6 MG/DL (ref 0.6–1.2)
EKG ATRIAL RATE: 108 BPM
EKG ATRIAL RATE: 52 BPM
EKG DIAGNOSIS: NORMAL
EKG DIAGNOSIS: NORMAL
EKG P AXIS: 70 DEGREES
EKG P-R INTERVAL: 164 MS
EKG Q-T INTERVAL: 398 MS
EKG Q-T INTERVAL: 466 MS
EKG QRS DURATION: 140 MS
EKG QRS DURATION: 92 MS
EKG QTC CALCULATION (BAZETT): 433 MS
EKG QTC CALCULATION (BAZETT): 505 MS
EKG R AXIS: -4 DEGREES
EKG R AXIS: 59 DEGREES
EKG T AXIS: -60 DEGREES
EKG T AXIS: 37 DEGREES
EKG VENTRICULAR RATE: 52 BPM
EKG VENTRICULAR RATE: 97 BPM
GFR AFRICAN AMERICAN: >60
GFR NON-AFRICAN AMERICAN: >60
GLOBULIN: 2.7 G/DL
GLUCOSE BLD-MCNC: 89 MG/DL (ref 70–99)
HCT VFR BLD CALC: 30.3 % (ref 36–48)
HEMOGLOBIN: 9.6 G/DL (ref 12–16)
LACTIC ACID, SEPSIS: 1.6 MMOL/L (ref 0.4–1.9)
MAGNESIUM: 1.8 MG/DL (ref 1.8–2.4)
MCH RBC QN AUTO: 25.4 PG (ref 26–34)
MCHC RBC AUTO-ENTMCNC: 31.6 G/DL (ref 31–36)
MCV RBC AUTO: 80.4 FL (ref 80–100)
PDW BLD-RTO: 19 % (ref 12.4–15.4)
PLATELET # BLD: 217 K/UL (ref 135–450)
PMV BLD AUTO: 8.3 FL (ref 5–10.5)
POTASSIUM REFLEX MAGNESIUM: 3.3 MMOL/L (ref 3.5–5.1)
PRO-BNP: 8389 PG/ML (ref 0–449)
RBC # BLD: 3.77 M/UL (ref 4–5.2)
SODIUM BLD-SCNC: 137 MMOL/L (ref 136–145)
TOTAL PROTEIN: 6 G/DL (ref 6.4–8.2)
TROPONIN: <0.01 NG/ML
WBC # BLD: 11 K/UL (ref 4–11)

## 2019-09-03 PROCEDURE — 6360000002 HC RX W HCPCS: Performed by: INTERNAL MEDICINE

## 2019-09-03 PROCEDURE — 93010 ELECTROCARDIOGRAM REPORT: CPT | Performed by: INTERNAL MEDICINE

## 2019-09-03 PROCEDURE — 83880 ASSAY OF NATRIURETIC PEPTIDE: CPT

## 2019-09-03 PROCEDURE — G0378 HOSPITAL OBSERVATION PER HR: HCPCS

## 2019-09-03 PROCEDURE — 93005 ELECTROCARDIOGRAM TRACING: CPT | Performed by: EMERGENCY MEDICINE

## 2019-09-03 PROCEDURE — 6370000000 HC RX 637 (ALT 250 FOR IP): Performed by: INTERNAL MEDICINE

## 2019-09-03 PROCEDURE — 83605 ASSAY OF LACTIC ACID: CPT

## 2019-09-03 PROCEDURE — 36415 COLL VENOUS BLD VENIPUNCTURE: CPT

## 2019-09-03 PROCEDURE — 94640 AIRWAY INHALATION TREATMENT: CPT

## 2019-09-03 PROCEDURE — 96367 TX/PROPH/DG ADDL SEQ IV INF: CPT

## 2019-09-03 PROCEDURE — 84484 ASSAY OF TROPONIN QUANT: CPT

## 2019-09-03 PROCEDURE — 80053 COMPREHEN METABOLIC PANEL: CPT

## 2019-09-03 PROCEDURE — 85027 COMPLETE CBC AUTOMATED: CPT

## 2019-09-03 PROCEDURE — 2580000003 HC RX 258: Performed by: INTERNAL MEDICINE

## 2019-09-03 PROCEDURE — 1200000000 HC SEMI PRIVATE

## 2019-09-03 PROCEDURE — 83735 ASSAY OF MAGNESIUM: CPT

## 2019-09-03 PROCEDURE — 2700000000 HC OXYGEN THERAPY PER DAY

## 2019-09-03 PROCEDURE — 94761 N-INVAS EAR/PLS OXIMETRY MLT: CPT

## 2019-09-03 RX ORDER — ONDANSETRON 2 MG/ML
4 INJECTION INTRAMUSCULAR; INTRAVENOUS EVERY 6 HOURS PRN
Status: DISCONTINUED | OUTPATIENT
Start: 2019-09-03 | End: 2019-09-12 | Stop reason: HOSPADM

## 2019-09-03 RX ORDER — LEVOFLOXACIN 5 MG/ML
500 INJECTION, SOLUTION INTRAVENOUS EVERY 24 HOURS
Status: DISCONTINUED | OUTPATIENT
Start: 2019-09-03 | End: 2019-09-05

## 2019-09-03 RX ORDER — TRAZODONE HYDROCHLORIDE 50 MG/1
25 TABLET ORAL NIGHTLY
Status: DISCONTINUED | OUTPATIENT
Start: 2019-09-03 | End: 2019-09-12

## 2019-09-03 RX ORDER — LEVOTHYROXINE SODIUM 88 UG/1
88 TABLET ORAL DAILY
Status: DISCONTINUED | OUTPATIENT
Start: 2019-09-03 | End: 2019-09-12 | Stop reason: HOSPADM

## 2019-09-03 RX ORDER — PREDNISONE 10 MG/1
10 TABLET ORAL DAILY
Status: DISCONTINUED | OUTPATIENT
Start: 2019-09-09 | End: 2019-09-09

## 2019-09-03 RX ORDER — METOPROLOL TARTRATE 50 MG/1
50 TABLET, FILM COATED ORAL 2 TIMES DAILY
Status: DISCONTINUED | OUTPATIENT
Start: 2019-09-03 | End: 2019-09-12 | Stop reason: HOSPADM

## 2019-09-03 RX ORDER — SODIUM CHLORIDE 0.9 % (FLUSH) 0.9 %
10 SYRINGE (ML) INJECTION EVERY 12 HOURS SCHEDULED
Status: DISCONTINUED | OUTPATIENT
Start: 2019-09-03 | End: 2019-09-12 | Stop reason: HOSPADM

## 2019-09-03 RX ORDER — GUAIFENESIN/DEXTROMETHORPHAN 100-10MG/5
5 SYRUP ORAL EVERY 4 HOURS PRN
Status: DISCONTINUED | OUTPATIENT
Start: 2019-09-03 | End: 2019-09-12 | Stop reason: HOSPADM

## 2019-09-03 RX ORDER — DILTIAZEM HYDROCHLORIDE 120 MG/1
120 CAPSULE, COATED, EXTENDED RELEASE ORAL DAILY
Status: DISCONTINUED | OUTPATIENT
Start: 2019-09-03 | End: 2019-09-12 | Stop reason: HOSPADM

## 2019-09-03 RX ORDER — DIVALPROEX SODIUM 125 MG/1
250 CAPSULE, COATED PELLETS ORAL 2 TIMES DAILY
Status: DISCONTINUED | OUTPATIENT
Start: 2019-09-03 | End: 2019-09-12 | Stop reason: HOSPADM

## 2019-09-03 RX ORDER — PANTOPRAZOLE SODIUM 40 MG/1
40 TABLET, DELAYED RELEASE ORAL
Status: DISCONTINUED | OUTPATIENT
Start: 2019-09-03 | End: 2019-09-12 | Stop reason: HOSPADM

## 2019-09-03 RX ORDER — FUROSEMIDE 10 MG/ML
40 INJECTION INTRAMUSCULAR; INTRAVENOUS DAILY
Status: DISCONTINUED | OUTPATIENT
Start: 2019-09-03 | End: 2019-09-05

## 2019-09-03 RX ORDER — SODIUM CHLORIDE 0.9 % (FLUSH) 0.9 %
10 SYRINGE (ML) INJECTION PRN
Status: DISCONTINUED | OUTPATIENT
Start: 2019-09-03 | End: 2019-09-12 | Stop reason: HOSPADM

## 2019-09-03 RX ORDER — IPRATROPIUM BROMIDE AND ALBUTEROL SULFATE 2.5; .5 MG/3ML; MG/3ML
1 SOLUTION RESPIRATORY (INHALATION)
Status: DISCONTINUED | OUTPATIENT
Start: 2019-09-03 | End: 2019-09-12 | Stop reason: HOSPADM

## 2019-09-03 RX ORDER — PREDNISONE 20 MG/1
20 TABLET ORAL DAILY
Status: COMPLETED | OUTPATIENT
Start: 2019-09-03 | End: 2019-09-05

## 2019-09-03 RX ORDER — DONEPEZIL HYDROCHLORIDE 10 MG/1
10 TABLET, FILM COATED ORAL NIGHTLY
Status: DISCONTINUED | OUTPATIENT
Start: 2019-09-03 | End: 2019-09-12 | Stop reason: HOSPADM

## 2019-09-03 RX ORDER — ACETAMINOPHEN 325 MG/1
650 TABLET ORAL EVERY 4 HOURS PRN
Status: DISCONTINUED | OUTPATIENT
Start: 2019-09-03 | End: 2019-09-12 | Stop reason: HOSPADM

## 2019-09-03 RX ORDER — DIVALPROEX SODIUM 250 MG/1
250 TABLET, DELAYED RELEASE ORAL 2 TIMES DAILY
COMMUNITY

## 2019-09-03 RX ORDER — ROPINIROLE 1 MG/1
2 TABLET, FILM COATED ORAL NIGHTLY
Status: DISCONTINUED | OUTPATIENT
Start: 2019-09-03 | End: 2019-09-12 | Stop reason: HOSPADM

## 2019-09-03 RX ORDER — PREDNISONE 1 MG/1
5 TABLET ORAL DAILY
Status: DISCONTINUED | OUTPATIENT
Start: 2019-09-12 | End: 2019-09-09

## 2019-09-03 RX ORDER — DULOXETIN HYDROCHLORIDE 20 MG/1
40 CAPSULE, DELAYED RELEASE ORAL DAILY
Status: DISCONTINUED | OUTPATIENT
Start: 2019-09-03 | End: 2019-09-12 | Stop reason: HOSPADM

## 2019-09-03 RX ADMIN — METOPROLOL TARTRATE 50 MG: 50 TABLET ORAL at 01:34

## 2019-09-03 RX ADMIN — ROPINIROLE HYDROCHLORIDE 2 MG: 1 TABLET, FILM COATED ORAL at 01:38

## 2019-09-03 RX ADMIN — METOPROLOL TARTRATE 50 MG: 50 TABLET ORAL at 10:10

## 2019-09-03 RX ADMIN — IPRATROPIUM BROMIDE AND ALBUTEROL SULFATE 1 AMPULE: .5; 3 SOLUTION RESPIRATORY (INHALATION) at 08:41

## 2019-09-03 RX ADMIN — APIXABAN 5 MG: 5 TABLET, FILM COATED ORAL at 01:35

## 2019-09-03 RX ADMIN — METOPROLOL TARTRATE 50 MG: 50 TABLET ORAL at 20:46

## 2019-09-03 RX ADMIN — DONEPEZIL HYDROCHLORIDE 10 MG: 10 TABLET, FILM COATED ORAL at 01:34

## 2019-09-03 RX ADMIN — SODIUM CHLORIDE, PRESERVATIVE FREE 10 ML: 5 INJECTION INTRAVENOUS at 20:46

## 2019-09-03 RX ADMIN — MOMETASONE FUROATE AND FORMOTEROL FUMARATE DIHYDRATE 2 PUFF: 100; 5 AEROSOL RESPIRATORY (INHALATION) at 08:41

## 2019-09-03 RX ADMIN — LEVOFLOXACIN 500 MG: 5 INJECTION, SOLUTION INTRAVENOUS at 01:35

## 2019-09-03 RX ADMIN — APIXABAN 5 MG: 5 TABLET, FILM COATED ORAL at 20:45

## 2019-09-03 RX ADMIN — DONEPEZIL HYDROCHLORIDE 10 MG: 10 TABLET, FILM COATED ORAL at 20:46

## 2019-09-03 RX ADMIN — PREDNISONE 20 MG: 20 TABLET ORAL at 15:45

## 2019-09-03 RX ADMIN — GUAIFENESIN AND DEXTROMETHORPHAN 5 ML: 100; 10 SYRUP ORAL at 22:16

## 2019-09-03 RX ADMIN — GUAIFENESIN AND DEXTROMETHORPHAN 5 ML: 100; 10 SYRUP ORAL at 13:02

## 2019-09-03 RX ADMIN — ROPINIROLE HYDROCHLORIDE 2 MG: 1 TABLET, FILM COATED ORAL at 20:46

## 2019-09-03 RX ADMIN — FUROSEMIDE 40 MG: 10 INJECTION, SOLUTION INTRAMUSCULAR; INTRAVENOUS at 15:45

## 2019-09-03 RX ADMIN — LEVOTHYROXINE SODIUM 88 MCG: 88 TABLET ORAL at 05:09

## 2019-09-03 RX ADMIN — TRAZODONE HYDROCHLORIDE 25 MG: 50 TABLET ORAL at 01:35

## 2019-09-03 RX ADMIN — DIVALPROEX SODIUM 250 MG: 125 CAPSULE ORAL at 01:34

## 2019-09-03 RX ADMIN — MOMETASONE FUROATE AND FORMOTEROL FUMARATE DIHYDRATE 2 PUFF: 100; 5 AEROSOL RESPIRATORY (INHALATION) at 20:25

## 2019-09-03 RX ADMIN — IPRATROPIUM BROMIDE AND ALBUTEROL SULFATE 1 AMPULE: .5; 3 SOLUTION RESPIRATORY (INHALATION) at 16:25

## 2019-09-03 RX ADMIN — PANTOPRAZOLE SODIUM 40 MG: 40 TABLET, DELAYED RELEASE ORAL at 05:09

## 2019-09-03 RX ADMIN — DILTIAZEM HYDROCHLORIDE 120 MG: 120 CAPSULE, COATED, EXTENDED RELEASE ORAL at 10:10

## 2019-09-03 RX ADMIN — SODIUM CHLORIDE, PRESERVATIVE FREE 10 ML: 5 INJECTION INTRAVENOUS at 10:12

## 2019-09-03 RX ADMIN — DIVALPROEX SODIUM 250 MG: 125 CAPSULE ORAL at 20:46

## 2019-09-03 RX ADMIN — MAGNESIUM HYDROXIDE 30 ML: 400 SUSPENSION ORAL at 15:45

## 2019-09-03 RX ADMIN — IPRATROPIUM BROMIDE AND ALBUTEROL SULFATE 1 AMPULE: .5; 3 SOLUTION RESPIRATORY (INHALATION) at 20:25

## 2019-09-03 RX ADMIN — DULOXETINE HYDROCHLORIDE 40 MG: 20 CAPSULE, DELAYED RELEASE ORAL at 10:11

## 2019-09-03 RX ADMIN — IPRATROPIUM BROMIDE AND ALBUTEROL SULFATE 1 AMPULE: .5; 3 SOLUTION RESPIRATORY (INHALATION) at 12:45

## 2019-09-03 RX ADMIN — DIVALPROEX SODIUM 250 MG: 125 CAPSULE ORAL at 10:10

## 2019-09-03 RX ADMIN — APIXABAN 5 MG: 5 TABLET, FILM COATED ORAL at 10:10

## 2019-09-03 RX ADMIN — TRAZODONE HYDROCHLORIDE 25 MG: 50 TABLET ORAL at 20:46

## 2019-09-03 ASSESSMENT — PAIN SCALES - GENERAL
PAINLEVEL_OUTOF10: 0

## 2019-09-03 NOTE — ED NOTES
Bed: [facility]  Expected date: 9/2/19  Expected time: 8:07 PM  Means of arrival: [facility]  Comments:  Leg swelling     Benny Boeck, RN  09/02/19 2015

## 2019-09-03 NOTE — PROGRESS NOTES
Pharmacy Medication Reconciliation Note     List of medications patient is currently taking is complete [facility] [place] [place] Source of information:   1. Contacted pt's mail order pharmacy [facility]      Allergies   Allergen Reactions    Gluten Meal Diarrhea    Demerol     Lidocaine Hcl     Other     Procaine     Tetanus Toxoid, Adsorbed     Vicodin [Hydrocodone-Acetaminophen]     Augmentin [Amoxicillin-Pot Clavulanate] Itching    Hydrocodone-Acetaminophen Palpitations    Meperidine Palpitations    Prednisone Palpitations    Tetanus Toxoids Palpitations       Notes regarding home medications:   1. [facility] has been filling oral maintenance  meds. They do not fill any inhalers   2.  Attempted to reach out to [facility], looks like she has filled in the past. Will update once I can speak with them       Tanvi Beth, [address] [facility]  9/3/2019  11:04 AM

## 2019-09-03 NOTE — PROGRESS NOTES
AGRATIO 1.2 09/03/2019    LABGLOM >60 09/03/2019    GLUCOSE 89 09/03/2019    PROT 6.0 09/03/2019    PROT 6.0 04/27/2012    LABALBU 3.3 09/03/2019    CALCIUM 8.5 09/03/2019    BILITOT 1.0 09/03/2019    ALKPHOS 96 09/03/2019    AST 19 09/03/2019    ALT 11 09/03/2019     Hepatic Function Panel:    Lab Results   Component Value Date    ALKPHOS 96 09/03/2019    ALT 11 09/03/2019    AST 19 09/03/2019    PROT 6.0 09/03/2019    PROT 6.0 04/27/2012    BILITOT 1.0 09/03/2019    BILIDIR <0.2 11/30/2017    IBILI see below 11/30/2017    LABALBU 3.3 09/03/2019     Magnesium:    Lab Results   Component Value Date    MG 1.80 09/03/2019     PT/INR:    Lab Results   Component Value Date    PROTIME 12.8 08/07/2018    INR 1.12 08/07/2018     Last 3 Troponin:    Lab Results   Component Value Date    TROPONINI <0.01 09/03/2019    TROPONINI <0.01 08/29/2019    TROPONINI <0.01 09/28/2018     U/A:    Lab Results   Component Value Date    NITRITE negative 11/27/2017    COLORU YELLOW 09/02/2019    PHUR 6.0 09/02/2019    WBCUA 1 09/02/2019    RBCUA 13 09/02/2019    CLARITYU CLOUDY 09/02/2019    SPECGRAV 1.010 09/02/2019    LEUKOCYTESUR TRACE 09/02/2019    UROBILINOGEN 1.0 09/02/2019    BILIRUBINUR Negative 09/02/2019    BILIRUBINUR negative 11/27/2017    BLOODU Negative 09/02/2019    GLUCOSEU Negative 09/02/2019     ABG:  No results found for: PHART, WWE2XPX, PO2ART, ZOW0FRB, BEART, THGBART, EXS1TWV, Z6IOYPMD  FLP:    Lab Results   Component Value Date    TRIG 74 02/29/2016    HDL 42 02/29/2016    HDL 44 04/27/2012    LDLCALC 71 02/29/2016    LABVLDL 15 02/29/2016     TSH:    Lab Results   Component Value Date    TSH 2.88 03/04/2019      DATA:   ECG: Sinus Rhythm       ASSESSMENT:   1  Bronchitis,  Possible  Pneumonia  With  bronchospsm  2  Acute    Systolic  Heart  Failure  Last  Echo    2018\  Ef  45-50%  3  Atrial  Fib  Rate  Well  Controlled  On  eliquis  AC  4  Dementia    5 htn  Well  Controlled  PLAN    add  Lasix  Steroid taper

## 2019-09-03 NOTE — PROGRESS NOTES
Chest pain    Low back pain    Vitamin D deficiency    Bradycardia    Other specified extrapyramidal and movement disorders    Iron deficiency anemia    Cervical pain    Anemia    Actinic keratosis    Hypothyroidism    Metatarsalgia of left foot    Buttocks nodule    Essential hypertension    CHF with unknown LVEF (HCC)    Dementia without behavioral disturbance    Pneumonia    Pleural effusion, left    Cardiomyopathy (HCC)        Allergies   Allergen Reactions    Gluten Meal Diarrhea    Demerol     Lidocaine Hcl     Other     Procaine     Tetanus Toxoid, Adsorbed     Vicodin [Hydrocodone-Acetaminophen]     Augmentin [Amoxicillin-Pot Clavulanate] Itching    Hydrocodone-Acetaminophen Palpitations    Meperidine Palpitations    Prednisone Palpitations    Tetanus Toxoids Palpitations        Current Inpatient Medications:    Current Facility-Administered Medications   Medication Dose Route Frequency Provider Last Rate Last Dose    diltiazem (CARDIZEM CD) extended release capsule 120 mg  120 mg Oral Daily Claire Miller MD   120 mg at 09/03/19 1010    divalproex (DEPAKOTE SPRINKLE) capsule 250 mg  250 mg Oral BID Claire Miller MD   250 mg at 09/03/19 1010    donepezil (ARICEPT) tablet 10 mg  10 mg Oral Nightly Claire Miller MD   10 mg at 09/03/19 0134    DULoxetine (CYMBALTA) extended release capsule 40 mg  40 mg Oral Daily Claire Miller MD   40 mg at 09/03/19 1011    apixaban (ELIQUIS) tablet 5 mg  5 mg Oral BID Claire Miller MD   5 mg at 09/03/19 1010    levothyroxine (SYNTHROID) tablet 88 mcg  88 mcg Oral Daily Claire Miller MD   88 mcg at 09/03/19 0509    metoprolol tartrate (LOPRESSOR) tablet 50 mg  50 mg Oral BID Claire Miller MD   50 mg at 09/03/19 1010    pantoprazole (PROTONIX) tablet 40 mg  40 mg Oral QAM AC Claire Miller MD   40 mg at 09/03/19 0509    rOPINIRole (REQUIP) tablet 2 mg  2 mg Oral Nightly Claire Miller MD   2 mg at 09/03/19 0138    mometasone-formoterol (DULERA) 100-5 MCG/ACT

## 2019-09-03 NOTE — ED PROVIDER NOTES
 Osteoporosis     Palpitation     Restless leg syndrome     Shingles     Sinusitis        SURGICAL HISTORY           Procedure Laterality Date    ANKLE SURGERY      left ankle surgery    APPENDECTOMY      CARDIOVERSION      COLONOSCOPY  10/25/10    normal dr Tobin Blum    COLONOSCOPY  2018    polyps dr Isabell Radford, no repeat needed. no malignancy    ELBOW SURGERY      HYSTERECTOMY      JOINT REPLACEMENT      right elbow    OVARY REMOVAL Left     UPPER GASTROINTESTINAL ENDOSCOPY  2018    normal dr Kowalski Letters     [unfilled]    ALLERGIES     Gluten meal; Demerol; Lidocaine hcl; Other; Procaine; Tetanus toxoid, adsorbed; Vicodin [hydrocodone-acetaminophen]; Augmentin [amoxicillin-pot clavulanate]; Hydrocodone-acetaminophen; Meperidine; Prednisone; and Tetanus toxoids    FAMILY HISTORY           Problem Relation Age of Onset    Cancer Mother     Stroke Father     Arthritis Other     Asthma Other     Diabetes Other     Heart Disease Other      Family Status   Relation Name Status    Mother   at age 80        cancer    Father   at age 80        stroke    Other  (Not Specified)    Other  (Not Specified)    Other  (Not Specified)    Other  (Not Specified)        SOCIAL HISTORY      reports that she quit smoking about 49 years ago. She has a 5.00 pack-year smoking history. She has never used smokeless tobacco. She reports that she does not drink alcohol or use drugs. PHYSICAL EXAM    (up to 7 for level 4, 8 or more for level 5)     ED Triage Vitals   Enc Vitals Group      BP       Pulse       Resp       Temp       Temp src       SpO2       Weight       Height       Head Circumference       Peak Flow       Pain Score       Pain Loc       Pain Edu? Excl. in [address]? Physical Exam   Constitutional: She appears well-developed and well-nourished. HENT:   Head: Normocephalic.    Nose: Nose normal.   Mouth/Throat: Oropharynx is clear and

## 2019-09-03 NOTE — H&P
cooperative. HEENT:  Normal cephalic, atraumatic without obvious deformity. Pupils equal, round, and reactive to light. Extra ocular muscles intact. Conjunctivae/corneas clear. Neck: Supple, with full range of motion. No jugular venous distention. Trachea midline. Respiratory: Diffuse rhonchi bilaterally with scattered expiratory wheezing  Cardiovascular:  Regular rate and rhythm with normal S1/S2 without murmurs, rubs or gallops. Abdomen: Soft, non-tender, non-distended with normal bowel sounds. Musculoskeletal:  No clubbing, cyanosis or edema bilaterally. Full range of motion without deformity. Skin: Skin color, texture, turgor normal.  No rashes or lesions. Neurologic:  Neurovascularly intact without any focal sensory/motor deficits. Cranial nerves: II-XII intact, grossly non-focal.  Psychiatric:  Alert and oriented, thought content appropriate, normal insight  Capillary Refill: Brisk,< 3 seconds   Peripheral Pulses: +2 palpable, equal bilaterally       Labs:     Recent Labs     09/02/19 2041   WBC 10.0   HGB 10.0*   HCT 30.9*        Recent Labs     09/02/19 2041   *   K 3.3*   CL 96*   CO2 26   BUN 22*   CREATININE 0.6   CALCIUM 8.8     Recent Labs     09/02/19 2041   AST 21   ALT 11   BILITOT 1.0   ALKPHOS 109     No results for input(s): INR in the last 72 hours. No results for input(s): Lloyd Mano in the last 72 hours. Urinalysis:      Lab Results   Component Value Date    NITRU Negative 09/02/2019    WBCUA 1 09/02/2019    RBCUA 13 09/02/2019    BLOODU Negative 09/02/2019    SPECGRAV 1.010 09/02/2019    GLUCOSEU Negative 09/02/2019       Radiology:     inDependently reviewed by me  XR CHEST PORTABLE   Preliminary Result   Cardiomegaly without acute process.              ASSESSMENT:    Early pneumonia  Acute hypoxic respiratory failure  Asthma  Hypertension  Paroxysmal A. fib    PLAN:    Viral studies ordered  Check procalcitonin level  Levaquin IV  Continue oxygen

## 2019-09-04 LAB
ANION GAP SERPL CALCULATED.3IONS-SCNC: 13 MMOL/L (ref 3–16)
BASOPHILS ABSOLUTE: 0.1 K/UL (ref 0–0.2)
BASOPHILS RELATIVE PERCENT: 1 %
BUN BLDV-MCNC: 25 MG/DL (ref 7–20)
CALCIUM SERPL-MCNC: 8.4 MG/DL (ref 8.3–10.6)
CHLORIDE BLD-SCNC: 96 MMOL/L (ref 99–110)
CO2: 27 MMOL/L (ref 21–32)
CREAT SERPL-MCNC: 0.8 MG/DL (ref 0.6–1.2)
EOSINOPHILS ABSOLUTE: 0 K/UL (ref 0–0.6)
EOSINOPHILS RELATIVE PERCENT: 0 %
GFR AFRICAN AMERICAN: >60
GFR NON-AFRICAN AMERICAN: >60
GLUCOSE BLD-MCNC: 133 MG/DL (ref 70–99)
HCT VFR BLD CALC: 28.7 % (ref 36–48)
HEMOGLOBIN: 9.4 G/DL (ref 12–16)
LYMPHOCYTES ABSOLUTE: 1.3 K/UL (ref 1–5.1)
LYMPHOCYTES RELATIVE PERCENT: 14.4 %
MCH RBC QN AUTO: 25.6 PG (ref 26–34)
MCHC RBC AUTO-ENTMCNC: 32.9 G/DL (ref 31–36)
MCV RBC AUTO: 77.7 FL (ref 80–100)
MONOCYTES ABSOLUTE: 0.9 K/UL (ref 0–1.3)
MONOCYTES RELATIVE PERCENT: 10.3 %
NEUTROPHILS ABSOLUTE: 6.5 K/UL (ref 1.7–7.7)
NEUTROPHILS RELATIVE PERCENT: 74.3 %
ORGANISM: ABNORMAL
ORGANISM: ABNORMAL
PDW BLD-RTO: 19.6 % (ref 12.4–15.4)
PLATELET # BLD: 204 K/UL (ref 135–450)
PMV BLD AUTO: 8.2 FL (ref 5–10.5)
POTASSIUM SERPL-SCNC: 3.7 MMOL/L (ref 3.5–5.1)
RBC # BLD: 3.69 M/UL (ref 4–5.2)
SODIUM BLD-SCNC: 136 MMOL/L (ref 136–145)
URINE CULTURE, ROUTINE: ABNORMAL
URINE CULTURE, ROUTINE: ABNORMAL
WBC # BLD: 8.8 K/UL (ref 4–11)

## 2019-09-04 PROCEDURE — 6370000000 HC RX 637 (ALT 250 FOR IP): Performed by: INTERNAL MEDICINE

## 2019-09-04 PROCEDURE — 6360000002 HC RX W HCPCS: Performed by: INTERNAL MEDICINE

## 2019-09-04 PROCEDURE — 36415 COLL VENOUS BLD VENIPUNCTURE: CPT

## 2019-09-04 PROCEDURE — 2700000000 HC OXYGEN THERAPY PER DAY

## 2019-09-04 PROCEDURE — 1200000000 HC SEMI PRIVATE

## 2019-09-04 PROCEDURE — 2580000003 HC RX 258: Performed by: INTERNAL MEDICINE

## 2019-09-04 PROCEDURE — 85025 COMPLETE CBC W/AUTO DIFF WBC: CPT

## 2019-09-04 PROCEDURE — 94640 AIRWAY INHALATION TREATMENT: CPT

## 2019-09-04 PROCEDURE — 94760 N-INVAS EAR/PLS OXIMETRY 1: CPT

## 2019-09-04 PROCEDURE — 80048 BASIC METABOLIC PNL TOTAL CA: CPT

## 2019-09-04 RX ORDER — FUROSEMIDE 10 MG/ML
60 INJECTION INTRAMUSCULAR; INTRAVENOUS ONCE
Status: COMPLETED | OUTPATIENT
Start: 2019-09-04 | End: 2019-09-04

## 2019-09-04 RX ADMIN — GUAIFENESIN AND DEXTROMETHORPHAN 5 ML: 100; 10 SYRUP ORAL at 10:21

## 2019-09-04 RX ADMIN — LEVOTHYROXINE SODIUM 88 MCG: 88 TABLET ORAL at 06:56

## 2019-09-04 RX ADMIN — DIVALPROEX SODIUM 250 MG: 125 CAPSULE ORAL at 08:56

## 2019-09-04 RX ADMIN — METOPROLOL TARTRATE 50 MG: 50 TABLET ORAL at 20:00

## 2019-09-04 RX ADMIN — SODIUM CHLORIDE, PRESERVATIVE FREE 10 ML: 5 INJECTION INTRAVENOUS at 20:01

## 2019-09-04 RX ADMIN — PREDNISONE 20 MG: 20 TABLET ORAL at 08:56

## 2019-09-04 RX ADMIN — FUROSEMIDE 60 MG: 10 INJECTION, SOLUTION INTRAMUSCULAR; INTRAVENOUS at 12:26

## 2019-09-04 RX ADMIN — GUAIFENESIN AND DEXTROMETHORPHAN 5 ML: 100; 10 SYRUP ORAL at 22:59

## 2019-09-04 RX ADMIN — IPRATROPIUM BROMIDE AND ALBUTEROL SULFATE 1 AMPULE: .5; 3 SOLUTION RESPIRATORY (INHALATION) at 13:18

## 2019-09-04 RX ADMIN — DONEPEZIL HYDROCHLORIDE 10 MG: 10 TABLET, FILM COATED ORAL at 19:59

## 2019-09-04 RX ADMIN — TRAZODONE HYDROCHLORIDE 25 MG: 50 TABLET ORAL at 19:59

## 2019-09-04 RX ADMIN — METOPROLOL TARTRATE 50 MG: 50 TABLET ORAL at 08:56

## 2019-09-04 RX ADMIN — APIXABAN 5 MG: 5 TABLET, FILM COATED ORAL at 19:59

## 2019-09-04 RX ADMIN — DILTIAZEM HYDROCHLORIDE 120 MG: 120 CAPSULE, COATED, EXTENDED RELEASE ORAL at 08:56

## 2019-09-04 RX ADMIN — PANTOPRAZOLE SODIUM 40 MG: 40 TABLET, DELAYED RELEASE ORAL at 06:56

## 2019-09-04 RX ADMIN — SODIUM CHLORIDE, PRESERVATIVE FREE 10 ML: 5 INJECTION INTRAVENOUS at 08:57

## 2019-09-04 RX ADMIN — GUAIFENESIN AND DEXTROMETHORPHAN 5 ML: 100; 10 SYRUP ORAL at 18:41

## 2019-09-04 RX ADMIN — DULOXETINE HYDROCHLORIDE 40 MG: 20 CAPSULE, DELAYED RELEASE ORAL at 08:56

## 2019-09-04 RX ADMIN — DIVALPROEX SODIUM 250 MG: 125 CAPSULE ORAL at 19:59

## 2019-09-04 RX ADMIN — ROPINIROLE HYDROCHLORIDE 2 MG: 1 TABLET, FILM COATED ORAL at 19:59

## 2019-09-04 RX ADMIN — MOMETASONE FUROATE AND FORMOTEROL FUMARATE DIHYDRATE 2 PUFF: 100; 5 AEROSOL RESPIRATORY (INHALATION) at 20:17

## 2019-09-04 RX ADMIN — IPRATROPIUM BROMIDE AND ALBUTEROL SULFATE 1 AMPULE: .5; 3 SOLUTION RESPIRATORY (INHALATION) at 20:17

## 2019-09-04 RX ADMIN — LEVOFLOXACIN 500 MG: 5 INJECTION, SOLUTION INTRAVENOUS at 02:38

## 2019-09-04 RX ADMIN — APIXABAN 5 MG: 5 TABLET, FILM COATED ORAL at 08:56

## 2019-09-04 RX ADMIN — FUROSEMIDE 40 MG: 10 INJECTION, SOLUTION INTRAMUSCULAR; INTRAVENOUS at 08:56

## 2019-09-04 RX ADMIN — MAGNESIUM HYDROXIDE 30 ML: 400 SUSPENSION ORAL at 23:01

## 2019-09-04 RX ADMIN — IPRATROPIUM BROMIDE AND ALBUTEROL SULFATE 1 AMPULE: .5; 3 SOLUTION RESPIRATORY (INHALATION) at 16:06

## 2019-09-04 NOTE — PROGRESS NOTES
rOPINIRole (REQUIP) tablet 2 mg  2 mg Oral Nightly Tessy David MD   2 mg at 09/03/19 2046    mometasone-formoterol (DULERA) 100-5 MCG/ACT inhaler 2 puff  2 puff Inhalation BID Tessy David MD   2 puff at 09/03/19 2025    traZODone (DESYREL) tablet 25 mg  25 mg Oral Nightly Tessy David MD   25 mg at 09/03/19 2046    sodium chloride flush 0.9 % injection 10 mL  10 mL Intravenous 2 times per day Tessy David MD   10 mL at 09/04/19 0857    sodium chloride flush 0.9 % injection 10 mL  10 mL Intravenous PRN Tessy David MD        magnesium hydroxide (MILK OF MAGNESIA) 400 MG/5ML suspension 30 mL  30 mL Oral Daily PRN Tessy David MD   30 mL at 09/03/19 1545    ondansetron (ZOFRAN) injection 4 mg  4 mg Intravenous Q6H PRN Tessy David MD        ipratropium-albuterol (DUONEB) nebulizer solution 1 ampule  1 ampule Inhalation Q4H WA Tessy David MD   1 ampule at 09/03/19 2025    acetaminophen (TYLENOL) tablet 650 mg  650 mg Oral Q4H PRN Tessy David MD        levofloxacin (LEVAQUIN) 500 MG/100ML infusion 500 mg  500 mg Intravenous Q24H Tessy David MD   Stopped at 09/04/19 0338    guaiFENesin-dextromethorphan (ROBITUSSIN DM) 100-10 MG/5ML syrup 5 mL  5 mL Oral Q4H PRN Andrez Dubon MD   5 mL at 09/04/19 1021    furosemide (LASIX) injection 40 mg  40 mg Intravenous Daily Andrez Dubon MD   40 mg at 09/04/19 0856    predniSONE (DELTASONE) tablet 20 mg  20 mg Oral Daily Andrez Dubon MD   20 mg at 09/04/19 0856    Followed by   Laqueta Show ON 9/6/2019] predniSONE (DELTASONE) tablet 15 mg  15 mg Oral Daily Andrez Dubon MD        Followed by   Laqueta Show ON 9/9/2019] predniSONE (DELTASONE) tablet 10 mg  10 mg Oral Daily Andrez Duobn MD        Followed by   Laqueta Show ON 9/12/2019] predniSONE (DELTASONE) tablet 5 mg  5 mg Oral Daily Andrez Dubon MD               Labs:  CBC with Differential:    Lab Results   Component Value Date    WBC 8.8 09/04/2019    RBC 3.69 09/04/2019    HGB 9.4 GLUCOSEU Negative 09/02/2019     ABG:  No results found for: PHART, ZUH0JBS, PO2ART, LPL5DYS, BEART, THGBART, KPN3NJR, L1PXUUMI  FLP:    Lab Results   Component Value Date    TRIG 74 02/29/2016    HDL 42 02/29/2016    HDL 44 04/27/2012    LDLCALC 71 02/29/2016    LABVLDL 15 02/29/2016     TSH:    Lab Results   Component Value Date    TSH 2.88 03/04/2019      DATA:   ECG: Sinus Rhythm       ASSESSMENT:   1  Bronchitis,  Possible  Pneumonia  With  bronchospsm.   Clinically  Much  Better    2  Acute    Systolic  Heart  Failure  Last  Echo    2018\  Ef  45-50%  Will  Give  extar  Lasix  60  Mg  Once  Now     3  Atrial  Fib  Rate  Well  Controlled  On  eliquis  AC    4  Dementia      5 htn  Well  Controlled  PLAN    lasix  60  Mg  Iv  Now

## 2019-09-04 NOTE — PLAN OF CARE
Problem: Falls - Risk of:  Goal: Will remain free from falls  Description  Will remain free from falls  9/4/2019 1044 by Pepito Rojas RN  Outcome: Ongoing  9/4/2019 0547 by Jenny Morgan RN  Outcome: Ongoing  Goal: Absence of physical injury  Description  Absence of physical injury  9/4/2019 1044 by Pepito Rojas RN  Outcome: Ongoing  9/4/2019 0547 by Jenny Morgan RN  Outcome: Met This Shift     Problem: Risk for Impaired Skin Integrity  Goal: Tissue integrity - skin and mucous membranes  Description  Structural intactness and normal physiological function of skin and  mucous membranes.   9/4/2019 1044 by Pepito Rojas RN  Outcome: Ongoing  9/4/2019 0547 by Jenny Morgan RN  Outcome: Ongoing

## 2019-09-05 LAB
ANION GAP SERPL CALCULATED.3IONS-SCNC: 16 MMOL/L (ref 3–16)
BASOPHILS ABSOLUTE: 0.1 K/UL (ref 0–0.2)
BASOPHILS RELATIVE PERCENT: 1.1 %
BUN BLDV-MCNC: 32 MG/DL (ref 7–20)
CALCIUM SERPL-MCNC: 8.4 MG/DL (ref 8.3–10.6)
CHLORIDE BLD-SCNC: 95 MMOL/L (ref 99–110)
CO2: 29 MMOL/L (ref 21–32)
CREAT SERPL-MCNC: 0.8 MG/DL (ref 0.6–1.2)
EOSINOPHILS ABSOLUTE: 0 K/UL (ref 0–0.6)
EOSINOPHILS RELATIVE PERCENT: 0.1 %
GFR AFRICAN AMERICAN: >60
GFR NON-AFRICAN AMERICAN: >60
GLUCOSE BLD-MCNC: 83 MG/DL (ref 70–99)
HCT VFR BLD CALC: 29.3 % (ref 36–48)
HEMOGLOBIN: 9.4 G/DL (ref 12–16)
LYMPHOCYTES ABSOLUTE: 1.3 K/UL (ref 1–5.1)
LYMPHOCYTES RELATIVE PERCENT: 15.7 %
MCH RBC QN AUTO: 25.7 PG (ref 26–34)
MCHC RBC AUTO-ENTMCNC: 32.1 G/DL (ref 31–36)
MCV RBC AUTO: 80.1 FL (ref 80–100)
MONOCYTES ABSOLUTE: 1.2 K/UL (ref 0–1.3)
MONOCYTES RELATIVE PERCENT: 14.4 %
NEUTROPHILS ABSOLUTE: 5.8 K/UL (ref 1.7–7.7)
NEUTROPHILS RELATIVE PERCENT: 68.7 %
PDW BLD-RTO: 19.2 % (ref 12.4–15.4)
PLATELET # BLD: 201 K/UL (ref 135–450)
PMV BLD AUTO: 8.4 FL (ref 5–10.5)
POTASSIUM SERPL-SCNC: 3.4 MMOL/L (ref 3.5–5.1)
RBC # BLD: 3.65 M/UL (ref 4–5.2)
SODIUM BLD-SCNC: 140 MMOL/L (ref 136–145)
WBC # BLD: 8.5 K/UL (ref 4–11)

## 2019-09-05 PROCEDURE — 94760 N-INVAS EAR/PLS OXIMETRY 1: CPT

## 2019-09-05 PROCEDURE — 94640 AIRWAY INHALATION TREATMENT: CPT

## 2019-09-05 PROCEDURE — 97162 PT EVAL MOD COMPLEX 30 MIN: CPT

## 2019-09-05 PROCEDURE — 94761 N-INVAS EAR/PLS OXIMETRY MLT: CPT

## 2019-09-05 PROCEDURE — 6360000002 HC RX W HCPCS: Performed by: INTERNAL MEDICINE

## 2019-09-05 PROCEDURE — 97167 OT EVAL HIGH COMPLEX 60 MIN: CPT | Performed by: PHYSICIAN ASSISTANT

## 2019-09-05 PROCEDURE — 97530 THERAPEUTIC ACTIVITIES: CPT | Performed by: PHYSICIAN ASSISTANT

## 2019-09-05 PROCEDURE — 97535 SELF CARE MNGMENT TRAINING: CPT | Performed by: PHYSICIAN ASSISTANT

## 2019-09-05 PROCEDURE — 36415 COLL VENOUS BLD VENIPUNCTURE: CPT

## 2019-09-05 PROCEDURE — 97116 GAIT TRAINING THERAPY: CPT

## 2019-09-05 PROCEDURE — 6370000000 HC RX 637 (ALT 250 FOR IP): Performed by: INTERNAL MEDICINE

## 2019-09-05 PROCEDURE — 80048 BASIC METABOLIC PNL TOTAL CA: CPT

## 2019-09-05 PROCEDURE — 2580000003 HC RX 258: Performed by: INTERNAL MEDICINE

## 2019-09-05 PROCEDURE — 1200000000 HC SEMI PRIVATE

## 2019-09-05 PROCEDURE — 85025 COMPLETE CBC W/AUTO DIFF WBC: CPT

## 2019-09-05 RX ORDER — POTASSIUM CHLORIDE 750 MG/1
40 TABLET, FILM COATED, EXTENDED RELEASE ORAL ONCE
Status: COMPLETED | OUTPATIENT
Start: 2019-09-05 | End: 2019-09-05

## 2019-09-05 RX ORDER — FUROSEMIDE 10 MG/ML
40 INJECTION INTRAMUSCULAR; INTRAVENOUS 2 TIMES DAILY
Status: DISCONTINUED | OUTPATIENT
Start: 2019-09-05 | End: 2019-09-07

## 2019-09-05 RX ORDER — METOLAZONE 5 MG/1
5 TABLET ORAL DAILY
Status: DISCONTINUED | OUTPATIENT
Start: 2019-09-05 | End: 2019-09-07

## 2019-09-05 RX ORDER — LEVOFLOXACIN 5 MG/ML
250 INJECTION, SOLUTION INTRAVENOUS EVERY 24 HOURS
Status: DISCONTINUED | OUTPATIENT
Start: 2019-09-06 | End: 2019-09-10

## 2019-09-05 RX ADMIN — LEVOTHYROXINE SODIUM 88 MCG: 88 TABLET ORAL at 05:46

## 2019-09-05 RX ADMIN — METOPROLOL TARTRATE 50 MG: 50 TABLET ORAL at 20:51

## 2019-09-05 RX ADMIN — ROPINIROLE HYDROCHLORIDE 2 MG: 1 TABLET, FILM COATED ORAL at 20:51

## 2019-09-05 RX ADMIN — IPRATROPIUM BROMIDE AND ALBUTEROL SULFATE 1 AMPULE: .5; 3 SOLUTION RESPIRATORY (INHALATION) at 20:24

## 2019-09-05 RX ADMIN — FUROSEMIDE 40 MG: 10 INJECTION, SOLUTION INTRAMUSCULAR; INTRAVENOUS at 08:39

## 2019-09-05 RX ADMIN — DULOXETINE HYDROCHLORIDE 40 MG: 20 CAPSULE, DELAYED RELEASE ORAL at 08:39

## 2019-09-05 RX ADMIN — APIXABAN 5 MG: 5 TABLET, FILM COATED ORAL at 20:51

## 2019-09-05 RX ADMIN — DILTIAZEM HYDROCHLORIDE 120 MG: 120 CAPSULE, COATED, EXTENDED RELEASE ORAL at 08:39

## 2019-09-05 RX ADMIN — MOMETASONE FUROATE AND FORMOTEROL FUMARATE DIHYDRATE 2 PUFF: 100; 5 AEROSOL RESPIRATORY (INHALATION) at 07:43

## 2019-09-05 RX ADMIN — IPRATROPIUM BROMIDE AND ALBUTEROL SULFATE 1 AMPULE: .5; 3 SOLUTION RESPIRATORY (INHALATION) at 07:43

## 2019-09-05 RX ADMIN — METOLAZONE 5 MG: 5 TABLET ORAL at 13:54

## 2019-09-05 RX ADMIN — SODIUM CHLORIDE, PRESERVATIVE FREE 10 ML: 5 INJECTION INTRAVENOUS at 20:55

## 2019-09-05 RX ADMIN — DIVALPROEX SODIUM 250 MG: 125 CAPSULE ORAL at 08:39

## 2019-09-05 RX ADMIN — APIXABAN 5 MG: 5 TABLET, FILM COATED ORAL at 08:39

## 2019-09-05 RX ADMIN — DIVALPROEX SODIUM 250 MG: 125 CAPSULE ORAL at 20:51

## 2019-09-05 RX ADMIN — POTASSIUM CHLORIDE 40 MEQ: 750 TABLET, EXTENDED RELEASE ORAL at 13:53

## 2019-09-05 RX ADMIN — DONEPEZIL HYDROCHLORIDE 10 MG: 10 TABLET, FILM COATED ORAL at 20:51

## 2019-09-05 RX ADMIN — FUROSEMIDE 40 MG: 10 INJECTION, SOLUTION INTRAMUSCULAR; INTRAVENOUS at 17:25

## 2019-09-05 RX ADMIN — GUAIFENESIN AND DEXTROMETHORPHAN 5 ML: 100; 10 SYRUP ORAL at 13:54

## 2019-09-05 RX ADMIN — PANTOPRAZOLE SODIUM 40 MG: 40 TABLET, DELAYED RELEASE ORAL at 05:46

## 2019-09-05 RX ADMIN — PREDNISONE 20 MG: 20 TABLET ORAL at 08:39

## 2019-09-05 RX ADMIN — SODIUM CHLORIDE, PRESERVATIVE FREE 10 ML: 5 INJECTION INTRAVENOUS at 08:40

## 2019-09-05 RX ADMIN — LEVOFLOXACIN 500 MG: 5 INJECTION, SOLUTION INTRAVENOUS at 02:43

## 2019-09-05 RX ADMIN — IPRATROPIUM BROMIDE AND ALBUTEROL SULFATE 1 AMPULE: .5; 3 SOLUTION RESPIRATORY (INHALATION) at 11:57

## 2019-09-05 RX ADMIN — IPRATROPIUM BROMIDE AND ALBUTEROL SULFATE 1 AMPULE: .5; 3 SOLUTION RESPIRATORY (INHALATION) at 16:15

## 2019-09-05 RX ADMIN — MOMETASONE FUROATE AND FORMOTEROL FUMARATE DIHYDRATE 2 PUFF: 100; 5 AEROSOL RESPIRATORY (INHALATION) at 20:24

## 2019-09-05 RX ADMIN — TRAZODONE HYDROCHLORIDE 25 MG: 50 TABLET ORAL at 20:51

## 2019-09-05 RX ADMIN — METOPROLOL TARTRATE 50 MG: 50 TABLET ORAL at 08:39

## 2019-09-05 ASSESSMENT — PAIN SCALES - GENERAL
PAINLEVEL_OUTOF10: 0
PAINLEVEL_OUTOF10: 0

## 2019-09-05 NOTE — PROGRESS NOTES
Notified by José Hyman that pt's HR went up to 140's, non-sustaining. Went into pt's room, VSS, HR low 100's, upper-90's, apically irregular. pt had just returned to bed from restroom. This RN went ahead and administered scheduled metoprolol, along with other scheduled meds. Will continue to monitor.

## 2019-09-05 NOTE — CARE COORDINATION
SW spoke with Rony Prieto ([phone]) at [facility]. They are able to accept patient for SNF if deemed appropriate by PT/OT. Will need [employer] precert. Goal is for patient to go to SNF for rehab prior to transitioning to their assisted living/memory care unit. Electronically signed by ALEXA Johns on 9/5/2019 at 10:46 AM      VM left with Rony Prieto at [address] of PT [facility]lamide and requested precert be started.     Electronically signed by ALEXA Johns on 9/5/2019 at 3:56 PM

## 2019-09-05 NOTE — PROGRESS NOTES
Physical Therapy    Facility/Department: [address] MED SURG  Initial Assessment  If pt. is D/C'd prior to next visit please refer back to last daily progress note for D/C status. NAME: Sandra Hernandez  : 3/2/1929  MRN: [id number]    Date of Service: 2019    Discharge Recommendations:  Patient would benefit from continued therapy after discharge, 3-5 sessions per week   Sandra Hernandez scored a 17/24 on the AM-PAC short mobility form. Current research shows that an AM-PAC score of 17 or less is typically not associated with a discharge to the patient's home setting. Based on the patients AM-PAC score and their current functional mobility deficits, it is recommended that the patient have 3-5 sessions per week of Physical Therapy at d/c to increase the patients independence. PT Equipment Recommendations  Other: will monitor, the pt reports she has a cane and a walker    Assessment   Assessment: The pt is a 81 yo female who came to the ED with c/o of SOB associated with a cough and congestion and mild swelling of her legs. The pt admitted with early pna and acute hypoxic resp failure. The pt is a poor historian but reports she lives alone and has a dgt and a gr-son who assist her as needed. The pt reports she normally does not use a device for walking but has a cane and a walker if needed. The pt reports she is indep in self-care and gets asisst for cleaning. PMhx: a-fib, arth L hip, asthma, CHF, dementia, insomnia, lmb radiculopathy, osteoporosis, RLS, shingles, L ankle sx, R elbow replacement     Today, the pt demonstrated that she needed at least min A for transfers from lower surfaces, CGA for ambulation with a wheeled walker, she had no LOB but did need seated rest breaks due to becoming SOB with activity. The pt is not functioning at her baseline and would be unsafe to be home alone at this time.  The pt would benefit from con't skilled PT services for strengthening and progression of her mobility

## 2019-09-06 LAB
ANION GAP SERPL CALCULATED.3IONS-SCNC: 17 MMOL/L (ref 3–16)
BASOPHILS ABSOLUTE: 0 K/UL (ref 0–0.2)
BASOPHILS RELATIVE PERCENT: 0.2 %
BUN BLDV-MCNC: 30 MG/DL (ref 7–20)
CALCIUM SERPL-MCNC: 8.6 MG/DL (ref 8.3–10.6)
CHLORIDE BLD-SCNC: 92 MMOL/L (ref 99–110)
CO2: 30 MMOL/L (ref 21–32)
CREAT SERPL-MCNC: 0.8 MG/DL (ref 0.6–1.2)
EOSINOPHILS ABSOLUTE: 0 K/UL (ref 0–0.6)
EOSINOPHILS RELATIVE PERCENT: 0.2 %
GFR AFRICAN AMERICAN: >60
GFR NON-AFRICAN AMERICAN: >60
GLUCOSE BLD-MCNC: 93 MG/DL (ref 70–99)
HCT VFR BLD CALC: 30.1 % (ref 36–48)
HEMOGLOBIN: 9.7 G/DL (ref 12–16)
LYMPHOCYTES ABSOLUTE: 1.6 K/UL (ref 1–5.1)
LYMPHOCYTES RELATIVE PERCENT: 20.2 %
MCH RBC QN AUTO: 25.4 PG (ref 26–34)
MCHC RBC AUTO-ENTMCNC: 32.2 G/DL (ref 31–36)
MCV RBC AUTO: 78.8 FL (ref 80–100)
MONOCYTES ABSOLUTE: 1.2 K/UL (ref 0–1.3)
MONOCYTES RELATIVE PERCENT: 16.1 %
NEUTROPHILS ABSOLUTE: 4.9 K/UL (ref 1.7–7.7)
NEUTROPHILS RELATIVE PERCENT: 63.3 %
PDW BLD-RTO: 19.5 % (ref 12.4–15.4)
PLATELET # BLD: 213 K/UL (ref 135–450)
PMV BLD AUTO: 8.6 FL (ref 5–10.5)
POTASSIUM SERPL-SCNC: 3.4 MMOL/L (ref 3.5–5.1)
RBC # BLD: 3.81 M/UL (ref 4–5.2)
SODIUM BLD-SCNC: 139 MMOL/L (ref 136–145)
WBC # BLD: 7.7 K/UL (ref 4–11)

## 2019-09-06 PROCEDURE — 94640 AIRWAY INHALATION TREATMENT: CPT

## 2019-09-06 PROCEDURE — 1200000000 HC SEMI PRIVATE

## 2019-09-06 PROCEDURE — 6360000002 HC RX W HCPCS: Performed by: INTERNAL MEDICINE

## 2019-09-06 PROCEDURE — 94761 N-INVAS EAR/PLS OXIMETRY MLT: CPT

## 2019-09-06 PROCEDURE — 97535 SELF CARE MNGMENT TRAINING: CPT

## 2019-09-06 PROCEDURE — 6370000000 HC RX 637 (ALT 250 FOR IP): Performed by: INTERNAL MEDICINE

## 2019-09-06 PROCEDURE — 85025 COMPLETE CBC W/AUTO DIFF WBC: CPT

## 2019-09-06 PROCEDURE — 80048 BASIC METABOLIC PNL TOTAL CA: CPT

## 2019-09-06 PROCEDURE — 97530 THERAPEUTIC ACTIVITIES: CPT

## 2019-09-06 PROCEDURE — 2580000003 HC RX 258: Performed by: INTERNAL MEDICINE

## 2019-09-06 PROCEDURE — 97116 GAIT TRAINING THERAPY: CPT

## 2019-09-06 PROCEDURE — 36415 COLL VENOUS BLD VENIPUNCTURE: CPT

## 2019-09-06 RX ORDER — PREDNISONE 1 MG/1
5 TABLET ORAL DAILY
Qty: 10 TABLET | Refills: 0 | DISCHARGE
Start: 2019-09-12 | End: 2019-09-12 | Stop reason: HOSPADM

## 2019-09-06 RX ORDER — POTASSIUM CHLORIDE 750 MG/1
10 TABLET, EXTENDED RELEASE ORAL DAILY
Qty: 30 TABLET | Refills: 0 | DISCHARGE
Start: 2019-09-06 | End: 2019-09-12 | Stop reason: HOSPADM

## 2019-09-06 RX ORDER — METOLAZONE 5 MG/1
5 TABLET ORAL DAILY
DISCHARGE
Start: 2019-09-07 | End: 2019-09-12 | Stop reason: HOSPADM

## 2019-09-06 RX ORDER — PREDNISONE 10 MG/1
10 TABLET ORAL DAILY
Qty: 10 TABLET | Refills: 0 | DISCHARGE
Start: 2019-09-09 | End: 2019-09-12 | Stop reason: HOSPADM

## 2019-09-06 RX ORDER — HALOPERIDOL 5 MG/ML
5 INJECTION INTRAMUSCULAR ONCE
Status: COMPLETED | OUTPATIENT
Start: 2019-09-06 | End: 2019-09-06

## 2019-09-06 RX ORDER — LEVOFLOXACIN 250 MG/1
250 TABLET ORAL DAILY
Qty: 6 TABLET | Refills: 0 | DISCHARGE
Start: 2019-09-06 | End: 2019-09-12 | Stop reason: HOSPADM

## 2019-09-06 RX ORDER — FUROSEMIDE 40 MG/1
40 TABLET ORAL DAILY
Qty: 60 TABLET | Refills: 3 | DISCHARGE
Start: 2019-09-06 | End: 2019-11-04 | Stop reason: SDUPTHER

## 2019-09-06 RX ORDER — POTASSIUM CHLORIDE 750 MG/1
40 TABLET, FILM COATED, EXTENDED RELEASE ORAL ONCE
Status: COMPLETED | OUTPATIENT
Start: 2019-09-06 | End: 2019-09-06

## 2019-09-06 RX ORDER — PREDNISONE 1 MG/1
15 TABLET ORAL DAILY
Qty: 30 TABLET | Refills: 0 | DISCHARGE
Start: 2019-09-07 | End: 2019-09-12 | Stop reason: HOSPADM

## 2019-09-06 RX ADMIN — HALOPERIDOL LACTATE 5 MG: 5 INJECTION INTRAMUSCULAR at 02:20

## 2019-09-06 RX ADMIN — METOPROLOL TARTRATE 50 MG: 50 TABLET ORAL at 08:45

## 2019-09-06 RX ADMIN — IPRATROPIUM BROMIDE AND ALBUTEROL SULFATE 1 AMPULE: .5; 3 SOLUTION RESPIRATORY (INHALATION) at 16:44

## 2019-09-06 RX ADMIN — METOLAZONE 5 MG: 5 TABLET ORAL at 08:45

## 2019-09-06 RX ADMIN — ROPINIROLE HYDROCHLORIDE 2 MG: 1 TABLET, FILM COATED ORAL at 20:51

## 2019-09-06 RX ADMIN — DILTIAZEM HYDROCHLORIDE 120 MG: 120 CAPSULE, COATED, EXTENDED RELEASE ORAL at 08:45

## 2019-09-06 RX ADMIN — GUAIFENESIN AND DEXTROMETHORPHAN 5 ML: 100; 10 SYRUP ORAL at 05:26

## 2019-09-06 RX ADMIN — PANTOPRAZOLE SODIUM 40 MG: 40 TABLET, DELAYED RELEASE ORAL at 05:09

## 2019-09-06 RX ADMIN — PREDNISONE 15 MG: 5 TABLET ORAL at 08:44

## 2019-09-06 RX ADMIN — LEVOFLOXACIN 250 MG: 5 INJECTION, SOLUTION INTRAVENOUS at 01:41

## 2019-09-06 RX ADMIN — APIXABAN 5 MG: 5 TABLET, FILM COATED ORAL at 20:51

## 2019-09-06 RX ADMIN — LEVOTHYROXINE SODIUM 88 MCG: 88 TABLET ORAL at 05:09

## 2019-09-06 RX ADMIN — APIXABAN 5 MG: 5 TABLET, FILM COATED ORAL at 08:45

## 2019-09-06 RX ADMIN — TRAZODONE HYDROCHLORIDE 25 MG: 50 TABLET ORAL at 20:51

## 2019-09-06 RX ADMIN — POTASSIUM CHLORIDE 40 MEQ: 750 TABLET, EXTENDED RELEASE ORAL at 11:47

## 2019-09-06 RX ADMIN — IPRATROPIUM BROMIDE AND ALBUTEROL SULFATE 1 AMPULE: .5; 3 SOLUTION RESPIRATORY (INHALATION) at 08:45

## 2019-09-06 RX ADMIN — SODIUM CHLORIDE, PRESERVATIVE FREE 10 ML: 5 INJECTION INTRAVENOUS at 08:46

## 2019-09-06 RX ADMIN — DIVALPROEX SODIUM 250 MG: 125 CAPSULE ORAL at 20:51

## 2019-09-06 RX ADMIN — FUROSEMIDE 40 MG: 10 INJECTION, SOLUTION INTRAMUSCULAR; INTRAVENOUS at 08:45

## 2019-09-06 RX ADMIN — SODIUM CHLORIDE, PRESERVATIVE FREE 10 ML: 5 INJECTION INTRAVENOUS at 20:53

## 2019-09-06 RX ADMIN — METOPROLOL TARTRATE 50 MG: 50 TABLET ORAL at 20:51

## 2019-09-06 RX ADMIN — MOMETASONE FUROATE AND FORMOTEROL FUMARATE DIHYDRATE 2 PUFF: 100; 5 AEROSOL RESPIRATORY (INHALATION) at 08:45

## 2019-09-06 RX ADMIN — IPRATROPIUM BROMIDE AND ALBUTEROL SULFATE 1 AMPULE: .5; 3 SOLUTION RESPIRATORY (INHALATION) at 12:43

## 2019-09-06 RX ADMIN — DULOXETINE HYDROCHLORIDE 40 MG: 20 CAPSULE, DELAYED RELEASE ORAL at 08:45

## 2019-09-06 RX ADMIN — DIVALPROEX SODIUM 250 MG: 125 CAPSULE ORAL at 08:44

## 2019-09-06 RX ADMIN — DONEPEZIL HYDROCHLORIDE 10 MG: 10 TABLET, FILM COATED ORAL at 20:51

## 2019-09-06 RX ADMIN — FUROSEMIDE 40 MG: 10 INJECTION, SOLUTION INTRAMUSCULAR; INTRAVENOUS at 17:36

## 2019-09-06 ASSESSMENT — PAIN SCALES - GENERAL
PAINLEVEL_OUTOF10: 0
PAINLEVEL_OUTOF10: 0

## 2019-09-06 NOTE — PROGRESS NOTES
Patient very fatigued. Attempted to administer morning medications with oatmeal, but patient too fatigued at this time. Patient resting and able to respond to voice and touch. Patient also ambulatory to bathroom this am. Respirations easy and VSS. Restraints removed overnight by night shift RN. x3 siderails and Telecam in place. Will continue to monitor.  Electronically signed by Anusha Cuadra RN on 9/6/2019 at 8:57 AM

## 2019-09-06 NOTE — PROGRESS NOTES
Physical Therapy    Daily Treatment Note    Patient Name: [facility] Record Number: [id number]  Room Number: V6R-1024/8052-26    ASSESSMENT: The pt is a 81 yo female who came to the ED with c/o of SOB associated with a cough and congestion and mild swelling of her legs. The pt admitted with early pna and acute hypoxic resp failure. The pt is a poor historian but reports she lives alone and has a dgt and a gr-son who assist her as needed. The pt reports she normally does not use a device for walking but has a cane and a walker if needed. The pt reports she is indep in self-care and gets asisst for cleaning. Today, the pt demonstrated that she needed as much as min A for transfers from lower surfaces, CGA for ambulation with a wheeled walker, she had no LOB but did need seated rest breaks due to fatigue with activity. The pt is not functioning at her baseline and would be unsafe to be home alone at this time. The pt would benefit from con't skilled PT services for strengthening and progression of her mobility back to her baseline. Will con't to follow. Discharge Recommendations: Patient would benefit from continued therapy after discharge, 3-5 sessions per week  Equipment Needs: Other: will monitor, the pt reports she has a cane and a walker    Admission Date: 9/2/2019  8:15 PM    Additional Pertinent Hx: Per H&P on 9-3-19 of Case Jean MD: The pt came to the ED with c/o of SOB associated wit a cough and congestion and mild swelling of her legs. The pt admitted with early pna and acute hypoxic resp failure.     PMhx: a-fib, arth L hip, asthma, CHF, dementia, insomnia, lmb radiculopathy, osteoporosis, RLS, shingles, L ankle sx, R elbow replacement,    Diagnosis: early pna and acute hypoxic resp failure  Restrictions/Precautions: Fall Risk Other position/activity restrictions: tele-sitter     PMHx:  has a past medical history of Abdominal wall pain, Arrhythmia, Arthritis, Asthma, Atrial fibrillation Individual Concurrent Group Co-treatment   Time In 8122            Time Out 1248          Minutes 20             Timed Code Treatment Minutes: 20 Minutes      Diana Sung, PT

## 2019-09-06 NOTE — PROGRESS NOTES
Occupational Therapy  Facility/Department: [address] MED SURG  Daily Treatment Note  Let this serve as discharge note if patient is discharged prior to next OT session  NAME: Gela Egan  : 3/2/1929  MRN: [id number]    Date of Service: 2019    Discharge Recommendations:  3-5 sessions per week    Warner Owens scored a 15/24 on the AM-PAC ADL Inpatient form. Current research shows that an AM-PAC score of 17 or less is typically not associated with a discharge to the patient's home setting. Based on the patients AM-PAC score and their current ADL deficits, it is recommended that the patient have 3-5 sessions per week of Occupational Therapy at d/c to increase the patients independence. OT Equipment Recommendations  Other: To be determined. Assessment   Performance deficits / Impairments: Decreased functional mobility ; Decreased high-level IADLs;Decreased ADL status; Decreased endurance;Decreased fine motor control;Decreased cognition;Decreased coordination;Decreased strength;Decreased balance;Decreased safe awareness  Assessment: Patient is limited by cognition and fatigue. Patient falling asleep easily throughout session. Patient is not aware of deficits. Patient requires increased assist with toileting tasks, increased cues for thoroughness with grooming tasks, and Min A for safe transfers/fxl mobility. Cont per POC. Treatment Diagnosis: Impaired ADLS  Prognosis: Fair  OT Education: Plan of Care;Transfer Training;Orientation; ADL Adaptive Strategies  REQUIRES OT FOLLOW UP: Yes  Activity Tolerance: Patient limited by fatigue;Treatment limited secondary to decreased cognition  Safety Devices in place: Yes  Type of devices: Call light within reach; Chair alarm in place; Left in chair;Gait belt;Nurse notified(bill conti present, JEB Brown notified )     Patient Diagnosis(es): The primary encounter diagnosis was Hypoxia.  Diagnoses of Cough, Acute respiratory infection, and Bilateral lower extremity report or indicate any pain     Orientation  Overall Orientation Status: Impaired  Orientation Level: Oriented to person;Disoriented to time;Disoriented to situation    Objective    ADL  Grooming: Minimal assistance(standing at sink for handwashing with increased cues for thoroughness )  Toileting: (patient completes depends down with Min A, this writer removed though as soiled with urine, patient completes hygiene without assist, yet dependent for depends application )    Balance  Sitting Balance: Supervision  Standing Balance: Minimal assistance    Functional Mobility  Functional - Mobility Device: Rolling Walker  Activity: To/from bathroom  Assist Level: Minimal assistance  Functional Mobility Comments: bed-toilet in [id number] with RW with Min A, patient uses unsafe techniques when backing up to surfaces, turning and walking backwards too soon, leaving walker, sitting quickly    Toilet Transfers  Toilet - Technique: Ambulating  Equipment Used: Grab bars  Toilet Transfer: Contact guard assistance    Bed mobility  Supine to Sit: Stand by assistance(HOB up, use of HR )    Cognition  Overall Cognitive Status: Exceptions  Following Commands: Follows one step commands with increased time; Follows one step commands with repetition  Attention Span: Attends with cues to redirect  Memory: Decreased recall of recent events;Decreased short term memory;Decreased long term memory  Safety Judgement: Decreased awareness of need for assistance;Decreased awareness of need for safety  Problem Solving: Decreased awareness of errors;Assistance required to identify errors made;Assistance required to implement solutions;Assistance required to generate solutions;Assistance required to correct errors made  Insights: Not aware of deficits  Initiation: Requires cues for some  Sequencing: Requires cues for some  Cognition Comment: patient groggy, falling asleep easily    Plan  Times per week: 3-5xs  Times per day: Daily  Plan weeks:

## 2019-09-06 NOTE — PROGRESS NOTES
Checked on patient, agitation subsided. Patient reoriented, was calm and cooperative, follows command. 4 limbs posey restraints removed. Vital signs taken and recorded, all within normal limits. IV cannula reinserted on her right arm, resumed on Levofloxacin IV, flowing well. Tolerated well her morning meds. Secured [facility], informed [facility]. Called tellesitter, verbalized with clear view of the patient. Will continue to monitor.

## 2019-09-07 LAB
ANION GAP SERPL CALCULATED.3IONS-SCNC: 13 MMOL/L (ref 3–16)
BASOPHILS ABSOLUTE: 0.1 K/UL (ref 0–0.2)
BASOPHILS RELATIVE PERCENT: 1.3 %
BUN BLDV-MCNC: 32 MG/DL (ref 7–20)
CALCIUM SERPL-MCNC: 8.9 MG/DL (ref 8.3–10.6)
CHLORIDE BLD-SCNC: 89 MMOL/L (ref 99–110)
CO2: 37 MMOL/L (ref 21–32)
CREAT SERPL-MCNC: 0.8 MG/DL (ref 0.6–1.2)
EOSINOPHILS ABSOLUTE: 0.1 K/UL (ref 0–0.6)
EOSINOPHILS RELATIVE PERCENT: 1.1 %
GFR AFRICAN AMERICAN: >60
GFR NON-AFRICAN AMERICAN: >60
GLUCOSE BLD-MCNC: 91 MG/DL (ref 70–99)
HCT VFR BLD CALC: 32.4 % (ref 36–48)
HEMOGLOBIN: 10.5 G/DL (ref 12–16)
LYMPHOCYTES ABSOLUTE: 1.9 K/UL (ref 1–5.1)
LYMPHOCYTES RELATIVE PERCENT: 28.9 %
MCH RBC QN AUTO: 25.3 PG (ref 26–34)
MCHC RBC AUTO-ENTMCNC: 32.3 G/DL (ref 31–36)
MCV RBC AUTO: 78.3 FL (ref 80–100)
MONOCYTES ABSOLUTE: 1.2 K/UL (ref 0–1.3)
MONOCYTES RELATIVE PERCENT: 18.7 %
NEUTROPHILS ABSOLUTE: 3.3 K/UL (ref 1.7–7.7)
NEUTROPHILS RELATIVE PERCENT: 50 %
PDW BLD-RTO: 19.2 % (ref 12.4–15.4)
PLATELET # BLD: 234 K/UL (ref 135–450)
PMV BLD AUTO: 8.6 FL (ref 5–10.5)
POTASSIUM SERPL-SCNC: 3.1 MMOL/L (ref 3.5–5.1)
RBC # BLD: 4.13 M/UL (ref 4–5.2)
SODIUM BLD-SCNC: 139 MMOL/L (ref 136–145)
WBC # BLD: 6.6 K/UL (ref 4–11)

## 2019-09-07 PROCEDURE — 80048 BASIC METABOLIC PNL TOTAL CA: CPT

## 2019-09-07 PROCEDURE — 6370000000 HC RX 637 (ALT 250 FOR IP): Performed by: INTERNAL MEDICINE

## 2019-09-07 PROCEDURE — 1200000000 HC SEMI PRIVATE

## 2019-09-07 PROCEDURE — 36415 COLL VENOUS BLD VENIPUNCTURE: CPT

## 2019-09-07 PROCEDURE — 6360000002 HC RX W HCPCS: Performed by: INTERNAL MEDICINE

## 2019-09-07 PROCEDURE — 94760 N-INVAS EAR/PLS OXIMETRY 1: CPT

## 2019-09-07 PROCEDURE — 85025 COMPLETE CBC W/AUTO DIFF WBC: CPT

## 2019-09-07 PROCEDURE — 2580000003 HC RX 258: Performed by: INTERNAL MEDICINE

## 2019-09-07 PROCEDURE — 94640 AIRWAY INHALATION TREATMENT: CPT

## 2019-09-07 RX ORDER — POTASSIUM CHLORIDE 750 MG/1
40 TABLET, FILM COATED, EXTENDED RELEASE ORAL ONCE
Status: COMPLETED | OUTPATIENT
Start: 2019-09-07 | End: 2019-09-07

## 2019-09-07 RX ADMIN — APIXABAN 5 MG: 5 TABLET, FILM COATED ORAL at 09:30

## 2019-09-07 RX ADMIN — LEVOTHYROXINE SODIUM 88 MCG: 88 TABLET ORAL at 06:24

## 2019-09-07 RX ADMIN — POTASSIUM CHLORIDE 40 MEQ: 750 TABLET, FILM COATED, EXTENDED RELEASE ORAL at 09:30

## 2019-09-07 RX ADMIN — PREDNISONE 15 MG: 5 TABLET ORAL at 09:30

## 2019-09-07 RX ADMIN — SODIUM CHLORIDE, PRESERVATIVE FREE 10 ML: 5 INJECTION INTRAVENOUS at 22:27

## 2019-09-07 RX ADMIN — METOPROLOL TARTRATE 50 MG: 50 TABLET ORAL at 09:30

## 2019-09-07 RX ADMIN — DIVALPROEX SODIUM 250 MG: 125 CAPSULE ORAL at 09:30

## 2019-09-07 RX ADMIN — TRAZODONE HYDROCHLORIDE 25 MG: 50 TABLET ORAL at 22:25

## 2019-09-07 RX ADMIN — LEVOFLOXACIN 250 MG: 5 INJECTION, SOLUTION INTRAVENOUS at 06:23

## 2019-09-07 RX ADMIN — DIVALPROEX SODIUM 250 MG: 125 CAPSULE ORAL at 22:24

## 2019-09-07 RX ADMIN — DILTIAZEM HYDROCHLORIDE 120 MG: 120 CAPSULE, COATED, EXTENDED RELEASE ORAL at 09:30

## 2019-09-07 RX ADMIN — IPRATROPIUM BROMIDE AND ALBUTEROL SULFATE 1 AMPULE: .5; 3 SOLUTION RESPIRATORY (INHALATION) at 15:52

## 2019-09-07 RX ADMIN — APIXABAN 5 MG: 5 TABLET, FILM COATED ORAL at 22:25

## 2019-09-07 RX ADMIN — DULOXETINE HYDROCHLORIDE 40 MG: 20 CAPSULE, DELAYED RELEASE ORAL at 09:30

## 2019-09-07 RX ADMIN — METOPROLOL TARTRATE 50 MG: 50 TABLET ORAL at 22:25

## 2019-09-07 RX ADMIN — ACETAMINOPHEN 650 MG: 325 TABLET ORAL at 22:25

## 2019-09-07 RX ADMIN — IPRATROPIUM BROMIDE AND ALBUTEROL SULFATE 1 AMPULE: .5; 3 SOLUTION RESPIRATORY (INHALATION) at 20:02

## 2019-09-07 RX ADMIN — MOMETASONE FUROATE AND FORMOTEROL FUMARATE DIHYDRATE 2 PUFF: 100; 5 AEROSOL RESPIRATORY (INHALATION) at 20:03

## 2019-09-07 RX ADMIN — DONEPEZIL HYDROCHLORIDE 10 MG: 10 TABLET, FILM COATED ORAL at 22:26

## 2019-09-07 RX ADMIN — IPRATROPIUM BROMIDE AND ALBUTEROL SULFATE 1 AMPULE: .5; 3 SOLUTION RESPIRATORY (INHALATION) at 11:33

## 2019-09-07 RX ADMIN — PANTOPRAZOLE SODIUM 40 MG: 40 TABLET, DELAYED RELEASE ORAL at 06:24

## 2019-09-07 RX ADMIN — ROPINIROLE HYDROCHLORIDE 2 MG: 1 TABLET, FILM COATED ORAL at 22:25

## 2019-09-07 ASSESSMENT — PAIN DESCRIPTION - LOCATION
LOCATION: LEG
LOCATION: LEG

## 2019-09-07 ASSESSMENT — PAIN DESCRIPTION - FREQUENCY
FREQUENCY: CONTINUOUS
FREQUENCY: CONTINUOUS

## 2019-09-07 ASSESSMENT — PAIN DESCRIPTION - DESCRIPTORS
DESCRIPTORS: DISCOMFORT
DESCRIPTORS: DISCOMFORT

## 2019-09-07 ASSESSMENT — PAIN SCALES - GENERAL
PAINLEVEL_OUTOF10: 0
PAINLEVEL_OUTOF10: 2
PAINLEVEL_OUTOF10: 4
PAINLEVEL_OUTOF10: 2

## 2019-09-07 ASSESSMENT — PAIN DESCRIPTION - PROGRESSION: CLINICAL_PROGRESSION: GRADUALLY IMPROVING

## 2019-09-07 ASSESSMENT — PAIN DESCRIPTION - ONSET: ONSET: AWAKENED FROM SLEEP

## 2019-09-07 ASSESSMENT — PAIN DESCRIPTION - ORIENTATION
ORIENTATION: RIGHT;LEFT
ORIENTATION: RIGHT;LEFT

## 2019-09-07 ASSESSMENT — PAIN DESCRIPTION - PAIN TYPE
TYPE: CHRONIC PAIN
TYPE: CHRONIC PAIN

## 2019-09-07 ASSESSMENT — PAIN - FUNCTIONAL ASSESSMENT: PAIN_FUNCTIONAL_ASSESSMENT: ACTIVITIES ARE NOT PREVENTED

## 2019-09-07 NOTE — PROGRESS NOTES
Hospitalist Progress Note      PCP: Dg Varghese MD    Date of Admission: 9/2/2019    Chief Complaint: shortness of breath    Hospital Course:   Presented with shortness of breath, cough, congestion. Mild hypoxia to 89-90%. Diuresed, I and O net positive though weight down. Treated with levaquin. Subjective:  Feels well this morning, thinks she is close to her baseline. Awaiting precert for discharge to SNF. Denies chest pain, shortness of breath, nausea, vomiting, fevers, abdominal pain. Medications:  Reviewed    Infusion Medications   Scheduled Medications    levofloxacin  250 mg Intravenous Q24H    diltiazem  120 mg Oral Daily    divalproex  250 mg Oral BID    donepezil  10 mg Oral Nightly    DULoxetine  40 mg Oral Daily    apixaban  5 mg Oral BID    levothyroxine  88 mcg Oral Daily    metoprolol tartrate  50 mg Oral BID    pantoprazole  40 mg Oral QAM AC    rOPINIRole  2 mg Oral Nightly    mometasone-formoterol  2 puff Inhalation BID    traZODone  25 mg Oral Nightly    sodium chloride flush  10 mL Intravenous 2 times per day    ipratropium-albuterol  1 ampule Inhalation Q4H WA    predniSONE  15 mg Oral Daily    Followed by   Edson Cesar ON 9/9/2019] predniSONE  10 mg Oral Daily    [facility] predniSONE  5 mg Oral Daily     PRN Meds: sodium chloride flush, magnesium hydroxide, ondansetron, acetaminophen, guaiFENesin-dextromethorphan      Intake/Output Summary (Last 24 hours) at 9/7/2019 1645  Last data filed at 9/7/2019 1013  Gross per 24 hour   Intake 690 ml   Output --   Net 690 ml       Physical Exam Performed:    /72   Pulse 101   Temp 97.9 °F (36.6 °C)   Resp 18   Ht 5' 4\" (1.626 m)   Wt 139 lb 12.4 oz (63.4 kg)   SpO2 91%   BMI 23.99 kg/m²     General appearance: No apparent distress, appears stated age and cooperative. HEENT: Pupils equal, round, and reactive to light. Conjunctivae/corneas clear.  [place]  Neck: Supple, with full range of

## 2019-09-08 LAB
ANION GAP SERPL CALCULATED.3IONS-SCNC: 12 MMOL/L (ref 3–16)
BUN BLDV-MCNC: 31 MG/DL (ref 7–20)
CALCIUM SERPL-MCNC: 8.8 MG/DL (ref 8.3–10.6)
CHLORIDE BLD-SCNC: 90 MMOL/L (ref 99–110)
CO2: 35 MMOL/L (ref 21–32)
CREAT SERPL-MCNC: 0.8 MG/DL (ref 0.6–1.2)
GFR AFRICAN AMERICAN: >60
GFR NON-AFRICAN AMERICAN: >60
GLUCOSE BLD-MCNC: 106 MG/DL (ref 70–99)
POTASSIUM SERPL-SCNC: 3.2 MMOL/L (ref 3.5–5.1)
SODIUM BLD-SCNC: 137 MMOL/L (ref 136–145)

## 2019-09-08 PROCEDURE — 6370000000 HC RX 637 (ALT 250 FOR IP): Performed by: INTERNAL MEDICINE

## 2019-09-08 PROCEDURE — 2580000003 HC RX 258: Performed by: INTERNAL MEDICINE

## 2019-09-08 PROCEDURE — 94640 AIRWAY INHALATION TREATMENT: CPT

## 2019-09-08 PROCEDURE — 80048 BASIC METABOLIC PNL TOTAL CA: CPT

## 2019-09-08 PROCEDURE — 36415 COLL VENOUS BLD VENIPUNCTURE: CPT

## 2019-09-08 PROCEDURE — 94760 N-INVAS EAR/PLS OXIMETRY 1: CPT

## 2019-09-08 PROCEDURE — 6360000002 HC RX W HCPCS: Performed by: INTERNAL MEDICINE

## 2019-09-08 PROCEDURE — 1200000000 HC SEMI PRIVATE

## 2019-09-08 RX ORDER — POTASSIUM CHLORIDE 7.45 MG/ML
10 INJECTION INTRAVENOUS PRN
Status: DISCONTINUED | OUTPATIENT
Start: 2019-09-08 | End: 2019-09-09

## 2019-09-08 RX ORDER — POTASSIUM CHLORIDE 20 MEQ/1
40 TABLET, EXTENDED RELEASE ORAL PRN
Status: DISCONTINUED | OUTPATIENT
Start: 2019-09-08 | End: 2019-09-09

## 2019-09-08 RX ADMIN — DULOXETINE HYDROCHLORIDE 40 MG: 20 CAPSULE, DELAYED RELEASE ORAL at 07:56

## 2019-09-08 RX ADMIN — LEVOFLOXACIN 250 MG: 5 INJECTION, SOLUTION INTRAVENOUS at 05:46

## 2019-09-08 RX ADMIN — LEVOTHYROXINE SODIUM 88 MCG: 88 TABLET ORAL at 05:46

## 2019-09-08 RX ADMIN — APIXABAN 5 MG: 5 TABLET, FILM COATED ORAL at 21:32

## 2019-09-08 RX ADMIN — DONEPEZIL HYDROCHLORIDE 10 MG: 10 TABLET, FILM COATED ORAL at 21:32

## 2019-09-08 RX ADMIN — DILTIAZEM HYDROCHLORIDE 120 MG: 120 CAPSULE, COATED, EXTENDED RELEASE ORAL at 07:57

## 2019-09-08 RX ADMIN — APIXABAN 5 MG: 5 TABLET, FILM COATED ORAL at 07:57

## 2019-09-08 RX ADMIN — METOPROLOL TARTRATE 50 MG: 50 TABLET ORAL at 07:57

## 2019-09-08 RX ADMIN — SODIUM CHLORIDE, PRESERVATIVE FREE 10 ML: 5 INJECTION INTRAVENOUS at 21:34

## 2019-09-08 RX ADMIN — MAGNESIUM HYDROXIDE 30 ML: 400 SUSPENSION ORAL at 22:16

## 2019-09-08 RX ADMIN — MOMETASONE FUROATE AND FORMOTEROL FUMARATE DIHYDRATE 2 PUFF: 100; 5 AEROSOL RESPIRATORY (INHALATION) at 20:44

## 2019-09-08 RX ADMIN — ROPINIROLE HYDROCHLORIDE 2 MG: 1 TABLET, FILM COATED ORAL at 21:31

## 2019-09-08 RX ADMIN — IPRATROPIUM BROMIDE AND ALBUTEROL SULFATE 1 AMPULE: .5; 3 SOLUTION RESPIRATORY (INHALATION) at 16:13

## 2019-09-08 RX ADMIN — DIVALPROEX SODIUM 250 MG: 125 CAPSULE ORAL at 21:32

## 2019-09-08 RX ADMIN — MOMETASONE FUROATE AND FORMOTEROL FUMARATE DIHYDRATE 2 PUFF: 100; 5 AEROSOL RESPIRATORY (INHALATION) at 07:55

## 2019-09-08 RX ADMIN — DIVALPROEX SODIUM 250 MG: 125 CAPSULE ORAL at 07:57

## 2019-09-08 RX ADMIN — TRAZODONE HYDROCHLORIDE 25 MG: 50 TABLET ORAL at 21:32

## 2019-09-08 RX ADMIN — PANTOPRAZOLE SODIUM 40 MG: 40 TABLET, DELAYED RELEASE ORAL at 05:46

## 2019-09-08 RX ADMIN — IPRATROPIUM BROMIDE AND ALBUTEROL SULFATE 1 AMPULE: .5; 3 SOLUTION RESPIRATORY (INHALATION) at 07:55

## 2019-09-08 RX ADMIN — METOPROLOL TARTRATE 50 MG: 50 TABLET ORAL at 21:31

## 2019-09-08 RX ADMIN — IPRATROPIUM BROMIDE AND ALBUTEROL SULFATE 1 AMPULE: .5; 3 SOLUTION RESPIRATORY (INHALATION) at 20:43

## 2019-09-08 RX ADMIN — IPRATROPIUM BROMIDE AND ALBUTEROL SULFATE 1 AMPULE: .5; 3 SOLUTION RESPIRATORY (INHALATION) at 11:33

## 2019-09-08 RX ADMIN — PREDNISONE 15 MG: 5 TABLET ORAL at 07:57

## 2019-09-08 ASSESSMENT — PAIN SCALES - GENERAL
PAINLEVEL_OUTOF10: 0
PAINLEVEL_OUTOF10: 0

## 2019-09-08 NOTE — CARE COORDINATION
Discharge Planning:  Message received from [facility] stating that this facility has not yet heard from pt insurance. Will need to f/u with Magan W Paulo Moreno on 9/9.   Electronically signed by Zuleyma Latif on 9/8/2019 at 11:00 AM

## 2019-09-08 NOTE — PROGRESS NOTES
reactive to light. Conjunctivae/corneas clear. [place]  Neck: Supple, with full range of motion. Trachea midline. Respiratory:  Normal respiratory effort. Clear to auscultation, bilaterally without Rales/Wheezes/Rhonchi. Cardiovascular: Regular rate and rhythm with normal V7/J3, 2/6 systolic murmur heard best over LLSB, no rubs or gallops. Abdomen: Soft, non-tender, non-distended with normal bowel sounds. Musculoskeletal: No clubbing, cyanosis, 2+ edema bilaterally. Full range of motion without deformity. Skin: Skin color, texture, turgor normal.  No rashes or lesions. Neurologic:  Neurovascularly intact without any focal sensory/motor deficits. Cranial nerves: II-XII intact, grossly non-focal.  Psychiatric: Alert and oriented, thought content appropriate, normal insight  Capillary Refill: Brisk,< 3 seconds   Peripheral Pulses: +2 palpable, equal bilaterally       Labs:   Recent Labs     09/06/19  0612 09/07/19  0756   WBC 7.7 6.6   HGB 9.7* 10.5*   HCT 30.1* 32.4*    234     Recent Labs     09/06/19  0612 09/07/19  0756 09/08/19  0703    139 137   K 3.4* 3.1* 3.2*   CL 92* 89* 90*   CO2 30 37* 35*   BUN 30* 32* 31*   CREATININE 0.8 0.8 0.8   CALCIUM 8.6 8.9 8.8     No results for input(s): AST, ALT, BILIDIR, BILITOT, ALKPHOS in the last 72 hours. No results for input(s): INR in the last 72 hours. No results for input(s): Ivonne Sudheer in the last 72 hours. Urinalysis:      Lab Results   Component Value Date    NITRU Negative 09/02/2019    WBCUA 1 09/02/2019    RBCUA 13 09/02/2019    BLOODU Negative 09/02/2019    SPECGRAV 1.010 09/02/2019    GLUCOSEU Negative 09/02/2019       Radiology:  XR CHEST PORTABLE   Final Result   Cardiomegaly without acute process. Assessment/Plan:    Active Hospital Problems    Diagnosis    Pneumonia [J18.9]     Bronchitis,  Possible  Pneumonia  With  bronchospsm. Clinically much better.   - continuing Levaquin     Acute  Systolic  Heart  Failure  Last  Echo 2018 Ef  45-50%    - hold IV diuresis, contracted with hypoK  - discharge on PO lasix     Acute hypoxic resp failure  Briefly on 2L early in admission, able to wean off.      Atrial  Fib    Rate controlled  - Continue eliquis      Dementia   - Will monitor and provide supportive care.      HTN  Well controlled    DVT Prophylaxis: Eliquis  Diet: DIET CARB CONTROL;  Code Status: Full Code    PT/OT Eval Status: complete    Dispo - SNF, pending precert, likely Monday    Jay De Jesus MD

## 2019-09-09 LAB
ANION GAP SERPL CALCULATED.3IONS-SCNC: 12 MMOL/L (ref 3–16)
BUN BLDV-MCNC: 31 MG/DL (ref 7–20)
CALCIUM SERPL-MCNC: 8.7 MG/DL (ref 8.3–10.6)
CHLORIDE BLD-SCNC: 91 MMOL/L (ref 99–110)
CO2: 35 MMOL/L (ref 21–32)
CREAT SERPL-MCNC: 0.7 MG/DL (ref 0.6–1.2)
GFR AFRICAN AMERICAN: >60
GFR NON-AFRICAN AMERICAN: >60
GLUCOSE BLD-MCNC: 96 MG/DL (ref 70–99)
POTASSIUM SERPL-SCNC: 3.2 MMOL/L (ref 3.5–5.1)
SODIUM BLD-SCNC: 138 MMOL/L (ref 136–145)

## 2019-09-09 PROCEDURE — 6370000000 HC RX 637 (ALT 250 FOR IP): Performed by: INTERNAL MEDICINE

## 2019-09-09 PROCEDURE — 97535 SELF CARE MNGMENT TRAINING: CPT

## 2019-09-09 PROCEDURE — 97116 GAIT TRAINING THERAPY: CPT

## 2019-09-09 PROCEDURE — 80048 BASIC METABOLIC PNL TOTAL CA: CPT

## 2019-09-09 PROCEDURE — 94640 AIRWAY INHALATION TREATMENT: CPT

## 2019-09-09 PROCEDURE — 97530 THERAPEUTIC ACTIVITIES: CPT

## 2019-09-09 PROCEDURE — 36415 COLL VENOUS BLD VENIPUNCTURE: CPT

## 2019-09-09 PROCEDURE — 1200000000 HC SEMI PRIVATE

## 2019-09-09 PROCEDURE — 6360000002 HC RX W HCPCS: Performed by: INTERNAL MEDICINE

## 2019-09-09 PROCEDURE — 94760 N-INVAS EAR/PLS OXIMETRY 1: CPT

## 2019-09-09 PROCEDURE — 2580000003 HC RX 258: Performed by: INTERNAL MEDICINE

## 2019-09-09 RX ORDER — FUROSEMIDE 20 MG/1
20 TABLET ORAL 2 TIMES DAILY
Status: DISCONTINUED | OUTPATIENT
Start: 2019-09-09 | End: 2019-09-12 | Stop reason: HOSPADM

## 2019-09-09 RX ADMIN — MOMETASONE FUROATE AND FORMOTEROL FUMARATE DIHYDRATE 2 PUFF: 100; 5 AEROSOL RESPIRATORY (INHALATION) at 08:36

## 2019-09-09 RX ADMIN — FUROSEMIDE 20 MG: 20 TABLET ORAL at 11:51

## 2019-09-09 RX ADMIN — PANTOPRAZOLE SODIUM 40 MG: 40 TABLET, DELAYED RELEASE ORAL at 06:47

## 2019-09-09 RX ADMIN — PREDNISONE 10 MG: 10 TABLET ORAL at 09:41

## 2019-09-09 RX ADMIN — APIXABAN 5 MG: 5 TABLET, FILM COATED ORAL at 09:40

## 2019-09-09 RX ADMIN — MOMETASONE FUROATE AND FORMOTEROL FUMARATE DIHYDRATE 2 PUFF: 100; 5 AEROSOL RESPIRATORY (INHALATION) at 20:42

## 2019-09-09 RX ADMIN — TRAZODONE HYDROCHLORIDE 25 MG: 50 TABLET ORAL at 20:33

## 2019-09-09 RX ADMIN — IPRATROPIUM BROMIDE AND ALBUTEROL SULFATE 1 AMPULE: .5; 3 SOLUTION RESPIRATORY (INHALATION) at 20:42

## 2019-09-09 RX ADMIN — APIXABAN 5 MG: 5 TABLET, FILM COATED ORAL at 20:33

## 2019-09-09 RX ADMIN — ROPINIROLE HYDROCHLORIDE 2 MG: 1 TABLET, FILM COATED ORAL at 20:33

## 2019-09-09 RX ADMIN — DIVALPROEX SODIUM 250 MG: 125 CAPSULE ORAL at 09:41

## 2019-09-09 RX ADMIN — DILTIAZEM HYDROCHLORIDE 120 MG: 120 CAPSULE, COATED, EXTENDED RELEASE ORAL at 09:41

## 2019-09-09 RX ADMIN — LEVOTHYROXINE SODIUM 88 MCG: 88 TABLET ORAL at 05:00

## 2019-09-09 RX ADMIN — IPRATROPIUM BROMIDE AND ALBUTEROL SULFATE 1 AMPULE: .5; 3 SOLUTION RESPIRATORY (INHALATION) at 08:36

## 2019-09-09 RX ADMIN — SODIUM CHLORIDE, PRESERVATIVE FREE 10 ML: 5 INJECTION INTRAVENOUS at 10:03

## 2019-09-09 RX ADMIN — METOPROLOL TARTRATE 50 MG: 50 TABLET ORAL at 20:33

## 2019-09-09 RX ADMIN — METOPROLOL TARTRATE 50 MG: 50 TABLET ORAL at 09:40

## 2019-09-09 RX ADMIN — DULOXETINE HYDROCHLORIDE 40 MG: 20 CAPSULE, DELAYED RELEASE ORAL at 09:40

## 2019-09-09 RX ADMIN — DONEPEZIL HYDROCHLORIDE 10 MG: 10 TABLET, FILM COATED ORAL at 20:33

## 2019-09-09 RX ADMIN — LEVOFLOXACIN 250 MG: 5 INJECTION, SOLUTION INTRAVENOUS at 04:56

## 2019-09-09 RX ADMIN — DIVALPROEX SODIUM 250 MG: 125 CAPSULE ORAL at 20:32

## 2019-09-09 RX ADMIN — IPRATROPIUM BROMIDE AND ALBUTEROL SULFATE 1 AMPULE: .5; 3 SOLUTION RESPIRATORY (INHALATION) at 18:09

## 2019-09-09 ASSESSMENT — PAIN SCALES - GENERAL
PAINLEVEL_OUTOF10: 0
PAINLEVEL_OUTOF10: 0

## 2019-09-09 NOTE — PROGRESS NOTES
Training, Patient/Caregiver Education & Training, Self-Care / ADL, Cognitive/Perceptual Training, Functional Mobility Training, Neuromuscular Re-education, Equipment Evaluation, Education, & procurement, Home Management Training, Endurance Training, Cognitive Reorientation    Goals  Short term goals  Time Frame for Short term goals: 1-2 weeks: status as of 9/9/19: goals ongoing   Short term goal 1: S bathing  Short term goal 2: S dressing  Short term goal 3: S grooming  Short term goal 4: S toileting  Short term goal 5: S functional transfers  Short term goal 6: Min A light home tasks. Patient Goals   Patient goals : Pt unsure of goals- has no insight into deficits.        Therapy Time   Individual Concurrent Group Co-treatment   Time In 6804         Time Out 0800         Minutes 25         Timed Code Treatment Minutes: [id number] [place] [id number]

## 2019-09-09 NOTE — PLAN OF CARE
Problem: Falls - Risk of:  Goal: Will remain free from falls  Description  Will remain free from falls  9/9/2019 0724 by Cynthia Lagunas RN  Outcome: Ongoing  9/9/2019 0458 by Ana Mccall RN  Outcome: Ongoing  Goal: Absence of physical injury  Description  Absence of physical injury  Outcome: Ongoing     Problem: Risk for Impaired Skin Integrity  Goal: Tissue integrity - skin and mucous membranes  Description  Structural intactness and normal physiological function of skin and  mucous membranes.   Outcome: Ongoing     Problem: Breathing Pattern - Ineffective:  Goal: Ability to achieve and maintain a regular respiratory rate will improve  Description  Ability to achieve and maintain a regular respiratory rate will improve  Outcome: Ongoing     Problem: Restraint Use - Nonviolent/Non-Self-Destructive Behavior:  Goal: Absence of restraint indications  Description  Absence of restraint indications  Outcome: Ongoing  Goal: Absence of restraint-related injury  Description  Absence of restraint-related injury  Outcome: Ongoing     Problem: OXYGENATION/RESPIRATORY FUNCTION  Goal: Patient will maintain patent airway  9/9/2019 0724 by Cynthia Lagunas RN  Outcome: Ongoing  9/9/2019 0458 by Ana Mccall RN  Outcome: Ongoing  Goal: Patient will achieve/maintain normal respiratory rate/effort  Description  Respiratory rate and effort will be within normal limits for the patient  Outcome: Ongoing     Problem: HEMODYNAMIC STATUS  Goal: Patient has stable vital signs and fluid balance  Outcome: Ongoing     Problem: FLUID AND ELECTROLYTE IMBALANCE  Goal: Fluid and electrolyte balance are achieved/maintained  9/9/2019 0724 by Cynthia Lagunas RN  Outcome: Ongoing  9/9/2019 0458 by Ana Mccall RN  Outcome: Ongoing     Problem: ACTIVITY INTOLERANCE/IMPAIRED MOBILITY  Goal: Mobility/activity is maintained at optimum level for patient  Outcome: Ongoing

## 2019-09-10 LAB
ANION GAP SERPL CALCULATED.3IONS-SCNC: 12 MMOL/L (ref 3–16)
BUN BLDV-MCNC: 35 MG/DL (ref 7–20)
CALCIUM SERPL-MCNC: 8.6 MG/DL (ref 8.3–10.6)
CHLORIDE BLD-SCNC: 91 MMOL/L (ref 99–110)
CO2: 35 MMOL/L (ref 21–32)
CREAT SERPL-MCNC: 0.9 MG/DL (ref 0.6–1.2)
GFR AFRICAN AMERICAN: >60
GFR NON-AFRICAN AMERICAN: 59
GLUCOSE BLD-MCNC: 94 MG/DL (ref 70–99)
MAGNESIUM: 2.5 MG/DL (ref 1.8–2.4)
POTASSIUM SERPL-SCNC: 3.7 MMOL/L (ref 3.5–5.1)
PRO-BNP: 2716 PG/ML (ref 0–449)
SODIUM BLD-SCNC: 138 MMOL/L (ref 136–145)

## 2019-09-10 PROCEDURE — 1200000000 HC SEMI PRIVATE

## 2019-09-10 PROCEDURE — 80048 BASIC METABOLIC PNL TOTAL CA: CPT

## 2019-09-10 PROCEDURE — 6360000002 HC RX W HCPCS: Performed by: INTERNAL MEDICINE

## 2019-09-10 PROCEDURE — 83735 ASSAY OF MAGNESIUM: CPT

## 2019-09-10 PROCEDURE — 97535 SELF CARE MNGMENT TRAINING: CPT

## 2019-09-10 PROCEDURE — 97116 GAIT TRAINING THERAPY: CPT

## 2019-09-10 PROCEDURE — 2580000003 HC RX 258: Performed by: INTERNAL MEDICINE

## 2019-09-10 PROCEDURE — 6370000000 HC RX 637 (ALT 250 FOR IP): Performed by: INTERNAL MEDICINE

## 2019-09-10 PROCEDURE — 83880 ASSAY OF NATRIURETIC PEPTIDE: CPT

## 2019-09-10 PROCEDURE — 94640 AIRWAY INHALATION TREATMENT: CPT

## 2019-09-10 PROCEDURE — 97530 THERAPEUTIC ACTIVITIES: CPT

## 2019-09-10 PROCEDURE — 94760 N-INVAS EAR/PLS OXIMETRY 1: CPT

## 2019-09-10 PROCEDURE — 36415 COLL VENOUS BLD VENIPUNCTURE: CPT

## 2019-09-10 RX ADMIN — ROPINIROLE HYDROCHLORIDE 2 MG: 1 TABLET, FILM COATED ORAL at 20:19

## 2019-09-10 RX ADMIN — LEVOTHYROXINE SODIUM 88 MCG: 88 TABLET ORAL at 06:14

## 2019-09-10 RX ADMIN — DONEPEZIL HYDROCHLORIDE 10 MG: 10 TABLET, FILM COATED ORAL at 20:19

## 2019-09-10 RX ADMIN — DIVALPROEX SODIUM 250 MG: 125 CAPSULE ORAL at 20:20

## 2019-09-10 RX ADMIN — MOMETASONE FUROATE AND FORMOTEROL FUMARATE DIHYDRATE 2 PUFF: 100; 5 AEROSOL RESPIRATORY (INHALATION) at 08:46

## 2019-09-10 RX ADMIN — LEVOFLOXACIN 250 MG: 5 INJECTION, SOLUTION INTRAVENOUS at 06:14

## 2019-09-10 RX ADMIN — PANTOPRAZOLE SODIUM 40 MG: 40 TABLET, DELAYED RELEASE ORAL at 06:14

## 2019-09-10 RX ADMIN — FUROSEMIDE 20 MG: 20 TABLET ORAL at 09:26

## 2019-09-10 RX ADMIN — METOPROLOL TARTRATE 50 MG: 50 TABLET ORAL at 09:26

## 2019-09-10 RX ADMIN — DILTIAZEM HYDROCHLORIDE 120 MG: 120 CAPSULE, COATED, EXTENDED RELEASE ORAL at 09:26

## 2019-09-10 RX ADMIN — IPRATROPIUM BROMIDE AND ALBUTEROL SULFATE 1 AMPULE: .5; 3 SOLUTION RESPIRATORY (INHALATION) at 20:56

## 2019-09-10 RX ADMIN — IPRATROPIUM BROMIDE AND ALBUTEROL SULFATE 1 AMPULE: .5; 3 SOLUTION RESPIRATORY (INHALATION) at 12:08

## 2019-09-10 RX ADMIN — APIXABAN 5 MG: 5 TABLET, FILM COATED ORAL at 20:20

## 2019-09-10 RX ADMIN — METOPROLOL TARTRATE 50 MG: 50 TABLET ORAL at 20:19

## 2019-09-10 RX ADMIN — IPRATROPIUM BROMIDE AND ALBUTEROL SULFATE 1 AMPULE: .5; 3 SOLUTION RESPIRATORY (INHALATION) at 08:42

## 2019-09-10 RX ADMIN — TRAZODONE HYDROCHLORIDE 25 MG: 50 TABLET ORAL at 20:19

## 2019-09-10 RX ADMIN — DIVALPROEX SODIUM 250 MG: 125 CAPSULE ORAL at 09:26

## 2019-09-10 RX ADMIN — SODIUM CHLORIDE, PRESERVATIVE FREE 10 ML: 5 INJECTION INTRAVENOUS at 20:19

## 2019-09-10 RX ADMIN — SODIUM CHLORIDE, PRESERVATIVE FREE 10 ML: 5 INJECTION INTRAVENOUS at 09:26

## 2019-09-10 RX ADMIN — FUROSEMIDE 20 MG: 20 TABLET ORAL at 18:56

## 2019-09-10 RX ADMIN — IPRATROPIUM BROMIDE AND ALBUTEROL SULFATE 1 AMPULE: .5; 3 SOLUTION RESPIRATORY (INHALATION) at 16:55

## 2019-09-10 RX ADMIN — MOMETASONE FUROATE AND FORMOTEROL FUMARATE DIHYDRATE 2 PUFF: 100; 5 AEROSOL RESPIRATORY (INHALATION) at 20:56

## 2019-09-10 RX ADMIN — APIXABAN 5 MG: 5 TABLET, FILM COATED ORAL at 09:26

## 2019-09-10 RX ADMIN — DULOXETINE HYDROCHLORIDE 40 MG: 20 CAPSULE, DELAYED RELEASE ORAL at 09:26

## 2019-09-10 ASSESSMENT — PAIN SCALES - GENERAL
PAINLEVEL_OUTOF10: 0

## 2019-09-10 NOTE — PROGRESS NOTES
best over LLSB, no rubs or gallops. Abdomen: Soft, non-tender, non-distended with normal bowel sounds. Musculoskeletal: No clubbing, cyanosis, 2+ edema bilaterally. Full range of motion without deformity. Skin: Skin color, texture, turgor normal.  No rashes or lesions. Neurologic:  Neurovascularly intact without any focal sensory/motor deficits. Cranial nerves: II-XII intact, grossly non-focal.  Psychiatric: Alert and oriented, thought content appropriate, normal insight  Capillary Refill: Brisk,< 3 seconds   Peripheral Pulses: +2 palpable, equal bilaterally       Labs:   No results for input(s): WBC, HGB, HCT, PLT in the last 72 hours. Recent Labs     09/08/19  0703 09/09/19  0640 09/10/19  0641    138 138   K 3.2* 3.2* 3.7   CL 90* 91* 91*   CO2 35* 35* 35*   BUN 31* 31* 35*   CREATININE 0.8 0.7 0.9   CALCIUM 8.8 8.7 8.6     No results for input(s): AST, ALT, BILIDIR, BILITOT, ALKPHOS in the last 72 hours. No results for input(s): INR in the last 72 hours. No results for input(s): Ayanna Self in the last 72 hours. Urinalysis:      Lab Results   Component Value Date    NITRU Negative 09/02/2019    WBCUA 1 09/02/2019    RBCUA 13 09/02/2019    BLOODU Negative 09/02/2019    SPECGRAV 1.010 09/02/2019    GLUCOSEU Negative 09/02/2019       Radiology:  XR CHEST PORTABLE   Final Result   Cardiomegaly without acute process. Assessment/Plan:    Active Hospital Problems    Diagnosis    Pneumonia [J18.9]     Bronchitis,  Possible  Pneumonia  With  bronchospasm - resolved. - continuing Levaquin - day 5/5 today.        Acute  Systolic  Heart  Failure    Last  Echo 2018 Ef  45-50%    - resumed PO lasix.        Acute hypoxic resp failure  Briefly on 2L early in admission, able to wean off. Atrial  Fib    Rate controlled  - Continue eliquis        Dementia advanced.    - Will monitor and provide supportive care.        HTN  Well controlled      DVT Prophylaxis: Eliquis  Diet: DIET

## 2019-09-10 NOTE — PROGRESS NOTES
guard assistance  Ambulation  Ambulation?: Yes  Ambulation 1  Surface: level tile  Device: Rolling Walker  Assistance: Contact guard assistance  Quality of Gait: slowed pace, shuffled steps, better with turns today  Distance: 79'  Stairs/Curb  Stairs?: No     Balance  Sitting - Static: Good  Sitting - Dynamic: Good;-  Standing - Static: Good;-  Standing - Dynamic: Good;-(with walker)        Goals  Short term goals  Time Frame for Short term goals: upon d/c  Short term goal 1: Bed mobility with SBA/Sup. Short term goal 2: Transfers sit <> stand with CGA/SBA. Short term goal 3: Ambulate with wheeled walker 75 feet with SBA.   Patient Goals   Patient goals : to feel better and return home    Plan    Plan  Times per week: 3-5x/week  Current Treatment Recommendations: Strengthening, Functional Mobility Training  Safety Devices  Type of devices: Call light within reach, All fall risk precautions in place, Chair alarm in place, Gait belt, Patient at risk for falls, Left in chair     Therapy Time   Individual Concurrent Group Co-treatment   Time In 1135         Time Out 1150         Minutes 15         Timed Code Treatment Minutes: Jazmín [id number], PT

## 2019-09-10 NOTE — PROGRESS NOTES
Orders to D/C pt in place, AVS and LAMBERTO completed. Pt's daughter appealed her discharge and pt will be staying while the appeal is in process. CMU and tele still in place. Pt alert to self and place, eyes closed in chair, call light in reach. Chair alarm on. Will continue to monitor.

## 2019-09-11 LAB
ANION GAP SERPL CALCULATED.3IONS-SCNC: 13 MMOL/L (ref 3–16)
BUN BLDV-MCNC: 43 MG/DL (ref 7–20)
CALCIUM SERPL-MCNC: 8.7 MG/DL (ref 8.3–10.6)
CHLORIDE BLD-SCNC: 91 MMOL/L (ref 99–110)
CO2: 34 MMOL/L (ref 21–32)
CREAT SERPL-MCNC: 0.8 MG/DL (ref 0.6–1.2)
GFR AFRICAN AMERICAN: >60
GFR NON-AFRICAN AMERICAN: >60
GLUCOSE BLD-MCNC: 103 MG/DL (ref 70–99)
POTASSIUM SERPL-SCNC: 3.7 MMOL/L (ref 3.5–5.1)
SODIUM BLD-SCNC: 138 MMOL/L (ref 136–145)

## 2019-09-11 PROCEDURE — 6370000000 HC RX 637 (ALT 250 FOR IP): Performed by: INTERNAL MEDICINE

## 2019-09-11 PROCEDURE — 2580000003 HC RX 258: Performed by: INTERNAL MEDICINE

## 2019-09-11 PROCEDURE — 97530 THERAPEUTIC ACTIVITIES: CPT

## 2019-09-11 PROCEDURE — 1200000000 HC SEMI PRIVATE

## 2019-09-11 PROCEDURE — 97116 GAIT TRAINING THERAPY: CPT

## 2019-09-11 PROCEDURE — 94640 AIRWAY INHALATION TREATMENT: CPT

## 2019-09-11 PROCEDURE — 97535 SELF CARE MNGMENT TRAINING: CPT

## 2019-09-11 PROCEDURE — 80048 BASIC METABOLIC PNL TOTAL CA: CPT

## 2019-09-11 PROCEDURE — 36415 COLL VENOUS BLD VENIPUNCTURE: CPT

## 2019-09-11 PROCEDURE — 94760 N-INVAS EAR/PLS OXIMETRY 1: CPT

## 2019-09-11 RX ADMIN — TRAZODONE HYDROCHLORIDE 25 MG: 50 TABLET ORAL at 21:21

## 2019-09-11 RX ADMIN — IPRATROPIUM BROMIDE AND ALBUTEROL SULFATE 1 AMPULE: .5; 3 SOLUTION RESPIRATORY (INHALATION) at 19:47

## 2019-09-11 RX ADMIN — DULOXETINE HYDROCHLORIDE 40 MG: 20 CAPSULE, DELAYED RELEASE ORAL at 09:34

## 2019-09-11 RX ADMIN — SODIUM CHLORIDE, PRESERVATIVE FREE 10 ML: 5 INJECTION INTRAVENOUS at 09:35

## 2019-09-11 RX ADMIN — APIXABAN 5 MG: 5 TABLET, FILM COATED ORAL at 21:21

## 2019-09-11 RX ADMIN — MOMETASONE FUROATE AND FORMOTEROL FUMARATE DIHYDRATE 2 PUFF: 100; 5 AEROSOL RESPIRATORY (INHALATION) at 19:47

## 2019-09-11 RX ADMIN — METOPROLOL TARTRATE 50 MG: 50 TABLET ORAL at 09:34

## 2019-09-11 RX ADMIN — FUROSEMIDE 20 MG: 20 TABLET ORAL at 09:34

## 2019-09-11 RX ADMIN — APIXABAN 5 MG: 5 TABLET, FILM COATED ORAL at 09:34

## 2019-09-11 RX ADMIN — METOPROLOL TARTRATE 50 MG: 50 TABLET ORAL at 21:21

## 2019-09-11 RX ADMIN — DIVALPROEX SODIUM 250 MG: 125 CAPSULE ORAL at 09:34

## 2019-09-11 RX ADMIN — SODIUM CHLORIDE, PRESERVATIVE FREE 10 ML: 5 INJECTION INTRAVENOUS at 21:23

## 2019-09-11 RX ADMIN — ROPINIROLE HYDROCHLORIDE 2 MG: 1 TABLET, FILM COATED ORAL at 21:20

## 2019-09-11 RX ADMIN — DONEPEZIL HYDROCHLORIDE 10 MG: 10 TABLET, FILM COATED ORAL at 21:21

## 2019-09-11 RX ADMIN — LEVOTHYROXINE SODIUM 88 MCG: 88 TABLET ORAL at 05:16

## 2019-09-11 RX ADMIN — DILTIAZEM HYDROCHLORIDE 120 MG: 120 CAPSULE, COATED, EXTENDED RELEASE ORAL at 09:34

## 2019-09-11 RX ADMIN — IPRATROPIUM BROMIDE AND ALBUTEROL SULFATE 1 AMPULE: .5; 3 SOLUTION RESPIRATORY (INHALATION) at 16:07

## 2019-09-11 RX ADMIN — DIVALPROEX SODIUM 250 MG: 125 CAPSULE ORAL at 21:21

## 2019-09-11 RX ADMIN — PANTOPRAZOLE SODIUM 40 MG: 40 TABLET, DELAYED RELEASE ORAL at 05:16

## 2019-09-11 RX ADMIN — FUROSEMIDE 20 MG: 20 TABLET ORAL at 16:58

## 2019-09-11 RX ADMIN — MAGNESIUM HYDROXIDE 30 ML: 400 SUSPENSION ORAL at 09:33

## 2019-09-11 ASSESSMENT — PAIN SCALES - GENERAL
PAINLEVEL_OUTOF10: 0

## 2019-09-11 NOTE — PROGRESS NOTES
information is per pt, and may not be reliable. Additional Comments: the pt reports one recent fall    Restrictions  Restrictions/Precautions: Fall Risk        Other position/activity restrictions: tele-sitter    SUBJECTIVE: Pt sitting up in recliner chair. Willing to get up and ambulate. Denies any dizziness or lightheadedness while up ambulating. Pain: Patient Currently in Pain: Denies      OBJECTIVE:    OBSERVATIONS  Edema: B legs with pitting edema up to her knees    Bed mobility  Supine to Sit: Supervision(HOB up, use of HR )    Transfers  Sit to Stand: Contact guard assistance, Stand by assistance  Stand to sit: Stand by assistance, Contact guard assistance    Ambulation  Ambulation  Ambulation?: Yes  Ambulation 1  Surface: level tile  Device: Rolling Walker  Assistance: Contact guard assistance, Stand by assistance  Quality of Gait: slowed pace, shuffled steps, better with turns today  Distance: 100'  Comments: .    Stairs  Stairs/Curb  Stairs?: No    Exercises   Exercises  Comments: SEATED: hip flexion x 10 reps, LAQ x 10 reps, ankle DF x 10 reps, resisted hip ER x 10 reps with upright trunk and arms folded across chest.   STANDING: hip flexion x 10 reps         Other Activities:  Comment: Pt assisted to the toilet for a bowel movement. She pulled her briefs down/up with supevision and performed timmy-care hygiene/hand-hygiene with supervision. Neuro/balance  Balance  Sitting - Static: Good  Sitting - Dynamic: (.)  Standing - Static: Good, -  Standing - Dynamic: (.)              Safety Devices: Type of devices: Call light within reach, All fall risk precautions in place, Chair alarm in place, Gait belt, Patient at risk for falls, Left in chair      ASSESSMENT:   The pt is a 81 yo female who came to the ED with c/o of SOB associated with a cough and congestion and mild swelling of her legs. The pt admitted with early pna and acute hypoxic resp failure.  The pt is a poor historian but reports she lives note will serve as the discharge summary.     Therapy Time    Individual Concurrent Group Co-treatment   Time In 9388            Time Out 1508          Minutes 23             Timed Code Treatment Minutes: 23 Minutes      Luis Sung PT

## 2019-09-11 NOTE — PROGRESS NOTES
Impaired  Orientation Level: Oriented to person;Oriented to place; Disoriented to time;Disoriented to situation    Objective  ADL  Feeding: Setup(breakfast tray at end of the session )  Grooming: Setup;Supervision;Verbal cueing(seated at sink, patient receptive to use soap for handwashing with encouragement, yet when instructed the importance as breakfast tray was present, patient was willing)  UE Bathing: Setup;Supervision(seated at sink, not thorough/poor quality, declines soap use despite encouragement )  LE Bathing: Minimal assistance(Min A balance while performing timmy-area/buttocks, again poor quality, declines lower legs/feet at this time )  UE Dressing: (hospital gown )  LE Dressing: Minimal assistance(doff/don depends only this morning )  Toileting: Contact guard assistance(depends management/hygiene )    Balance  Sitting Balance: Supervision  Standing Balance: (variable CGA/Min A depending on task )    Transfers  Sit to stand: Contact guard assistance  Stand to sit: Contact guard assistance    Functional Mobility  Functional - Mobility Device: Rolling Walker  Activity: To/from bathroom  Assist Level: Contact guard assistance  Functional Mobility Comments: bed-toilet in BR-chair sink-recliner with RW with CGA, unsafe walker techniques at times, walker guided at times as well     Toilet Transfers  Toilet - Technique: Ambulating  Equipment Used: Grab bars  Toilet Transfer: Contact guard assistance    Bed mobility  Supine to Sit: Supervision(HOB up, use of HR )    Cognition  Overall Cognitive Status: Exceptions  Following Commands: Follows one step commands with increased time; Follows one step commands with repetition  Memory: Decreased recall of recent events;Decreased short term memory;Decreased long term memory  Safety Judgement: Decreased awareness of need for assistance;Decreased awareness of need for safety  Problem Solving: Decreased awareness of errors;Assistance required to identify errors made;Assistance required to implement solutions;Assistance required to generate solutions;Assistance required to correct errors made  Insights: Not aware of deficits  Initiation: Requires cues for some  Sequencing: Requires cues for some     Plan  Times per week: 3-5xs  Times per day: Daily  Plan weeks: 1-2 weeks  Current Treatment Recommendations: Strengthening, Safety Education & Training, Balance Training, Patient/Caregiver Education & Training, Self-Care / ADL, Cognitive/Perceptual Training, Functional Mobility Training, Neuromuscular Re-education, Equipment Evaluation, Education, & procurement, Home Management Training, Endurance Training, Cognitive Reorientation    Goals  Short term goals  Time Frame for Short term goals: 1-2 weeks: status as of 9/11/19: goals ongoing   Short term goal 1: S bathing  Short term goal 2: S dressing  Short term goal 3: S grooming  Short term goal 4: S toileting  Short term goal 5: S functional transfers  Short term goal 6: Min A light home tasks. Patient Goals   Patient goals : Pt unsure of goals- has no insight into deficits.        Therapy Time   Individual Concurrent Group Co-treatment   Time In 0740         Time Out 0815         Minutes 35         Timed Code Treatment Minutes: [address] [place] [address]

## 2019-09-12 ENCOUNTER — TELEPHONE (OUTPATIENT)
Dept: FAMILY MEDICINE CLINIC | Age: 84
End: 2019-09-12

## 2019-09-12 VITALS
BODY MASS INDEX: 23.11 KG/M2 | OXYGEN SATURATION: 96 % | SYSTOLIC BLOOD PRESSURE: 114 MMHG | HEART RATE: 91 BPM | HEIGHT: 64 IN | WEIGHT: 135.36 LBS | RESPIRATION RATE: 18 BRPM | TEMPERATURE: 97.8 F | DIASTOLIC BLOOD PRESSURE: 75 MMHG

## 2019-09-12 LAB
ANION GAP SERPL CALCULATED.3IONS-SCNC: 15 MMOL/L (ref 3–16)
BUN BLDV-MCNC: 34 MG/DL (ref 7–20)
CALCIUM SERPL-MCNC: 9.1 MG/DL (ref 8.3–10.6)
CHLORIDE BLD-SCNC: 89 MMOL/L (ref 99–110)
CO2: 33 MMOL/L (ref 21–32)
CREAT SERPL-MCNC: 0.7 MG/DL (ref 0.6–1.2)
GFR AFRICAN AMERICAN: >60
GFR NON-AFRICAN AMERICAN: >60
GLUCOSE BLD-MCNC: 116 MG/DL (ref 70–99)
POTASSIUM SERPL-SCNC: 3.4 MMOL/L (ref 3.5–5.1)
SODIUM BLD-SCNC: 137 MMOL/L (ref 136–145)

## 2019-09-12 PROCEDURE — 6370000000 HC RX 637 (ALT 250 FOR IP): Performed by: INTERNAL MEDICINE

## 2019-09-12 PROCEDURE — 94761 N-INVAS EAR/PLS OXIMETRY MLT: CPT

## 2019-09-12 PROCEDURE — 2580000003 HC RX 258: Performed by: INTERNAL MEDICINE

## 2019-09-12 PROCEDURE — 97535 SELF CARE MNGMENT TRAINING: CPT

## 2019-09-12 PROCEDURE — 94640 AIRWAY INHALATION TREATMENT: CPT

## 2019-09-12 PROCEDURE — 80048 BASIC METABOLIC PNL TOTAL CA: CPT

## 2019-09-12 PROCEDURE — 36415 COLL VENOUS BLD VENIPUNCTURE: CPT

## 2019-09-12 PROCEDURE — 97530 THERAPEUTIC ACTIVITIES: CPT

## 2019-09-12 RX ORDER — POTASSIUM CHLORIDE 20 MEQ/1
40 TABLET, EXTENDED RELEASE ORAL ONCE
Status: COMPLETED | OUTPATIENT
Start: 2019-09-12 | End: 2019-09-12

## 2019-09-12 RX ORDER — POLYETHYLENE GLYCOL 3350 17 G/17G
17 POWDER, FOR SOLUTION ORAL DAILY
Status: DISCONTINUED | OUTPATIENT
Start: 2019-09-12 | End: 2019-09-12 | Stop reason: HOSPADM

## 2019-09-12 RX ADMIN — POLYETHYLENE GLYCOL 3350 17 G: 17 POWDER, FOR SOLUTION ORAL at 11:48

## 2019-09-12 RX ADMIN — LEVOTHYROXINE SODIUM 88 MCG: 88 TABLET ORAL at 06:24

## 2019-09-12 RX ADMIN — DILTIAZEM HYDROCHLORIDE 120 MG: 120 CAPSULE, COATED, EXTENDED RELEASE ORAL at 08:33

## 2019-09-12 RX ADMIN — DULOXETINE HYDROCHLORIDE 40 MG: 20 CAPSULE, DELAYED RELEASE ORAL at 08:33

## 2019-09-12 RX ADMIN — METOPROLOL TARTRATE 50 MG: 50 TABLET ORAL at 08:33

## 2019-09-12 RX ADMIN — IPRATROPIUM BROMIDE AND ALBUTEROL SULFATE 1 AMPULE: .5; 3 SOLUTION RESPIRATORY (INHALATION) at 08:25

## 2019-09-12 RX ADMIN — FUROSEMIDE 20 MG: 20 TABLET ORAL at 08:33

## 2019-09-12 RX ADMIN — PANTOPRAZOLE SODIUM 40 MG: 40 TABLET, DELAYED RELEASE ORAL at 06:24

## 2019-09-12 RX ADMIN — IPRATROPIUM BROMIDE AND ALBUTEROL SULFATE 1 AMPULE: .5; 3 SOLUTION RESPIRATORY (INHALATION) at 12:49

## 2019-09-12 RX ADMIN — POTASSIUM CHLORIDE 40 MEQ: 20 TABLET, EXTENDED RELEASE ORAL at 11:48

## 2019-09-12 RX ADMIN — APIXABAN 5 MG: 5 TABLET, FILM COATED ORAL at 08:33

## 2019-09-12 RX ADMIN — MOMETASONE FUROATE AND FORMOTEROL FUMARATE DIHYDRATE 2 PUFF: 100; 5 AEROSOL RESPIRATORY (INHALATION) at 08:25

## 2019-09-12 RX ADMIN — IPRATROPIUM BROMIDE AND ALBUTEROL SULFATE 1 AMPULE: .5; 3 SOLUTION RESPIRATORY (INHALATION) at 16:02

## 2019-09-12 RX ADMIN — DIVALPROEX SODIUM 250 MG: 125 CAPSULE ORAL at 08:33

## 2019-09-12 RX ADMIN — SODIUM CHLORIDE, PRESERVATIVE FREE 10 ML: 5 INJECTION INTRAVENOUS at 08:34

## 2019-09-12 NOTE — PROGRESS NOTES
to OT treat, more talkative and interactive this morning, remains pleasantly confused though, asking the same questions multiple times to this writer and repeating the same the comments to this writer     Orientation  Overall Orientation Status: Impaired  Orientation Level: Oriented to person;Disoriented to time;Disoriented to situation    Objective  ADL  Grooming: Contact guard assistance(standing at sink for handwashing, oral care, comb hair, patient leaning up against the wall on the left during this )  Toileting: Contact guard assistance(clothing management/hygiene )    Balance  Sitting Balance: Supervision  Standing Balance: Contact guard assistance, patient stood for extended period of time looking out at the traffic when completed fxl mobility to carpeted lounge area    Functional Mobility  Functional - Mobility Device: Rolling Walker  Activity: Other  Assist Level: Contact guard assistance  Functional Mobility Comments: toilet in PN-bcri-IOV-hallway x 90 feet-recliner in room with RW with CGA, leaves walker at times when backing up to surfaces, requires cues to stay inside of walker during fxl mobility     Toilet Transfers  Toilet - Technique: Ambulating  Equipment Used: Grab bars  Toilet Transfer: Contact guard assistance    Cognition  Overall Cognitive Status: Exceptions  Cognition Comment: patient more talkative and interactive this morning, yet repeats the same questions or comments multiple times throughout session     Plan  Times per week: 3-5xs  Times per day: Daily  Plan weeks: 1-2 weeks  Current Treatment Recommendations: Strengthening, Safety Education & Training, Balance Training, Patient/Caregiver Education & Training, Self-Care / ADL, Cognitive/Perceptual Training, Functional Mobility Training, Neuromuscular Re-education, Equipment Evaluation, Education, & procurement, Home Management Training, Endurance Training, Cognitive Reorientation    Goals  Short term goals  Time Frame for Short term goals: 1-2 weeks: status as of 9/12/19: goals ongoing   Short term goal 1: S bathing  Short term goal 2: S dressing  Short term goal 3: S grooming  Short term goal 4: S toileting  Short term goal 5: S functional transfers  Short term goal 6: Min A light home tasks. Patient Goals   Patient goals : Pt unsure of goals- has no insight into deficits.        Therapy Time   Individual Concurrent Group Co-treatment   Time In 0730         Time Out 0810         Minutes 40         Timed Code Treatment Minutes: [address] [place] [phone]

## 2019-09-12 NOTE — PROGRESS NOTES
POA decided that she wants pt to go to [facility] instead of home with home health care. Orders to d/c patient. IV and CMU removed- pt tolerated well. AVS information reviewed and in packet with LAMBERTO - AVS info includes: Activity, Diet, New medications, Continued medications, medications to stop taking, follow up appointments, and education on diagnosis. Pt to be picked up by daughter around [phone]. Pt dressed, up in chair, call light in reach, will monitor.

## 2019-09-12 NOTE — DISCHARGE SUMMARY
Hospital Medicine Discharge Summary    Patient ID: Milagros Gu      Patient's PCP: Gina Avery MD    Admit Date: 9/2/2019     Discharge Date:   9/12/2019    Admitting Physician: Carson Dueñas MD     Discharge Physician: Leida Price MD     Discharge Diagnoses: Active Hospital Problems    Diagnosis    Pneumonia [J18.9]       The patient was seen and examined on day of discharge and this discharge summary is in conjunction with any daily progress note from day of discharge. Hospital Course: 79yo woman with moderately advanced dementia, presented form home with dyspnea. Bronchitis,  Possible  Pneumonia  With  bronchospasm - resolved. - continuing Levaquin - day 5/5 completed on 9/10.         Acute  Systolic  Heart  Failure    Last  Echo 2018 Ef  45-50%    - resumed PO lasix. - no SOB. - no orthopnea. - LE edema improving.         Acute hypoxic resp failure resolved  Briefly on 2L early in admission, able to wean off.         Atrial  Fib chronic  Rate controlled  - Continue eliquis         Dementia advanced. - Will monitor and provide supportive care.         HTN  Well controlled             Physical Exam Performed:     /75   Pulse 91   Temp 97.8 °F (36.6 °C) (Oral)   Resp 16   Ht 5' 4\" (1.626 m)   Wt 135 lb 5.8 oz (61.4 kg)   SpO2 94%   BMI 23.23 kg/m²          General appearance: No apparent distress, appears stated age and cooperative. HEENT: Pupils equal, round, and reactive to light. Conjunctivae/corneas clear. [place]  Neck: Supple, with full range of motion. Trachea midline. Respiratory:  Normal respiratory effort. Clear to auscultation, bilaterally without Rales/Wheezes/Rhonchi. Cardiovascular: Regular rate and rhythm with normal C8/W3, 2/6 systolic murmur heard best over LLSB, no rubs or gallops. Abdomen: Soft, non-tender, non-distended with normal bowel sounds. Musculoskeletal: No clubbing, cyanosis, 2+ edema bilaterally.   Full range of motion without deformity. Skin: Skin color, texture, turgor normal.  No rashes or lesions. Neurologic:  Neurovascularly intact without any focal sensory/motor deficits. Cranial nerves: II-XII intact, grossly non-focal.  Psychiatric: Alert and oriented to place, no insight. Very limited memory/recollect ability - short term mostly. Capillary Refill: Brisk,< 3 seconds   Peripheral Pulses: +2 palpable, equal bilaterally        Labs: For convenience and continuity at follow-up the following most recent labs are provided:      CBC:    Lab Results   Component Value Date    WBC 6.6 09/07/2019    HGB 10.5 09/07/2019    HCT 32.4 09/07/2019     09/07/2019       Renal:    Lab Results   Component Value Date     09/12/2019    K 3.4 09/12/2019    K 3.3 09/03/2019    CL 89 09/12/2019    CO2 33 09/12/2019    BUN 34 09/12/2019    CREATININE 0.7 09/12/2019    CALCIUM 9.1 09/12/2019    PHOS 4.1 10/02/2018         Significant Diagnostic Studies    Radiology:   XR CHEST PORTABLE   Final Result   Cardiomegaly without acute process. Consults:     IP CONSULT TO HOSPITALIST  IP CONSULT TO DIETITIAN  IP CONSULT TO SOCIAL WORK  IP CONSULT TO HOME CARE NEEDS    Disposition:  ECF     Condition at Discharge: Stable    Discharge Instructions/Follow-up:  Facility MD 1 week.      Code Status:  Full Code     Activity: activity as tolerated    Diet: regular diet      Discharge Medications:     Current Discharge Medication List           Details   furosemide (LASIX) 40 MG tablet Take 1 tablet by mouth daily  Qty: 60 tablet, Refills: 3              Details   divalproex (DEPAKOTE) 125 MG DR tablet Take 250 mg by mouth 2 times daily      levothyroxine (SYNTHROID) 88 MCG tablet TAKE 1 TABLET BY MOUTH DAILY  Qty: 90 tablet, Refills: 1      ELIQUIS 5 MG TABS tablet TAKE ONE (1) TABLET BY MOUTH TWICE DAILY  Qty: 180 tablet, Refills: 1      pantoprazole (PROTONIX) 40 MG tablet TAKE (1) TABLET BY MOUTH DAILY  Qty: 90 tablet, Refills: 1

## 2019-09-13 ENCOUNTER — CARE COORDINATION (OUTPATIENT)
Dept: CASE MANAGEMENT | Age: 84
End: 2019-09-13

## 2019-09-13 ENCOUNTER — TELEPHONE (OUTPATIENT)
Dept: FAMILY MEDICINE CLINIC | Age: 84
End: 2019-09-13

## 2019-09-14 ENCOUNTER — CARE COORDINATION (OUTPATIENT)
Dept: CASE MANAGEMENT | Age: 84
End: 2019-09-14

## 2019-09-15 ENCOUNTER — CARE COORDINATION (OUTPATIENT)
Dept: CASE MANAGEMENT | Age: 84
End: 2019-09-15

## 2019-09-15 NOTE — CARE COORDINATION
Damian 45 Transitions Initial Follow Up Call    Call within 2 business days of discharge: Yes    Patient: Paul Allen Patient : 3/2/1929   MRN: [id number]  Reason for Admission: Hypoxia  Discharge Date: 19 RARS: Readmission Risk Score: 27      Last Discharge [facility]       Complaint Diagnosis Description Type Department Provider    19 Leg Swelling; Cough Hypoxia . .. ED to Hosp-Admission (Discharged) (ADMITTED) Apolinar Santoyo MD; Peter Pimentel. .. Facility: [facility]    Non-face-to-face services provided:      2nd attempt to make contact for a follow up call, LVM introducing self, role, and nature of the call. Contact information provided.  DANRDE Alvarado RN  Care Transition Nurse [phone]        Follow Up  Future Appointments   Date Time Provider Marysol Cabral   2019  3:15 PM Catina Thomas MD [facility]       Shorty Regan RN

## 2019-09-16 ENCOUNTER — CARE COORDINATION (OUTPATIENT)
Dept: CASE MANAGEMENT | Age: 84
End: 2019-09-16

## 2019-09-16 ENCOUNTER — TELEPHONE (OUTPATIENT)
Dept: PHARMACY | Facility: CLINIC | Age: 84
End: 2019-09-16

## 2019-09-17 ENCOUNTER — CARE COORDINATION (OUTPATIENT)
Dept: CARE COORDINATION | Age: 84
End: 2019-09-17

## 2019-09-17 SDOH — ECONOMIC STABILITY: FOOD INSECURITY: WITHIN THE PAST 12 MONTHS, YOU WORRIED THAT YOUR FOOD WOULD RUN OUT BEFORE YOU GOT MONEY TO BUY MORE.: NEVER TRUE

## 2019-09-17 SDOH — HEALTH STABILITY: MENTAL HEALTH
STRESS IS WHEN SOMEONE FEELS TENSE, NERVOUS, ANXIOUS, OR CAN'T SLEEP AT NIGHT BECAUSE THEIR MIND IS TROUBLED. HOW STRESSED ARE YOU?: NOT AT ALL

## 2019-09-17 SDOH — ECONOMIC STABILITY: TRANSPORTATION INSECURITY
IN THE PAST 12 MONTHS, HAS THE LACK OF TRANSPORTATION KEPT YOU FROM MEDICAL APPOINTMENTS OR FROM GETTING MEDICATIONS?: NO

## 2019-09-17 SDOH — SOCIAL STABILITY: SOCIAL NETWORK: ARE YOU MARRIED, WIDOWED, DIVORCED, SEPARATED, NEVER MARRIED, OR LIVING WITH A PARTNER?: WIDOWED

## 2019-09-17 SDOH — ECONOMIC STABILITY: TRANSPORTATION INSECURITY
IN THE PAST 12 MONTHS, HAS LACK OF TRANSPORTATION KEPT YOU FROM MEETINGS, WORK, OR FROM GETTING THINGS NEEDED FOR DAILY LIVING?: NO

## 2019-09-17 SDOH — SOCIAL STABILITY: SOCIAL NETWORK: HOW OFTEN DO YOU GET TOGETHER WITH FRIENDS OR RELATIVES?: MORE THAN THREE TIMES A WEEK

## 2019-09-17 SDOH — HEALTH STABILITY: PHYSICAL HEALTH: ON AVERAGE, HOW MANY DAYS PER WEEK DO YOU ENGAGE IN MODERATE TO STRENUOUS EXERCISE (LIKE A BRISK WALK)?: 0 DAYS

## 2019-09-17 SDOH — ECONOMIC STABILITY: FOOD INSECURITY: WITHIN THE PAST 12 MONTHS, THE FOOD YOU BOUGHT JUST DIDN'T LAST AND YOU DIDN'T HAVE MONEY TO GET MORE.: NEVER TRUE

## 2019-09-17 SDOH — SOCIAL STABILITY: SOCIAL NETWORK
DO YOU BELONG TO ANY CLUBS OR ORGANIZATIONS SUCH AS CHURCH GROUPS UNIONS, FRATERNAL OR ATHLETIC GROUPS, OR SCHOOL GROUPS?: NO

## 2019-09-17 SDOH — HEALTH STABILITY: MENTAL HEALTH: HOW OFTEN DO YOU HAVE A DRINK CONTAINING ALCOHOL?: NEVER

## 2019-09-17 SDOH — SOCIAL STABILITY: SOCIAL NETWORK: HOW OFTEN DO YOU ATTEND CHURCH OR RELIGIOUS SERVICES?: NEVER

## 2019-09-17 SDOH — SOCIAL STABILITY: SOCIAL NETWORK
IN A TYPICAL WEEK, HOW MANY TIMES DO YOU TALK ON THE PHONE WITH FAMILY, FRIENDS, OR NEIGHBORS?: MORE THAN THREE TIMES A WEEK

## 2019-09-17 SDOH — SOCIAL STABILITY: SOCIAL NETWORK: HOW OFTEN DO YOU ATTENT MEETINGS OF THE CLUB OR ORGANIZATION YOU BELONG TO?: NEVER

## 2019-09-17 SDOH — HEALTH STABILITY: PHYSICAL HEALTH: ON AVERAGE, HOW MANY MINUTES DO YOU ENGAGE IN EXERCISE AT THIS LEVEL?: 0 MIN

## 2019-09-17 SDOH — ECONOMIC STABILITY: INCOME INSECURITY: HOW HARD IS IT FOR YOU TO PAY FOR THE VERY BASICS LIKE FOOD, HOUSING, MEDICAL CARE, AND HEATING?: NOT HARD AT ALL

## 2019-09-17 NOTE — TELEPHONE ENCOUNTER
Second and final attempt to contact the patient in regards to post-discharge medication review. Left message for patient to return my call. Will prepare and send a letter to the patient today. Will sign off at this time.      Sindi Montez, PharmD, [address] [facility]  Direct: [phone]  Department, toll free [phone], option 1800 [facility] Call Made?: Yes  [facility] ([facility]) Select Patient?: Yes  Total # of Interventions Recommended: 0  Total # Interventions Accepted: 0  Outreach Status: Patient Unreachable  Care Coordinator Outreach to Patient?: No  Provider Contacted?: No  Time Spent (min): 15

## 2019-09-22 ENCOUNTER — APPOINTMENT (OUTPATIENT)
Dept: CT IMAGING | Age: 84
End: 2019-09-22
Payer: MEDICARE

## 2019-09-22 ENCOUNTER — HOSPITAL ENCOUNTER (EMERGENCY)
Age: 84
Discharge: HOME | End: 2019-09-22
Attending: EMERGENCY MEDICINE
Payer: MEDICARE

## 2019-09-22 ENCOUNTER — APPOINTMENT (OUTPATIENT)
Dept: GENERAL RADIOLOGY | Age: 84
End: 2019-09-22
Payer: MEDICARE

## 2019-09-22 VITALS
BODY MASS INDEX: 24.62 KG/M2 | OXYGEN SATURATION: 97 % | WEIGHT: 144.18 LBS | RESPIRATION RATE: 16 BRPM | HEART RATE: 98 BPM | SYSTOLIC BLOOD PRESSURE: 106 MMHG | TEMPERATURE: 98.2 F | HEIGHT: 64 IN | DIASTOLIC BLOOD PRESSURE: 82 MMHG

## 2019-09-22 DIAGNOSIS — M25.551 RIGHT HIP PAIN: Primary | ICD-10-CM

## 2019-09-22 DIAGNOSIS — M25.511 ACUTE PAIN OF RIGHT SHOULDER: ICD-10-CM

## 2019-09-22 LAB
ANION GAP SERPL CALCULATED.3IONS-SCNC: 11 MMOL/L (ref 3–16)
BASOPHILS ABSOLUTE: 0.1 K/UL (ref 0–0.2)
BASOPHILS RELATIVE PERCENT: 1.6 %
BUN BLDV-MCNC: 12 MG/DL (ref 7–20)
CALCIUM SERPL-MCNC: 9.2 MG/DL (ref 8.3–10.6)
CHLORIDE BLD-SCNC: 97 MMOL/L (ref 99–110)
CO2: 27 MMOL/L (ref 21–32)
CREAT SERPL-MCNC: 0.6 MG/DL (ref 0.6–1.2)
EOSINOPHILS ABSOLUTE: 0.1 K/UL (ref 0–0.6)
EOSINOPHILS RELATIVE PERCENT: 1.8 %
GFR AFRICAN AMERICAN: >60
GFR NON-AFRICAN AMERICAN: >60
GLUCOSE BLD-MCNC: 90 MG/DL (ref 70–99)
HCT VFR BLD CALC: 31.9 % (ref 36–48)
HEMOGLOBIN: 10 G/DL (ref 12–16)
LYMPHOCYTES ABSOLUTE: 1.5 K/UL (ref 1–5.1)
LYMPHOCYTES RELATIVE PERCENT: 24.7 %
MCH RBC QN AUTO: 25.1 PG (ref 26–34)
MCHC RBC AUTO-ENTMCNC: 31.4 G/DL (ref 31–36)
MCV RBC AUTO: 79.8 FL (ref 80–100)
MONOCYTES ABSOLUTE: 1.1 K/UL (ref 0–1.3)
MONOCYTES RELATIVE PERCENT: 17.7 %
NEUTROPHILS ABSOLUTE: 3.4 K/UL (ref 1.7–7.7)
NEUTROPHILS RELATIVE PERCENT: 54.2 %
PDW BLD-RTO: 20.9 % (ref 12.4–15.4)
PLATELET # BLD: 254 K/UL (ref 135–450)
PMV BLD AUTO: 8.7 FL (ref 5–10.5)
POTASSIUM REFLEX MAGNESIUM: 4 MMOL/L (ref 3.5–5.1)
RBC # BLD: 3.99 M/UL (ref 4–5.2)
SODIUM BLD-SCNC: 135 MMOL/L (ref 136–145)
WBC # BLD: 6.3 K/UL (ref 4–11)

## 2019-09-22 PROCEDURE — 72131 CT LUMBAR SPINE W/O DYE: CPT

## 2019-09-22 PROCEDURE — 80048 BASIC METABOLIC PNL TOTAL CA: CPT

## 2019-09-22 PROCEDURE — 70450 CT HEAD/BRAIN W/O DYE: CPT

## 2019-09-22 PROCEDURE — 73030 X-RAY EXAM OF SHOULDER: CPT

## 2019-09-22 PROCEDURE — 72128 CT CHEST SPINE W/O DYE: CPT

## 2019-09-22 PROCEDURE — 85025 COMPLETE CBC W/AUTO DIFF WBC: CPT

## 2019-09-22 PROCEDURE — 73502 X-RAY EXAM HIP UNI 2-3 VIEWS: CPT

## 2019-09-22 PROCEDURE — 99284 EMERGENCY DEPT VISIT MOD MDM: CPT

## 2019-09-22 PROCEDURE — 72125 CT NECK SPINE W/O DYE: CPT

## 2019-09-22 ASSESSMENT — PAIN SCALES - GENERAL
PAINLEVEL_OUTOF10: 6
PAINLEVEL_OUTOF10: 0
PAINLEVEL_OUTOF10: 0

## 2019-09-22 ASSESSMENT — PAIN DESCRIPTION - LOCATION: LOCATION: HIP;SHOULDER

## 2019-09-22 ASSESSMENT — PAIN DESCRIPTION - ORIENTATION: ORIENTATION: RIGHT

## 2019-09-22 ASSESSMENT — ENCOUNTER SYMPTOMS
SHORTNESS OF BREATH: 0
VOMITING: 0
ABDOMINAL PAIN: 0
COUGH: 0
GASTROINTESTINAL NEGATIVE: 1
NAUSEA: 0
EYES NEGATIVE: 1
RESPIRATORY NEGATIVE: 1
BACK PAIN: 1

## 2019-09-22 ASSESSMENT — PAIN DESCRIPTION - FREQUENCY: FREQUENCY: CONTINUOUS

## 2019-09-22 ASSESSMENT — PAIN - FUNCTIONAL ASSESSMENT: PAIN_FUNCTIONAL_ASSESSMENT: ACTIVITIES ARE NOT PREVENTED

## 2019-09-22 ASSESSMENT — PAIN DESCRIPTION - PROGRESSION: CLINICAL_PROGRESSION: NOT CHANGED

## 2019-09-22 ASSESSMENT — PAIN DESCRIPTION - DESCRIPTORS: DESCRIPTORS: ACHING

## 2019-09-22 ASSESSMENT — PAIN DESCRIPTION - PAIN TYPE: TYPE: ACUTE PAIN

## 2019-09-22 ASSESSMENT — PAIN DESCRIPTION - ONSET: ONSET: ON-GOING

## 2019-09-22 NOTE — ED NOTES
Pt comes to ER with complaints of falling and landing on right side. Pt complaining of right hip right shoulder and general right side of body pain. Pt denies LOC. No obvious deformity. Pt arrived with c-collar in place and on back board. Pt removed from backboard. No obvious trauma. Labs drawn and sent. Pt to ct-scan.        Alejandro Salas RN  09/22/19 5395

## 2019-09-22 NOTE — ED PROVIDER NOTES
breakfast)    FUROSEMIDE (LASIX) 40 MG TABLET    Take 1 tablet by mouth daily    LEVOTHYROXINE (SYNTHROID) 88 MCG TABLET    TAKE 1 TABLET BY MOUTH DAILY    LINZESS 290 MCG CAPS CAPSULE    Take 290 mcg by mouth every morning (before breakfast)     MOMETASONE (NASONEX) 50 MCG/ACT NASAL SPRAY    2 sprays by Nasal route daily    MONTELUKAST (SINGULAIR) 10 MG TABLET    Take 1 tablet by mouth nightly    MULTIPLE VITAMINS-MINERALS (THERAPEUTIC MULTIVITAMIN-MINERALS) TABLET    Take 1 tablet by mouth daily    OXYMETAZOLINE (12 HOUR NASAL SPRAY) 0.05 % NASAL SPRAY    If you develop a nosebleed, 1 spray bilateral then clamp nose for 15 minutes    PANTOPRAZOLE (PROTONIX) 40 MG TABLET    TAKE (1) TABLET BY MOUTH DAILY    POLYETHYLENE GLYCOL 3350 (MIRALAX PO)    Take 17 g by mouth daily as needed (constipation)     PROAIR  (90 BASE) MCG/ACT INHALER    2 PUFFS INTO LUNGS EVERY 4 HOURS AS NEEDED FOR WHEEZING OR FORSHORTNESS OF BREATH    ROPINIROLE (REQUIP) 1 MG TABLET    TAKE TWO (2) TABLETS BY MOUTH NIGHTLY    SPACER/AERO-HOLDING CHAMBERS (E-Z SPACER) POLLY    1 Device by Does not apply route daily as needed. SYMBICORT 160-4.5 MCG/ACT AERO    INHALE 2 PUFFS TWICE A DAY INTO THE LUNGS    VITAMIN B-12 (CYANOCOBALAMIN) 1000 MCG TABLET    Take 1,000 mcg by mouth daily       ALLERGIES     Gluten meal; Demerol; Lidocaine hcl; Other; Procaine; Tetanus toxoid, adsorbed; Vicodin [hydrocodone-acetaminophen]; Augmentin [amoxicillin-pot clavulanate]; Hydrocodone-acetaminophen; Meperidine; Prednisone; and Tetanus toxoids    FAMILY HISTORY       Family History   Problem Relation Age of Onset    Cancer Mother     Stroke Father     Arthritis Other     Asthma Other     Diabetes Other     Heart Disease Other           SOCIAL HISTORY       Social History     Socioeconomic History    Marital status:       Spouse name: None    Number of children: None    Years of education: None    Highest education level: None   Occupational History    Occupation: Retired teacher   Social Needs    Financial resource strain: Not hard at all   [employer] insecurity:     Worry: Never true     Inability: Never true   [employer] needs:     Medical: No     Non-medical: No   Tobacco Use    Smoking status: Former Smoker     Packs/day: 0.25     Years: 20.00     Pack years: 5.00     Last attempt to quit: 1970     Years since quittin.1    Smokeless tobacco: Never Used   Substance and Sexual Activity    Alcohol use: No     Frequency: Never     Binge frequency: Never    Drug use: No    Sexual activity: Not Currently     Partners: Male   Lifestyle    Physical activity:     Days per week: 0 days     Minutes per session: 0 min    Stress: Not at all   Relationships    Social connections:     Talks on phone: More than three times a week     Gets together: More than three times a week     Attends Faith service: Never     Active member of club or organization: No     Attends meetings of clubs or organizations: Never     Relationship status:     Intimate partner violence:     Fear of current or ex partner: None     Emotionally abused: None     Physically abused: None     Forced sexual activity: None   Other Topics Concern    None   Social History Narrative    Pt's daughter, Ramo Jl contact due to pt's cognitive status       SCREENINGS             PHYSICAL EXAM    (up to 7 for level 4, 8 or more for level 5)     ED Triage Vitals [19]   BP Temp Temp Source Pulse Resp SpO2 Height Weight   (!) 151/81 98.1 °F (36.7 °C) Oral 93 16 95 % 5' 4\" (1.626 m) 144 lb 2.9 oz (65.4 kg)      height is 5' 4\" (1.626 m) and weight is 144 lb 2.9 oz (65.4 kg). Her oral temperature is 98.1 °F (36.7 °C). Her blood pressure is 162/93 (abnormal) and her pulse is 93. Her respiration is 27 and oxygen saturation is 97%. Physical Exam   Constitutional: Appears chronically ill. Thin. No distress. HENT:    Head: Normocephalic and atraumatic.    Eyes: other labs were within normal range or not returned as of this dictation. EKG: All EKG's are interpreted by the Emergency Department Physician who either signs or co-signs this chart in the absence of a cardiologist.    RADIOLOGY:   plain film images such as CT, Ultrasound and MRI are read by the radiologist. Plain radiographic images are visualized and preliminarily interpreted by the ED Provider with the below findings:    Xr Shoulder Right (min 2 Views)    Result Date: 9/22/2019  EXAMINATION: ONE XRAY VIEW OF THE PELVIS AND TWO XRAY VIEWS RIGHT HIP; THREE XRAY VIEWS OF THE RIGHT SHOULDER 9/22/2019 8:04 pm COMPARISON: Pelvic radiographs dated 08/29/2019 and chest radiograph dated 09/02/2019 HISTORY: ORDERING SYSTEM PROVIDED HISTORY: fall with right hip pain TECHNOLOGIST PROVIDED HISTORY: Reason for exam:->fall with right hip pain Reason for Exam: fall with right hip pain FINDINGS: Pelvis and right hip: The bones are osteopenic. Osteoarthritis of the right hip is seen. No acute fracture is appreciated. Degenerative change of the lower lumbar spine are evident. Right shoulder: AC arthritis is present. Bones are osteopenic. No acute fracture is seen. Degenerative changes in the mid lower cervical spine are evident. Osteopenia no acute osseous injury of the pelvis, right hip or right shoulder. Osteoarthritis. Ct Head Wo Contrast    Result Date: 9/22/2019  EXAMINATION: CT OF THE HEAD WITHOUT CONTRAST; CT OF THE CERVICAL SPINE WITHOUT CONTRAST 9/22/2019 7:28 pm TECHNIQUE: CT of the head was performed without the administration of intravenous contrast. Dose modulation, iterative reconstruction, and/or weight based adjustment of the mA/kV was utilized to reduce the radiation dose to as low as reasonably achievable.; CT of the cervical spine was performed without the administration of intravenous contrast. Multiplanar reformatted images are provided for review.  Dose modulation, iterative reconstruction, and/or weight based adjustment of the mA/kV was utilized to reduce the radiation dose to as low as reasonably achievable. COMPARISON: CT brain dated 08/29/2019, and CT cervical spine dated 08/29/2019 HISTORY: ORDERING SYSTEM PROVIDED HISTORY: fall on eliquis TECHNOLOGIST PROVIDED HISTORY: Has a \"code stroke\" or \"stroke alert\" been called? ->No Reason for Exam: fall on eliquis FINDINGS: CT brain: BRAIN/VENTRICLES: There are focal and confluent areas of diminished attenuation in the white matter supratentorially indicative of chronic small vessel ischemic change. No mass or hemorrhage is seen intracranially. No midline shift is noted. Atherosclerotic vascular calcifications are present. ORBITS: The visualized portion of the orbits demonstrate no acute abnormality. SINUSES: The visualized paranasal sinuses and mastoid air cells demonstrate no acute abnormality. SOFT TISSUES/SKULL:  No acute abnormality of the visualized skull or soft tissues. CT cervical spine: The cervical vertebrae are intact. Degenerative disc and joint disease is evident. Atherosclerotic vascular calcifications are seen. The prevertebral soft tissues are maintained. Emphysematous changes are seen in the lung apices. No acute intracranial abnormality. No acute osseous injury of the cervical spine. Sequela of chronic small vessel ischemic change. Degenerative disc and joint disease cervical spine.      Ct Cervical Spine Wo Contrast    Result Date: 9/22/2019  EXAMINATION: CT OF THE HEAD WITHOUT CONTRAST; CT OF THE CERVICAL SPINE WITHOUT CONTRAST 9/22/2019 7:28 pm TECHNIQUE: CT of the head was performed without the administration of intravenous contrast. Dose modulation, iterative reconstruction, and/or weight based adjustment of the mA/kV was utilized to reduce the radiation dose to as low as reasonably achievable.; CT of the cervical spine was performed without the administration of intravenous contrast. Multiplanar reformatted images are Leela Tillman MD  09/22/19 9277

## 2019-09-22 NOTE — ED NOTES
Bed: B-09  Expected date:   Expected time:   Means of arrival:   Comments:  So Skelton RN  09/22/19 1911

## 2019-09-23 ENCOUNTER — CARE COORDINATION (OUTPATIENT)
Dept: CARE COORDINATION | Age: 84
End: 2019-09-23

## 2019-09-23 NOTE — ED NOTES
Spoke with pt daughter. Trying to get a hold of pt grandson to pick pt up.        Toñito Rucker RN  09/22/19 8671

## 2019-09-24 ENCOUNTER — TELEPHONE (OUTPATIENT)
Dept: FAMILY MEDICINE CLINIC | Age: 84
End: 2019-09-24

## 2019-09-24 NOTE — TELEPHONE ENCOUNTER
Bart Clark w/ [facility] called to report that pt had a fall on Sunday - pt has a life line and pressed the button, squad came and took pt to [facility] to get checked out and everything was fine.

## 2019-09-27 ENCOUNTER — APPOINTMENT (OUTPATIENT)
Dept: GENERAL RADIOLOGY | Age: 84
End: 2019-09-27
Payer: MEDICARE

## 2019-09-27 ENCOUNTER — HOSPITAL ENCOUNTER (EMERGENCY)
Age: 84
Discharge: HOME | End: 2019-09-27
Attending: EMERGENCY MEDICINE
Payer: MEDICARE

## 2019-09-27 ENCOUNTER — APPOINTMENT (OUTPATIENT)
Dept: CT IMAGING | Age: 84
End: 2019-09-27
Payer: MEDICARE

## 2019-09-27 DIAGNOSIS — S20.229A CONTUSION OF BACK, UNSPECIFIED LATERALITY, INITIAL ENCOUNTER: ICD-10-CM

## 2019-09-27 DIAGNOSIS — S16.1XXA ACUTE STRAIN OF NECK MUSCLE, INITIAL ENCOUNTER: ICD-10-CM

## 2019-09-27 DIAGNOSIS — S09.90XA INJURY OF HEAD, INITIAL ENCOUNTER: Primary | ICD-10-CM

## 2019-09-27 PROCEDURE — 70450 CT HEAD/BRAIN W/O DYE: CPT

## 2019-09-27 PROCEDURE — 99284 EMERGENCY DEPT VISIT MOD MDM: CPT

## 2019-09-27 PROCEDURE — 72100 X-RAY EXAM L-S SPINE 2/3 VWS: CPT

## 2019-09-27 PROCEDURE — 72125 CT NECK SPINE W/O DYE: CPT

## 2019-09-27 ASSESSMENT — PAIN DESCRIPTION - ORIENTATION
ORIENTATION: POSTERIOR
ORIENTATION: POSTERIOR;RIGHT
ORIENTATION: POSTERIOR
ORIENTATION: LEFT

## 2019-09-27 ASSESSMENT — PAIN DESCRIPTION - FREQUENCY: FREQUENCY: CONTINUOUS

## 2019-09-27 ASSESSMENT — PAIN SCALES - GENERAL
PAINLEVEL_OUTOF10: 6
PAINLEVEL_OUTOF10: 5
PAINLEVEL_OUTOF10: 6
PAINLEVEL_OUTOF10: 6

## 2019-09-27 ASSESSMENT — PAIN DESCRIPTION - PAIN TYPE
TYPE: ACUTE PAIN

## 2019-09-27 ASSESSMENT — PAIN DESCRIPTION - DESCRIPTORS
DESCRIPTORS: ACHING;SORE
DESCRIPTORS: ACHING;SORE
DESCRIPTORS: ACHING

## 2019-09-27 ASSESSMENT — PAIN DESCRIPTION - PROGRESSION: CLINICAL_PROGRESSION: NOT CHANGED

## 2019-09-27 ASSESSMENT — PAIN - FUNCTIONAL ASSESSMENT
PAIN_FUNCTIONAL_ASSESSMENT: 0-10
PAIN_FUNCTIONAL_ASSESSMENT: ACTIVITIES ARE NOT PREVENTED
PAIN_FUNCTIONAL_ASSESSMENT: PREVENTS OR INTERFERES SOME ACTIVE ACTIVITIES AND ADLS

## 2019-09-27 ASSESSMENT — PAIN DESCRIPTION - LOCATION
LOCATION: HEAD
LOCATION: HEAD
LOCATION: HEAD;HIP
LOCATION: NECK

## 2019-09-27 ASSESSMENT — PAIN DESCRIPTION - ONSET: ONSET: SUDDEN

## 2019-09-28 VITALS
DIASTOLIC BLOOD PRESSURE: 73 MMHG | WEIGHT: 146.06 LBS | OXYGEN SATURATION: 95 % | BODY MASS INDEX: 24.33 KG/M2 | RESPIRATION RATE: 16 BRPM | SYSTOLIC BLOOD PRESSURE: 120 MMHG | HEART RATE: 59 BPM | HEIGHT: 65 IN | TEMPERATURE: 99.1 F

## 2019-09-28 NOTE — ED TRIAGE NOTES
Patient to EMERGENCY DEPARTMENT per Beryl Osgood -patient states taking out trash in slippers- fell backwards onto right shoulder with posterior head, neck pain approx 30 min prior to arrival.denies LOC. cervical collar in place

## 2019-09-28 NOTE — ED PROVIDER NOTES
nasal spray 2 sprays by Nasal route daily 1 Inhaler 0    Spacer/Aero-Holding Chambers (E-Z SPACER) POLLY 1 Device by Does not apply route daily as needed. 1 Device 0    Calcium Carbonate-Vit D-Min (CALTRATE PLUS PO) Take 1 tablet by mouth daily        Allergies   Allergen Reactions    Gluten Meal Diarrhea    Demerol     Lidocaine Hcl     Other     Procaine     Tetanus Toxoid, Adsorbed     Vicodin [Hydrocodone-Acetaminophen]     Augmentin [Amoxicillin-Pot Clavulanate] Itching    Hydrocodone-Acetaminophen Palpitations    Meperidine Palpitations    Prednisone Palpitations    Tetanus Toxoids Palpitations       REVIEW OF SYSTEMS  Positives and pertinent negatives as per HPI. Ten other systems were reviewed and are negative. Nursing notes pertaining to ROS were reviewed. PHYSICAL EXAM   /66   Pulse 88   Temp 99.1 °F (37.3 °C) (Oral)   Resp 18   Ht 5' 5\" (1.651 m)   Wt 146 lb 1 oz (66.3 kg)   SpO2 96%   BMI 24.31 kg/m²   General: Alert and oriented x 3, NAD. No increased work of breathing or accessory muscle use. Non-ill appearing. Appropriate and interactive  Eyes: PERRL, no scleral icterus, injection or exudate. EOMI. HENT: Mild tenderness with no significant hematoma or bony step-off over the midline occiput. oral pharynx is clear, moist, no enanthem. No tonsillar hypertropy or exudate. Nasal mucous membranes are clear. TM's are clear without evidence of otitis media. No hemotympanum. Negative Menchaca's and raccoon sign. No epistaxis. No trismus or jaw pain. Neck:  No Lymphadenopathy. Mildly tender to palpation over bilateral paraspinal muscles and lower cervical midline as well as over both trapezius muscles without bruising or hematoma. .  Normal ROM. No JVD. No thyromegaly. No Mass. PULMONARY: Lungs clear bilaterally without wheezes, rales or rhonchi. Good air movement bilaterally. CV: Regular rate and rhythm without murmurs, rubs or gallops.     ABD: Soft, non-tender, non-distended, normal bowel sounds, no hepatosplenomegaly, no masses. No peritoneal signs, rebound or guarding. Back:  No CVAT, no rash. Mild tenderness over the sacrum and coccyx without bruising or hematoma  EXT: No cyanosis or clubbing. No rash. CR < 2 seconds. No tenderness to palpation. No lower extremity edema. +2 pulses in upper/lower extremities bilaterally. Skin is warm and dry. No tenderness to palpation of the bilateral clavicles, shoulders, elbows, wrists, hips, knees, ankles with normal range of motion of the upper and lower extremities without pain with palpation or with range of motion  PSYCH: normal affect  NEURO: Alert and oriented x 3, NAD. Interactive. GCS 15.  CN 2-12 are intact. PERRL. EOMI. Visual fields are normal.    5 of 5 LE strength that is bilaterally symmetric.  5 of 5 UE strength that is bilaterally symmetric. Normal sensation to light touch of the UE and LE.  2/4 DTR of the biceps, patellar and Achilles tendons. No clonus. Normal Romberg without pronator drift. Normal finger to nose testing without past pointing. No ataxia or truncal instability. Cervical spine:  Normal sensation of the back of the head and neck bilaterally  Normal deltoid, biceps strength bilaterally  Normal strength with extension of the wrist and fingers bilaterally  Normal finger flexion strength bilaterally  Normal finger abduction strength bilaterally    Thoracic spine:  Normal sensation of the trunk bilaterally    Lumbar spine:  Normal sensation of the inner thigh bilaterally  Normal thigh adduction strength bilaterally  Normal knee extension bilaterally  Normal ankle and great toe dorsiflexion bilaterally  Normal Patellar 2/4 reflexes bilaterally  Normal plantarflexion of the toes bilaterally        RADIOLOGY    XR LUMBAR SPINE (2-3 VIEWS)   Final Result   No acute abnormality detected within the lumbar spine.          CT Cervical Spine WO Contrast   Final Result   No acute cervical spine fracture. Multilevel mild-to-moderate degenerative   disease in the cervical spine. No acute intracranial hemorrhage or mass effect. Chronic small vessel   ischemic disease. CT Head WO Contrast   Final Result   No acute cervical spine fracture. Multilevel mild-to-moderate degenerative   disease in the cervical spine. No acute intracranial hemorrhage or mass effect. Chronic small vessel   ischemic disease. ED COURSE/MDM  Fall with closed head injury, cervical strain and sacral contusion: CAT scan of the head, cervical spine and plain films of the lumbosacral spine reveals no acute intracranial injury, bony fracture. Patient has a normal neurologic exam with an NIH stroke scale of 0 with no focal deficits. Tylenol as needed. Ice as needed. Return to the emergency room for worsening symptoms. Patient was given scripts for the following medications. I counseled patient how to take these medications. New Prescriptions    No medications on file         CLINICAL IMPRESSION  1. Injury of head, initial encounter    2. Contusion of back, unspecified laterality, initial encounter    3. Acute strain of neck muscle, initial encounter        Blood pressure 113/66, pulse 88, temperature 99.1 °F (37.3 °C), temperature source Oral, resp. rate 18, height 5' 5\" (1.651 m), weight 146 lb 1 oz (66.3 kg), SpO2 96 %, not currently breastfeeding.       Follow-up with:  Nedra Smith MD  [address]  [phone]    In 1 week            Madai Mason MD  09/27/19 7068

## 2019-09-30 ENCOUNTER — TELEPHONE (OUTPATIENT)
Dept: FAMILY MEDICINE CLINIC | Age: 84
End: 2019-09-30
Payer: MEDICARE

## 2019-09-30 ENCOUNTER — CARE COORDINATION (OUTPATIENT)
Dept: CARE COORDINATION | Age: 84
End: 2019-09-30

## 2019-09-30 DIAGNOSIS — I11.0 HYPERTENSIVE HEART DISEASE WITH CONGESTIVE HEART FAILURE, UNSPECIFIED HEART FAILURE TYPE (HCC): Primary | ICD-10-CM

## 2019-09-30 PROCEDURE — G0180 MD CERTIFICATION HHA PATIENT: HCPCS | Performed by: FAMILY MEDICINE

## 2019-10-01 ENCOUNTER — OFFICE VISIT (OUTPATIENT)
Dept: FAMILY MEDICINE CLINIC | Age: 84
End: 2019-10-01
Payer: MEDICARE

## 2019-10-01 VITALS
SYSTOLIC BLOOD PRESSURE: 124 MMHG | DIASTOLIC BLOOD PRESSURE: 80 MMHG | WEIGHT: 143 LBS | HEIGHT: 65 IN | TEMPERATURE: 97.8 F | BODY MASS INDEX: 23.82 KG/M2

## 2019-10-01 DIAGNOSIS — Z23 NEEDS FLU SHOT: ICD-10-CM

## 2019-10-01 DIAGNOSIS — F03.90 DEMENTIA WITHOUT BEHAVIORAL DISTURBANCE, UNSPECIFIED DEMENTIA TYPE: Primary | ICD-10-CM

## 2019-10-01 PROCEDURE — 90653 IIV ADJUVANT VACCINE IM: CPT | Performed by: FAMILY MEDICINE

## 2019-10-01 PROCEDURE — G0008 ADMIN INFLUENZA VIRUS VAC: HCPCS | Performed by: FAMILY MEDICINE

## 2019-10-01 PROCEDURE — 99213 OFFICE O/P EST LOW 20 MIN: CPT | Performed by: FAMILY MEDICINE

## 2019-10-03 ENCOUNTER — TELEPHONE (OUTPATIENT)
Dept: FAMILY MEDICINE CLINIC | Age: 84
End: 2019-10-03

## 2019-10-07 RX ORDER — MONTELUKAST SODIUM 10 MG/1
10 TABLET ORAL NIGHTLY
Qty: 90 TABLET | Refills: 1 | Status: SHIPPED | OUTPATIENT
Start: 2019-10-07 | End: 2020-07-09

## 2019-10-08 ENCOUNTER — TELEPHONE (OUTPATIENT)
Dept: PHARMACY | Facility: CLINIC | Age: 84
End: 2019-10-08

## 2019-10-18 ENCOUNTER — CARE COORDINATION (OUTPATIENT)
Dept: CARE COORDINATION | Age: 84
End: 2019-10-18

## 2019-10-21 ENCOUNTER — CARE COORDINATION (OUTPATIENT)
Dept: CARE COORDINATION | Age: 84
End: 2019-10-21

## 2019-10-24 ENCOUNTER — HOSPITAL ENCOUNTER (EMERGENCY)
Age: 84
Discharge: HOME | End: 2019-10-24
Attending: EMERGENCY MEDICINE
Payer: MEDICARE

## 2019-10-24 ENCOUNTER — APPOINTMENT (OUTPATIENT)
Dept: GENERAL RADIOLOGY | Age: 84
End: 2019-10-24
Payer: MEDICARE

## 2019-10-24 ENCOUNTER — APPOINTMENT (OUTPATIENT)
Dept: CT IMAGING | Age: 84
End: 2019-10-24
Payer: MEDICARE

## 2019-10-24 VITALS
BODY MASS INDEX: 25.64 KG/M2 | SYSTOLIC BLOOD PRESSURE: 130 MMHG | TEMPERATURE: 98.5 F | DIASTOLIC BLOOD PRESSURE: 74 MMHG | RESPIRATION RATE: 16 BRPM | HEART RATE: 83 BPM | OXYGEN SATURATION: 97 % | HEIGHT: 65 IN | WEIGHT: 153.88 LBS
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
## 2019-10-24 DIAGNOSIS — S09.90XA CLOSED HEAD INJURY, INITIAL ENCOUNTER: Primary | ICD-10-CM

## 2019-10-24 PROCEDURE — 71045 X-RAY EXAM CHEST 1 VIEW: CPT

## 2019-10-24 PROCEDURE — 99284 EMERGENCY DEPT VISIT MOD MDM: CPT

## 2019-10-24 PROCEDURE — 72125 CT NECK SPINE W/O DYE: CPT

## 2019-10-24 PROCEDURE — 70450 CT HEAD/BRAIN W/O DYE: CPT

## 2019-10-24 RX ORDER — APIXABAN 5 MG/1
TABLET, FILM COATED ORAL
COMMUNITY
Start: 2019-10-17 | End: 2019-11-04 | Stop reason: ALTCHOICE

## 2019-10-24 ASSESSMENT — PAIN DESCRIPTION - LOCATION: LOCATION: HEAD

## 2019-10-24 ASSESSMENT — PAIN DESCRIPTION - PAIN TYPE: TYPE: ACUTE PAIN

## 2019-10-24 ASSESSMENT — PAIN SCALES - GENERAL: PAINLEVEL_OUTOF10: 3

## 2019-10-25 ENCOUNTER — CARE COORDINATION (OUTPATIENT)
Dept: CARE COORDINATION | Age: 84
End: 2019-10-25

## 2019-10-28 ENCOUNTER — TELEPHONE (OUTPATIENT)
Dept: FAMILY MEDICINE CLINIC | Age: 84
End: 2019-10-28

## 2019-10-29 RX ORDER — FUROSEMIDE 40 MG/1
TABLET ORAL
Qty: 90 TABLET | Refills: 10 | Status: ON HOLD
Start: 2019-10-29 | End: 2020-06-02 | Stop reason: HOSPADM

## 2019-10-30 ENCOUNTER — CARE COORDINATION (OUTPATIENT)
Dept: CARE COORDINATION | Age: 84
End: 2019-10-30

## 2019-11-04 ENCOUNTER — CARE COORDINATION (OUTPATIENT)
Dept: CARE COORDINATION | Age: 84
End: 2019-11-04

## 2019-11-04 ENCOUNTER — OFFICE VISIT (OUTPATIENT)
Dept: FAMILY MEDICINE CLINIC | Age: 84
End: 2019-11-04
Payer: MEDICARE

## 2019-11-04 ENCOUNTER — TELEPHONE (OUTPATIENT)
Dept: FAMILY MEDICINE CLINIC | Age: 84
End: 2019-11-04

## 2019-11-04 VITALS
DIASTOLIC BLOOD PRESSURE: 64 MMHG | BODY MASS INDEX: 25.49 KG/M2 | HEIGHT: 65 IN | HEART RATE: 68 BPM | WEIGHT: 153 LBS | SYSTOLIC BLOOD PRESSURE: 122 MMHG

## 2019-11-04 DIAGNOSIS — I50.9 CHF WITH UNKNOWN LVEF (HCC): Primary | ICD-10-CM

## 2019-11-04 PROCEDURE — 99213 OFFICE O/P EST LOW 20 MIN: CPT | Performed by: FAMILY MEDICINE

## 2019-11-05 ENCOUNTER — TELEPHONE (OUTPATIENT)
Dept: FAMILY MEDICINE CLINIC | Age: 84
End: 2019-11-05

## 2019-11-05 NOTE — TELEPHONE ENCOUNTER
Vinod Nicholson w/ [facility] calling to see if paperwork was signed by Dr Osmni Muhammad. Pt apt was yesterday with them.     [phone]

## 2019-11-26 ENCOUNTER — CARE COORDINATION (OUTPATIENT)
Dept: CARE COORDINATION | Age: 84
End: 2019-11-26

## 2020-01-09 ENCOUNTER — TELEPHONE (OUTPATIENT)
Dept: FAMILY MEDICINE CLINIC | Age: 85
End: 2020-01-09
Payer: MEDICARE

## 2020-01-09 PROCEDURE — G0180 MD CERTIFICATION HHA PATIENT: HCPCS | Performed by: FAMILY MEDICINE

## 2020-05-23 ENCOUNTER — HOSPITAL ENCOUNTER (INPATIENT)
Age: 85
LOS: 10 days | Discharge: NURSING, REHAB OR LONG-TERM CARE | DRG: 177 | End: 2020-06-02
Attending: EMERGENCY MEDICINE | Admitting: INTERNAL MEDICINE
Payer: MEDICARE

## 2020-05-23 ENCOUNTER — APPOINTMENT (OUTPATIENT)
Dept: GENERAL RADIOLOGY | Age: 85
DRG: 177 | End: 2020-05-23
Payer: MEDICARE

## 2020-05-23 LAB
A/G RATIO: 1.2 (ref 1.1–2.2)
ALBUMIN SERPL-MCNC: 3.6 G/DL (ref 3.4–5)
ALP BLD-CCNC: 151 U/L (ref 40–129)
ALT SERPL-CCNC: 19 U/L (ref 10–40)
ANION GAP SERPL CALCULATED.3IONS-SCNC: 11 MMOL/L (ref 3–16)
AST SERPL-CCNC: 45 U/L (ref 15–37)
BASOPHILS ABSOLUTE: 0 K/UL (ref 0–0.2)
BASOPHILS RELATIVE PERCENT: 0.9 %
BILIRUB SERPL-MCNC: 0.9 MG/DL (ref 0–1)
BILIRUBIN URINE: NEGATIVE
BLOOD, URINE: NEGATIVE
BUN BLDV-MCNC: 23 MG/DL (ref 7–20)
CALCIUM SERPL-MCNC: 8.6 MG/DL (ref 8.3–10.6)
CHLORIDE BLD-SCNC: 95 MMOL/L (ref 99–110)
CLARITY: CLEAR
CO2: 26 MMOL/L (ref 21–32)
COLOR: YELLOW
CREAT SERPL-MCNC: 0.6 MG/DL (ref 0.6–1.2)
D DIMER: 294 NG/ML DDU (ref 0–229)
EOSINOPHILS ABSOLUTE: 0 K/UL (ref 0–0.6)
EOSINOPHILS RELATIVE PERCENT: 0 %
EPITHELIAL CELLS, UA: 0 /HPF (ref 0–5)
GFR AFRICAN AMERICAN: >60
GFR NON-AFRICAN AMERICAN: >60
GLOBULIN: 2.9 G/DL
GLUCOSE BLD-MCNC: 95 MG/DL (ref 70–99)
GLUCOSE URINE: NEGATIVE MG/DL
HCT VFR BLD CALC: 38.2 % (ref 36–48)
HEMOGLOBIN: 12.4 G/DL (ref 12–16)
HYALINE CASTS: 1 /LPF (ref 0–8)
INR BLD: 1.37 (ref 0.86–1.14)
KETONES, URINE: ABNORMAL MG/DL
LACTATE DEHYDROGENASE: 356 U/L (ref 100–190)
LACTIC ACID: 0.7 MMOL/L (ref 0.4–2)
LEUKOCYTE ESTERASE, URINE: NEGATIVE
LYMPHOCYTES ABSOLUTE: 1 K/UL (ref 1–5.1)
LYMPHOCYTES RELATIVE PERCENT: 21 %
MAGNESIUM: 1.9 MG/DL (ref 1.8–2.4)
MCH RBC QN AUTO: 29.8 PG (ref 26–34)
MCHC RBC AUTO-ENTMCNC: 32.5 G/DL (ref 31–36)
MCV RBC AUTO: 91.7 FL (ref 80–100)
MICROSCOPIC EXAMINATION: YES
MONOCYTES ABSOLUTE: 0.5 K/UL (ref 0–1.3)
MONOCYTES RELATIVE PERCENT: 11.1 %
NEUTROPHILS ABSOLUTE: 3.3 K/UL (ref 1.7–7.7)
NEUTROPHILS RELATIVE PERCENT: 67 %
NITRITE, URINE: NEGATIVE
PDW BLD-RTO: 17.2 % (ref 12.4–15.4)
PH UA: 6 (ref 5–8)
PLATELET # BLD: 96 K/UL (ref 135–450)
PLATELET SLIDE REVIEW: ABNORMAL
PMV BLD AUTO: 9.7 FL (ref 5–10.5)
POTASSIUM REFLEX MAGNESIUM: 3.2 MMOL/L (ref 3.5–5.1)
PRO-BNP: 4527 PG/ML (ref 0–449)
PROCALCITONIN: 0.12 NG/ML (ref 0–0.15)
PROTEIN UA: 100 MG/DL
PROTHROMBIN TIME: 16 SEC (ref 10–13.2)
RBC # BLD: 4.16 M/UL (ref 4–5.2)
RBC UA: 3 /HPF (ref 0–4)
SODIUM BLD-SCNC: 132 MMOL/L (ref 136–145)
SPECIFIC GRAVITY UA: 1.02 (ref 1–1.03)
TOTAL CK: 290 U/L (ref 26–192)
TOTAL PROTEIN: 6.5 G/DL (ref 6.4–8.2)
TROPONIN: <0.01 NG/ML
URINE REFLEX TO CULTURE: ABNORMAL
URINE TYPE: ABNORMAL
UROBILINOGEN, URINE: 1 E.U./DL
WBC # BLD: 4.9 K/UL (ref 4–11)
WBC UA: 1 /HPF (ref 0–5)

## 2020-05-23 PROCEDURE — 80053 COMPREHEN METABOLIC PANEL: CPT

## 2020-05-23 PROCEDURE — 84145 PROCALCITONIN (PCT): CPT

## 2020-05-23 PROCEDURE — 87449 NOS EACH ORGANISM AG IA: CPT

## 2020-05-23 PROCEDURE — 96367 TX/PROPH/DG ADDL SEQ IV INF: CPT

## 2020-05-23 PROCEDURE — 87040 BLOOD CULTURE FOR BACTERIA: CPT

## 2020-05-23 PROCEDURE — 83735 ASSAY OF MAGNESIUM: CPT

## 2020-05-23 PROCEDURE — 6370000000 HC RX 637 (ALT 250 FOR IP): Performed by: EMERGENCY MEDICINE

## 2020-05-23 PROCEDURE — 2700000000 HC OXYGEN THERAPY PER DAY

## 2020-05-23 PROCEDURE — 71045 X-RAY EXAM CHEST 1 VIEW: CPT

## 2020-05-23 PROCEDURE — 96365 THER/PROPH/DIAG IV INF INIT: CPT

## 2020-05-23 PROCEDURE — 82728 ASSAY OF FERRITIN: CPT

## 2020-05-23 PROCEDURE — 6370000000 HC RX 637 (ALT 250 FOR IP): Performed by: INTERNAL MEDICINE

## 2020-05-23 PROCEDURE — 83615 LACTATE (LD) (LDH) ENZYME: CPT

## 2020-05-23 PROCEDURE — 6360000002 HC RX W HCPCS: Performed by: PHYSICIAN ASSISTANT

## 2020-05-23 PROCEDURE — 81001 URINALYSIS AUTO W/SCOPE: CPT

## 2020-05-23 PROCEDURE — 2580000003 HC RX 258: Performed by: PHYSICIAN ASSISTANT

## 2020-05-23 PROCEDURE — 84484 ASSAY OF TROPONIN QUANT: CPT

## 2020-05-23 PROCEDURE — 99285 EMERGENCY DEPT VISIT HI MDM: CPT

## 2020-05-23 PROCEDURE — 6360000002 HC RX W HCPCS: Performed by: INTERNAL MEDICINE

## 2020-05-23 PROCEDURE — 94640 AIRWAY INHALATION TREATMENT: CPT

## 2020-05-23 PROCEDURE — 94760 N-INVAS EAR/PLS OXIMETRY 1: CPT

## 2020-05-23 PROCEDURE — 93005 ELECTROCARDIOGRAM TRACING: CPT | Performed by: EMERGENCY MEDICINE

## 2020-05-23 PROCEDURE — 83880 ASSAY OF NATRIURETIC PEPTIDE: CPT

## 2020-05-23 PROCEDURE — 85025 COMPLETE CBC W/AUTO DIFF WBC: CPT

## 2020-05-23 PROCEDURE — 82550 ASSAY OF CK (CPK): CPT

## 2020-05-23 PROCEDURE — 83605 ASSAY OF LACTIC ACID: CPT

## 2020-05-23 PROCEDURE — 85379 FIBRIN DEGRADATION QUANT: CPT

## 2020-05-23 PROCEDURE — 96372 THER/PROPH/DIAG INJ SC/IM: CPT

## 2020-05-23 PROCEDURE — 1200000000 HC SEMI PRIVATE

## 2020-05-23 PROCEDURE — 85610 PROTHROMBIN TIME: CPT

## 2020-05-23 PROCEDURE — 96375 TX/PRO/DX INJ NEW DRUG ADDON: CPT

## 2020-05-23 PROCEDURE — U0003 INFECTIOUS AGENT DETECTION BY NUCLEIC ACID (DNA OR RNA); SEVERE ACUTE RESPIRATORY SYNDROME CORONAVIRUS 2 (SARS-COV-2) (CORONAVIRUS DISEASE [COVID-19]), AMPLIFIED PROBE TECHNIQUE, MAKING USE OF HIGH THROUGHPUT TECHNOLOGIES AS DESCRIBED BY CMS-2020-01-R: HCPCS

## 2020-05-23 RX ORDER — ACETAMINOPHEN 325 MG/1
650 TABLET ORAL EVERY 4 HOURS PRN
Status: DISCONTINUED | OUTPATIENT
Start: 2020-05-23 | End: 2020-06-02 | Stop reason: HOSPADM

## 2020-05-23 RX ORDER — TRAZODONE HYDROCHLORIDE 50 MG/1
50 TABLET ORAL NIGHTLY
Status: ON HOLD | COMMUNITY
End: 2020-06-02 | Stop reason: HOSPADM

## 2020-05-23 RX ORDER — LANOLIN ALCOHOL/MO/W.PET/CERES
1000 CREAM (GRAM) TOPICAL DAILY
Status: DISCONTINUED | OUTPATIENT
Start: 2020-05-23 | End: 2020-06-02 | Stop reason: HOSPADM

## 2020-05-23 RX ORDER — IPRATROPIUM BROMIDE AND ALBUTEROL SULFATE 2.5; .5 MG/3ML; MG/3ML
1 SOLUTION RESPIRATORY (INHALATION) EVERY 4 HOURS PRN
Status: DISCONTINUED | OUTPATIENT
Start: 2020-05-23 | End: 2020-06-02 | Stop reason: HOSPADM

## 2020-05-23 RX ORDER — LEVOTHYROXINE SODIUM 88 UG/1
88 TABLET ORAL DAILY
Status: DISCONTINUED | OUTPATIENT
Start: 2020-05-24 | End: 2020-06-02 | Stop reason: HOSPADM

## 2020-05-23 RX ORDER — FLUTICASONE PROPIONATE 50 MCG
2 SPRAY, SUSPENSION (ML) NASAL DAILY
Status: DISCONTINUED | OUTPATIENT
Start: 2020-05-24 | End: 2020-06-02 | Stop reason: HOSPADM

## 2020-05-23 RX ORDER — FERROUS SULFATE 325(65) MG
325 TABLET ORAL
Status: DISCONTINUED | OUTPATIENT
Start: 2020-05-24 | End: 2020-06-02 | Stop reason: HOSPADM

## 2020-05-23 RX ORDER — DONEPEZIL HYDROCHLORIDE 5 MG/1
10 TABLET, FILM COATED ORAL NIGHTLY
Status: DISCONTINUED | OUTPATIENT
Start: 2020-05-23 | End: 2020-06-02 | Stop reason: HOSPADM

## 2020-05-23 RX ORDER — ROPINIROLE 1 MG/1
2 TABLET, FILM COATED ORAL NIGHTLY
Status: DISCONTINUED | OUTPATIENT
Start: 2020-05-23 | End: 2020-06-02 | Stop reason: HOSPADM

## 2020-05-23 RX ORDER — MONTELUKAST SODIUM 10 MG/1
10 TABLET ORAL NIGHTLY
Status: DISCONTINUED | OUTPATIENT
Start: 2020-05-23 | End: 2020-06-02 | Stop reason: HOSPADM

## 2020-05-23 RX ORDER — ACETAMINOPHEN 500 MG
1000 TABLET ORAL ONCE
Status: COMPLETED | OUTPATIENT
Start: 2020-05-23 | End: 2020-05-23

## 2020-05-23 RX ORDER — POTASSIUM CHLORIDE 1.5 G/1.77G
20 POWDER, FOR SOLUTION ORAL DAILY
Status: ON HOLD | COMMUNITY
End: 2020-06-02 | Stop reason: HOSPADM

## 2020-05-23 RX ORDER — DULOXETIN HYDROCHLORIDE 20 MG/1
40 CAPSULE, DELAYED RELEASE ORAL DAILY
Status: DISCONTINUED | OUTPATIENT
Start: 2020-05-24 | End: 2020-06-02 | Stop reason: HOSPADM

## 2020-05-23 RX ORDER — FUROSEMIDE 40 MG/1
40 TABLET ORAL DAILY
Status: DISCONTINUED | OUTPATIENT
Start: 2020-05-24 | End: 2020-05-24

## 2020-05-23 RX ORDER — AMMONIUM LACTATE 12 G/100G
CREAM TOPICAL PRN
Status: DISCONTINUED | OUTPATIENT
Start: 2020-05-23 | End: 2020-06-02 | Stop reason: HOSPADM

## 2020-05-23 RX ORDER — PANTOPRAZOLE SODIUM 40 MG/1
40 TABLET, DELAYED RELEASE ORAL
Status: DISCONTINUED | OUTPATIENT
Start: 2020-05-24 | End: 2020-06-02 | Stop reason: HOSPADM

## 2020-05-23 RX ORDER — ACETAMINOPHEN 325 MG/1
TABLET ORAL
Status: DISPENSED
Start: 2020-05-23 | End: 2020-05-24

## 2020-05-23 RX ORDER — METOPROLOL SUCCINATE 50 MG/1
50 TABLET, EXTENDED RELEASE ORAL 2 TIMES DAILY
Status: ON HOLD | COMMUNITY
End: 2020-06-02 | Stop reason: HOSPADM

## 2020-05-23 RX ORDER — VITAMIN B COMPLEX
5000 TABLET ORAL DAILY
Status: DISCONTINUED | OUTPATIENT
Start: 2020-05-23 | End: 2020-06-02 | Stop reason: HOSPADM

## 2020-05-23 RX ORDER — POLYETHYLENE GLYCOL 3350 17 G/17G
17 POWDER, FOR SOLUTION ORAL DAILY
Status: DISCONTINUED | OUTPATIENT
Start: 2020-05-24 | End: 2020-06-02 | Stop reason: HOSPADM

## 2020-05-23 RX ORDER — DIVALPROEX SODIUM 250 MG/1
250 TABLET, DELAYED RELEASE ORAL 2 TIMES DAILY
Status: DISCONTINUED | OUTPATIENT
Start: 2020-05-23 | End: 2020-06-02 | Stop reason: HOSPADM

## 2020-05-23 RX ORDER — FUROSEMIDE 10 MG/ML
40 INJECTION INTRAMUSCULAR; INTRAVENOUS ONCE
Status: COMPLETED | OUTPATIENT
Start: 2020-05-23 | End: 2020-05-23

## 2020-05-23 RX ORDER — DILTIAZEM HYDROCHLORIDE 120 MG/1
120 CAPSULE, COATED, EXTENDED RELEASE ORAL DAILY
Status: DISCONTINUED | OUTPATIENT
Start: 2020-05-23 | End: 2020-05-24

## 2020-05-23 RX ORDER — OXYMETAZOLINE HYDROCHLORIDE 0.05 G/100ML
1 SPRAY NASAL 2 TIMES DAILY
Status: DISCONTINUED | OUTPATIENT
Start: 2020-05-23 | End: 2020-05-23

## 2020-05-23 RX ORDER — M-VIT,TX,IRON,MINS/CALC/FOLIC 27MG-0.4MG
1 TABLET ORAL DAILY
Status: DISCONTINUED | OUTPATIENT
Start: 2020-05-23 | End: 2020-06-02 | Stop reason: HOSPADM

## 2020-05-23 RX ORDER — BUDESONIDE AND FORMOTEROL FUMARATE DIHYDRATE 160; 4.5 UG/1; UG/1
2 AEROSOL RESPIRATORY (INHALATION) 2 TIMES DAILY
Status: DISCONTINUED | OUTPATIENT
Start: 2020-05-23 | End: 2020-06-02 | Stop reason: HOSPADM

## 2020-05-23 RX ADMIN — MONTELUKAST SODIUM 10 MG: 10 TABLET, FILM COATED ORAL at 22:18

## 2020-05-23 RX ADMIN — VANCOMYCIN HYDROCHLORIDE 1000 MG: 1 INJECTION, POWDER, LYOPHILIZED, FOR SOLUTION INTRAVENOUS at 13:36

## 2020-05-23 RX ADMIN — DONEPEZIL HYDROCHLORIDE 10 MG: 5 TABLET, FILM COATED ORAL at 22:18

## 2020-05-23 RX ADMIN — DIVALPROEX SODIUM 250 MG: 250 TABLET, DELAYED RELEASE ORAL at 22:18

## 2020-05-23 RX ADMIN — ROPINIROLE HYDROCHLORIDE 2 MG: 1 TABLET, FILM COATED ORAL at 22:18

## 2020-05-23 RX ADMIN — CEFEPIME HYDROCHLORIDE 2 G: 2 INJECTION, POWDER, FOR SOLUTION INTRAVENOUS at 13:05

## 2020-05-23 RX ADMIN — ACETAMINOPHEN 650 MG: 325 TABLET, FILM COATED ORAL at 23:36

## 2020-05-23 RX ADMIN — FUROSEMIDE 40 MG: 10 INJECTION, SOLUTION INTRAMUSCULAR; INTRAVENOUS at 14:40

## 2020-05-23 RX ADMIN — ACETAMINOPHEN 1000 MG: 500 TABLET, FILM COATED ORAL at 12:40

## 2020-05-23 RX ADMIN — ENOXAPARIN SODIUM 60 MG: 60 INJECTION SUBCUTANEOUS at 13:59

## 2020-05-23 RX ADMIN — ENOXAPARIN SODIUM 30 MG: 30 INJECTION SUBCUTANEOUS at 22:18

## 2020-05-23 RX ADMIN — Medication 2 PUFF: at 20:47

## 2020-05-23 ASSESSMENT — ENCOUNTER SYMPTOMS
CHEST TIGHTNESS: 0
DIARRHEA: 1
ABDOMINAL PAIN: 0
STRIDOR: 0
COUGH: 1
CONSTIPATION: 0
VOMITING: 0
SHORTNESS OF BREATH: 0
COLOR CHANGE: 0
BACK PAIN: 0
PHOTOPHOBIA: 0
NAUSEA: 0
WHEEZING: 0

## 2020-05-23 ASSESSMENT — PAIN SCALES - WONG BAKER
WONGBAKER_NUMERICALRESPONSE: 0

## 2020-05-23 ASSESSMENT — PAIN SCALES - GENERAL
PAINLEVEL_OUTOF10: 0

## 2020-05-23 NOTE — ED NOTES
Pt brought in by EMS from [facility] for sudden onset of fever, congested cough and one episode diarrhea that began this morning. Pt warm to touch, awake, alert but appears to be confused. Pt has expiratory wheezes, NSR/ST on monitor, bp stable. Pt placed on 2L of o2 due to sats of 90% on RA. Pt apparently uses o2 at night.   PT on cardiac monitor, call light in reach, will continue to monitor     Minnie Sainz RN  05/23/20 3610

## 2020-05-23 NOTE — ED PROVIDER NOTES
Bilateral lower extremity compression stockings, 15-20 mmHg    DILTIAZEM (CARDIZEM CD) 120 MG EXTENDED RELEASE CAPSULE    Take 1 capsule by mouth daily    DIVALPROEX (DEPAKOTE) 125 MG DR TABLET    Take 250 mg by mouth 2 times daily    DONEPEZIL (ARICEPT) 10 MG TABLET    Take 10 mg by mouth nightly     DULOXETINE (CYMBALTA) 20 MG EXTENDED RELEASE CAPSULE    Take 40 mg by mouth daily     FERROUS SULFATE 325 (65 FE) MG TABLET    Take 325 mg by mouth daily (with breakfast)    FUROSEMIDE (LASIX) 40 MG TABLET    TAKE 1 TABLET BY MOUTH ONCE DAILY    LEVOTHYROXINE (SYNTHROID) 88 MCG TABLET    TAKE 1 TABLET BY MOUTH DAILY    LINZESS 290 MCG CAPS CAPSULE    Take 290 mcg by mouth every morning (before breakfast)     MOMETASONE (NASONEX) 50 MCG/ACT NASAL SPRAY    2 sprays by Nasal route daily    MONTELUKAST (SINGULAIR) 10 MG TABLET    TAKE 1 TABLET BY MOUTH NIGHTLY    MULTIPLE VITAMINS-MINERALS (THERAPEUTIC MULTIVITAMIN-MINERALS) TABLET    Take 1 tablet by mouth daily    OXYMETAZOLINE (12 HOUR NASAL SPRAY) 0.05 % NASAL SPRAY    If you develop a nosebleed, 1 spray bilateral then clamp nose for 15 minutes    PANTOPRAZOLE (PROTONIX) 40 MG TABLET    TAKE (1) TABLET BY MOUTH DAILY    POLYETHYLENE GLYCOL 3350 (MIRALAX PO)    Take 17 g by mouth daily as needed (constipation)     PROAIR  (90 BASE) MCG/ACT INHALER    2 PUFFS INTO LUNGS EVERY 4 HOURS AS NEEDED FOR WHEEZING OR FORSHORTNESS OF BREATH    ROPINIROLE (REQUIP) 1 MG TABLET    TAKE TWO (2) TABLETS BY MOUTH NIGHTLY    SPACER/AERO-HOLDING CHAMBERS (E-Z SPACER) POLLY    1 Device by Does not apply route daily as needed. SYMBICORT 160-4.5 MCG/ACT AERO    INHALE 2 PUFFS TWICE A DAY INTO THE LUNGS    VITAMIN B-12 (CYANOCOBALAMIN) 1000 MCG TABLET    Take 1,000 mcg by mouth daily         ALLERGIES     Gluten meal; Demerol; Lidocaine hcl; Other; Procaine; Tetanus toxoid, adsorbed; Vicodin [hydrocodone-acetaminophen];  Augmentin [amoxicillin-pot clavulanate]; control. Chest:      Chest wall: No tenderness. Abdominal:      General: Abdomen is flat. There is no distension. Palpations: Abdomen is soft. There is no mass. Tenderness: There is no abdominal tenderness. There is no right CVA tenderness, left CVA tenderness, guarding or rebound. Hernia: No hernia is present. Musculoskeletal: Normal range of motion. Lymphadenopathy:      Cervical: No cervical adenopathy. Skin:     General: Skin is warm and dry. Coloration: Skin is not pale. Findings: No erythema or rash. Neurological:      Mental Status: She is alert and oriented to person, place, and time.    Psychiatric:         Behavior: Behavior normal.         DIAGNOSTIC RESULTS   LABS:    Labs Reviewed   CBC WITH AUTO DIFFERENTIAL - Abnormal; Notable for the following components:       Result Value    RDW 17.2 (*)     Platelets 96 (*)     All other components within normal limits    Narrative:     Performed at:  [facility] [address], [place], [address] [employer] [address]   Phone [phone]   COMPREHENSIVE METABOLIC PANEL W/ REFLEX TO MG FOR LOW K - Abnormal; Notable for the following components:    Sodium 132 (*)     Potassium reflex Magnesium 3.2 (*)     Chloride 95 (*)     BUN 23 (*)     Alkaline Phosphatase 151 (*)     AST 45 (*)     All other components within normal limits    Narrative:     Performed at:  [facility] [address], [place], [address] [facility]   Phone [phone]   PROTIME-INR - Abnormal; Notable for the following components:    Protime 16.0 (*)     INR 1.37 (*)     All other components within normal limits    Narrative:     Performed at:  [facility] [address], [place], [address] [address]   Phone [phone]   [address] - Abnormal; Notable for the following components:    Pro-BNP 4,527 (*)     All other components within normal limits    Narrative:     Performed at:  [facility] [address]  [place], [address] [employer]   Phone [phone]   CK - Abnormal; Notable for the following components:     Total  (*)     All other components within normal limits    Narrative:     Performed at:  [facility] [address] [employer],  [place], [address]   Phone [phone]   LACTATE DEHYDROGENASE - Abnormal; Notable for the following components:     (*)     All other components within normal limits    Narrative:     Performed at:  [facility] [address] [employer]  [place], [address] [employer]   Phone [phone]   URINE RT REFLEX TO CULTURE - Abnormal; Notable for the following components:    Ketones, Urine TRACE (*)     Protein,  (*)     All other components within normal limits    Narrative:     Performed at:  [facility] [address]  [place], [address] [employer]   Phone [phone]   D-DIMER, QUANTITATIVE - Abnormal; Notable for the following components:    D-Dimer, Quant 294 (*)     All other components within normal limits    Narrative:     Performed at:  [facility] [address] [employer]  [place], [address] [place]   Phone [phone]   CULTURE, BLOOD 1   CULTURE, BLOOD 2   LEGIONELLA ANTIGEN, URINE   STREP PNEUMONIAE ANTIGEN   TROPONIN    Narrative:     Performed at:  [facility] [address],  [place], [address] [employer]   Phone [phone]   PROCALCITONIN    Narrative:     Performed at:  [facility] [address] [employer]  [place], [address] [employer]   Phone [phone]   LACTIC ACID, PLASMA    Narrative:     Performed at:  [facility] [address] [employer],  [place], [address] [employer]   Phone [phone]   MICROSCOPIC URINALYSIS    Narrative:     Performed at:  [facility] [address], [address] [employer] admission at this time. FINAL IMPRESSION      1. Febrile illness, acute    2. Suspected COVID-19 virus infection    3. Multifocal pneumonia          DISPOSITION/PLAN   DISPOSITION Decision To Admit 05/23/2020 01:49:44 PM      PATIENT REFERREDTO:  No follow-up provider specified.     DISCHARGE MEDICATIONS:  New Prescriptions    No medications on file       DISCONTINUED MEDICATIONS:  Discontinued Medications    No medications on file              (Please note that portions of this note were completed with a voice recognition program.  Efforts were made to edit the dictations but occasionally words are mis-transcribed.)    LEONIDES Ruiz (electronically signed)          LEONIDES Winston  05/23/20 7559

## 2020-05-23 NOTE — ED NOTES
Bed: 27  Expected date:   Expected time:   Means of arrival:   Comments:  89 fever     Marinda Felty, RN  05/23/20 1200

## 2020-05-23 NOTE — ED PROVIDER NOTES
time spent doing separately billable procedures. This includes time at the bedside, data interpretation, medication management, obtaining critical history from collateral sources if the patient is unable to provide it directly, and physician consultation. Specifics of interventions taken and potentially life-threatening diagnostic considerations are listed above in the medical decision making. This chart was created using Dragon dictation software. Efforts were made by me to ensure accuracy, however some errors may be present due to limitations of this technology.             Lucina Feliciano MD  05/23/20 3126

## 2020-05-24 PROBLEM — Z20.822 SUSPECTED COVID-19 VIRUS INFECTION: Status: ACTIVE | Noted: 2020-05-24

## 2020-05-24 PROBLEM — I50.9 CHF WITH UNKNOWN LVEF (HCC): Status: RESOLVED | Noted: 2018-08-01 | Resolved: 2020-05-24

## 2020-05-24 PROBLEM — J12.9 VIRAL PNEUMONIA: Status: ACTIVE | Noted: 2020-05-24

## 2020-05-24 PROBLEM — M77.42 METATARSALGIA OF LEFT FOOT: Status: RESOLVED | Noted: 2018-01-17 | Resolved: 2020-05-24

## 2020-05-24 PROBLEM — R09.02 HYPOXEMIA: Status: ACTIVE | Noted: 2020-05-24

## 2020-05-24 LAB
ANION GAP SERPL CALCULATED.3IONS-SCNC: 10 MMOL/L (ref 3–16)
BUN BLDV-MCNC: 20 MG/DL (ref 7–20)
CALCIUM SERPL-MCNC: 9.1 MG/DL (ref 8.3–10.6)
CHLORIDE BLD-SCNC: 92 MMOL/L (ref 99–110)
CO2: 31 MMOL/L (ref 21–32)
CREAT SERPL-MCNC: 0.7 MG/DL (ref 0.6–1.2)
EKG ATRIAL RATE: 97 BPM
EKG DIAGNOSIS: NORMAL
EKG Q-T INTERVAL: 398 MS
EKG QRS DURATION: 146 MS
EKG QTC CALCULATION (BAZETT): 531 MS
EKG R AXIS: -15 DEGREES
EKG T AXIS: -53 DEGREES
EKG VENTRICULAR RATE: 107 BPM
FERRITIN: 445.5 NG/ML (ref 15–150)
GFR AFRICAN AMERICAN: >60
GFR NON-AFRICAN AMERICAN: >60
GLUCOSE BLD-MCNC: 89 MG/DL (ref 70–99)
HCT VFR BLD CALC: 38.5 % (ref 36–48)
HEMOGLOBIN: 12.6 G/DL (ref 12–16)
MAGNESIUM: 1.8 MG/DL (ref 1.8–2.4)
MCH RBC QN AUTO: 30 PG (ref 26–34)
MCHC RBC AUTO-ENTMCNC: 32.7 G/DL (ref 31–36)
MCV RBC AUTO: 91.9 FL (ref 80–100)
PDW BLD-RTO: 16.9 % (ref 12.4–15.4)
PLATELET # BLD: 125 K/UL (ref 135–450)
PMV BLD AUTO: 10.7 FL (ref 5–10.5)
POTASSIUM REFLEX MAGNESIUM: 2.9 MMOL/L (ref 3.5–5.1)
POTASSIUM SERPL-SCNC: 2.6 MMOL/L (ref 3.5–5.1)
RBC # BLD: 4.19 M/UL (ref 4–5.2)
SARS-COV-2, PCR: DETECTED
SODIUM BLD-SCNC: 133 MMOL/L (ref 136–145)
WBC # BLD: 5.2 K/UL (ref 4–11)

## 2020-05-24 PROCEDURE — 94761 N-INVAS EAR/PLS OXIMETRY MLT: CPT

## 2020-05-24 PROCEDURE — 80048 BASIC METABOLIC PNL TOTAL CA: CPT

## 2020-05-24 PROCEDURE — 1200000000 HC SEMI PRIVATE

## 2020-05-24 PROCEDURE — 2700000000 HC OXYGEN THERAPY PER DAY

## 2020-05-24 PROCEDURE — 93010 ELECTROCARDIOGRAM REPORT: CPT | Performed by: INTERNAL MEDICINE

## 2020-05-24 PROCEDURE — 6360000002 HC RX W HCPCS: Performed by: INTERNAL MEDICINE

## 2020-05-24 PROCEDURE — 84132 ASSAY OF SERUM POTASSIUM: CPT

## 2020-05-24 PROCEDURE — 93005 ELECTROCARDIOGRAM TRACING: CPT | Performed by: INTERNAL MEDICINE

## 2020-05-24 PROCEDURE — 85027 COMPLETE CBC AUTOMATED: CPT

## 2020-05-24 PROCEDURE — 94640 AIRWAY INHALATION TREATMENT: CPT

## 2020-05-24 PROCEDURE — 36415 COLL VENOUS BLD VENIPUNCTURE: CPT

## 2020-05-24 PROCEDURE — 2580000003 HC RX 258: Performed by: INTERNAL MEDICINE

## 2020-05-24 PROCEDURE — 83735 ASSAY OF MAGNESIUM: CPT

## 2020-05-24 PROCEDURE — 6370000000 HC RX 637 (ALT 250 FOR IP): Performed by: INTERNAL MEDICINE

## 2020-05-24 PROCEDURE — 2500000003 HC RX 250 WO HCPCS: Performed by: INTERNAL MEDICINE

## 2020-05-24 RX ORDER — FUROSEMIDE 20 MG/1
20 TABLET ORAL DAILY
Status: DISCONTINUED | OUTPATIENT
Start: 2020-05-25 | End: 2020-06-02 | Stop reason: HOSPADM

## 2020-05-24 RX ORDER — SODIUM CHLORIDE 0.9 % (FLUSH) 0.9 %
10 SYRINGE (ML) INJECTION EVERY 12 HOURS SCHEDULED
Status: DISCONTINUED | OUTPATIENT
Start: 2020-05-24 | End: 2020-05-29

## 2020-05-24 RX ORDER — DILTIAZEM HYDROCHLORIDE 5 MG/ML
10 INJECTION INTRAVENOUS ONCE
Status: COMPLETED | OUTPATIENT
Start: 2020-05-24 | End: 2020-05-24

## 2020-05-24 RX ORDER — SODIUM CHLORIDE 0.9 % (FLUSH) 0.9 %
10 SYRINGE (ML) INJECTION PRN
Status: DISCONTINUED | OUTPATIENT
Start: 2020-05-24 | End: 2020-06-02 | Stop reason: HOSPADM

## 2020-05-24 RX ADMIN — MULTIPLE VITAMINS W/ MINERALS TAB 1 TABLET: TAB at 08:45

## 2020-05-24 RX ADMIN — DULOXETINE HYDROCHLORIDE 40 MG: 20 CAPSULE, DELAYED RELEASE ORAL at 08:44

## 2020-05-24 RX ADMIN — FERROUS SULFATE TAB 325 MG (65 MG ELEMENTAL FE) 325 MG: 325 (65 FE) TAB at 08:45

## 2020-05-24 RX ADMIN — LINACLOTIDE 290 MCG: 145 CAPSULE, GELATIN COATED ORAL at 06:45

## 2020-05-24 RX ADMIN — ENOXAPARIN SODIUM 30 MG: 30 INJECTION SUBCUTANEOUS at 08:43

## 2020-05-24 RX ADMIN — DILTIAZEM HYDROCHLORIDE 5 MG/HR: 5 INJECTION INTRAVENOUS at 22:08

## 2020-05-24 RX ADMIN — PANTOPRAZOLE SODIUM 40 MG: 40 TABLET, DELAYED RELEASE ORAL at 06:38

## 2020-05-24 RX ADMIN — DILTIAZEM HYDROCHLORIDE 10 MG: 5 INJECTION INTRAVENOUS at 21:58

## 2020-05-24 RX ADMIN — ENOXAPARIN SODIUM 30 MG: 30 INJECTION SUBCUTANEOUS at 21:07

## 2020-05-24 RX ADMIN — POTASSIUM BICARBONATE 20 MEQ: 782 TABLET, EFFERVESCENT ORAL at 12:21

## 2020-05-24 RX ADMIN — FUROSEMIDE 40 MG: 40 TABLET ORAL at 08:45

## 2020-05-24 RX ADMIN — DILTIAZEM HYDROCHLORIDE 120 MG: 120 CAPSULE, COATED, EXTENDED RELEASE ORAL at 08:45

## 2020-05-24 RX ADMIN — Medication 10 ML: at 12:21

## 2020-05-24 RX ADMIN — POLYETHYLENE GLYCOL 3350 17 G: 17 POWDER, FOR SOLUTION ORAL at 08:43

## 2020-05-24 RX ADMIN — Medication 2 PUFF: at 20:45

## 2020-05-24 RX ADMIN — LEVOTHYROXINE SODIUM 88 MCG: 88 TABLET ORAL at 08:44

## 2020-05-24 RX ADMIN — POTASSIUM BICARBONATE 40 MEQ: 782 TABLET, EFFERVESCENT ORAL at 21:04

## 2020-05-24 RX ADMIN — DIVALPROEX SODIUM 250 MG: 250 TABLET, DELAYED RELEASE ORAL at 08:45

## 2020-05-24 RX ADMIN — MONTELUKAST SODIUM 10 MG: 10 TABLET, FILM COATED ORAL at 21:03

## 2020-05-24 RX ADMIN — CYANOCOBALAMIN TAB 1000 MCG 1000 MCG: 1000 TAB at 08:45

## 2020-05-24 RX ADMIN — DONEPEZIL HYDROCHLORIDE 10 MG: 5 TABLET, FILM COATED ORAL at 21:03

## 2020-05-24 RX ADMIN — VITAMIN D, TAB 1000IU (100/BT) 5000 UNITS: 25 TAB at 08:44

## 2020-05-24 RX ADMIN — DIVALPROEX SODIUM 250 MG: 250 TABLET, DELAYED RELEASE ORAL at 21:02

## 2020-05-24 RX ADMIN — CEFEPIME HYDROCHLORIDE 2 G: 2 INJECTION, POWDER, FOR SOLUTION INTRAVENOUS at 12:20

## 2020-05-24 RX ADMIN — CEFEPIME HYDROCHLORIDE 2 G: 2 INJECTION, POWDER, FOR SOLUTION INTRAVENOUS at 00:59

## 2020-05-24 RX ADMIN — ROPINIROLE HYDROCHLORIDE 2 MG: 1 TABLET, FILM COATED ORAL at 21:03

## 2020-05-24 RX ADMIN — ACETAMINOPHEN 650 MG: 325 TABLET, FILM COATED ORAL at 21:07

## 2020-05-24 RX ADMIN — VANCOMYCIN HYDROCHLORIDE 1000 MG: 1 INJECTION, POWDER, LYOPHILIZED, FOR SOLUTION INTRAVENOUS at 13:30

## 2020-05-24 RX ADMIN — Medication 2 PUFF: at 10:44

## 2020-05-24 RX ADMIN — Medication 10 ML: at 21:03

## 2020-05-24 ASSESSMENT — PAIN SCALES - WONG BAKER

## 2020-05-24 ASSESSMENT — PAIN SCALES - GENERAL
PAINLEVEL_OUTOF10: 0

## 2020-05-24 NOTE — H&P
children, now there are  two living. She has absolutely no history of drinking or any drug  abuse. She was working as a . She used to smoke about  quater pack a day, but she has quit at least for 20 plus years or more. REVIEW OF SYSTEMS:  No loss of consciousness. No TIA. No speech  disturbance. No swallowing disturbance. The patient does have cough,  congestion and moderate shortness of breath. There is no orthopnea. No  paroxysmal nocturnal dyspnea. There is no mucoid sputum production. No  hemoptysis. No abdominal pain. No hematemesis. No melena. No  genitourinary complaint. Earlier had some diarrhea. There is no  nausea, vomiting. Does have chronic musculoskeletal pain. There is no  seizure activity. No other neurologic symptoms. PHYSICAL EXAMINATION:  GENERAL:  Alert, awake, oriented x0, very pleasantly confused and  cooperative white woman appears to be in moderate distress. VITAL SIGNS:  Her temperature was 102.9, blood pressure is 152/89,  respirations are 24, heart rate is 114, O2 sat is 95% on nasal cannula. NECK:  Supple. Mild jugular venous distention. No accessory muscles of  respiration in use. LUNGS:  Shows bibasilar crackles. There is no wheezing. HEART:  Regular rate and rhythm, S1, S2 without any S3 or S4 gallop. ABDOMEN:  Soft, nontender. Bowel sounds present. EXTREMITIES:  Shows trace edema. NEUROLOGIC:  There are no acute focal deficits. The patient appears to  be moving all the four extremities against gravity. LABORATORY DATA:  Sodium is 132, potassium 3.2, chloride 95, CO2 26, BUN  23, creatinine 0.6, anion gap is 11, magnesium 1.90, total , total  . ProBNP level 4527. Troponin less than 0.01. Albumin 3.6,  globulin 2.9, alkaline phosphatase 151, AST and ALT are 45 and 19,  bilirubin is 0.9. Blood sugar is 95. White blood cell count is 4.9,  hemoglobin and hematocrit 12.4 and 38.2, platelet count is 96.   PT/INR  is 16 and

## 2020-05-25 PROBLEM — U07.1 COVID-19 VIRUS INFECTION: Status: ACTIVE | Noted: 2020-05-25

## 2020-05-25 LAB
ANION GAP SERPL CALCULATED.3IONS-SCNC: 11 MMOL/L (ref 3–16)
BUN BLDV-MCNC: 23 MG/DL (ref 7–20)
CALCIUM SERPL-MCNC: 8 MG/DL (ref 8.3–10.6)
CHLORIDE BLD-SCNC: 94 MMOL/L (ref 99–110)
CO2: 28 MMOL/L (ref 21–32)
CREAT SERPL-MCNC: 0.8 MG/DL (ref 0.6–1.2)
EKG ATRIAL RATE: 105 BPM
EKG DIAGNOSIS: NORMAL
EKG Q-T INTERVAL: 280 MS
EKG QRS DURATION: 132 MS
EKG QTC CALCULATION (BAZETT): 447 MS
EKG R AXIS: 112 DEGREES
EKG T AXIS: -74 DEGREES
EKG VENTRICULAR RATE: 153 BPM
GFR AFRICAN AMERICAN: >60
GFR NON-AFRICAN AMERICAN: >60
GLUCOSE BLD-MCNC: 101 MG/DL (ref 70–99)
L. PNEUMOPHILA SEROGP 1 UR AG: NORMAL
POTASSIUM SERPL-SCNC: 3.6 MMOL/L (ref 3.5–5.1)
SODIUM BLD-SCNC: 133 MMOL/L (ref 136–145)
STREP PNEUMONIAE ANTIGEN, URINE: NORMAL

## 2020-05-25 PROCEDURE — 97530 THERAPEUTIC ACTIVITIES: CPT

## 2020-05-25 PROCEDURE — 6360000002 HC RX W HCPCS: Performed by: INTERNAL MEDICINE

## 2020-05-25 PROCEDURE — 94640 AIRWAY INHALATION TREATMENT: CPT

## 2020-05-25 PROCEDURE — 1200000000 HC SEMI PRIVATE

## 2020-05-25 PROCEDURE — 2580000003 HC RX 258

## 2020-05-25 PROCEDURE — 97162 PT EVAL MOD COMPLEX 30 MIN: CPT

## 2020-05-25 PROCEDURE — 80048 BASIC METABOLIC PNL TOTAL CA: CPT

## 2020-05-25 PROCEDURE — 94761 N-INVAS EAR/PLS OXIMETRY MLT: CPT

## 2020-05-25 PROCEDURE — 2700000000 HC OXYGEN THERAPY PER DAY

## 2020-05-25 PROCEDURE — 6370000000 HC RX 637 (ALT 250 FOR IP): Performed by: INTERNAL MEDICINE

## 2020-05-25 PROCEDURE — 99223 1ST HOSP IP/OBS HIGH 75: CPT | Performed by: INTERNAL MEDICINE

## 2020-05-25 PROCEDURE — 97165 OT EVAL LOW COMPLEX 30 MIN: CPT

## 2020-05-25 PROCEDURE — 2580000003 HC RX 258: Performed by: INTERNAL MEDICINE

## 2020-05-25 PROCEDURE — 93010 ELECTROCARDIOGRAM REPORT: CPT | Performed by: INTERNAL MEDICINE

## 2020-05-25 PROCEDURE — 97535 SELF CARE MNGMENT TRAINING: CPT

## 2020-05-25 PROCEDURE — 36415 COLL VENOUS BLD VENIPUNCTURE: CPT

## 2020-05-25 RX ORDER — DIGOXIN 125 MCG
250 TABLET ORAL DAILY
Status: COMPLETED | OUTPATIENT
Start: 2020-05-25 | End: 2020-05-27

## 2020-05-25 RX ORDER — METOPROLOL SUCCINATE 50 MG/1
50 TABLET, EXTENDED RELEASE ORAL DAILY
Status: DISCONTINUED | OUTPATIENT
Start: 2020-05-25 | End: 2020-06-02 | Stop reason: HOSPADM

## 2020-05-25 RX ORDER — METOPROLOL TARTRATE 50 MG/1
50 TABLET, FILM COATED ORAL 2 TIMES DAILY
Status: DISCONTINUED | OUTPATIENT
Start: 2020-05-25 | End: 2020-05-25

## 2020-05-25 RX ORDER — DIGOXIN 125 MCG
125 TABLET ORAL DAILY
Status: DISCONTINUED | OUTPATIENT
Start: 2020-05-28 | End: 2020-05-28

## 2020-05-25 RX ORDER — CEFUROXIME AXETIL 250 MG/1
250 TABLET ORAL EVERY 12 HOURS SCHEDULED
Status: COMPLETED | OUTPATIENT
Start: 2020-05-25 | End: 2020-05-29

## 2020-05-25 RX ORDER — SODIUM CHLORIDE 9 MG/ML
INJECTION, SOLUTION INTRAVENOUS
Status: COMPLETED
Start: 2020-05-25 | End: 2020-05-25

## 2020-05-25 RX ADMIN — MONTELUKAST SODIUM 10 MG: 10 TABLET, FILM COATED ORAL at 21:36

## 2020-05-25 RX ADMIN — METOPROLOL SUCCINATE 50 MG: 50 TABLET, FILM COATED, EXTENDED RELEASE ORAL at 09:53

## 2020-05-25 RX ADMIN — DIVALPROEX SODIUM 250 MG: 250 TABLET, DELAYED RELEASE ORAL at 09:52

## 2020-05-25 RX ADMIN — MULTIPLE VITAMINS W/ MINERALS TAB 1 TABLET: TAB at 09:52

## 2020-05-25 RX ADMIN — DONEPEZIL HYDROCHLORIDE 10 MG: 5 TABLET, FILM COATED ORAL at 21:36

## 2020-05-25 RX ADMIN — ACETAMINOPHEN 650 MG: 325 TABLET, FILM COATED ORAL at 21:36

## 2020-05-25 RX ADMIN — SODIUM CHLORIDE: 9 INJECTION, SOLUTION INTRAVENOUS at 04:20

## 2020-05-25 RX ADMIN — PANTOPRAZOLE SODIUM 40 MG: 40 TABLET, DELAYED RELEASE ORAL at 05:50

## 2020-05-25 RX ADMIN — CEFUROXIME AXETIL 250 MG: 250 TABLET ORAL at 21:36

## 2020-05-25 RX ADMIN — Medication 2 PUFF: at 20:53

## 2020-05-25 RX ADMIN — DILTIAZEM HYDROCHLORIDE 30 MG: 30 TABLET, FILM COATED ORAL at 23:33

## 2020-05-25 RX ADMIN — Medication 10 ML: at 09:53

## 2020-05-25 RX ADMIN — FERROUS SULFATE TAB 325 MG (65 MG ELEMENTAL FE) 325 MG: 325 (65 FE) TAB at 09:52

## 2020-05-25 RX ADMIN — CYANOCOBALAMIN TAB 1000 MCG 1000 MCG: 1000 TAB at 09:52

## 2020-05-25 RX ADMIN — ROPINIROLE HYDROCHLORIDE 2 MG: 1 TABLET, FILM COATED ORAL at 21:36

## 2020-05-25 RX ADMIN — POTASSIUM BICARBONATE 40 MEQ: 782 TABLET, EFFERVESCENT ORAL at 21:36

## 2020-05-25 RX ADMIN — APIXABAN 2.5 MG: 2.5 TABLET, FILM COATED ORAL at 21:36

## 2020-05-25 RX ADMIN — DULOXETINE HYDROCHLORIDE 40 MG: 20 CAPSULE, DELAYED RELEASE ORAL at 09:52

## 2020-05-25 RX ADMIN — Medication 2 PUFF: at 08:34

## 2020-05-25 RX ADMIN — APIXABAN 2.5 MG: 2.5 TABLET, FILM COATED ORAL at 09:53

## 2020-05-25 RX ADMIN — LEVOTHYROXINE SODIUM 88 MCG: 88 TABLET ORAL at 09:52

## 2020-05-25 RX ADMIN — LINACLOTIDE 290 MCG: 145 CAPSULE, GELATIN COATED ORAL at 09:54

## 2020-05-25 RX ADMIN — POLYETHYLENE GLYCOL 3350 17 G: 17 POWDER, FOR SOLUTION ORAL at 09:51

## 2020-05-25 RX ADMIN — CEFEPIME HYDROCHLORIDE 2 G: 2 INJECTION, POWDER, FOR SOLUTION INTRAVENOUS at 02:01

## 2020-05-25 RX ADMIN — DIVALPROEX SODIUM 250 MG: 250 TABLET, DELAYED RELEASE ORAL at 21:36

## 2020-05-25 RX ADMIN — VITAMIN D, TAB 1000IU (100/BT) 5000 UNITS: 25 TAB at 09:53

## 2020-05-25 RX ADMIN — DIGOXIN 250 MCG: 125 TABLET ORAL at 09:52

## 2020-05-25 RX ADMIN — FUROSEMIDE 20 MG: 20 TABLET ORAL at 09:52

## 2020-05-25 RX ADMIN — POTASSIUM BICARBONATE 40 MEQ: 782 TABLET, EFFERVESCENT ORAL at 09:52

## 2020-05-25 ASSESSMENT — PAIN SCALES - GENERAL
PAINLEVEL_OUTOF10: 0
PAINLEVEL_OUTOF10: 3

## 2020-05-25 ASSESSMENT — PAIN SCALES - WONG BAKER
WONGBAKER_NUMERICALRESPONSE: 0

## 2020-05-25 NOTE — PROGRESS NOTES
Physical Therapy    Facility/Department: [address] ONCOLOGY  Initial Assessment    NAME: Italia Tomlin  : 3/2/1929  MRN: [id number]    Date of Service: 2020    Discharge Recommendations: Italia Tomlin scored a 15/24 on the AM-PAC short mobility form. Current research shows that an AM-PAC score of 17 or less is typically not associated with a discharge to the patient's home setting. Based on the patient's AM-PAC score and their current functional mobility deficits, it is recommended that the patient have 3-5 sessions per week of Physical Therapy at d/c to increase the patient's independence. At this time, this patient lacks the endurance, and/or tolerance for 3 hours of therapy/day, 5-7x/wk and would benefit most from a follow up treatment frequency of 3-5x/wk. Please see assessment section for further patient specific details. If patient discharges prior to next session this note will serve as a discharge summary. Please see below for the latest assessment towards goals. PT Equipment Recommendations  Equipment Needed: No    Assessment   Body structures, Functions, Activity limitations: Decreased functional mobility ; Decreased strength;Decreased endurance;Decreased balance;Decreased coordination  Assessment: The pt presents with decreased strength, balance, activity tolerance and cognition that impairs her ability to stand, ambulate and safely mobilize for ADLs. She is either very [place] or avoids answering questions. Prognosis: Fair  Decision Making: Medium Complexity  PT Education: PT Role;Plan of Care;Precautions;General Safety;Orientation  Patient Education: pt was unable to verbalize understanding  Barriers to Learning: hearing, cognition  REQUIRES PT FOLLOW UP: Yes  Activity Tolerance  Activity Tolerance: Patient limited by cognitive status; Patient limited by fatigue  Activity Tolerance: 4L/min O2, unable to obtain an O2 sat reading before and after activity, limited by [facility] Patient Diagnosis(es): The primary encounter diagnosis was Febrile illness, acute. Diagnoses of Suspected COVID-19 virus infection and Multifocal pneumonia were also pertinent to this visit. has a past medical history of Abdominal wall pain, Arrhythmia, Arthritis, Asthma, Atrial fibrillation ([address] Broken nose, CHF (congestive heart failure) ([address] Cough variant asthma, Dementia ([address] Dyspnea, Essential hypertension, Fatigue, Hematoma, Hyperlipidemia, Insomnia, Lumbar radiculopathy, Osteoporosis, Palpitation, Restless leg syndrome, Shingles, and Sinusitis. has a past surgical history that includes Ankle surgery (1997); Elbow surgery; Colonoscopy (10/25/10); Ovary removal (Left); joint replacement; Appendectomy; Hysterectomy; Cardioversion; Upper gastrointestinal endoscopy (02/27/2018); and Colonoscopy (02/27/2018). Restrictions  Restrictions/Precautions  Restrictions/Precautions: Fall Risk(High fall risk)  Position Activity Restriction  Other position/activity restrictions: The pt was admitted with SOB, cough and diarrhea. She was diagnosed with COVID-19. Vision/Hearing  Vision: Impaired  Hearing: Exceptions to [facility]  Hearing Exceptions: Hard of hearing/hearing concerns     Subjective  General  Chart Reviewed: Yes  Family / Caregiver Present: No  Diagnosis: PNA  Follows Commands: Impaired  Other (Comment): very [place], does not understand all commands  General Comment  Comments: pt was supine in bed upon PT arrival and agreeable to PT  Subjective  Subjective: pt denied pain, pt requested to use the bathroom   Pain Screening  Patient Currently in Pain: Denies  Vital Signs  Patient Currently in Pain: Denies       Orientation  Orientation  Overall Orientation Status: Impaired  Orientation Level: Oriented to person;Disoriented to place; Disoriented to time;Disoriented to situation  Social/Functional History  Social/Functional History  Type of Home: Facility([facility] )  Home Equipment: Rolling walker  ADL

## 2020-05-25 NOTE — PROGRESS NOTES
diagnosis was Febrile illness, acute. Diagnoses of Suspected COVID-19 virus infection and Multifocal pneumonia were also pertinent to this visit. has a past medical history of Abdominal wall pain, Arrhythmia, Arthritis, Asthma, Atrial fibrillation ([address] Broken nose, CHF (congestive heart failure) ([address] Cough variant asthma, Dementia ([address] Dyspnea, Essential hypertension, Fatigue, Hematoma, Hyperlipidemia, Insomnia, Lumbar radiculopathy, Osteoporosis, Palpitation, Restless leg syndrome, Shingles, and Sinusitis. has a past surgical history that includes Ankle surgery (1997); Elbow surgery; Colonoscopy (10/25/10); Ovary removal (Left); joint replacement; Appendectomy; Hysterectomy; Cardioversion; Upper gastrointestinal endoscopy (02/27/2018); and Colonoscopy (02/27/2018). Treatment Diagnosis: Decreased ADLs, IADLs, transfers, mobility associated with Pneumonia      Restrictions  Restrictions/Precautions  Restrictions/Precautions: Fall Risk(High fall risk)  Position Activity Restriction  Other position/activity restrictions: The pt was admitted with SOB, cough and diarrhea. She was diagnosed with COVID-19. Subjective   General  Chart Reviewed: Yes  Family / Caregiver Present: No  Diagnosis: Pneumonia, COVID (+)  Subjective  Subjective: Patient supine in bed, agreeable to evaluation with encouragement (confusion throughout, [place] intermittent)  Patient Currently in Pain: Denies  Pre Treatment Pain Screening  Intervention List: Patient able to continue with treatment  Vital Signs  Patient Currently in Pain: Denies  Social/Functional History  Social/Functional History  Type of Home: Facility([facility] )  Home Equipment: Rolling walker  ADL Assistance: Needs assistance  Ambulation Assistance: Needs assistance(with RW)  Transfer Assistance: Needs assistance  Additional Comments: Pt is a poor historian. Pt lived with her daughter until 2019 and was independent with ADLs/mobility.   Attempted to call SNF but

## 2020-05-26 PROCEDURE — 2700000000 HC OXYGEN THERAPY PER DAY

## 2020-05-26 PROCEDURE — 99223 1ST HOSP IP/OBS HIGH 75: CPT | Performed by: INTERNAL MEDICINE

## 2020-05-26 PROCEDURE — 1200000000 HC SEMI PRIVATE

## 2020-05-26 PROCEDURE — 97116 GAIT TRAINING THERAPY: CPT

## 2020-05-26 PROCEDURE — 97530 THERAPEUTIC ACTIVITIES: CPT

## 2020-05-26 PROCEDURE — 94761 N-INVAS EAR/PLS OXIMETRY MLT: CPT

## 2020-05-26 PROCEDURE — 6370000000 HC RX 637 (ALT 250 FOR IP): Performed by: INTERNAL MEDICINE

## 2020-05-26 PROCEDURE — 2580000003 HC RX 258: Performed by: INTERNAL MEDICINE

## 2020-05-26 PROCEDURE — 97535 SELF CARE MNGMENT TRAINING: CPT

## 2020-05-26 PROCEDURE — 94640 AIRWAY INHALATION TREATMENT: CPT

## 2020-05-26 RX ADMIN — MONTELUKAST SODIUM 10 MG: 10 TABLET, FILM COATED ORAL at 20:31

## 2020-05-26 RX ADMIN — POTASSIUM BICARBONATE 40 MEQ: 782 TABLET, EFFERVESCENT ORAL at 08:36

## 2020-05-26 RX ADMIN — DULOXETINE HYDROCHLORIDE 40 MG: 20 CAPSULE, DELAYED RELEASE ORAL at 08:36

## 2020-05-26 RX ADMIN — FERROUS SULFATE TAB 325 MG (65 MG ELEMENTAL FE) 325 MG: 325 (65 FE) TAB at 08:36

## 2020-05-26 RX ADMIN — ROPINIROLE HYDROCHLORIDE 2 MG: 1 TABLET, FILM COATED ORAL at 20:31

## 2020-05-26 RX ADMIN — DILTIAZEM HYDROCHLORIDE 30 MG: 30 TABLET, FILM COATED ORAL at 14:07

## 2020-05-26 RX ADMIN — DIVALPROEX SODIUM 250 MG: 250 TABLET, DELAYED RELEASE ORAL at 08:36

## 2020-05-26 RX ADMIN — POTASSIUM BICARBONATE 40 MEQ: 782 TABLET, EFFERVESCENT ORAL at 20:31

## 2020-05-26 RX ADMIN — APIXABAN 2.5 MG: 2.5 TABLET, FILM COATED ORAL at 08:36

## 2020-05-26 RX ADMIN — Medication 2 PUFF: at 20:50

## 2020-05-26 RX ADMIN — FUROSEMIDE 20 MG: 20 TABLET ORAL at 08:38

## 2020-05-26 RX ADMIN — Medication 10 ML: at 08:37

## 2020-05-26 RX ADMIN — CYANOCOBALAMIN TAB 1000 MCG 1000 MCG: 1000 TAB at 08:36

## 2020-05-26 RX ADMIN — CEFUROXIME AXETIL 250 MG: 250 TABLET ORAL at 08:35

## 2020-05-26 RX ADMIN — MULTIPLE VITAMINS W/ MINERALS TAB 1 TABLET: TAB at 08:37

## 2020-05-26 RX ADMIN — CEFUROXIME AXETIL 250 MG: 250 TABLET ORAL at 20:31

## 2020-05-26 RX ADMIN — DIGOXIN 250 MCG: 125 TABLET ORAL at 08:36

## 2020-05-26 RX ADMIN — PANTOPRAZOLE SODIUM 40 MG: 40 TABLET, DELAYED RELEASE ORAL at 04:57

## 2020-05-26 RX ADMIN — METOPROLOL SUCCINATE 50 MG: 50 TABLET, FILM COATED, EXTENDED RELEASE ORAL at 08:36

## 2020-05-26 RX ADMIN — Medication 10 ML: at 04:56

## 2020-05-26 RX ADMIN — Medication 2 PUFF: at 08:19

## 2020-05-26 RX ADMIN — DILTIAZEM HYDROCHLORIDE 30 MG: 30 TABLET, FILM COATED ORAL at 17:28

## 2020-05-26 RX ADMIN — POLYETHYLENE GLYCOL 3350 17 G: 17 POWDER, FOR SOLUTION ORAL at 08:40

## 2020-05-26 RX ADMIN — VITAMIN D, TAB 1000IU (100/BT) 5000 UNITS: 25 TAB at 08:37

## 2020-05-26 RX ADMIN — LINACLOTIDE 290 MCG: 145 CAPSULE, GELATIN COATED ORAL at 05:04

## 2020-05-26 RX ADMIN — DILTIAZEM HYDROCHLORIDE 30 MG: 30 TABLET, FILM COATED ORAL at 04:56

## 2020-05-26 RX ADMIN — DONEPEZIL HYDROCHLORIDE 10 MG: 5 TABLET, FILM COATED ORAL at 20:31

## 2020-05-26 RX ADMIN — APIXABAN 2.5 MG: 2.5 TABLET, FILM COATED ORAL at 20:31

## 2020-05-26 RX ADMIN — LEVOTHYROXINE SODIUM 88 MCG: 88 TABLET ORAL at 08:36

## 2020-05-26 RX ADMIN — DIVALPROEX SODIUM 250 MG: 250 TABLET, DELAYED RELEASE ORAL at 20:31

## 2020-05-26 ASSESSMENT — PAIN SCALES - GENERAL
PAINLEVEL_OUTOF10: 0

## 2020-05-26 NOTE — CARE COORDINATION
CM received vm from Jerrica Her at [facility] stating that they can accept pt at discharge. CM called and left vm for pt's daughter Parag Marcelo about facility. CM then called and updated Jerrica Her. DEEPTI will continue to follow for d/c planning.       Zulma Powell RN, BSN  [phone]

## 2020-05-26 NOTE — PROGRESS NOTES
Physical Therapy  Facility/Department: [address] ONCOLOGY  Daily Treatment Note  NAME: Angeles Cloud  : 3/2/1929  MRN: [id number]    Date of Service: 2020    Discharge Recommendations:    Angeles Cloud scored a 15/24 on the AM-PAC short mobility form. Current research shows that an AM-PAC score of 17 or less is typically not associated with a discharge to the patient's home setting. Based on the patient's AM-PAC score and their current functional mobility deficits, it is recommended that the patient have 3-5 sessions per week of Physical Therapy at d/c to increase the patient's independence. At this time, this patient lacks the endurance, and/or tolerance for 3 hours of therapy/day, 5-7x/wk and would benefit most from a follow up treatment frequency of 3-5x/wk. Please see assessment section for further patient specific details. If patient discharges prior to next session this note will serve as a discharge summary. Please see below for the latest assessment towards goals. PT Equipment Recommendations  Equipment Needed: No    Assessment   Body structures, Functions, Activity limitations: Decreased functional mobility ; Decreased strength;Decreased endurance;Decreased balance;Decreased coordination  Assessment: pt demonstrated improved ability to follow 1 step commands and safety with ambulation, continues to fatigue quickly with ambulation  Treatment Diagnosis: impaired functional mobility  Prognosis: Fair  PT Education: PT Role;Plan of Care;Precautions;General Safety;Orientation  Patient Education: pt was unable to verbalize understanding  Barriers to Learning: hearing, cognition  REQUIRES PT FOLLOW UP: Yes  Activity Tolerance  Activity Tolerance: Patient Tolerated treatment well;Patient limited by cognitive status     Patient Diagnosis(es): The primary encounter diagnosis was Febrile illness, acute.  Diagnoses of Suspected COVID-19 virus infection and Multifocal pneumonia were also pertinent to this visit. has a past medical history of Abdominal wall pain, Arrhythmia, Arthritis, Asthma, Atrial fibrillation ([address] Broken nose, CHF (congestive heart failure) ([address] Cough variant asthma, Dementia ([address] Dyspnea, Essential hypertension, Fatigue, Hematoma, Hyperlipidemia, Insomnia, Lumbar radiculopathy, Osteoporosis, Palpitation, Restless leg syndrome, Shingles, and Sinusitis. has a past surgical history that includes Ankle surgery (1997); Elbow surgery; Colonoscopy (10/25/10); Ovary removal (Left); joint replacement; Appendectomy; Hysterectomy; Cardioversion; Upper gastrointestinal endoscopy (02/27/2018); and Colonoscopy (02/27/2018). Restrictions  Restrictions/Precautions  Restrictions/Precautions: Fall Risk(High fall risk)  Position Activity Restriction  Other position/activity restrictions: The pt was admitted with SOB, cough and diarrhea. She was diagnosed with COVID-19. Appropriate PPE donned prior to entering patients room this date:  gown, gloves, N-95 and face shield. Subjective   General  Chart Reviewed: Yes  Response To Previous Treatment: Patient with no complaints from previous session. Family / Caregiver Present: No  Subjective  Subjective: pt supine at start of session, agreeable to OOB activity  Pain Screening  Patient Currently in Pain: Denies  Vital Signs  Patient Currently in Pain: Denies       Orientation  Orientation  Overall Orientation Status: Impaired  Orientation Level: Oriented to person;Disoriented to place; Disoriented to time;Disoriented to situation    Objective   Bed mobility  Supine to Sit: Supervision(verbal cues for sequencing)  Scooting: Supervision  Transfers  Sit to Stand: Minimal Assistance;Contact guard assistance  Stand to sit: Contact guard assistance  Comment: CGA from bed, min assist from toilet  Ambulation  Ambulation?: Yes  Ambulation 1  Surface: level tile  Device: Rolling Walker  Assistance: Minimal assistance; Moderate assistance  Quality of

## 2020-05-26 NOTE — PROGRESS NOTES
Routine vitals stable. Scheduled medications given. Pt denies any further needs at this time. Call light within reach. Will continue to monitor.

## 2020-05-26 NOTE — PROGRESS NOTES
Diagnoses of Suspected COVID-19 virus infection and Multifocal pneumonia were also pertinent to this visit. has a past medical history of Abdominal wall pain, Arrhythmia, Arthritis, Asthma, Atrial fibrillation ([address] Broken nose, CHF (congestive heart failure) ([address] Cough variant asthma, Dementia ([address] Dyspnea, Essential hypertension, Fatigue, Hematoma, Hyperlipidemia, Insomnia, Lumbar radiculopathy, Osteoporosis, Palpitation, Restless leg syndrome, Shingles, and Sinusitis. has a past surgical history that includes Ankle surgery (1997); Elbow surgery; Colonoscopy (10/25/10); Ovary removal (Left); joint replacement; Appendectomy; Hysterectomy; Cardioversion; Upper gastrointestinal endoscopy (02/27/2018); and Colonoscopy (02/27/2018). Restrictions  Restrictions/Precautions  Restrictions/Precautions: Fall Risk(High fall risk)  Position Activity Restriction  Other position/activity restrictions: The pt was admitted with SOB, cough and diarrhea. She was diagnosed with COVID-19. Subjective   General  Chart Reviewed: Yes  Family / Caregiver Present: No  Diagnosis: Pneumonia, COVID (+)  Subjective  Subjective: Patient supine in bed, agreeable to treatment with encouragement (confusion throughout, [place] intermittent)  Pre Treatment Pain Screening  Intervention List: Patient able to continue with treatment  Vital Signs  Patient Currently in Pain: No   Orientation  Orientation  Overall Orientation Status: Impaired  Orientation Level: Oriented to person  Objective    ADL  Grooming: Minimal assistance(In stance at sink. Patient completed oral care, assist required to comb hair. Hari balance in stance)  LE Bathing: Maximum assistance; Moderate assistance  LE Dressing: Maximum assistance;Dependent/Total  Toileting: Maximum assistance        Standing Balance  Time: ~6-8 minutes   Activity: stance at sink to complete grooming tasks  Toilet Transfers  Toilet - Technique: Ambulating  Equipment Used: Standard Functional mobility with appropriate AD Dm-min to modA 5/26  Short term goal 4: UB ADLs Dm, LB modA  Long term goals  Time Frame for Long term goals : LTG=STG  Patient Goals   Patient goals : Not stated due to cognition       Therapy Time   Individual Concurrent Group Co-treatment   Time In       1120   Time Out       1200   Minutes       40        Timed Code Treatment Minutes:  40 Minutes    Total Treatment Minutes:  [address] [facility] OTR/L [id number]

## 2020-05-26 NOTE — CARE COORDINATION
Referrals sent to: [facility], [facility], [address], [place], [address] [place], and [address]  [address].

## 2020-05-26 NOTE — CONSULTS
[address]   Electrophysiology Consultation   Date: 5/26/2020  Reason for Consultation: Atrial fibrillation   Consult Requesting Physician: Jessica Valdez MD     Chief Complaint   Patient presents with    Fever     pt brought in by [facility] from [facility] for cough, fever and an episode of diarrhea that began this morning     HPI: Akin Kahn is a 80 y.o. female with dementia in NH, H of chronic Atrial fibrillation, admitted with COVID 19 , fever, cough and PNA. Rate has been faster. Toprol was started. HR is better    Feels better, in chair    Past Medical History:   Diagnosis Date    Abdominal wall pain     Arrhythmia     afib,bradycardia    Arthritis     left hip    Asthma     Atrial fibrillation (HCC)     Broken nose 2016    [facility]) [facility] 08/2018    Cough variant asthma     Dementia (HCC)     Dyspnea     Essential hypertension 8/1/2018    Fatigue     Hematoma     Hyperlipidemia     Insomnia     Lumbar radiculopathy     Osteoporosis     Palpitation     Restless leg syndrome     Shingles     Sinusitis         Past Surgical History:   Procedure Laterality Date    ANKLE SURGERY  1997    left ankle surgery    APPENDECTOMY      CARDIOVERSION      COLONOSCOPY  10/25/10    normal dr Robson Marcelo    COLONOSCOPY  02/27/2018    polyps dr Georgia Longoria, no repeat needed.  no malignancy    ELBOW SURGERY      HYSTERECTOMY      JOINT REPLACEMENT      right elbow    OVARY REMOVAL Left     UPPER GASTROINTESTINAL ENDOSCOPY  02/27/2018    normal dr Georgia Longoria       Allergies   Allergen Reactions    Gluten Meal Diarrhea    Demerol     Lidocaine Hcl     Other     Procaine     Tetanus Toxoid, Adsorbed     Vicodin [Hydrocodone-Acetaminophen]     Wheat Bran     Augmentin [Amoxicillin-Pot Clavulanate] Itching    Hydrocodone-Acetaminophen Palpitations    Meperidine Palpitations    Prednisone Palpitations    Tetanus Toxoids Palpitations       Social History:  Reviewed. reports that she quit smoking about 49 years ago. She has a 5.00 pack-year smoking history. She has never used smokeless tobacco. She reports that she does not drink alcohol or use drugs. Family History:  Reviewed. family history includes Arthritis in an other family member; Asthma in an other family member; Cancer in her mother; Diabetes in an other family member; Heart Disease in an other family member; Stroke in her father. Review of System:  All other systems reviewed and are negative except for that noted above. Pertinent negatives are:     · General: negative for, chills  +fever  · Ophthalmic ROS: negative for - eye pain or loss of vision  · ENT ROS: negative for - headaches, sore throat   · Respiratory: negative for - , sputum,+cough  · Cardiovascular: Reviewed in HPI  · Gastrointestinal: negative for - abdominal pain, diarrhea, N/V  · Hematology: negative for - bleeding, blood clots, bruising or jaundice  · Genito-Urinary:  negative for - Dysuria or incontinence  · Musculoskeletal: negative for - Joint swelling, muscle pain  · Neurological: negative for - confusion, dizziness, headaches   · Psychiatric: No anxiety, no depression. · Dermatological: negative for - rash    Physical Examination:  Vitals:    20 0456   BP: 119/69   Pulse: 85   Resp: 16   Temp: 98.6 °F (37 °C)   SpO2: 96%      In: -   Out: 200    Wt Readings from Last 3 Encounters:   20 125 lb 9.6 oz (57 kg)   19 153 lb (69.4 kg)   10/24/19 153 lb 14.1 oz (69.8 kg)     Temp  Av.3 °F (36.8 °C)  Min: 97.5 °F (36.4 °C)  Max: 99 °F (37.2 °C)  Pulse  Av.7  Min: 85  Max: 127  BP  Min: 113/70  Max: 159/89  SpO2  Av.2 %  Min: 76 %  Max: 98 %    Intake/Output Summary (Last 24 hours) at 2020 0725  Last data filed at 2020 0003  Gross per 24 hour   Intake --   Output 500 ml   Net -500 ml       · Telemetry: Atrial fibrillation   · Constitutional: Oriented. No distress.    · Head: Hypoxemia 05/24/2020    Viral pneumonia 05/24/2020    Suspected COVID-19 virus infection 05/24/2020    Cardiomyopathy ([address]     Pneumonia 09/28/2018    Dementia without behavioral disturbance ([address]     Essential hypertension 08/01/2018    Buttocks nodule 05/23/2018    Hypothyroidism 01/30/2017    Vitamin D deficiency 09/28/2015    Atrial fibrillation with rapid ventricular response ([address] 14/93/6193    Uncomplicated asthma 50/03/4954    Osteoporosis 08/24/2011    Restless leg syndrome 08/24/2011    Dyspnea 07/19/2011      Active Hospital Problems    Diagnosis Date Noted    COVID-19 virus infection [U07.1] 05/25/2020    Hypoxemia [R09.02] 05/24/2020    Viral pneumonia [J12.9] 05/24/2020    Suspected COVID-19 virus infection [Z20.828] 05/24/2020    Cardiomyopathy ([address] [I42.9]     Pneumonia [J18.9] 09/28/2018    Dementia without behavioral disturbance ([address] [F03.90]     Essential hypertension [I10] 08/01/2018    Hypothyroidism [E03.9] 01/30/2017    Vitamin D deficiency [E55.9] 09/28/2015    Atrial fibrillation with rapid ventricular response ([address] [I48.91] 42/17/7826    Uncomplicated asthma [L41.824] 09/10/2014    Restless leg syndrome [G25.81] 08/24/2011    Osteoporosis [M81.0] 08/24/2011    Dyspnea [R06.00] 07/19/2011       Assessment:       Plan:    - Chronic Atrial fibrillation    Average rate of 10 is acceptable in the context of viral illness. Some high rates is expectd   Continue current treatment. , ay increase Toprol Xl if need be to control rate   On anticoagulation     - COVID 19 and PNA      On supportive care and antibiotics    - Hypothyroidism   Synthroid    - Mild LV dysfunction based on echo from 2018     On toprol XL and lasix   May consider ARB/ACEI later    - HTN   BP is well controlled. Continue current meds.     - Hyponatremia   Adequate intake  I independently reviewed  *CXR      Thank you for allowing me to participate in the care of Kaleen Osgood     NOTE: This

## 2020-05-26 NOTE — PROGRESS NOTES
Shift assessment complete. VSS. Scheduled medications given. Pt pleasantly confused. Will continue to monitor.

## 2020-05-26 NOTE — PROGRESS NOTES
tablet 250 mg, 250 mg, Oral, BID  donepezil (ARICEPT) tablet 10 mg, 10 mg, Oral, Nightly  DULoxetine (CYMBALTA) extended release capsule 40 mg, 40 mg, Oral, Daily  ferrous sulfate (IRON 325) tablet 325 mg, 325 mg, Oral, Daily with breakfast  levothyroxine (SYNTHROID) tablet 88 mcg, 88 mcg, Oral, Daily  linaclotide (LINZESS) capsule 290 mcg, 290 mcg, Oral, QAM AC  fluticasone (FLONASE) 50 MCG/ACT nasal spray 2 spray, 2 spray, Each Nostril, Daily  montelukast (SINGULAIR) tablet 10 mg, 10 mg, Oral, Nightly  therapeutic multivitamin-minerals 1 tablet, 1 tablet, Oral, Daily  pantoprazole (PROTONIX) tablet 40 mg, 40 mg, Oral, QAM AC  polyethylene glycol (GLYCOLAX) packet 17 g, 17 g, Oral, Daily  ipratropium-albuterol (DUONEB) nebulizer solution 1 ampule, 1 ampule, Inhalation, Q4H PRN  rOPINIRole (REQUIP) tablet 2 mg, 2 mg, Oral, Nightly  budesonide-formoterol (SYMBICORT) 160-4.5 MCG/ACT inhaler 2 puff, 2 puff, Inhalation, BID  vitamin B-12 (CYANOCOBALAMIN) tablet 1,000 mcg, 1,000 mcg, Oral, Daily  acetaminophen (TYLENOL) tablet 650 mg, 650 mg, Oral, Q4H PRN    Physical      Vitals: /61   Pulse 93   Temp 98.2 °F (36.8 °C) (Oral)   Resp 16   Ht 5' 2\" (1.575 m)   Wt 125 lb 9.6 oz (57 kg)   SpO2 96%   BMI 22.97 kg/m²   Temp: Temp: 98.2 °F (36.8 °C)  Max: Temp  Av.6 °F (37 °C)  Min: 98.1 °F (36.7 °C)  Max: 99.5 °F (37.5 °C)  Respiration range:  Resp  Av.6  Min: 16  Max: 22  Pulse Range:  Pulse  Av.6  Min: 75  Max: 127  Blood pressure range:  Systolic (15KXH), WZJ:795 , Min:113 , PQI:153   , Diastolic (27ZNY), HRD:59, Min:61, Max:89    SpO2  Av.6 %  Min: 76 %  Max: 96 %    Intake/Output Summary (Last 24 hours) at 2020 174  Last data filed at 2020 1722  Gross per 24 hour   Intake --   Output 800 ml   Net -800 ml       Vent settings:  Pulse  Av.4  Min: 70  Max: 150  Resp  Av.1  Min: 16  Max: 24  SpO2  Av.6 %  Min: 76 %  Max: 100 %    CONSTITUTIONAL:  fatigued, alert,

## 2020-05-27 ENCOUNTER — APPOINTMENT (OUTPATIENT)
Dept: GENERAL RADIOLOGY | Age: 85
DRG: 177 | End: 2020-05-27
Payer: MEDICARE

## 2020-05-27 LAB
ANION GAP SERPL CALCULATED.3IONS-SCNC: 6 MMOL/L (ref 3–16)
BLOOD CULTURE, ROUTINE: NORMAL
BUN BLDV-MCNC: 25 MG/DL (ref 7–20)
CALCIUM SERPL-MCNC: 9.4 MG/DL (ref 8.3–10.6)
CHLORIDE BLD-SCNC: 90 MMOL/L (ref 99–110)
CO2: 33 MMOL/L (ref 21–32)
CREAT SERPL-MCNC: 0.6 MG/DL (ref 0.6–1.2)
CULTURE, BLOOD 2: NORMAL
GFR AFRICAN AMERICAN: >60
GFR NON-AFRICAN AMERICAN: >60
GLUCOSE BLD-MCNC: 121 MG/DL (ref 70–99)
GLUCOSE BLD-MCNC: 79 MG/DL (ref 70–99)
HCT VFR BLD CALC: 36.3 % (ref 36–48)
HEMOGLOBIN: 11.8 G/DL (ref 12–16)
MCH RBC QN AUTO: 29.8 PG (ref 26–34)
MCHC RBC AUTO-ENTMCNC: 32.7 G/DL (ref 31–36)
MCV RBC AUTO: 91.3 FL (ref 80–100)
PDW BLD-RTO: 16.8 % (ref 12.4–15.4)
PERFORMED ON: NORMAL
PLATELET # BLD: 182 K/UL (ref 135–450)
PMV BLD AUTO: 10.4 FL (ref 5–10.5)
POTASSIUM SERPL-SCNC: 4.3 MMOL/L (ref 3.5–5.1)
RBC # BLD: 3.98 M/UL (ref 4–5.2)
SODIUM BLD-SCNC: 129 MMOL/L (ref 136–145)
WBC # BLD: 6.7 K/UL (ref 4–11)

## 2020-05-27 PROCEDURE — U0003 INFECTIOUS AGENT DETECTION BY NUCLEIC ACID (DNA OR RNA); SEVERE ACUTE RESPIRATORY SYNDROME CORONAVIRUS 2 (SARS-COV-2) (CORONAVIRUS DISEASE [COVID-19]), AMPLIFIED PROBE TECHNIQUE, MAKING USE OF HIGH THROUGHPUT TECHNOLOGIES AS DESCRIBED BY CMS-2020-01-R: HCPCS

## 2020-05-27 PROCEDURE — 71045 X-RAY EXAM CHEST 1 VIEW: CPT

## 2020-05-27 PROCEDURE — 1200000000 HC SEMI PRIVATE

## 2020-05-27 PROCEDURE — 6370000000 HC RX 637 (ALT 250 FOR IP): Performed by: INTERNAL MEDICINE

## 2020-05-27 PROCEDURE — 80048 BASIC METABOLIC PNL TOTAL CA: CPT

## 2020-05-27 PROCEDURE — 2700000000 HC OXYGEN THERAPY PER DAY

## 2020-05-27 PROCEDURE — 94761 N-INVAS EAR/PLS OXIMETRY MLT: CPT

## 2020-05-27 PROCEDURE — 94640 AIRWAY INHALATION TREATMENT: CPT

## 2020-05-27 PROCEDURE — 99233 SBSQ HOSP IP/OBS HIGH 50: CPT | Performed by: INTERNAL MEDICINE

## 2020-05-27 PROCEDURE — 36415 COLL VENOUS BLD VENIPUNCTURE: CPT

## 2020-05-27 PROCEDURE — 85027 COMPLETE CBC AUTOMATED: CPT

## 2020-05-27 RX ADMIN — MONTELUKAST SODIUM 10 MG: 10 TABLET, FILM COATED ORAL at 21:32

## 2020-05-27 RX ADMIN — DIVALPROEX SODIUM 250 MG: 250 TABLET, DELAYED RELEASE ORAL at 08:20

## 2020-05-27 RX ADMIN — DILTIAZEM HYDROCHLORIDE 30 MG: 30 TABLET, FILM COATED ORAL at 21:31

## 2020-05-27 RX ADMIN — VITAMIN D, TAB 1000IU (100/BT) 5000 UNITS: 25 TAB at 08:11

## 2020-05-27 RX ADMIN — ROPINIROLE HYDROCHLORIDE 2 MG: 1 TABLET, FILM COATED ORAL at 21:31

## 2020-05-27 RX ADMIN — DONEPEZIL HYDROCHLORIDE 10 MG: 5 TABLET, FILM COATED ORAL at 21:31

## 2020-05-27 RX ADMIN — FUROSEMIDE 20 MG: 20 TABLET ORAL at 10:22

## 2020-05-27 RX ADMIN — CEFUROXIME AXETIL 250 MG: 250 TABLET ORAL at 21:30

## 2020-05-27 RX ADMIN — CYANOCOBALAMIN TAB 1000 MCG 1000 MCG: 1000 TAB at 10:22

## 2020-05-27 RX ADMIN — APIXABAN 2.5 MG: 2.5 TABLET, FILM COATED ORAL at 08:13

## 2020-05-27 RX ADMIN — APIXABAN 2.5 MG: 2.5 TABLET, FILM COATED ORAL at 21:30

## 2020-05-27 RX ADMIN — DILTIAZEM HYDROCHLORIDE 30 MG: 30 TABLET, FILM COATED ORAL at 06:26

## 2020-05-27 RX ADMIN — LINACLOTIDE 290 MCG: 145 CAPSULE, GELATIN COATED ORAL at 06:26

## 2020-05-27 RX ADMIN — FERROUS SULFATE TAB 325 MG (65 MG ELEMENTAL FE) 325 MG: 325 (65 FE) TAB at 08:11

## 2020-05-27 RX ADMIN — DIVALPROEX SODIUM 250 MG: 250 TABLET, DELAYED RELEASE ORAL at 21:30

## 2020-05-27 RX ADMIN — Medication 2 PUFF: at 17:52

## 2020-05-27 RX ADMIN — DIGOXIN 250 MCG: 125 TABLET ORAL at 08:20

## 2020-05-27 RX ADMIN — DILTIAZEM HYDROCHLORIDE 30 MG: 30 TABLET, FILM COATED ORAL at 01:04

## 2020-05-27 RX ADMIN — POLYETHYLENE GLYCOL 3350 17 G: 17 POWDER, FOR SOLUTION ORAL at 08:11

## 2020-05-27 RX ADMIN — DULOXETINE HYDROCHLORIDE 40 MG: 20 CAPSULE, DELAYED RELEASE ORAL at 08:11

## 2020-05-27 RX ADMIN — METOPROLOL SUCCINATE 50 MG: 50 TABLET, FILM COATED, EXTENDED RELEASE ORAL at 10:23

## 2020-05-27 RX ADMIN — FLUTICASONE PROPIONATE 2 SPRAY: 50 SPRAY, METERED NASAL at 10:21

## 2020-05-27 RX ADMIN — Medication 2 PUFF: at 08:17

## 2020-05-27 RX ADMIN — CEFUROXIME AXETIL 250 MG: 250 TABLET ORAL at 08:12

## 2020-05-27 RX ADMIN — LEVOTHYROXINE SODIUM 88 MCG: 88 TABLET ORAL at 10:22

## 2020-05-27 RX ADMIN — PANTOPRAZOLE SODIUM 40 MG: 40 TABLET, DELAYED RELEASE ORAL at 06:26

## 2020-05-27 RX ADMIN — POTASSIUM BICARBONATE 40 MEQ: 782 TABLET, EFFERVESCENT ORAL at 21:31

## 2020-05-27 RX ADMIN — MULTIPLE VITAMINS W/ MINERALS TAB 1 TABLET: TAB at 08:11

## 2020-05-27 RX ADMIN — POTASSIUM BICARBONATE 40 MEQ: 782 TABLET, EFFERVESCENT ORAL at 08:11

## 2020-05-27 ASSESSMENT — PAIN SCALES - GENERAL
PAINLEVEL_OUTOF10: 0
PAINLEVEL_OUTOF10: 0

## 2020-05-27 NOTE — PROGRESS NOTES
Department of Internal Medicine  General Internal Medicine   Progress Note      SUBJECTIVE    Afebrile but O2 sat still only 91percent on 5 L     History obtained from chart review and the patient  General ROS: positive for  - fatigue, fever and malaise  negative for - chills or night sweats  Psychological ROS: positive for - anxiety, disorientation, irritability, memory difficulties and sleep disturbances  negative for - behavioral disorder, hallucinations or hostility  Ophthalmic ROS: negative  Respiratory ROS: positive for - cough, shortness of breath, sputum changes and tachypnea  negative for - hemoptysis, pleuritic pain, stridor or wheezing  Cardiovascular ROS: positive for - dyspnea on exertion  negative for - chest pain  Gastrointestinal ROS: no abdominal pain, change in bowel habits, or black or bloody stools  Genito-Urinary ROS: no dysuria, trouble voiding, or hematuria  Musculoskeletal ROS: chronic pain   Neurological ROS: no TIA or stroke symptoms  Dermatological ROS: negative    OBJECTIVE      Medications      Current Facility-Administered Medications: apixaban (ELIQUIS) tablet 2.5 mg, 2.5 mg, Oral, BID  [START ON 5/28/2020] digoxin (LANOXIN) tablet 125 mcg, 125 mcg, Oral, Daily  metoprolol succinate (TOPROL XL) extended release tablet 50 mg, 50 mg, Oral, Daily  cefUROXime (CEFTIN) tablet 250 mg, 250 mg, Oral, 2 times per day  dilTIAZem (CARDIZEM) tablet 30 mg, 30 mg, Oral, 4 times per day  sodium chloride flush 0.9 % injection 10 mL, 10 mL, Intravenous, 2 times per day  sodium chloride flush 0.9 % injection 10 mL, 10 mL, Intravenous, PRN  potassium bicarb-citric acid (EFFER-K) effervescent tablet 40 mEq, 40 mEq, Oral, BID  furosemide (LASIX) tablet 20 mg, 20 mg, Oral, Daily  ammonium lactate (AMLACTIN) 12 % cream, , Topical, PRN  Vitamin D (CHOLECALCIFEROL) tablet 5,000 Units, 5,000 Units, Oral, Daily  divalproex (DEPAKOTE) DR tablet 250 mg, 250 mg, Oral, BID  donepezil (ARICEPT) tablet 10 mg, 10 mg, Oral, Nightly  DULoxetine (CYMBALTA) extended release capsule 40 mg, 40 mg, Oral, Daily  ferrous sulfate (IRON 325) tablet 325 mg, 325 mg, Oral, Daily with breakfast  levothyroxine (SYNTHROID) tablet 88 mcg, 88 mcg, Oral, Daily  linaclotide (LINZESS) capsule 290 mcg, 290 mcg, Oral, QAM AC  fluticasone (FLONASE) 50 MCG/ACT nasal spray 2 spray, 2 spray, Each Nostril, Daily  montelukast (SINGULAIR) tablet 10 mg, 10 mg, Oral, Nightly  therapeutic multivitamin-minerals 1 tablet, 1 tablet, Oral, Daily  pantoprazole (PROTONIX) tablet 40 mg, 40 mg, Oral, QAM AC  polyethylene glycol (GLYCOLAX) packet 17 g, 17 g, Oral, Daily  ipratropium-albuterol (DUONEB) nebulizer solution 1 ampule, 1 ampule, Inhalation, Q4H PRN  rOPINIRole (REQUIP) tablet 2 mg, 2 mg, Oral, Nightly  budesonide-formoterol (SYMBICORT) 160-4.5 MCG/ACT inhaler 2 puff, 2 puff, Inhalation, BID  vitamin B-12 (CYANOCOBALAMIN) tablet 1,000 mcg, 1,000 mcg, Oral, Daily  acetaminophen (TYLENOL) tablet 650 mg, 650 mg, Oral, Q4H PRN    Physical      Vitals: /68   Pulse 86   Temp 98.7 °F (37.1 °C) (Oral)   Resp 18   Ht 5' 2\" (1.575 m)   Wt 131 lb 3.2 oz (59.5 kg)   SpO2 91%   BMI 24.00 kg/m²   Temp: Temp: 98.7 °F (37.1 °C)  Max: Temp  Av.7 °F (37.1 °C)  Min: 97.8 °F (36.6 °C)  Max: 99.6 °F (37.6 °C)  Respiration range:  Resp  Av.6  Min: 16  Max: 18  Pulse Range:  Pulse  Av.1  Min: 78  Max: 106  Blood pressure range:  Systolic (78SPT), LXB:229 , Min:108 , ATK:182   , Diastolic (36TON), MNL:31, Min:54, Max:71    SpO2  Av.1 %  Min: 89 %  Max: 96 %    Intake/Output Summary (Last 24 hours) at 2020 1101  Last data filed at 2020 0635  Gross per 24 hour   Intake 360 ml   Output 1000 ml   Net -640 ml       Vent settings:  Pulse  Av.3  Min: 70  Max: 150  Resp  Av.9  Min: 16  Max: 24  SpO2  Av.1 %  Min: 76 %  Max: 100 %    CONSTITUTIONAL:  fatigued, alert, cooperative, mild distress, appears stated age and normal

## 2020-05-27 NOTE — CARE COORDINATION
CM noted last Covid test 5/23. Covid surveillance test ordered and charge nurse Aparna notified.      Stephanie Puente RN, BSN  [phone]

## 2020-05-28 PROBLEM — Z20.822 SUSPECTED COVID-19 VIRUS INFECTION: Status: RESOLVED | Noted: 2020-05-24 | Resolved: 2020-05-28

## 2020-05-28 PROBLEM — I50.42 CHRONIC COMBINED SYSTOLIC (CONGESTIVE) AND DIASTOLIC (CONGESTIVE) HEART FAILURE (HCC): Status: ACTIVE | Noted: 2020-05-28

## 2020-05-28 LAB
REPORT: ABNORMAL
SARS-COV-2: DETECTED
THIS TEST SENT TO: ABNORMAL

## 2020-05-28 PROCEDURE — 6370000000 HC RX 637 (ALT 250 FOR IP): Performed by: INTERNAL MEDICINE

## 2020-05-28 PROCEDURE — 99233 SBSQ HOSP IP/OBS HIGH 50: CPT | Performed by: INTERNAL MEDICINE

## 2020-05-28 PROCEDURE — 94761 N-INVAS EAR/PLS OXIMETRY MLT: CPT

## 2020-05-28 PROCEDURE — 97530 THERAPEUTIC ACTIVITIES: CPT

## 2020-05-28 PROCEDURE — 2700000000 HC OXYGEN THERAPY PER DAY

## 2020-05-28 PROCEDURE — 1200000000 HC SEMI PRIVATE

## 2020-05-28 PROCEDURE — 97535 SELF CARE MNGMENT TRAINING: CPT

## 2020-05-28 PROCEDURE — 94640 AIRWAY INHALATION TREATMENT: CPT

## 2020-05-28 RX ADMIN — LEVOTHYROXINE SODIUM 88 MCG: 88 TABLET ORAL at 10:39

## 2020-05-28 RX ADMIN — DILTIAZEM HYDROCHLORIDE 30 MG: 30 TABLET, FILM COATED ORAL at 10:38

## 2020-05-28 RX ADMIN — LINACLOTIDE 290 MCG: 145 CAPSULE, GELATIN COATED ORAL at 05:21

## 2020-05-28 RX ADMIN — FUROSEMIDE 20 MG: 20 TABLET ORAL at 10:39

## 2020-05-28 RX ADMIN — APIXABAN 2.5 MG: 2.5 TABLET, FILM COATED ORAL at 21:59

## 2020-05-28 RX ADMIN — CEFUROXIME AXETIL 250 MG: 250 TABLET ORAL at 22:00

## 2020-05-28 RX ADMIN — CEFUROXIME AXETIL 250 MG: 250 TABLET ORAL at 10:37

## 2020-05-28 RX ADMIN — PANTOPRAZOLE SODIUM 40 MG: 40 TABLET, DELAYED RELEASE ORAL at 05:21

## 2020-05-28 RX ADMIN — DIVALPROEX SODIUM 250 MG: 250 TABLET, DELAYED RELEASE ORAL at 10:38

## 2020-05-28 RX ADMIN — VITAMIN D, TAB 1000IU (100/BT) 5000 UNITS: 25 TAB at 10:37

## 2020-05-28 RX ADMIN — DONEPEZIL HYDROCHLORIDE 10 MG: 5 TABLET, FILM COATED ORAL at 22:00

## 2020-05-28 RX ADMIN — APIXABAN 2.5 MG: 2.5 TABLET, FILM COATED ORAL at 10:39

## 2020-05-28 RX ADMIN — DILTIAZEM HYDROCHLORIDE 30 MG: 30 TABLET, FILM COATED ORAL at 18:30

## 2020-05-28 RX ADMIN — CYANOCOBALAMIN TAB 1000 MCG 1000 MCG: 1000 TAB at 10:39

## 2020-05-28 RX ADMIN — Medication 2 PUFF: at 20:43

## 2020-05-28 RX ADMIN — FERROUS SULFATE TAB 325 MG (65 MG ELEMENTAL FE) 325 MG: 325 (65 FE) TAB at 10:38

## 2020-05-28 RX ADMIN — DULOXETINE HYDROCHLORIDE 40 MG: 20 CAPSULE, DELAYED RELEASE ORAL at 10:38

## 2020-05-28 RX ADMIN — POTASSIUM BICARBONATE 40 MEQ: 782 TABLET, EFFERVESCENT ORAL at 10:38

## 2020-05-28 RX ADMIN — ROPINIROLE HYDROCHLORIDE 2 MG: 1 TABLET, FILM COATED ORAL at 21:52

## 2020-05-28 RX ADMIN — METOPROLOL SUCCINATE 50 MG: 50 TABLET, FILM COATED, EXTENDED RELEASE ORAL at 10:39

## 2020-05-28 RX ADMIN — POTASSIUM BICARBONATE 40 MEQ: 782 TABLET, EFFERVESCENT ORAL at 21:52

## 2020-05-28 RX ADMIN — DILTIAZEM HYDROCHLORIDE 30 MG: 30 TABLET, FILM COATED ORAL at 05:21

## 2020-05-28 RX ADMIN — DILTIAZEM HYDROCHLORIDE 30 MG: 30 TABLET, FILM COATED ORAL at 00:31

## 2020-05-28 RX ADMIN — MULTIPLE VITAMINS W/ MINERALS TAB 1 TABLET: TAB at 10:39

## 2020-05-28 RX ADMIN — DIVALPROEX SODIUM 250 MG: 250 TABLET, DELAYED RELEASE ORAL at 21:59

## 2020-05-28 RX ADMIN — MONTELUKAST SODIUM 10 MG: 10 TABLET, FILM COATED ORAL at 22:00

## 2020-05-28 RX ADMIN — Medication 2 PUFF: at 12:13

## 2020-05-28 ASSESSMENT — PAIN SCALES - GENERAL
PAINLEVEL_OUTOF10: 0
PAINLEVEL_OUTOF10: 0

## 2020-05-28 NOTE — PROGRESS NOTES
mod A from toilet and recliner)  Stand to sit: Minimal Assistance  Ambulation  Ambulation?: Yes  Ambulation 1  Surface: level tile  Device: Rolling Walker  Assistance: Minimal assistance; Moderate assistance  Quality of Gait: posterior lean with one LOB PT corrected, difficulty navigating RW, slow marjorie, decreased stance phase time LLE   Gait Deviations: Deviated path;Staggers  Distance: 12' x 1, 8' x 1  Comments: Pt ambulated to/from the bathroom, very difficult to direct or cue as the pt was GIANA [facility] and/or ignoring PT/OT      Balance  Posture: Fair  Sitting - Static: Good  Sitting - Dynamic: Good;-(SBA while completing pericare while seated on toilet)  Standing - Static: Fair  Standing - Dynamic: Poor  Comments: pt had diarrhea and required assistance for pericare, CGA for standing balance with RW while PT assisted with pericare, min to mod A for dynamic standing balance with RW, The pt stood with RW 2 reps for 2-3 minutes each for pericare and donning a brief. AM-PAC Score  -PAC Inpatient Mobility Raw Score : 14 (05/28/20 1033)  AM-PAC Inpatient T-Scale Score : 38.1 (05/28/20 1033)  Mobility Inpatient CMS 0-100% Score: 61.29 (05/28/20 1033)  Mobility Inpatient CMS G-Code Modifier : CL (05/28/20 1033)          Goals  Short term goals  Time Frame for Short term goals: To be met prior to DC  Short term goal 1: Pt will perform sit to/from stand with SBA  Short term goal 2: Pt will ambulate 36' with RW and min A  Patient Goals   Patient goals : ALHAJI due to decreased cognition  *no goals met    Plan    Plan  Times per week: 3-5  Current Treatment Recommendations: Strengthening, Gait Training, Balance Training, Functional Mobility Training, Transfer Training, Neuromuscular Re-education, Endurance Training, Safety Education & Training  Safety Devices  Type of devices:  All fall risk precautions in place, Left in chair, Call light within reach, Chair alarm in place     Therapy Time   Individual Concurrent Group

## 2020-05-28 NOTE — PROGRESS NOTES
Department of Internal Medicine  General Internal Medicine   Progress Note      SUBJECTIVE    Steady rise in O2 requirement      History obtained from chart review and the patient  General ROS: positive for  - fatigue, fever and malaise  negative for - chills or night sweats  Psychological ROS: positive for - anxiety, disorientation, irritability, memory difficulties and sleep disturbances  negative for - behavioral disorder, hallucinations or hostility  Ophthalmic ROS: negative  Respiratory ROS: positive for - cough, shortness of breath, sputum changes and tachypnea  negative for - hemoptysis, pleuritic pain, stridor or wheezing  Cardiovascular ROS: positive for - dyspnea on exertion  negative for - chest pain  Gastrointestinal ROS: no abdominal pain, change in bowel habits, or black or bloody stools  Genito-Urinary ROS: no dysuria, trouble voiding, or hematuria  Musculoskeletal ROS: chronic pain   Neurological ROS: no TIA or stroke symptoms  Dermatological ROS: negative    OBJECTIVE      Medications      Current Facility-Administered Medications: apixaban (ELIQUIS) tablet 2.5 mg, 2.5 mg, Oral, BID  metoprolol succinate (TOPROL XL) extended release tablet 50 mg, 50 mg, Oral, Daily  cefUROXime (CEFTIN) tablet 250 mg, 250 mg, Oral, 2 times per day  dilTIAZem (CARDIZEM) tablet 30 mg, 30 mg, Oral, 4 times per day  sodium chloride flush 0.9 % injection 10 mL, 10 mL, Intravenous, 2 times per day  sodium chloride flush 0.9 % injection 10 mL, 10 mL, Intravenous, PRN  potassium bicarb-citric acid (EFFER-K) effervescent tablet 40 mEq, 40 mEq, Oral, BID  furosemide (LASIX) tablet 20 mg, 20 mg, Oral, Daily  ammonium lactate (AMLACTIN) 12 % cream, , Topical, PRN  Vitamin D (CHOLECALCIFEROL) tablet 5,000 Units, 5,000 Units, Oral, Daily  divalproex (DEPAKOTE) DR tablet 250 mg, 250 mg, Oral, BID  donepezil (ARICEPT) tablet 10 mg, 10 mg, Oral, Nightly  DULoxetine (CYMBALTA) extended release capsule 40 mg, 40 mg, Oral, Daily  ferrous sulfate (IRON 325) tablet 325 mg, 325 mg, Oral, Daily with breakfast  levothyroxine (SYNTHROID) tablet 88 mcg, 88 mcg, Oral, Daily  linaclotide (LINZESS) capsule 290 mcg, 290 mcg, Oral, QAM AC  fluticasone (FLONASE) 50 MCG/ACT nasal spray 2 spray, 2 spray, Each Nostril, Daily  montelukast (SINGULAIR) tablet 10 mg, 10 mg, Oral, Nightly  therapeutic multivitamin-minerals 1 tablet, 1 tablet, Oral, Daily  pantoprazole (PROTONIX) tablet 40 mg, 40 mg, Oral, QAM AC  polyethylene glycol (GLYCOLAX) packet 17 g, 17 g, Oral, Daily  ipratropium-albuterol (DUONEB) nebulizer solution 1 ampule, 1 ampule, Inhalation, Q4H PRN  rOPINIRole (REQUIP) tablet 2 mg, 2 mg, Oral, Nightly  budesonide-formoterol (SYMBICORT) 160-4.5 MCG/ACT inhaler 2 puff, 2 puff, Inhalation, BID  vitamin B-12 (CYANOCOBALAMIN) tablet 1,000 mcg, 1,000 mcg, Oral, Daily  acetaminophen (TYLENOL) tablet 650 mg, 650 mg, Oral, Q4H PRN    Physical      Vitals: /84   Pulse 92   Temp 98.3 °F (36.8 °C) (Oral)   Resp 18   Ht 5' 2\" (1.575 m)   Wt 133 lb 3.2 oz (60.4 kg)   SpO2 92%   BMI 24.36 kg/m²   Temp: Temp: 98.3 °F (36.8 °C)  Max: Temp  Av.1 °F (36.7 °C)  Min: 97.6 °F (36.4 °C)  Max: 98.6 °F (37 °C)  Respiration range:  Resp  Av.7  Min: 16  Max: 18  Pulse Range:  Pulse  Av.7  Min: 80  Max: 101  Blood pressure range:  Systolic (51QAF), SFO:355 , Min:95 , XMU:620   , Diastolic (49RWW), DVY:15, Min:50, Max:84    SpO2  Av.1 %  Min: 78 %  Max: 95 %    Intake/Output Summary (Last 24 hours) at 2020 1920  Last data filed at 2020 1443  Gross per 24 hour   Intake 120 ml   Output 825 ml   Net -705 ml       Vent settings:  Pulse  Av.5  Min: 70  Max: 150  Resp  Av.7  Min: 16  Max: 24  SpO2  Av.5 %  Min: 76 %  Max: 100 %    CONSTITUTIONAL:  fatigued, alert, cooperative, mild distress, appears stated age and normal weight  EYES:  Unremarkable   NECK:  Mild JVD  and supple, symmetrical, trachea midline  BACK: symmetric and no curvature  LUNGS:  tachypneic, Moderate respiratory distress, moderate air exchange, no retractions and crackles diffuse, rhonchi diffuse  CARDIOVASCULAR:  normal apical pulses, regular rate and rhythm with ectopic beats, normal S1 and S2, no S3 and no S4  ABDOMEN:  Soft scaphoid BS + non tender   GENITAL/URINARY:  NEX   MUSCULOSKELETAL:  Trace edema   there is no redness, warmth, or swelling of the joints  NEUROLOGIC:  No acute focal deficit   SKIN:  Warm moist mild pallor  and no bruising or bleeding    Data      Lab Results   Component Value Date    WBC 6.7 05/27/2020    HGB 11.8 (L) 05/27/2020    HCT 36.3 05/27/2020    MCV 91.3 05/27/2020     05/27/2020     Lab Results   Component Value Date     05/27/2020    K 4.3 05/27/2020    K 2.9 05/24/2020    CL 90 05/27/2020    CO2 33 05/27/2020    BUN 25 05/27/2020    CREATININE 0.6 05/27/2020    GLUCOSE 121 05/27/2020    CALCIUM 9.4 05/27/2020          ASSESSMENT AND PLAN     Active Problems:    Dyspnea    Osteoporosis    Restless leg syndrome    Uncomplicated asthma    Atrial fibrillation with rapid ventricular response (HCC)    Vitamin D deficiency    Hypothyroidism    Essential hypertension    Dementia without behavioral disturbance (HCC)    Pneumonia    Cardiomyopathy ([facility] [address]    Hypoxemia    Viral pneumonia    COVID-19 virus infection    Hyponatremia    Chronic combined systolic (congestive) and diastolic (congestive) heart failure (HCC)  Resolved Problems:    Suspected COVID-19 virus infection    Antibiotics are serving no purpose at this time we will de-escalate    needs Transthoracic ECHO has significant CHF history   Palliative care could be next , patient is DNR CCA

## 2020-05-28 NOTE — PROGRESS NOTES
Patient Diagnosis(es): The primary encounter diagnosis was Febrile illness, acute. Diagnoses of Suspected COVID-19 virus infection and Multifocal pneumonia were also pertinent to this visit. has a past medical history of Abdominal wall pain, Arrhythmia, Arthritis, Asthma, Atrial fibrillation ([address] Broken nose, CHF (congestive heart failure) ([address] Cough variant asthma, Dementia ([address] Dyspnea, Essential hypertension, Fatigue, Hematoma, Hyperlipidemia, Insomnia, Lumbar radiculopathy, Osteoporosis, Palpitation, Restless leg syndrome, Shingles, and Sinusitis. has a past surgical history that includes Ankle surgery (1997); Elbow surgery; Colonoscopy (10/25/10); Ovary removal (Left); joint replacement; Appendectomy; Hysterectomy; Cardioversion; Upper gastrointestinal endoscopy (02/27/2018); and Colonoscopy (02/27/2018). Restrictions  Restrictions/Precautions  Restrictions/Precautions: Fall Risk(High fall risk)  Position Activity Restriction  Other position/activity restrictions: The pt was admitted with SOB, cough and diarrhea. She was diagnosed with COVID-19. Subjective   General  Chart Reviewed: Yes  Response to previous treatment: Patient with no complaints from previous session  Family / Caregiver Present: No  Diagnosis: Pneumonia, COVID (+)  Subjective  Subjective: Pt supine in bed upon arrival and agreeable to therapy session. Reporting lower back pain following completing bed mobility and indication pain in L LE. Asked frequently if pt was in pain, pt indicated no throughout session. Pt intermittently [place] and or igonoring PT/OT.       Orientation  Orientation  Overall Orientation Status: Impaired  Orientation Level: Oriented to person  Objective    ADL  Feeding: Setup  LE Dressing: Dependent/Total(assist to doff depends and assist to thread depends and perform LB clothing management over hips once in stance)  Toileting: Maximum assistance(assist for hygiene following voiding bm seated on toilet. max a for thoroughness.)  Additional Comments: Pt performed functional mobility to/from restroom and transferred to toilet to perform the above ADLs at the above assist levels         Balance  Sitting Balance: Stand by assistance  Standing Balance: Minimal assistance(moderate assist for dynamic ADL tasks)  Standing Balance  Time: ~6-8 minutes   Activity: functional mobility to/from restroom, stance for LB ADLs, functional transfers  Comment: with use of rw  Functional Mobility  Functional - Mobility Device: Rolling Walker  Activity: To/from bathroom  Assist Level: Minimal assistance  Functional Mobility Comments: min-mod a due to rw management and navigation, 1 instance of LOB ambulating-moderate assist to correct. Increased time to complete  Toilet Transfers  Toilet - Technique: Ambulating  Equipment Used: Standard toilet  Toilet Transfer: Moderate assistance  Toilet Transfers Comments: cues for hand placement  Bed mobility  Supine to Sit: Stand by assistance  Scooting: Stand by assistance  Comment: increased time to complete  Transfers  Sit to stand: Minimal assistance(min A from bed-mod A from toilet/recliner)  Stand to sit: Minimal assistance     Cognition  Overall Cognitive Status: Exceptions  Arousal/Alertness: Delayed responses to stimuli;Inconsistent responses to stimuli  Following Commands: Follows one step commands with increased time; Follows one step commands with repetition  Attention Span: Difficulty attending to directions; Attends with cues to redirect  Memory: Decreased recall of precautions;Decreased recall of recent events;Decreased short term memory  Safety Judgement: Decreased awareness of need for assistance;Decreased awareness of need for safety  Problem Solving: Decreased awareness of errors  Insights: Not aware of deficits  Initiation: Requires cues for some  Sequencing: Requires cues for some     Perception  Overall Perceptual Status: [facility]       Plan   Plan  Times per week: 3-5  Times per day: Daily  Current Treatment Recommendations: Strengthening, Balance Training, Functional Mobility Training, Cognitive Reorientation, Safety Education & Training, Patient/Caregiver Education & Training, Equipment Evaluation, Education, & procurement, Self-Care / ADL    AM-PAC Score        AM-PAC Inpatient Daily Activity Raw Score: 12 (05/28/20 1344)  AM-PAC Inpatient ADL T-Scale Score : 30.6 (05/28/20 1344)  ADL Inpatient CMS 0-100% Score: 66.57 (05/28/20 1344)  ADL Inpatient CMS G-Code Modifier : CL (05/28/20 1344)    Goals  Short term goals  Time Frame for Short term goals: Discharge  Short term goal 1: Bed mobility Mod I-SBA 5/28  Short term goal 2: Functional ADL transfers Dm-min to [facility] 5/28  Short term goal 3: Functional mobility with appropriate AD Dm-min to modA 5/28  Short term goal 4: UB ADLs Dm, LB modA-LB dependent 5/28  Long term goals  Time Frame for Long term goals : LTG=STG  Patient Goals   Patient goals : Not stated due to cognition       Therapy Time   Individual Concurrent Group Co-treatment   Time In       0843   Time Out       0923   Minutes       40      Timed Code Treatment Minutes:  40 Minutes  Total Treatment Minutes:  [address], [address]    I have reviewed and am in agreement with this treatment session.     SONIDO Tolentino OTR/L [id number]

## 2020-05-28 NOTE — PROGRESS NOTES
RN entered room to find patient sleeping with oxygen tubing on floor. Oxygen saturation on room air 78%. Oxygen placed in nose, quickly recovered to 91% on 5L. Assessment performed and documented. Medications administered as ordered with no complications. Plan of care discussed with patient, verbalized understanding. Fal risk interventions in place. Call light within reach. No additional needs verbalized at this time. Will continue to monitor.

## 2020-05-28 NOTE — PROGRESS NOTES
clear and moist.   · Eyes: Conjunctivae normal. EOM are normal.   · Neck: Neck supple. No rigidity. No JVD present. · Cardiovascular: Normal rate, irregular rhythm, S1&S2. · Pulmonary/Chest: Bilateral respiratory sounds. No wheezes,   rhonchi. · Abdominal: Soft. Bowel sounds present. No distension, No tenderness. · Musculoskeletal: No tenderness. No edema    · Lymphadenopathy: Has no cervical adenopathy. · Neurological: Alert and oriented. Cranial nerve appears intact, No Gross deficit   · Skin: Skin is warm and dry. No rash noted. · Psychiatric: Has a normal behavior   Labs, diagnostic and imaging results reviewed. Reviewed. Recent Labs     05/27/20  1118   *   K 4.3   CL 90*   CO2 33*   BUN 25*   CREATININE 0.6     Recent Labs     05/27/20  1118   WBC 6.7   HGB 11.8*   HCT 36.3   MCV 91.3        Lab Results   Component Value Date    CKTOTAL 290 05/23/2020    TROPONINI <0.01 05/23/2020     Estimated Creatinine Clearance: 52 mL/min (based on SCr of 0.6 mg/dL).    No results found for: BNP  Lab Results   Component Value Date    PROTIME 16.0 05/23/2020    PROTIME 12.8 08/07/2018    PROTIME 12.8 07/31/2018    INR 1.37 05/23/2020    INR 1.12 08/07/2018    INR 1.12 07/31/2018     Lab Results   Component Value Date    CHOL 128 02/29/2016    HDL 42 02/29/2016    HDL 44 04/27/2012    TRIG 74 02/29/2016       Scheduled Meds:   apixaban  2.5 mg Oral BID    digoxin  125 mcg Oral Daily    metoprolol succinate  50 mg Oral Daily    cefUROXime  250 mg Oral 2 times per day    dilTIAZem  30 mg Oral 4 times per day    sodium chloride flush  10 mL Intravenous 2 times per day    potassium bicarb-citric acid  40 mEq Oral BID    furosemide  20 mg Oral Daily    Vitamin D  5,000 Units Oral Daily    divalproex  250 mg Oral BID    donepezil  10 mg Oral Nightly    DULoxetine  40 mg Oral Daily    ferrous sulfate  325 mg Oral Daily with breakfast    levothyroxine  88 mcg Oral Daily    linaclotide

## 2020-05-29 PROCEDURE — 92526 ORAL FUNCTION THERAPY: CPT

## 2020-05-29 PROCEDURE — 1200000000 HC SEMI PRIVATE

## 2020-05-29 PROCEDURE — 2700000000 HC OXYGEN THERAPY PER DAY

## 2020-05-29 PROCEDURE — 6370000000 HC RX 637 (ALT 250 FOR IP): Performed by: INTERNAL MEDICINE

## 2020-05-29 PROCEDURE — 92610 EVALUATE SWALLOWING FUNCTION: CPT

## 2020-05-29 PROCEDURE — 94761 N-INVAS EAR/PLS OXIMETRY MLT: CPT

## 2020-05-29 PROCEDURE — 99233 SBSQ HOSP IP/OBS HIGH 50: CPT | Performed by: INTERNAL MEDICINE

## 2020-05-29 PROCEDURE — 94640 AIRWAY INHALATION TREATMENT: CPT

## 2020-05-29 RX ADMIN — Medication 2 PUFF: at 21:06

## 2020-05-29 RX ADMIN — DONEPEZIL HYDROCHLORIDE 10 MG: 5 TABLET, FILM COATED ORAL at 21:22

## 2020-05-29 RX ADMIN — CYANOCOBALAMIN TAB 1000 MCG 1000 MCG: 1000 TAB at 08:59

## 2020-05-29 RX ADMIN — FERROUS SULFATE TAB 325 MG (65 MG ELEMENTAL FE) 325 MG: 325 (65 FE) TAB at 09:01

## 2020-05-29 RX ADMIN — ACETAMINOPHEN 650 MG: 325 TABLET, FILM COATED ORAL at 12:29

## 2020-05-29 RX ADMIN — DILTIAZEM HYDROCHLORIDE 30 MG: 30 TABLET, FILM COATED ORAL at 12:29

## 2020-05-29 RX ADMIN — APIXABAN 2.5 MG: 2.5 TABLET, FILM COATED ORAL at 09:01

## 2020-05-29 RX ADMIN — CEFUROXIME AXETIL 250 MG: 250 TABLET ORAL at 09:01

## 2020-05-29 RX ADMIN — VITAMIN D, TAB 1000IU (100/BT) 5000 UNITS: 25 TAB at 08:59

## 2020-05-29 RX ADMIN — DILTIAZEM HYDROCHLORIDE 30 MG: 30 TABLET, FILM COATED ORAL at 18:10

## 2020-05-29 RX ADMIN — APIXABAN 2.5 MG: 2.5 TABLET, FILM COATED ORAL at 21:22

## 2020-05-29 RX ADMIN — PANTOPRAZOLE SODIUM 40 MG: 40 TABLET, DELAYED RELEASE ORAL at 06:24

## 2020-05-29 RX ADMIN — METOPROLOL SUCCINATE 50 MG: 50 TABLET, FILM COATED, EXTENDED RELEASE ORAL at 09:01

## 2020-05-29 RX ADMIN — POTASSIUM BICARBONATE 40 MEQ: 782 TABLET, EFFERVESCENT ORAL at 21:21

## 2020-05-29 RX ADMIN — DILTIAZEM HYDROCHLORIDE 30 MG: 30 TABLET, FILM COATED ORAL at 00:38

## 2020-05-29 RX ADMIN — CEFUROXIME AXETIL 250 MG: 250 TABLET ORAL at 21:21

## 2020-05-29 RX ADMIN — DILTIAZEM HYDROCHLORIDE 30 MG: 30 TABLET, FILM COATED ORAL at 06:23

## 2020-05-29 RX ADMIN — DULOXETINE HYDROCHLORIDE 40 MG: 20 CAPSULE, DELAYED RELEASE ORAL at 09:01

## 2020-05-29 RX ADMIN — LINACLOTIDE 290 MCG: 145 CAPSULE, GELATIN COATED ORAL at 06:23

## 2020-05-29 RX ADMIN — DIVALPROEX SODIUM 250 MG: 250 TABLET, DELAYED RELEASE ORAL at 21:21

## 2020-05-29 RX ADMIN — DIVALPROEX SODIUM 250 MG: 250 TABLET, DELAYED RELEASE ORAL at 09:01

## 2020-05-29 RX ADMIN — Medication 2 PUFF: at 07:56

## 2020-05-29 RX ADMIN — MONTELUKAST SODIUM 10 MG: 10 TABLET, FILM COATED ORAL at 21:22

## 2020-05-29 RX ADMIN — MULTIPLE VITAMINS W/ MINERALS TAB 1 TABLET: TAB at 09:01

## 2020-05-29 RX ADMIN — ROPINIROLE HYDROCHLORIDE 2 MG: 1 TABLET, FILM COATED ORAL at 21:21

## 2020-05-29 RX ADMIN — FUROSEMIDE 20 MG: 20 TABLET ORAL at 09:01

## 2020-05-29 RX ADMIN — POLYETHYLENE GLYCOL 3350 17 G: 17 POWDER, FOR SOLUTION ORAL at 09:02

## 2020-05-29 RX ADMIN — LEVOTHYROXINE SODIUM 88 MCG: 88 TABLET ORAL at 09:01

## 2020-05-29 RX ADMIN — POTASSIUM BICARBONATE 40 MEQ: 782 TABLET, EFFERVESCENT ORAL at 09:02

## 2020-05-29 ASSESSMENT — PAIN DESCRIPTION - ORIENTATION: ORIENTATION: LEFT

## 2020-05-29 ASSESSMENT — PAIN DESCRIPTION - LOCATION: LOCATION: KNEE;LEG

## 2020-05-29 ASSESSMENT — PAIN SCALES - WONG BAKER: WONGBAKER_NUMERICALRESPONSE: 6

## 2020-05-29 NOTE — PROGRESS NOTES
Conjunctivae normal. EOM are normal.   · Neck: Neck supple. No rigidity. No JVD present. · Cardiovascular: Normal rate, irregular rhythm, S1&S2. · Pulmonary/Chest: Bilateral respiratory sounds. No wheezes,   rhonchi. · Abdominal: Soft. Bowel sounds present. No distension, No tenderness. · Musculoskeletal: No tenderness. No edema    · Lymphadenopathy: Has no cervical adenopathy. · Neurological: Alert and oriented. Cranial nerve appears intact, No Gross deficit   · Skin: Skin is warm and dry. No rash noted. · Psychiatric: Has a normal behavior   Labs, diagnostic and imaging results reviewed. Reviewed. Recent Labs     05/27/20  1118   *   K 4.3   CL 90*   CO2 33*   BUN 25*   CREATININE 0.6     Recent Labs     05/27/20  1118   WBC 6.7   HGB 11.8*   HCT 36.3   MCV 91.3        Lab Results   Component Value Date    CKTOTAL 290 05/23/2020    TROPONINI <0.01 05/23/2020     Estimated Creatinine Clearance: 48 mL/min (based on SCr of 0.6 mg/dL).    No results found for: BNP  Lab Results   Component Value Date    PROTIME 16.0 05/23/2020    PROTIME 12.8 08/07/2018    PROTIME 12.8 07/31/2018    INR 1.37 05/23/2020    INR 1.12 08/07/2018    INR 1.12 07/31/2018     Lab Results   Component Value Date    CHOL 128 02/29/2016    HDL 42 02/29/2016    HDL 44 04/27/2012    TRIG 74 02/29/2016       Scheduled Meds:   apixaban  2.5 mg Oral BID    metoprolol succinate  50 mg Oral Daily    cefUROXime  250 mg Oral 2 times per day    dilTIAZem  30 mg Oral 4 times per day    sodium chloride flush  10 mL Intravenous 2 times per day    potassium bicarb-citric acid  40 mEq Oral BID    furosemide  20 mg Oral Daily    Vitamin D  5,000 Units Oral Daily    divalproex  250 mg Oral BID    donepezil  10 mg Oral Nightly    DULoxetine  40 mg Oral Daily    ferrous sulfate  325 mg Oral Daily with breakfast    levothyroxine  88 mcg Oral Daily    linaclotide  290 mcg Oral QAM AC    fluticasone  2 spray Each Nostril Plan:      - Chronic Atrial fibrillation           Her HR is in 80-90 and very well controlled              Continue current treatment. , may increase Toprol Xl if need be to control rate                  On anticoagulation      - COVID 19 and PNA                            On supportive care and antibiotics   Improving      - Hypothyroidism              Synthroid     - Mild LV dysfunction based on echo from 2018                 On toprol XL and lasix              May consider ARB/ACEI later     - HTN              BP is well controlled. Continue current meds.     - Hyponatremia              Adequate intake    We sign off. NOTE: This report was transcribed using voice recognition software. Every effort was made to ensure accuracy, however, inadvertent computerized transcription errors may be present.

## 2020-05-29 NOTE — PROGRESS NOTES
Orders: None  · Anthropometric Measures:  · Ht: 5' 2\" (157.5 cm)   · Current Body Wt: 132 lb (59.9 kg)  · Ideal Body Wt: 110 lb (49.9 kg)   · BMI Classification: BMI 18.5 - 24.9 Normal Weight    Nutrition Interventions:   Continue current diet, Start ONS  Continued Inpatient Monitoring, Education Not Indicated    Nutrition Evaluation:   · Evaluation: Goals set   · Goals: Pt will consume at least 50% of meals and supplements     · Monitoring: Meal Intake, Supplement Intake, Diet Tolerance      Electronically signed by Adri Montes RD, REKHA on 5/29/20 at 12:42 PM EDT    Contact Number: [phone]

## 2020-05-29 NOTE — PROGRESS NOTES
The pt stood up next to bed without calling for assistance. The pt has been confused and unable to have conversation but able to express what she wants. The pt does not follow fall safety instructions. The pt wanted to use a toilet. After having small stool, the pt needed 1 mod assist getting up from a toilet. The pt pack in bed now.

## 2020-05-30 PROCEDURE — 6370000000 HC RX 637 (ALT 250 FOR IP): Performed by: INTERNAL MEDICINE

## 2020-05-30 PROCEDURE — 94761 N-INVAS EAR/PLS OXIMETRY MLT: CPT

## 2020-05-30 PROCEDURE — 2700000000 HC OXYGEN THERAPY PER DAY

## 2020-05-30 PROCEDURE — 94640 AIRWAY INHALATION TREATMENT: CPT

## 2020-05-30 PROCEDURE — 1200000000 HC SEMI PRIVATE

## 2020-05-30 RX ADMIN — CYANOCOBALAMIN TAB 1000 MCG 1000 MCG: 1000 TAB at 09:00

## 2020-05-30 RX ADMIN — MULTIPLE VITAMINS W/ MINERALS TAB 1 TABLET: TAB at 09:00

## 2020-05-30 RX ADMIN — POTASSIUM BICARBONATE 40 MEQ: 782 TABLET, EFFERVESCENT ORAL at 21:14

## 2020-05-30 RX ADMIN — FERROUS SULFATE TAB 325 MG (65 MG ELEMENTAL FE) 325 MG: 325 (65 FE) TAB at 09:00

## 2020-05-30 RX ADMIN — POLYETHYLENE GLYCOL 3350 17 G: 17 POWDER, FOR SOLUTION ORAL at 09:04

## 2020-05-30 RX ADMIN — FLUTICASONE PROPIONATE 2 SPRAY: 50 SPRAY, METERED NASAL at 12:59

## 2020-05-30 RX ADMIN — ROPINIROLE HYDROCHLORIDE 2 MG: 1 TABLET, FILM COATED ORAL at 21:19

## 2020-05-30 RX ADMIN — DILTIAZEM HYDROCHLORIDE 30 MG: 30 TABLET, FILM COATED ORAL at 13:51

## 2020-05-30 RX ADMIN — APIXABAN 2.5 MG: 2.5 TABLET, FILM COATED ORAL at 21:20

## 2020-05-30 RX ADMIN — FUROSEMIDE 20 MG: 20 TABLET ORAL at 09:00

## 2020-05-30 RX ADMIN — DILTIAZEM HYDROCHLORIDE 30 MG: 30 TABLET, FILM COATED ORAL at 00:20

## 2020-05-30 RX ADMIN — APIXABAN 2.5 MG: 2.5 TABLET, FILM COATED ORAL at 09:00

## 2020-05-30 RX ADMIN — Medication 2 PUFF: at 20:51

## 2020-05-30 RX ADMIN — DILTIAZEM HYDROCHLORIDE 30 MG: 30 TABLET, FILM COATED ORAL at 18:59

## 2020-05-30 RX ADMIN — LEVOTHYROXINE SODIUM 88 MCG: 88 TABLET ORAL at 09:04

## 2020-05-30 RX ADMIN — LINACLOTIDE 290 MCG: 145 CAPSULE, GELATIN COATED ORAL at 05:55

## 2020-05-30 RX ADMIN — DONEPEZIL HYDROCHLORIDE 10 MG: 5 TABLET, FILM COATED ORAL at 21:19

## 2020-05-30 RX ADMIN — POTASSIUM BICARBONATE 40 MEQ: 782 TABLET, EFFERVESCENT ORAL at 09:04

## 2020-05-30 RX ADMIN — METOPROLOL SUCCINATE 50 MG: 50 TABLET, FILM COATED, EXTENDED RELEASE ORAL at 09:04

## 2020-05-30 RX ADMIN — DIVALPROEX SODIUM 250 MG: 250 TABLET, DELAYED RELEASE ORAL at 21:20

## 2020-05-30 RX ADMIN — DIVALPROEX SODIUM 250 MG: 250 TABLET, DELAYED RELEASE ORAL at 09:00

## 2020-05-30 RX ADMIN — DULOXETINE HYDROCHLORIDE 40 MG: 20 CAPSULE, DELAYED RELEASE ORAL at 09:00

## 2020-05-30 RX ADMIN — VITAMIN D, TAB 1000IU (100/BT) 5000 UNITS: 25 TAB at 09:00

## 2020-05-30 RX ADMIN — DILTIAZEM HYDROCHLORIDE 30 MG: 30 TABLET, FILM COATED ORAL at 05:59

## 2020-05-30 RX ADMIN — PANTOPRAZOLE SODIUM 40 MG: 40 TABLET, DELAYED RELEASE ORAL at 05:58

## 2020-05-30 RX ADMIN — Medication 2 PUFF: at 07:40

## 2020-05-30 RX ADMIN — MONTELUKAST SODIUM 10 MG: 10 TABLET, FILM COATED ORAL at 21:19

## 2020-05-30 ASSESSMENT — PAIN SCALES - GENERAL
PAINLEVEL_OUTOF10: 0

## 2020-05-30 NOTE — PROGRESS NOTES
Department of Internal Medicine  General Internal Medicine   Progress Note      SUBJECTIVE  moderate SOB  Still needing 5-6 L O2    History obtained from chart review and the patient  General ROS: positive for  - fatigue, fever and malaise  negative for - chills or night sweats  Psychological ROS: positive for - anxiety, disorientation, irritability, memory difficulties and sleep disturbances  negative for - behavioral disorder, hallucinations or hostility  Ophthalmic ROS: negative  Respiratory ROS: positive for - cough, shortness of breath, sputum changes and tachypnea  negative for - hemoptysis, pleuritic pain, stridor or wheezing  Cardiovascular ROS: positive for - dyspnea on exertion  negative for - chest pain  Gastrointestinal ROS: no abdominal pain, change in bowel habits, or black or bloody stools  Genito-Urinary ROS: no dysuria, trouble voiding, or hematuria  Musculoskeletal ROS: chronic pain   Neurological ROS: no TIA or stroke symptoms  Dermatological ROS: negative    OBJECTIVE      Medications      Current Facility-Administered Medications: apixaban (ELIQUIS) tablet 2.5 mg, 2.5 mg, Oral, BID  metoprolol succinate (TOPROL XL) extended release tablet 50 mg, 50 mg, Oral, Daily  dilTIAZem (CARDIZEM) tablet 30 mg, 30 mg, Oral, 4 times per day  sodium chloride flush 0.9 % injection 10 mL, 10 mL, Intravenous, PRN  potassium bicarb-citric acid (EFFER-K) effervescent tablet 40 mEq, 40 mEq, Oral, BID  furosemide (LASIX) tablet 20 mg, 20 mg, Oral, Daily  ammonium lactate (AMLACTIN) 12 % cream, , Topical, PRN  Vitamin D (CHOLECALCIFEROL) tablet 5,000 Units, 5,000 Units, Oral, Daily  divalproex (DEPAKOTE) DR tablet 250 mg, 250 mg, Oral, BID  donepezil (ARICEPT) tablet 10 mg, 10 mg, Oral, Nightly  DULoxetine (CYMBALTA) extended release capsule 40 mg, 40 mg, Oral, Daily  ferrous sulfate (IRON 325) tablet 325 mg, 325 mg, Oral, Daily with breakfast  levothyroxine (SYNTHROID) tablet 88 mcg, 88 mcg, Oral, Daily  linaclotide

## 2020-05-30 NOTE — PROGRESS NOTES
Daily  linaclotide (LINZESS) capsule 290 mcg, 290 mcg, Oral, QAM AC  fluticasone (FLONASE) 50 MCG/ACT nasal spray 2 spray, 2 spray, Each Nostril, Daily  montelukast (SINGULAIR) tablet 10 mg, 10 mg, Oral, Nightly  therapeutic multivitamin-minerals 1 tablet, 1 tablet, Oral, Daily  pantoprazole (PROTONIX) tablet 40 mg, 40 mg, Oral, QAM AC  polyethylene glycol (GLYCOLAX) packet 17 g, 17 g, Oral, Daily  ipratropium-albuterol (DUONEB) nebulizer solution 1 ampule, 1 ampule, Inhalation, Q4H PRN  rOPINIRole (REQUIP) tablet 2 mg, 2 mg, Oral, Nightly  budesonide-formoterol (SYMBICORT) 160-4.5 MCG/ACT inhaler 2 puff, 2 puff, Inhalation, BID  vitamin B-12 (CYANOCOBALAMIN) tablet 1,000 mcg, 1,000 mcg, Oral, Daily  acetaminophen (TYLENOL) tablet 650 mg, 650 mg, Oral, Q4H PRN    Physical      Vitals: /72   Pulse 73   Temp 97.9 °F (36.6 °C) (Oral)   Resp 16   Ht 5' 2\" (1.575 m)   Wt 129 lb 4.8 oz (58.7 kg)   SpO2 93%   BMI 23.65 kg/m²   Temp: Temp: 97.9 °F (36.6 °C)  Max: Temp  Av.6 °F (36.4 °C)  Min: 97.4 °F (36.3 °C)  Max: 97.9 °F (36.6 °C)  Respiration range:  Resp  Av.9  Min: 16  Max: 18  Pulse Range:  Pulse  Av.7  Min: 67  Max: 74  Blood pressure range:  Systolic (32LUS), SAM:520 , Min:105 , ZLE:987   , Diastolic (42IWW), OZI:26, Min:60, Max:85    SpO2  Av.2 %  Min: 91 %  Max: 97 %    Intake/Output Summary (Last 24 hours) at 2020 1649  Last data filed at 2020 1453  Gross per 24 hour   Intake 480 ml   Output 1100 ml   Net -620 ml       Vent settings:  Pulse  Av.3  Min: 67  Max: 150  Resp  Av  Min: 16  Max: 24  SpO2  Av.2 %  Min: 76 %  Max: 100 %    CONSTITUTIONAL:  fatigued, alert, cooperative, mild distress, appears stated age and normal weight  EYES:  Unremarkable   NECK:  Mild JVD  and supple, symmetrical, trachea midline  BACK:  symmetric and no curvature  LUNGS:  tachypneic, Moderate respiratory distress, moderate air exchange, no retractions and crackles diffuse,

## 2020-05-31 PROCEDURE — 6370000000 HC RX 637 (ALT 250 FOR IP): Performed by: INTERNAL MEDICINE

## 2020-05-31 PROCEDURE — 1200000000 HC SEMI PRIVATE

## 2020-05-31 RX ADMIN — APIXABAN 2.5 MG: 2.5 TABLET, FILM COATED ORAL at 21:57

## 2020-05-31 RX ADMIN — DILTIAZEM HYDROCHLORIDE 30 MG: 30 TABLET, FILM COATED ORAL at 00:06

## 2020-05-31 RX ADMIN — LINACLOTIDE 290 MCG: 145 CAPSULE, GELATIN COATED ORAL at 06:17

## 2020-05-31 RX ADMIN — LEVOTHYROXINE SODIUM 88 MCG: 88 TABLET ORAL at 09:04

## 2020-05-31 RX ADMIN — METOPROLOL SUCCINATE 50 MG: 50 TABLET, FILM COATED, EXTENDED RELEASE ORAL at 09:04

## 2020-05-31 RX ADMIN — Medication 2 PUFF: at 08:20

## 2020-05-31 RX ADMIN — DILTIAZEM HYDROCHLORIDE 30 MG: 30 TABLET, FILM COATED ORAL at 17:37

## 2020-05-31 RX ADMIN — FUROSEMIDE 20 MG: 20 TABLET ORAL at 09:04

## 2020-05-31 RX ADMIN — DONEPEZIL HYDROCHLORIDE 10 MG: 5 TABLET, FILM COATED ORAL at 21:57

## 2020-05-31 RX ADMIN — DULOXETINE HYDROCHLORIDE 40 MG: 20 CAPSULE, DELAYED RELEASE ORAL at 09:04

## 2020-05-31 RX ADMIN — ROPINIROLE HYDROCHLORIDE 2 MG: 1 TABLET, FILM COATED ORAL at 21:57

## 2020-05-31 RX ADMIN — DIVALPROEX SODIUM 250 MG: 250 TABLET, DELAYED RELEASE ORAL at 21:57

## 2020-05-31 RX ADMIN — FERROUS SULFATE TAB 325 MG (65 MG ELEMENTAL FE) 325 MG: 325 (65 FE) TAB at 09:04

## 2020-05-31 RX ADMIN — DIVALPROEX SODIUM 250 MG: 250 TABLET, DELAYED RELEASE ORAL at 09:04

## 2020-05-31 RX ADMIN — VITAMIN D, TAB 1000IU (100/BT) 5000 UNITS: 25 TAB at 09:04

## 2020-05-31 RX ADMIN — POTASSIUM BICARBONATE 40 MEQ: 782 TABLET, EFFERVESCENT ORAL at 21:57

## 2020-05-31 RX ADMIN — MONTELUKAST SODIUM 10 MG: 10 TABLET, FILM COATED ORAL at 21:57

## 2020-05-31 RX ADMIN — PANTOPRAZOLE SODIUM 40 MG: 40 TABLET, DELAYED RELEASE ORAL at 06:17

## 2020-05-31 RX ADMIN — CYANOCOBALAMIN TAB 1000 MCG 1000 MCG: 1000 TAB at 09:04

## 2020-05-31 RX ADMIN — DILTIAZEM HYDROCHLORIDE 30 MG: 30 TABLET, FILM COATED ORAL at 12:19

## 2020-05-31 RX ADMIN — DILTIAZEM HYDROCHLORIDE 30 MG: 30 TABLET, FILM COATED ORAL at 06:17

## 2020-05-31 RX ADMIN — POLYETHYLENE GLYCOL 3350 17 G: 17 POWDER, FOR SOLUTION ORAL at 09:05

## 2020-05-31 RX ADMIN — APIXABAN 2.5 MG: 2.5 TABLET, FILM COATED ORAL at 09:04

## 2020-05-31 RX ADMIN — POTASSIUM BICARBONATE 40 MEQ: 782 TABLET, EFFERVESCENT ORAL at 09:05

## 2020-05-31 ASSESSMENT — PAIN SCALES - GENERAL
PAINLEVEL_OUTOF10: 0

## 2020-05-31 NOTE — PLAN OF CARE
Problem: Falls - Risk of:  Goal: Will remain free from falls  Description: Will remain free from falls  Outcome: Ongoing     Problem: Discharge Planning:  Goal: Discharged to appropriate level of care  Description: Discharged to appropriate level of care  Outcome: Ongoing
Problem: Falls - Risk of:  Goal: Will remain free from falls  Description: Will remain free from falls  Outcome: Ongoing     Problem: Discharge Planning:  Goal: Discharged to appropriate level of care  Description: Discharged to appropriate level of care  Outcome: Ongoing     Problem: Airway Clearance - Ineffective:  Goal: Clear lung sounds  Description: Clear lung sounds  Outcome: Ongoing     Problem: Airway Clearance - Ineffective  Goal: Achieve or maintain patent airway  Outcome: Ongoing     Problem: Isolation Precautions - Risk of Spread of Infection  Goal: Prevent transmission of infection  Outcome: Ongoing     Problem: Breathing Pattern - Ineffective  Goal: Ability to achieve and maintain a regular respiratory rate  Outcome: Ongoing     Problem: Body Temperature -  Risk of, Imbalanced  Goal: Ability to maintain a body temperature within defined limits  Outcome: Ongoing     Problem: Nutrition Deficits  Goal: Optimize nutrtional status  Outcome: Ongoing     Problem: Fatigue  Goal: Verbalize increase energy and improved vitality  Outcome: Ongoing     Problem: Pain:  Description: Pain management should include both nonpharmacologic and pharmacologic interventions.   Goal: Pain level will decrease  Description: Pain level will decrease  Outcome: Ongoing
Problem: Falls - Risk of:  Goal: Will remain free from falls  Description: Will remain free from falls  Outcome: Ongoing  Note: Patient is a high fall risk. Fall risk interventions are in place including armband, socks, bed alarms, hourly rounding. Plan of care explained to patient, verbalized understanding. Problem: Airway Clearance - Ineffective:  Goal: Clear lung sounds  Description: Clear lung sounds  Outcome: Met This Shift     Problem: Gas Exchange - Impaired:  Goal: Levels of oxygenation will improve  Description: Levels of oxygenation will improve  Outcome: Ongoing  Note: Oxygen increased to 6L, patient desatting to 78% when oxygen removed. Check that NC is in correct position frequently.      Problem: Isolation Precautions - Risk of Spread of Infection  Goal: Prevent transmission of infection  Outcome: Met This Shift  Note: Droplet+ isolation
Problem:  Body Temperature -  Risk of, Imbalanced  Goal: Ability to maintain a body temperature within defined limits  Outcome: Ongoing  Goal: Will regain or maintain usual level of consciousness  Outcome: Ongoing  Goal: Complications related to the disease process, condition or treatment will be avoided or minimized  Outcome: Ongoing     Problem: Nutrition Deficits  Goal: Optimize nutrtional status  Outcome: Ongoing     Problem: Risk for Fluid Volume Deficit  Goal: Maintain normal heart rhythm  Outcome: Ongoing  Goal: Maintain absence of muscle cramping  Outcome: Ongoing  Goal: Maintain normal serum potassium, sodium, calcium, phosphorus, and pH  Outcome: Ongoing     Problem: Loneliness or Risk for Loneliness  Goal: Demonstrate positive use of time alone when socialization is not possible  Outcome: Ongoing     Problem: Fatigue  Goal: Verbalize increase energy and improved vitality  Outcome: Ongoing     Problem: Patient Education: Go to Patient Education Activity  Goal: Patient/Family Education  Outcome: Ongoing     Problem: Pain:  Goal: Pain level will decrease  Description: Pain level will decrease  Outcome: Ongoing  Goal: Control of acute pain  Description: Control of acute pain  Outcome: Ongoing  Goal: Control of chronic pain  Description: Control of chronic pain  Outcome: Ongoing     Problem: Nutrition  Goal: Optimal nutrition therapy  Outcome: Ongoing

## 2020-05-31 NOTE — PROGRESS NOTES
Department of Internal Medicine  General Internal Medicine   Progress Note      SUBJECTIVE  Appears confused pleasantly and disoriented as usual , no obvious respiratory distress     History obtained from chart review and the patient  General ROS: positive for  - fatigue, fever and malaise  negative for - chills or night sweats  Psychological ROS: positive for - anxiety, disorientation, irritability, memory difficulties and sleep disturbances  negative for - behavioral disorder, hallucinations or hostility  Ophthalmic ROS: negative  Respiratory ROS: positive for - cough, shortness of breath, sputum changes and tachypnea  negative for - hemoptysis, pleuritic pain, stridor or wheezing  Cardiovascular ROS: positive for - dyspnea on exertion  negative for - chest pain  Gastrointestinal ROS: no abdominal pain, change in bowel habits, or black or bloody stools  Genito-Urinary ROS: no dysuria, trouble voiding, or hematuria  Musculoskeletal ROS: chronic pain   Neurological ROS: no TIA or stroke symptoms  Dermatological ROS: negative    OBJECTIVE      Medications      Current Facility-Administered Medications: apixaban (ELIQUIS) tablet 2.5 mg, 2.5 mg, Oral, BID  metoprolol succinate (TOPROL XL) extended release tablet 50 mg, 50 mg, Oral, Daily  dilTIAZem (CARDIZEM) tablet 30 mg, 30 mg, Oral, 4 times per day  sodium chloride flush 0.9 % injection 10 mL, 10 mL, Intravenous, PRN  potassium bicarb-citric acid (EFFER-K) effervescent tablet 40 mEq, 40 mEq, Oral, BID  furosemide (LASIX) tablet 20 mg, 20 mg, Oral, Daily  ammonium lactate (AMLACTIN) 12 % cream, , Topical, PRN  Vitamin D (CHOLECALCIFEROL) tablet 5,000 Units, 5,000 Units, Oral, Daily  divalproex (DEPAKOTE) DR tablet 250 mg, 250 mg, Oral, BID  donepezil (ARICEPT) tablet 10 mg, 10 mg, Oral, Nightly  DULoxetine (CYMBALTA) extended release capsule 40 mg, 40 mg, Oral, Daily  ferrous sulfate (IRON 325) tablet 325 mg, 325 mg, Oral, Daily with breakfast  levothyroxine

## 2020-05-31 NOTE — PROGRESS NOTES
Pt up to restroom no bm.  Pt x1 with walker to bed resting eyes closed call light in reach denies any pain o2 sat 96 2L NC.

## 2020-06-01 PROCEDURE — 2700000000 HC OXYGEN THERAPY PER DAY

## 2020-06-01 PROCEDURE — 97535 SELF CARE MNGMENT TRAINING: CPT

## 2020-06-01 PROCEDURE — 92526 ORAL FUNCTION THERAPY: CPT

## 2020-06-01 PROCEDURE — 94640 AIRWAY INHALATION TREATMENT: CPT

## 2020-06-01 PROCEDURE — 94761 N-INVAS EAR/PLS OXIMETRY MLT: CPT

## 2020-06-01 PROCEDURE — 6370000000 HC RX 637 (ALT 250 FOR IP): Performed by: INTERNAL MEDICINE

## 2020-06-01 PROCEDURE — 1200000000 HC SEMI PRIVATE

## 2020-06-01 PROCEDURE — 97530 THERAPEUTIC ACTIVITIES: CPT

## 2020-06-01 PROCEDURE — 97116 GAIT TRAINING THERAPY: CPT

## 2020-06-01 RX ADMIN — Medication 2 PUFF: at 20:31

## 2020-06-01 RX ADMIN — DIVALPROEX SODIUM 250 MG: 250 TABLET, DELAYED RELEASE ORAL at 09:07

## 2020-06-01 RX ADMIN — METOPROLOL SUCCINATE 50 MG: 50 TABLET, FILM COATED, EXTENDED RELEASE ORAL at 09:07

## 2020-06-01 RX ADMIN — DONEPEZIL HYDROCHLORIDE 10 MG: 5 TABLET, FILM COATED ORAL at 20:07

## 2020-06-01 RX ADMIN — DULOXETINE HYDROCHLORIDE 40 MG: 20 CAPSULE, DELAYED RELEASE ORAL at 09:08

## 2020-06-01 RX ADMIN — CYANOCOBALAMIN TAB 1000 MCG 1000 MCG: 1000 TAB at 09:07

## 2020-06-01 RX ADMIN — FUROSEMIDE 20 MG: 20 TABLET ORAL at 09:07

## 2020-06-01 RX ADMIN — POTASSIUM BICARBONATE 40 MEQ: 782 TABLET, EFFERVESCENT ORAL at 09:07

## 2020-06-01 RX ADMIN — MONTELUKAST SODIUM 10 MG: 10 TABLET, FILM COATED ORAL at 20:07

## 2020-06-01 RX ADMIN — Medication 2 PUFF: at 09:18

## 2020-06-01 RX ADMIN — DIVALPROEX SODIUM 250 MG: 250 TABLET, DELAYED RELEASE ORAL at 20:08

## 2020-06-01 RX ADMIN — DILTIAZEM HYDROCHLORIDE 30 MG: 30 TABLET, FILM COATED ORAL at 06:20

## 2020-06-01 RX ADMIN — PANTOPRAZOLE SODIUM 40 MG: 40 TABLET, DELAYED RELEASE ORAL at 06:20

## 2020-06-01 RX ADMIN — APIXABAN 2.5 MG: 2.5 TABLET, FILM COATED ORAL at 09:07

## 2020-06-01 RX ADMIN — APIXABAN 2.5 MG: 2.5 TABLET, FILM COATED ORAL at 20:07

## 2020-06-01 RX ADMIN — LEVOTHYROXINE SODIUM 88 MCG: 88 TABLET ORAL at 09:07

## 2020-06-01 RX ADMIN — DILTIAZEM HYDROCHLORIDE 30 MG: 30 TABLET, FILM COATED ORAL at 18:01

## 2020-06-01 RX ADMIN — DILTIAZEM HYDROCHLORIDE 30 MG: 30 TABLET, FILM COATED ORAL at 12:46

## 2020-06-01 RX ADMIN — LINACLOTIDE 290 MCG: 145 CAPSULE, GELATIN COATED ORAL at 06:20

## 2020-06-01 RX ADMIN — POTASSIUM BICARBONATE 40 MEQ: 782 TABLET, EFFERVESCENT ORAL at 20:07

## 2020-06-01 RX ADMIN — DILTIAZEM HYDROCHLORIDE 30 MG: 30 TABLET, FILM COATED ORAL at 00:02

## 2020-06-01 RX ADMIN — VITAMIN D, TAB 1000IU (100/BT) 5000 UNITS: 25 TAB at 09:07

## 2020-06-01 RX ADMIN — ROPINIROLE HYDROCHLORIDE 2 MG: 1 TABLET, FILM COATED ORAL at 20:07

## 2020-06-01 ASSESSMENT — PAIN SCALES - GENERAL
PAINLEVEL_OUTOF10: 0

## 2020-06-01 NOTE — PROGRESS NOTES
[place] removed pt is ambulatory to restroom x1 walker pt now up in chair eating breakfast. 02 sat 95 2 L call light in reach alarm on for pt safety video in room

## 2020-06-01 NOTE — PROGRESS NOTES
Physical Therapy  Facility/Department: [address] ONCOLOGY  Daily Treatment Note  NAME: Jamal Yancey  : 3/2/1929  MRN: [id number]    Date of Service: 2020    Discharge Recommendations:    Jamal Yancey scored a 13/24 on the AM-PAC short mobility form. Current research shows that an AM-PAC score of 17 or less is typically not associated with a discharge to the patient's home setting. Based on the patient's AM-PAC score and their current functional mobility deficits, it is recommended that the patient have 3-5 sessions per week of Physical Therapy at d/c to increase the patient's independence. At this time, this patient lacks the endurance, and/or tolerance for 3 hours of therapy/day, 5-7x/wk and would benefit most from a follow up treatment frequency of 3-5x/wk. Please see assessment section for further patient specific details. If patient discharges prior to next session this note will serve as a discharge summary. Please see below for the latest assessment towards goals. PT Equipment Recommendations  Equipment Needed: No  Other: defer to next level of care    Assessment   Body structures, Functions, Activity limitations: Decreased functional mobility ; Decreased strength;Decreased endurance;Decreased balance;Decreased coordination  Assessment: pt with increased difficulty following commands and increased time needed to respond this session  Treatment Diagnosis: impaired functional mobility  Prognosis: Fair  PT Education: PT Role;Plan of Care;Precautions;General Safety;Orientation  Patient Education: pt was unable to verbalize understanding  Barriers to Learning: hearing, cognition  REQUIRES PT FOLLOW UP: Yes  Activity Tolerance  Activity Tolerance: Patient Tolerated treatment well;Patient limited by cognitive status     Patient Diagnosis(es): The primary encounter diagnosis was Febrile illness, acute.  Diagnoses of Suspected COVID-19 virus infection and Multifocal pneumonia were also pertinent Assist  Assistance: Moderate assistance  Quality of Gait: flexed trunk with weight on heels, difficulty maintaining neutral PETRONA, increased time needed to complete  Gait Deviations: Deviated path;Staggers  Distance: 10' x 1, 7' x 1--distance limited by fatigue and safety  Comments: pt not following directions for safety or sequencing this session     Balance  Posture: Fair  Sitting - Static: Good  Sitting - Dynamic: Fair  Standing - Static: Poor  Standing - Dynamic: Poor  Comments: mod assist to stand from toilet, min assist for all standing balance for pericare and grooming tasks at sink                  AM-PAC Score  AM-PAC Inpatient Mobility Raw Score : 13 (06/01/20 1423)  AM-PAC Inpatient T-Scale Score : 36.74 (06/01/20 1423)  Mobility Inpatient CMS 0-100% Score: 64.91 (06/01/20 1423)  Mobility Inpatient CMS G-Code Modifier : CL (06/01/20 1423)          Goals  Short term goals  Time Frame for Short term goals: To be met prior to DC  Short term goal 1: Pt will perform sit to/from stand with SBA  Short term goal 2: Pt will ambulate 36' with RW and min A  Patient Goals   Patient goals : ALHAJI due to decreased cognition  No goals met this session    Plan    Plan  Times per week: 3-5  Current Treatment Recommendations: Strengthening, Gait Training, Balance Training, Functional Mobility Training, Transfer Training, Neuromuscular Re-education, Endurance Training, Safety Education & Training  Safety Devices  Type of devices:  All fall risk precautions in place, Call light within reach, Chair alarm in place, Nurse notified, Left in chair     Therapy Time   Individual Concurrent Group Co-treatment   Time In 1109         Time Out 1135         Minutes 26         Timed Code Treatment Minutes: [facility] 41, [facility]

## 2020-06-01 NOTE — PROGRESS NOTES
diffuse, rhonchi diffuse  CARDIOVASCULAR:  normal apical pulses, regular rate and rhythm with ectopic beats, normal S1 and S2, no S3 and no S4  ABDOMEN:  Soft scaphoid BS + non tender   GENITAL/URINARY:  NEX   MUSCULOSKELETAL:  Trace edema   there is no redness, warmth, or swelling of the joints  NEUROLOGIC:  No acute focal deficit   SKIN:  Warm moist mild pallor  and no bruising or bleeding    Data      Lab Results   Component Value Date    WBC 6.7 05/27/2020    HGB 11.8 (L) 05/27/2020    HCT 36.3 05/27/2020    MCV 91.3 05/27/2020     05/27/2020     Lab Results   Component Value Date     05/27/2020    K 4.3 05/27/2020    K 2.9 05/24/2020    CL 90 05/27/2020    CO2 33 05/27/2020    BUN 25 05/27/2020    CREATININE 0.6 05/27/2020    GLUCOSE 121 05/27/2020    CALCIUM 9.4 05/27/2020          ASSESSMENT AND PLAN     Active Problems:    Dyspnea    Osteoporosis    Restless leg syndrome    Uncomplicated asthma    Atrial fibrillation with rapid ventricular response (HCC)    Vitamin D deficiency    Hypothyroidism    Essential hypertension    Dementia without behavioral disturbance (HCC)    Pneumonia    Cardiomyopathy ([address]    Hypoxemia    Viral pneumonia    COVID-19 virus infection    Hyponatremia    Chronic combined systolic (congestive) and diastolic (congestive) heart failure ([address]  Resolved Problems:    Suspected COVID-19 virus infection    Ct present care   Check TTE    ct Oral anticoagulation    and Diltiazam for rate control    repeat COVID testing in order to help with discharge AM PAC score very poor will need SNF for sure

## 2020-06-01 NOTE — PROGRESS NOTES
minutes  Activity: functional mobility to/from restroom, stance for LB ADLs, functional transfers  Functional Mobility  Functional - Mobility Device: Other(hand held assist)  Activity: Other  Assist Level: Moderate assistance  Functional Mobility Comments: increased time to complete, requires increased time to process verbal cuing required for sequencing  Toilet Transfers  Toilet - Technique: Ambulating  Equipment Used: Standard toilet  Toilet Transfer: Moderate assistance  Bed mobility  Comment: pt in recliner at start/end of session  Transfers  Sit to stand: Minimal assistance  Stand to sit: Minimal assistance               Cognition  Overall Cognitive Status: Exceptions  Arousal/Alertness: Delayed responses to stimuli;Inconsistent responses to stimuli  Following Commands: Follows one step commands with increased time; Follows one step commands with repetition  Attention Span: Difficulty attending to directions; Attends with cues to redirect  Memory: Decreased recall of precautions;Decreased recall of recent events;Decreased short term memory  Safety Judgement: Decreased awareness of need for assistance;Decreased awareness of need for safety  Problem Solving: Decreased awareness of errors  Insights: Not aware of deficits  Initiation: Requires cues for some  Sequencing: Requires cues for some     Perception  Overall Perceptual Status: [facility]                                   Plan   Plan  Times per week: 3-5  Times per day: Daily  Current Treatment Recommendations: Strengthening, Balance Training, Functional Mobility Training, Cognitive Reorientation, Safety Education & Training, Patient/Caregiver Education & Training, Equipment Evaluation, Education, & procurement, Self-Care / ADL  G-Code     OutComes Score                                                  AM-PAC Score        AM-[facility] Inpatient Daily Activity Raw Score: 12 (06/01/20 1500)  AM-PAC Inpatient ADL T-Scale Score : 30.6 (06/01/20 1500)  ADL Inpatient CMS 0-100% Score: 66.57 (06/01/20 1500)  ADL Inpatient CMS G-Code Modifier : CL (06/01/20 1500)    Goals  Short term goals  Time Frame for Short term goals: Discharge  Short term goal 1: Bed mobility Mod I - SBA 5/28  Short term goal 2: Functional ADL transfers Dm-min to modA - ongoing 6/1  Short term goal 3: Functional mobility with appropriate AD Dm - ongoing 6/1  Short term goal 4: UB ADLs Dm, LB modA - ongoing 6/1  Long term goals  Time Frame for Long term goals : LTG=STG  Patient Goals   Patient goals : Not stated due to cognition       Therapy Time   Individual Concurrent Group Co-treatment   Time In       1109   Time Out       1135   Minutes       26      Timed Code Treatment Minutes:  26 Minutes    Total Treatment Minutes:  26 minutes    Fabiano Gonsalves OTR/L [id number]    Carolyn Kern OT

## 2020-06-02 VITALS
HEIGHT: 62 IN | BODY MASS INDEX: 23.35 KG/M2 | DIASTOLIC BLOOD PRESSURE: 68 MMHG | RESPIRATION RATE: 18 BRPM | HEART RATE: 73 BPM | TEMPERATURE: 97.6 F | SYSTOLIC BLOOD PRESSURE: 108 MMHG | OXYGEN SATURATION: 95 % | WEIGHT: 126.9 LBS

## 2020-06-02 LAB — SARS-COV-2, PCR: DETECTED

## 2020-06-02 PROCEDURE — U0003 INFECTIOUS AGENT DETECTION BY NUCLEIC ACID (DNA OR RNA); SEVERE ACUTE RESPIRATORY SYNDROME CORONAVIRUS 2 (SARS-COV-2) (CORONAVIRUS DISEASE [COVID-19]), AMPLIFIED PROBE TECHNIQUE, MAKING USE OF HIGH THROUGHPUT TECHNOLOGIES AS DESCRIBED BY CMS-2020-01-R: HCPCS

## 2020-06-02 PROCEDURE — 6370000000 HC RX 637 (ALT 250 FOR IP): Performed by: INTERNAL MEDICINE

## 2020-06-02 PROCEDURE — 94761 N-INVAS EAR/PLS OXIMETRY MLT: CPT

## 2020-06-02 PROCEDURE — 2700000000 HC OXYGEN THERAPY PER DAY

## 2020-06-02 PROCEDURE — 94640 AIRWAY INHALATION TREATMENT: CPT

## 2020-06-02 RX ORDER — METOPROLOL SUCCINATE 50 MG/1
50 TABLET, EXTENDED RELEASE ORAL DAILY
Qty: 30 TABLET | Refills: 3 | Status: ON HOLD | OUTPATIENT
Start: 2020-06-02 | End: 2020-07-13 | Stop reason: HOSPADM

## 2020-06-02 RX ORDER — FUROSEMIDE 20 MG/1
20 TABLET ORAL DAILY
Qty: 60 TABLET | Refills: 3 | Status: ON HOLD | OUTPATIENT
Start: 2020-06-02 | End: 2020-09-25 | Stop reason: HOSPADM

## 2020-06-02 RX ORDER — IPRATROPIUM BROMIDE AND ALBUTEROL SULFATE 2.5; .5 MG/3ML; MG/3ML
3 SOLUTION RESPIRATORY (INHALATION) EVERY 4 HOURS PRN
Qty: 360 ML | Refills: 0 | Status: SHIPPED | OUTPATIENT
Start: 2020-06-02 | End: 2020-07-09

## 2020-06-02 RX ADMIN — LINACLOTIDE 290 MCG: 145 CAPSULE, GELATIN COATED ORAL at 05:50

## 2020-06-02 RX ADMIN — DULOXETINE HYDROCHLORIDE 40 MG: 20 CAPSULE, DELAYED RELEASE ORAL at 08:54

## 2020-06-02 RX ADMIN — PANTOPRAZOLE SODIUM 40 MG: 40 TABLET, DELAYED RELEASE ORAL at 05:50

## 2020-06-02 RX ADMIN — FERROUS SULFATE TAB 325 MG (65 MG ELEMENTAL FE) 325 MG: 325 (65 FE) TAB at 08:54

## 2020-06-02 RX ADMIN — FUROSEMIDE 20 MG: 20 TABLET ORAL at 08:55

## 2020-06-02 RX ADMIN — DILTIAZEM HYDROCHLORIDE 30 MG: 30 TABLET, FILM COATED ORAL at 00:53

## 2020-06-02 RX ADMIN — DILTIAZEM HYDROCHLORIDE 30 MG: 30 TABLET, FILM COATED ORAL at 05:50

## 2020-06-02 RX ADMIN — VITAMIN D, TAB 1000IU (100/BT) 5000 UNITS: 25 TAB at 08:54

## 2020-06-02 RX ADMIN — DIVALPROEX SODIUM 250 MG: 250 TABLET, DELAYED RELEASE ORAL at 08:55

## 2020-06-02 RX ADMIN — Medication 2 PUFF: at 08:10

## 2020-06-02 RX ADMIN — POTASSIUM BICARBONATE 40 MEQ: 782 TABLET, EFFERVESCENT ORAL at 08:55

## 2020-06-02 RX ADMIN — METOPROLOL SUCCINATE 50 MG: 50 TABLET, FILM COATED, EXTENDED RELEASE ORAL at 08:55

## 2020-06-02 RX ADMIN — MULTIPLE VITAMINS W/ MINERALS TAB 1 TABLET: TAB at 08:55

## 2020-06-02 RX ADMIN — DILTIAZEM HYDROCHLORIDE 30 MG: 30 TABLET, FILM COATED ORAL at 13:07

## 2020-06-02 RX ADMIN — APIXABAN 2.5 MG: 2.5 TABLET, FILM COATED ORAL at 08:55

## 2020-06-02 RX ADMIN — CYANOCOBALAMIN TAB 1000 MCG 1000 MCG: 1000 TAB at 08:54

## 2020-06-02 RX ADMIN — LEVOTHYROXINE SODIUM 88 MCG: 88 TABLET ORAL at 08:54

## 2020-06-02 ASSESSMENT — PAIN SCALES - GENERAL
PAINLEVEL_OUTOF10: 0

## 2020-06-02 NOTE — CARE COORDINATION
Discharge Plan:     Patient discharged to:[facility]   SW/DC Planner faxed, [address] and AVS to:[phone]  Narcotic Prescriptions faxed were:n/a  RN: Julianna Claude will call report to:  [phone]    Medical Transport with: [address] they are aware that pt is Covid positive    time:5 pm today   Family advised of discharge?:yes  notified daughter Parag Marcelo this am   HENS Submitted?:  Yes   All discharge needs met per case management.     Zulma Powell RN, BSN  [phone]

## 2020-06-02 NOTE — PROGRESS NOTES
Department of Internal Medicine  General Internal Medicine   Progress Note      SUBJECTIVE   Breathing remains stable , O2 requirement down to 1 L     History obtained from chart review and the patient  General ROS: positive for  - fatigue, fever and malaise  negative for - chills or night sweats  Psychological ROS: positive for - anxiety, disorientation, irritability, memory difficulties and sleep disturbances  negative for - behavioral disorder, hallucinations or hostility  Ophthalmic ROS: negative  Respiratory ROS: positive for - cough, shortness of breath, sputum changes and tachypnea  negative for - hemoptysis, pleuritic pain, stridor or wheezing  Cardiovascular ROS: positive for - dyspnea on exertion  negative for - chest pain  Gastrointestinal ROS: no abdominal pain, change in bowel habits, or black or bloody stools  Genito-Urinary ROS: no dysuria, trouble voiding, or hematuria  Musculoskeletal ROS: chronic pain   Neurological ROS: no TIA or stroke symptoms  Dermatological ROS: negative    OBJECTIVE      Medications      Current Facility-Administered Medications: apixaban (ELIQUIS) tablet 2.5 mg, 2.5 mg, Oral, BID  metoprolol succinate (TOPROL XL) extended release tablet 50 mg, 50 mg, Oral, Daily  dilTIAZem (CARDIZEM) tablet 30 mg, 30 mg, Oral, 4 times per day  sodium chloride flush 0.9 % injection 10 mL, 10 mL, Intravenous, PRN  potassium bicarb-citric acid (EFFER-K) effervescent tablet 40 mEq, 40 mEq, Oral, BID  furosemide (LASIX) tablet 20 mg, 20 mg, Oral, Daily  ammonium lactate (AMLACTIN) 12 % cream, , Topical, PRN  Vitamin D (CHOLECALCIFEROL) tablet 5,000 Units, 5,000 Units, Oral, Daily  divalproex (DEPAKOTE) DR tablet 250 mg, 250 mg, Oral, BID  donepezil (ARICEPT) tablet 10 mg, 10 mg, Oral, Nightly  DULoxetine (CYMBALTA) extended release capsule 40 mg, 40 mg, Oral, Daily  ferrous sulfate (IRON 325) tablet 325 mg, 325 mg, Oral, Daily with breakfast  levothyroxine (SYNTHROID) tablet 88 mcg, 88 mcg, Oral,

## 2020-06-03 PROBLEM — R41.0 DELIRIUM, ACUTE: Status: ACTIVE | Noted: 2020-06-03

## 2020-06-03 NOTE — PROGRESS NOTES
Patient Active Problem List   Diagnosis    Dyspnea    Osteoporosis    Restless leg syndrome    Uncomplicated asthma    [facility] rapid [facility]    Vitamin D deficiency    Hypothyroidism    Buttocks nodule    Essential hypertension    Dementia without behavioral disturbance ([facility]    Pneumonia    [facility]    Hypoxemia    Viral pneumonia    COVID-19 virus infection    Hyponatremia    Chronic combined systolic (congestive) and diastolic (congestive) heart failure (HCC)    Delirium, acute   acute delirium added as a result of viremia due to COVID 19

## 2020-07-09 ENCOUNTER — APPOINTMENT (OUTPATIENT)
Dept: GENERAL RADIOLOGY | Age: 85
DRG: 469 | End: 2020-07-09
Payer: MEDICARE

## 2020-07-09 ENCOUNTER — HOSPITAL ENCOUNTER (INPATIENT)
Age: 85
LOS: 8 days | Discharge: NURSING, REHAB OR LONG-TERM CARE | DRG: 469 | End: 2020-07-17
Attending: STUDENT IN AN ORGANIZED HEALTH CARE EDUCATION/TRAINING PROGRAM | Admitting: INTERNAL MEDICINE
Payer: MEDICARE

## 2020-07-09 ENCOUNTER — APPOINTMENT (OUTPATIENT)
Dept: CT IMAGING | Age: 85
DRG: 469 | End: 2020-07-09
Payer: MEDICARE

## 2020-07-09 PROBLEM — S72.002A LEFT DISPLACED FEMORAL NECK FRACTURE (HCC): Status: ACTIVE | Noted: 2020-07-09

## 2020-07-09 PROBLEM — Z79.01 CHRONIC ANTICOAGULATION: Status: ACTIVE | Noted: 2020-07-09

## 2020-07-09 LAB
ABO/RH: NORMAL
ALBUMIN SERPL-MCNC: 4.3 G/DL (ref 3.4–5)
ALP BLD-CCNC: 201 U/L (ref 40–129)
ALT SERPL-CCNC: 25 U/L (ref 10–40)
ANION GAP SERPL CALCULATED.3IONS-SCNC: 15 MMOL/L (ref 3–16)
ANTIBODY SCREEN: NORMAL
AST SERPL-CCNC: 27 U/L (ref 15–37)
BASE EXCESS ARTERIAL: 3 MMOL/L (ref -3–3)
BASOPHILS ABSOLUTE: 0.1 K/UL (ref 0–0.2)
BASOPHILS RELATIVE PERCENT: 0.6 %
BILIRUB SERPL-MCNC: 1 MG/DL (ref 0–1)
BILIRUBIN DIRECT: 0.5 MG/DL (ref 0–0.3)
BILIRUBIN URINE: NEGATIVE
BILIRUBIN, INDIRECT: 0.5 MG/DL (ref 0–1)
BLOOD, URINE: NEGATIVE
BUN BLDV-MCNC: 23 MG/DL (ref 7–20)
CALCIUM SERPL-MCNC: 9.4 MG/DL (ref 8.3–10.6)
CARBOXYHEMOGLOBIN ARTERIAL: 1.5 % (ref 0–1.5)
CHLORIDE BLD-SCNC: 92 MMOL/L (ref 99–110)
CLARITY: CLEAR
CO2: 25 MMOL/L (ref 21–32)
COLOR: YELLOW
CREAT SERPL-MCNC: 0.7 MG/DL (ref 0.6–1.2)
EKG DIAGNOSIS: NORMAL
EKG Q-T INTERVAL: 380 MS
EKG QRS DURATION: 146 MS
EKG QTC CALCULATION (BAZETT): 530 MS
EKG R AXIS: -23 DEGREES
EKG T AXIS: -7 DEGREES
EKG VENTRICULAR RATE: 117 BPM
EOSINOPHILS ABSOLUTE: 0 K/UL (ref 0–0.6)
EOSINOPHILS RELATIVE PERCENT: 0.1 %
GFR AFRICAN AMERICAN: >60
GFR NON-AFRICAN AMERICAN: >60
GLUCOSE BLD-MCNC: 125 MG/DL (ref 70–99)
GLUCOSE URINE: NEGATIVE MG/DL
HCO3 ARTERIAL: 26.3 MMOL/L (ref 21–29)
HCT VFR BLD CALC: 37.2 % (ref 36–48)
HEMOGLOBIN, ART, EXTENDED: 11.5 G/DL (ref 12–16)
HEMOGLOBIN: 12.1 G/DL (ref 12–16)
KETONES, URINE: NEGATIVE MG/DL
LACTIC ACID, SEPSIS: 1.7 MMOL/L (ref 0.4–1.9)
LEUKOCYTE ESTERASE, URINE: NEGATIVE
LYMPHOCYTES ABSOLUTE: 1 K/UL (ref 1–5.1)
LYMPHOCYTES RELATIVE PERCENT: 8.3 %
MCH RBC QN AUTO: 30.1 PG (ref 26–34)
MCHC RBC AUTO-ENTMCNC: 32.6 G/DL (ref 31–36)
MCV RBC AUTO: 92.4 FL (ref 80–100)
METHEMOGLOBIN ARTERIAL: 0.3 %
MICROSCOPIC EXAMINATION: NORMAL
MONOCYTES ABSOLUTE: 1.2 K/UL (ref 0–1.3)
MONOCYTES RELATIVE PERCENT: 10.6 %
NEUTROPHILS ABSOLUTE: 9.4 K/UL (ref 1.7–7.7)
NEUTROPHILS RELATIVE PERCENT: 80.4 %
NITRITE, URINE: NEGATIVE
O2 CONTENT ARTERIAL: 15 ML/DL
O2 SAT, ARTERIAL: 93.5 %
O2 THERAPY: ABNORMAL
PCO2 ARTERIAL: 34.7 MMHG (ref 35–45)
PDW BLD-RTO: 17.5 % (ref 12.4–15.4)
PH ARTERIAL: 7.49 (ref 7.35–7.45)
PH UA: 6 (ref 5–8)
PLATELET # BLD: 163 K/UL (ref 135–450)
PMV BLD AUTO: 9.3 FL (ref 5–10.5)
PO2 ARTERIAL: 63.9 MMHG (ref 75–108)
POTASSIUM SERPL-SCNC: 4.1 MMOL/L (ref 3.5–5.1)
PROTEIN UA: NEGATIVE MG/DL
RBC # BLD: 4.03 M/UL (ref 4–5.2)
SARS-COV-2, NAAT: NOT DETECTED
SODIUM BLD-SCNC: 132 MMOL/L (ref 136–145)
SPECIFIC GRAVITY UA: 1.01 (ref 1–1.03)
T4 FREE: 1.7 NG/DL (ref 0.9–1.8)
TCO2 ARTERIAL: 27.3 MMOL/L
TOTAL CK: 508 U/L (ref 26–192)
TOTAL PROTEIN: 7.8 G/DL (ref 6.4–8.2)
TROPONIN: <0.01 NG/ML
TSH SERPL DL<=0.05 MIU/L-ACNC: 2.76 UIU/ML (ref 0.27–4.2)
URINE REFLEX TO CULTURE: NORMAL
URINE TYPE: NORMAL
UROBILINOGEN, URINE: 1 E.U./DL
WBC # BLD: 11.7 K/UL (ref 4–11)

## 2020-07-09 PROCEDURE — U0002 COVID-19 LAB TEST NON-CDC: HCPCS

## 2020-07-09 PROCEDURE — 71045 X-RAY EXAM CHEST 1 VIEW: CPT

## 2020-07-09 PROCEDURE — 73502 X-RAY EXAM HIP UNI 2-3 VIEWS: CPT

## 2020-07-09 PROCEDURE — 87040 BLOOD CULTURE FOR BACTERIA: CPT

## 2020-07-09 PROCEDURE — 84484 ASSAY OF TROPONIN QUANT: CPT

## 2020-07-09 PROCEDURE — 70450 CT HEAD/BRAIN W/O DYE: CPT

## 2020-07-09 PROCEDURE — 93010 ELECTROCARDIOGRAM REPORT: CPT | Performed by: INTERNAL MEDICINE

## 2020-07-09 PROCEDURE — 86900 BLOOD TYPING SEROLOGIC ABO: CPT

## 2020-07-09 PROCEDURE — 72125 CT NECK SPINE W/O DYE: CPT

## 2020-07-09 PROCEDURE — 80076 HEPATIC FUNCTION PANEL: CPT

## 2020-07-09 PROCEDURE — 99222 1ST HOSP IP/OBS MODERATE 55: CPT | Performed by: ORTHOPAEDIC SURGERY

## 2020-07-09 PROCEDURE — 84439 ASSAY OF FREE THYROXINE: CPT

## 2020-07-09 PROCEDURE — 99285 EMERGENCY DEPT VISIT HI MDM: CPT

## 2020-07-09 PROCEDURE — 82803 BLOOD GASES ANY COMBINATION: CPT

## 2020-07-09 PROCEDURE — 94761 N-INVAS EAR/PLS OXIMETRY MLT: CPT

## 2020-07-09 PROCEDURE — 1200000000 HC SEMI PRIVATE

## 2020-07-09 PROCEDURE — 85025 COMPLETE CBC W/AUTO DIFF WBC: CPT

## 2020-07-09 PROCEDURE — 83605 ASSAY OF LACTIC ACID: CPT

## 2020-07-09 PROCEDURE — 93005 ELECTROCARDIOGRAM TRACING: CPT | Performed by: PHYSICIAN ASSISTANT

## 2020-07-09 PROCEDURE — 80048 BASIC METABOLIC PNL TOTAL CA: CPT

## 2020-07-09 PROCEDURE — 81003 URINALYSIS AUTO W/O SCOPE: CPT

## 2020-07-09 PROCEDURE — 36600 WITHDRAWAL OF ARTERIAL BLOOD: CPT

## 2020-07-09 PROCEDURE — 86850 RBC ANTIBODY SCREEN: CPT

## 2020-07-09 PROCEDURE — 36415 COLL VENOUS BLD VENIPUNCTURE: CPT

## 2020-07-09 PROCEDURE — 2580000003 HC RX 258: Performed by: INTERNAL MEDICINE

## 2020-07-09 PROCEDURE — 6370000000 HC RX 637 (ALT 250 FOR IP): Performed by: PHYSICIAN ASSISTANT

## 2020-07-09 PROCEDURE — 84443 ASSAY THYROID STIM HORMONE: CPT

## 2020-07-09 PROCEDURE — 86901 BLOOD TYPING SEROLOGIC RH(D): CPT

## 2020-07-09 PROCEDURE — 82550 ASSAY OF CK (CPK): CPT

## 2020-07-09 RX ORDER — POLYETHYLENE GLYCOL 3350 17 G/17G
17 POWDER, FOR SOLUTION ORAL DAILY PRN
Status: DISCONTINUED | OUTPATIENT
Start: 2020-07-09 | End: 2020-07-12

## 2020-07-09 RX ORDER — ACETAMINOPHEN 325 MG/1
650 TABLET ORAL EVERY 4 HOURS PRN
Status: DISCONTINUED | OUTPATIENT
Start: 2020-07-09 | End: 2020-07-09 | Stop reason: SDUPTHER

## 2020-07-09 RX ORDER — ALBUTEROL SULFATE 90 UG/1
2 AEROSOL, METERED RESPIRATORY (INHALATION) EVERY 4 HOURS PRN
Status: DISCONTINUED | OUTPATIENT
Start: 2020-07-09 | End: 2020-07-17 | Stop reason: HOSPADM

## 2020-07-09 RX ORDER — METOPROLOL SUCCINATE 50 MG/1
50 TABLET, EXTENDED RELEASE ORAL DAILY
Status: DISCONTINUED | OUTPATIENT
Start: 2020-07-10 | End: 2020-07-12

## 2020-07-09 RX ORDER — SODIUM CHLORIDE 0.9 % (FLUSH) 0.9 %
10 SYRINGE (ML) INJECTION EVERY 12 HOURS SCHEDULED
Status: DISCONTINUED | OUTPATIENT
Start: 2020-07-09 | End: 2020-07-17 | Stop reason: HOSPADM

## 2020-07-09 RX ORDER — FLUTICASONE PROPIONATE 50 MCG
2 SPRAY, SUSPENSION (ML) NASAL DAILY
Status: DISCONTINUED | OUTPATIENT
Start: 2020-07-10 | End: 2020-07-17 | Stop reason: HOSPADM

## 2020-07-09 RX ORDER — BUDESONIDE AND FORMOTEROL FUMARATE DIHYDRATE 160; 4.5 UG/1; UG/1
2 AEROSOL RESPIRATORY (INHALATION) 2 TIMES DAILY
Status: DISCONTINUED | OUTPATIENT
Start: 2020-07-09 | End: 2020-07-17 | Stop reason: HOSPADM

## 2020-07-09 RX ORDER — ACETAMINOPHEN 325 MG/1
650 TABLET ORAL EVERY 6 HOURS PRN
Status: DISCONTINUED | OUTPATIENT
Start: 2020-07-09 | End: 2020-07-10

## 2020-07-09 RX ORDER — SODIUM CHLORIDE 9 MG/ML
INJECTION, SOLUTION INTRAVENOUS CONTINUOUS
Status: ACTIVE | OUTPATIENT
Start: 2020-07-09 | End: 2020-07-10

## 2020-07-09 RX ORDER — LEVOTHYROXINE SODIUM 88 UG/1
88 TABLET ORAL
Status: DISCONTINUED | OUTPATIENT
Start: 2020-07-10 | End: 2020-07-17 | Stop reason: HOSPADM

## 2020-07-09 RX ORDER — PROMETHAZINE HYDROCHLORIDE 25 MG/1
12.5 TABLET ORAL EVERY 6 HOURS PRN
Status: DISCONTINUED | OUTPATIENT
Start: 2020-07-09 | End: 2020-07-11

## 2020-07-09 RX ORDER — SODIUM CHLORIDE 0.9 % (FLUSH) 0.9 %
10 SYRINGE (ML) INJECTION PRN
Status: DISCONTINUED | OUTPATIENT
Start: 2020-07-09 | End: 2020-07-17 | Stop reason: HOSPADM

## 2020-07-09 RX ORDER — FLUTICASONE PROPIONATE 50 MCG
2 SPRAY, SUSPENSION (ML) NASAL DAILY
COMMUNITY

## 2020-07-09 RX ORDER — DIVALPROEX SODIUM 250 MG/1
250 TABLET, DELAYED RELEASE ORAL 2 TIMES DAILY
Status: DISCONTINUED | OUTPATIENT
Start: 2020-07-09 | End: 2020-07-17 | Stop reason: HOSPADM

## 2020-07-09 RX ORDER — FERROUS SULFATE TAB EC 324 MG (65 MG FE EQUIVALENT) 324 (65 FE) MG
324 TABLET DELAYED RESPONSE ORAL
Status: DISCONTINUED | OUTPATIENT
Start: 2020-07-10 | End: 2020-07-17 | Stop reason: HOSPADM

## 2020-07-09 RX ORDER — ONDANSETRON 2 MG/ML
4 INJECTION INTRAMUSCULAR; INTRAVENOUS EVERY 6 HOURS PRN
Status: DISCONTINUED | OUTPATIENT
Start: 2020-07-09 | End: 2020-07-11

## 2020-07-09 RX ORDER — ACETAMINOPHEN 650 MG/1
650 SUPPOSITORY RECTAL EVERY 6 HOURS PRN
Status: DISCONTINUED | OUTPATIENT
Start: 2020-07-09 | End: 2020-07-10

## 2020-07-09 RX ORDER — ROPINIROLE 1 MG/1
2 TABLET, FILM COATED ORAL NIGHTLY
Status: DISCONTINUED | OUTPATIENT
Start: 2020-07-09 | End: 2020-07-17 | Stop reason: HOSPADM

## 2020-07-09 RX ORDER — FUROSEMIDE 20 MG/1
20 TABLET ORAL DAILY
Status: CANCELLED | OUTPATIENT
Start: 2020-07-09

## 2020-07-09 RX ORDER — ACETAMINOPHEN 325 MG/1
650 TABLET ORAL EVERY 4 HOURS PRN
COMMUNITY

## 2020-07-09 RX ORDER — DONEPEZIL HYDROCHLORIDE 10 MG/1
10 TABLET, FILM COATED ORAL NIGHTLY
Status: DISCONTINUED | OUTPATIENT
Start: 2020-07-09 | End: 2020-07-17 | Stop reason: HOSPADM

## 2020-07-09 RX ORDER — M-VIT,TX,IRON,MINS/CALC/FOLIC 27MG-0.4MG
1 TABLET ORAL DAILY
Status: DISCONTINUED | OUTPATIENT
Start: 2020-07-10 | End: 2020-07-17 | Stop reason: HOSPADM

## 2020-07-09 RX ORDER — ACETAMINOPHEN 650 MG/1
650 SUPPOSITORY RECTAL ONCE
Status: COMPLETED | OUTPATIENT
Start: 2020-07-09 | End: 2020-07-09

## 2020-07-09 RX ORDER — PANTOPRAZOLE SODIUM 40 MG/1
40 TABLET, DELAYED RELEASE ORAL
Status: DISCONTINUED | OUTPATIENT
Start: 2020-07-10 | End: 2020-07-17 | Stop reason: HOSPADM

## 2020-07-09 RX ORDER — DULOXETIN HYDROCHLORIDE 20 MG/1
40 CAPSULE, DELAYED RELEASE ORAL DAILY
Status: DISCONTINUED | OUTPATIENT
Start: 2020-07-10 | End: 2020-07-17 | Stop reason: HOSPADM

## 2020-07-09 RX ADMIN — SODIUM CHLORIDE, PRESERVATIVE FREE 10 ML: 5 INJECTION INTRAVENOUS at 20:36

## 2020-07-09 RX ADMIN — SODIUM CHLORIDE: 9 INJECTION, SOLUTION INTRAVENOUS at 20:36

## 2020-07-09 RX ADMIN — ACETAMINOPHEN 650 MG: 650 SUPPOSITORY RECTAL at 13:29

## 2020-07-09 ASSESSMENT — PAIN SCALES - GENERAL
PAINLEVEL_OUTOF10: 0

## 2020-07-09 NOTE — CONSULTS
[facility] Orthopedic Surgery  Consult Note    This patient is seen in consultation at the request of JAOCBY COYLE    Reason for Consult:  Left hip fracture    CHIEF COMPLAINT:  Left hip pain    History Obtained From:  patient, electronic medical record    HISTORY OF PRESENT ILLNESS:    The patient is a 80 y.o. female who presents with left hip pain. She arrives from [facility]. She has been recovering there for recent dx of COVID. She reportedly fell there 2 night ago per nursing staff and xrays of left hip were ordered. The staff reports normally the pt is confused but active and talking a lot. Currently she is able to follow simple commands but sleeps mostly. She is a poor historian and disoriented x3 as best I can tell. She is also [place]. . Pain is not described in left hip . She appears in no distress. REsp easy. She is here alone at this time. She does take Eliquis for A Fib    Past Medical History:        Diagnosis Date    Abdominal wall pain     Arrhythmia     afib,bradycardia    Arthritis     left hip    Asthma     Atrial fibrillation (HCC)     Broken nose 2016    CHF (congestive heart failure) ([facility] [address] 08/2018    Cough variant asthma     Dementia (HCC)     Dyspnea     Essential hypertension 8/1/2018    Fatigue     Hematoma     Hyperlipidemia     Insomnia     Lumbar radiculopathy     Osteoporosis     Palpitation     Restless leg syndrome     Shingles     Sinusitis        Past Surgical History:        Procedure Laterality Date    ANKLE SURGERY  1997    left ankle surgery    APPENDECTOMY      CARDIOVERSION      COLONOSCOPY  10/25/10    normal dr Nanette Black    COLONOSCOPY  02/27/2018    polyps dr Doris Gregg, no repeat needed.  no malignancy    ELBOW SURGERY      HYSTERECTOMY      JOINT REPLACEMENT      right elbow    OVARY REMOVAL Left     UPPER GASTROINTESTINAL ENDOSCOPY  02/27/2018    normal dr Doris Gregg       Social History     Tobacco Use    Smoking status: Former Smoker     Packs/day: 0.25 Years: 20.00     Pack years: 5.00     Last attempt to quit: 1970     Years since quittin.9    Smokeless tobacco: Never Used   Substance Use Topics    Alcohol use: No     Frequency: Never     Binge frequency: Never       Family History   Problem Relation Age of Onset    Cancer Mother     Stroke Father     Arthritis Other     Asthma Other     Diabetes Other     Heart Disease Other            Current Medications:   Current Facility-Administered Medications: acetaminophen (TYLENOL) suppository 650 mg, 650 mg, Rectal, Once  Allergies:   [id number]     REVIEW OF SYSTEMS:    CONSTITUTIONAL:  negative for  fevers, chills and malaise, sleepy  MUSCULOSKELETAL:  positive for  myalgias, arthralgias and pain  All other ROS reviewed in chart or with patient or family and are grossly negative. PHYSICAL EXAM:    VITALS:  /62   Pulse 86   Temp 98.6 °F (37 °C)   Resp 17   SpO2 95%     MUSCULOSKELETAL:  left foot NVI. Wiggles toes to tickling foot. Pedal pulses are palpable. Nontender bilateral hip knee or ankle. Nontender left groin to palpation. NOtable left lateral hip purple bruise. Nontender bilateral shoulder elbow or wrists. Nontender C Spine. Pupils are equal and small. NEUROLOGIC:   Sensory:    Touch:  Unable to assess, does not answer but does withdraw feet to tickling soles.                                    Skin warm and dry  Resp deep and easy  Abdomen soft and round  Pulse is with regular rate and rhythm    DATA:    CBC:   Lab Results   Component Value Date    WBC 11.7 2020    RBC 4.03 2020    HGB 12.1 2020    HCT 37.2 2020    MCV 92.4 2020    MCH 30.1 2020    MCHC 32.6 2020    RDW 17.5 2020     2020    MPV 9.3 2020     WBC:    Lab Results   Component Value Date    WBC 11.7 2020     PT/INR:    Lab Results   Component Value Date    PROTIME 16.0 2020    INR 1.37 2020     PTT:    Lab Results Component Value Date    APTT 33.9 08/07/2018   [APTT  Radiology Review:    Narrative   EXAMINATION:   ONE XRAY VIEW OF THE PELVIS AND TWO XRAY VIEWS LEFT HIP       7/9/2020 8:43 am       COMPARISON:   08/29/2019       HISTORY:   ORDERING SYSTEM PROVIDED HISTORY: hip fracture, left; fall at nursing home. [facility] in [facility], tested positive 6/2   TECHNOLOGIST PROVIDED HISTORY:   Reason for exam:->hip fracture, left; fall at nursing home. Resides in [facility], tested positive 6/2   Acuity: Acute   Type of Exam: Initial       FINDINGS:   Subcapital left femoral neck fracture is identified, with minimal impaction,   and with proximal migration of the distal femoral neck.           Impression   Subcapital left femoral neck fracture.            IMPRESSION/RECOMMENDATIONS:    Fall at [facility]  Left hip pain  Left femoral neck fx  NPO for surgery Friday per Dr Aleks Cross  Memory loss  Afib, on Eliquis  HOLD Eliquis and all anticoags for now  Discussed with Dr Juan Mcghee  7/9/2020  1:23 PM

## 2020-07-09 NOTE — ED PROVIDER NOTES
EKG interpretation    Atrial fibrillation with rapid ventricular response at a rate of 117 right bundle branch block abnormal EKG      Estuardo Manuel MD  07/09/20 5903

## 2020-07-09 NOTE — ED NOTES
Verbal order for singer cath. 18F singer with urine meter placed. Pt's soiled linens changed and replaced. Fresh chux placed under pt. Pt able to turn from side to side, minimal expression of pain.      Hetal Fu RN  07/09/20 8309

## 2020-07-09 NOTE — ED PROVIDER NOTES
mouth 2 times daily     DONEPEZIL (ARICEPT) 10 MG TABLET    Take 10 mg by mouth nightly     DULOXETINE HCL 40 MG CSDR    Take 40 mg by mouth daily     FERROUS SULFATE 325 (65 FE) MG TABLET    Take 325 mg by mouth daily (with breakfast)    FLUTICASONE (FLONASE) 50 MCG/ACT NASAL SPRAY    2 sprays by Each Nostril route daily    FUROSEMIDE (LASIX) 20 MG TABLET    Take 1 tablet by mouth daily    LEVOTHYROXINE (SYNTHROID) 88 MCG TABLET    TAKE 1 TABLET BY MOUTH DAILY    LINZESS 290 MCG CAPS CAPSULE    Take 290 mcg by mouth every morning (before breakfast)     METOPROLOL SUCCINATE (TOPROL XL) 50 MG EXTENDED RELEASE TABLET    Take 1 tablet by mouth daily    MULTIPLE VITAMINS-MINERALS (THERAPEUTIC MULTIVITAMIN-MINERALS) TABLET    Take 1 tablet by mouth daily    PANTOPRAZOLE (PROTONIX) 40 MG TABLET    TAKE (1) TABLET BY MOUTH DAILY    POLYETHYLENE GLYCOL 3350 (MIRALAX PO)    Take 17 g by mouth daily     POTASSIUM BICARB-CITRIC ACID (EFFER-K) 20 MEQ TBEF EFFERVESCENT TABLET    Take 2 tablets by mouth 2 times daily    PROAIR  (90 BASE) MCG/ACT INHALER    2 PUFFS INTO LUNGS EVERY 4 HOURS AS NEEDED FOR WHEEZING OR FORSHORTNESS OF BREATH    ROPINIROLE (REQUIP) 1 MG TABLET    TAKE TWO (2) TABLETS BY MOUTH NIGHTLY    SPACER/AERO-HOLDING CHAMBERS (E-Z SPACER) POLLY    1 Device by Does not apply route daily as needed. SYMBICORT 160-4.5 MCG/ACT AERO    INHALE 2 PUFFS TWICE A DAY INTO THE LUNGS    THIAMINE MONONITRATE PO    Take 1,000 mcg by mouth daily         ALLERGIES     Gluten meal; Demerol; Lidocaine hcl; Other; Procaine; Tetanus toxoid, adsorbed; Vicodin [hydrocodone-acetaminophen]; Wheat bran; Augmentin [amoxicillin-pot clavulanate];  Hydrocodone-acetaminophen; Meperidine; Prednisone; and Tetanus toxoids    FAMILYHISTORY       Family History   Problem Relation Age of Onset    Cancer Mother     Stroke Father     Arthritis Other     Asthma Other     Diabetes Other     Heart Disease Other           SOCIAL HISTORY Social History     Tobacco Use    Smoking status: Former Smoker     Packs/day: 0.25     Years: 20.00     Pack years: 5.00     Last attempt to quit: 1970     Years since quittin.9    Smokeless tobacco: Never Used   Substance Use Topics    Alcohol use: No     Frequency: Never     Binge frequency: Never    Drug use: No       SCREENINGS             PHYSICAL EXAM    (up to 7 for level 4, 8 or more for level 5)     ED Triage Vitals [20 1047]   BP Temp Temp src Pulse Resp SpO2 Height Weight   (!) 136/111 98.6 °F (37 °C) -- 125 22 91 % -- --       Physical Exam  Vitals signs and nursing note reviewed. Constitutional:       General: She is sleeping. She is not in acute distress. Appearance: She is well-developed. She is not ill-appearing, toxic-appearing or diaphoretic. HENT:      Head: Normocephalic and atraumatic. Right Ear: Tympanic membrane and ear canal normal.      Left Ear: Tympanic membrane and ear canal normal.   Eyes:      Conjunctiva/sclera: Conjunctivae normal.      Pupils: Pupils are equal, round, and reactive to light. Cardiovascular:      Rate and Rhythm: Normal rate and regular rhythm. Pulses:           Dorsalis pedis pulses are 2+ on the right side and 2+ on the left side. Pulmonary:      Effort: Pulmonary effort is normal. No respiratory distress. Breath sounds: No wheezing. Abdominal:      General: Bowel sounds are normal. There is no distension. Palpations: Abdomen is soft. Musculoskeletal:      Right hip: Normal.      Left hip: She exhibits decreased range of motion, tenderness and deformity. Right knee: Normal.      Left knee: Normal.      Right ankle: Normal.      Left ankle: Normal.      Cervical back: She exhibits normal range of motion, no tenderness and no bony tenderness. Thoracic back: She exhibits no tenderness. Lumbar back: She exhibits no tenderness.       Right upper leg: Normal.      Left upper leg: She exhibits no tenderness, no bony tenderness and no swelling. Right lower leg: No edema. Left lower leg: No edema. Skin:     General: Skin is warm and dry. Neurological:      Mental Status: She is lethargic. Psychiatric:         Behavior: Behavior normal.         Thought Content:  Thought content normal.         DIAGNOSTIC RESULTS   LABS:    Labs Reviewed   CBC WITH AUTO DIFFERENTIAL - Abnormal; Notable for the following components:       Result Value    WBC 11.7 (*)     RDW 17.5 (*)     Neutrophils Absolute 9.4 (*)     All other components within normal limits    Narrative:     Performed at:  [address] [employer]   Phone [phone]   BASIC METABOLIC PANEL - Abnormal; Notable for the following components:    Sodium 132 (*)     Chloride 92 (*)     Glucose 125 (*)     BUN 23 (*)     All other components within normal limits    Narrative:     Performed at:  [address] [employer]   Phone [phone]   HEPATIC FUNCTION PANEL - Abnormal; Notable for the following components:    Alkaline Phosphatase 201 (*)     Bilirubin, Direct 0.5 (*)     All other components within normal limits    Narrative:     Performed at:  [address] [employer]   Phone [phone]   BLOOD GAS, ARTERIAL - Abnormal; Notable for the following components:    pH, Arterial 7.488 (*)     pCO2, Arterial 34.7 (*)     pO2, Arterial 63.9 (*)     Hemoglobin, Art, Extended 11.5 (*)     All other components within normal limits    Narrative:     Performed at:  [address] [facility] [place]   Phone [phone]   CULTURE, BLOOD 1   CULTURE, BLOOD 2   TROPONIN    Narrative:     Performed at:  [facility]  [address] [place] [address] [facility] [employer]   Phone [phone]   URINE RT REFLEX TO CULTURE    Narrative:     Performed at:  [facility]  [address] [facility]   Phone [phone]   LACTATE, SEPSIS    Narrative:     Performed at:  [facility]  [address] [place], [place] [address]   Phone [phone]   COVID-19   TYPE AND SCREEN       All other labs were within normal range or not returned as of this dictation. EKG: All EKG's are interpreted by the Emergency Department Physician in the absence of a cardiologist.  Please see their note for interpretation of EKG. RADIOLOGY:   Non-plain film images such as CT, Ultrasound and MRI are read by the radiologist. Plain radiographic images are visualized and preliminarily interpreted by the ED Provider with the below findings:    Interpretation per the Radiologist below, if available at the time of this note:    CT Head WO Contrast   Final Result   Motion limited exam, demonstrating atrophy and small vessel ischemic disease. CT Cervical Spine WO Contrast   Final Result   No acute abnormality of the cervical spine. XR CHEST PORTABLE   Final Result   Cardiomegaly with no significant finding in the chest.  Resolution of   airspace changes from the prior comparison study. XR HIP 2-3 VW W PELVIS LEFT   Final Result   Subcapital left femoral neck fracture. No results found.         PROCEDURES   Unless otherwise noted below, none     Procedures    CRITICAL CARE TIME   N/A    CONSULTS:  IP CONSULT TO HOSPITALIST      EMERGENCY DEPARTMENT COURSE and DIFFERENTIAL DIAGNOSIS/MDM:   Vitals:    Vitals:    07/09/20 1227 07/09/20 1246 07/09/20 1258 07/09/20 1302   BP:  (!) 146/91  116/62   Pulse: 95 110 88 86   Resp: 20 26 20 17   Temp:       SpO2: 96%  95% 95%       Patient was given the following medications:  Medications   acetaminophen (TYLENOL) suppository 650 mg (650 mg Rectal Given 7/9/20 1329) ED COURSE & MEDICAL DECISION MAKING    Pertinent Labs & Imaging studies reviewed. (See chart for details)   -  Patient seen and evaluated in the emergency department. -  Triage and nursing notes reviewed and incorporated. -  Old chart records reviewed and incorporated. -   I have seen and evaluated this patient with my supervising physician Purvi Pulido MD.  -  Differential diagnosis includes: stroke, TIA, intracranial bleed, trauma, infection/sepsis, medication side effect, intoxication/withdrawal, metabolic/electrolyte abnormalities, abrasion/laceration, contusion, fracture, sprain/strain, dislocation  -  Work-up included:  See above  -  ED treatment included:  tylenol  - Consults: Ortho - Dr. Cynthia Tijerina spoke with Dr. Ellis Wooten, and they will plan to take her to the OR tomorrow morning. I then consulted the hospitalist service for admission.  -  Results reviewed. Labs show white blood cell count of 1.7, hemoglobin of 12.1, medicated 37.2. Metabolic panel with a BUN of 23. Arterial blood gas with pH of 7.4, PCO2 34.7, PO2 of 63.9. Troponin is less than 0.01. Urine with no evidence of infection. Imaging studies show no acute process on CT of the head, though it is limited by motion. There is no acute abnormality of the cervical spine. Chest x-ray shows resolution of the airspace changes as compared to previous imaging studies. Plain films of the pelvis shows the left subcapital femoral neck fracture. At this time, we recommend admission. The patient is agreeable with plan of care and disposition.  -  Disposition:  Admission    FINAL IMPRESSION      1. Altered mental status, unspecified altered mental status type    2. Closed fracture of neck of left femur, initial encounter ([facility] [address]    3. History of 2019 novel coronavirus disease (COVID-19)          DISPOSITION/PLAN   DISPOSITION        PATIENT REFERREDTO:  No follow-up provider specified.     DISCHARGE MEDICATIONS:  New Prescriptions    No medications on

## 2020-07-09 NOTE — LETTER
Dr Hooper [facility] [phone] F: [phone]  Surgery Scheduling Form:  DEMOGRAPHICS:                                                                                                              .    Patient Name:  Ilsa Clayton    Patient :  3/2/1929   Patient SS#:  [id number]      Patient Phone:  [phone] (home)      Patient Address:  [address] [place], [place] [address]    Insurance:   Payor/Plan Subscr  Sex Relation Sub. Ins. ID Effective Group Num   1.  [id number] 3/2/1929 Female  [id number] 1/1/15 [id number]                                    [id number]     DIAGNOSIS & PROCEDURE:                                                                                            .    Diagnosis:  Left displaced subcapital femoral neck fracture S72.012A    Operation:  Left hip hemiarthroplasty, lateral approach    Location:  [facility]    Surgeon:  Nadeem January    SCHEDULING INFORMATION:                                                                                         .    Requested Date:  7/10/20 OR Time: 1:00pm      OR Time Required:  120 minutes    Anesthesia:  General    SA Required:  Yes x 2    Equipment:  Depuy     Status:  Inhouse    Latex Allergy:  no Defibrilator or Pacemaker:  no    Isolation Precautions:  no                    Blake Hayden MD     20   BILLING INFORMATION:                                                                                                    .                          CPT Code Modifier  Total hip    03936

## 2020-07-09 NOTE — ED NOTES
Bed: S-  Expected date:   Expected time:   Means of arrival:   Comments:  Fall with hip fracture from [facility].   + Hortencia Ross RN  07/09/20 8269

## 2020-07-09 NOTE — ED NOTES
1st set of blood cultures [facility] which was prepped with prevantics swab for 30 seconds, let dry, and wasted [id number] Anita Pratt RN  07/09/20 6363

## 2020-07-09 NOTE — ED NOTES
Fall risk screening completed. Fall risk bracelet applied to patient. Non-skid socks provided and placed on patient. The fall risk is indicated using  dome light . Based on score, a bed alarm was indicated and applied. The call light is within the patient's reach, and instructions for use were provided. The bed is in the lowest position with wheels locked. The patient has been advised to notify staff, using the call light, if there is a need to get up or use restroom. The patient verbalized understanding of safety precautions and how to contact staff for assistance.       Adán Matthew RN  07/09/20 7036

## 2020-07-09 NOTE — ED NOTES
Pt sleeping, waking up every once in awhile grimacing in pain. Pt will say \"it hurts\", staff asks what hurts and each time pt states a different area of the body. Pt will start crying and then fall right back asleep and not bothered by pain. Pt can be moved awhile asleep without crying out.      Glen Velez RN  07/09/20 7902

## 2020-07-09 NOTE — PROGRESS NOTES
Medication Reconciliation    List of medications patient is currently taking is complete. Source of information: 1. [facility] records                                      2. Discussion with nurse at [facility]     Allergies  Gluten meal; Demerol; Lidocaine hcl; Other; Procaine; Tetanus toxoid, adsorbed; Vicodin [hydrocodone-acetaminophen]; Wheat bran; Augmentin [amoxicillin-pot clavulanate]; Hydrocodone-acetaminophen; Meperidine; Prednisone; and Tetanus toxoids     Notes regarding home medications:   1. Patient received all of her home medications prior to arrival to the emergency department.     July Edmonds, PharmD.  7/9/2020  12:25 PM

## 2020-07-09 NOTE — ED TRIAGE NOTES
Pt arrived to ED via EMS with complaints of left hip fx. On initial assessment, pt appears to be altered and unresponsive to verbal stimulus. Pt falls asleep mid conversation. Pt unable to states who they are, where they are, and why they are here. This RN assess left hip pain with applying pressure and pt did not respond with any painful response. Pt extremely drowsy. This RN spoke with nurse that sent pt to ED for hip fx and they stated this is not their normal. Pt is usually talking and very pleasant. Nurse states that they fell 2 nights ago, xray was done today and showed left hip fx. Pt was only sent for fx. Nurse at [facility] shocked when heard pt altered. Nurse unsure how long pt was down and unsure how pt fell. He is just aware pt was found on floor. VS noted and pt is in Afib with RVR per EKG on arrival. Pt placed on continuous heart monitor. Appears to have hx of this. O2 fluctuating per pt position, yet staying above 90% on RA. Pt tested positive for COVID on 6/2. Will continue to monitor.

## 2020-07-09 NOTE — H&P
ORTHOPAEDIC NEW PATIENT NOTE    Chief Complaint   Patient presents with    Fall     fell yesterday, left hip fx    Altered Mental Status     pt unable to answer questions on arrival, tachy and low O2       HPI  80 y.o. female seen for evaluation of left hip fracture    Patient is sleeping, and difficult to awaken  No family members present  Review of chart performed  Has been sleeping/altered and unresponsive to verbal stimuli since in the ED    Here from [facility]  COVID positive in early June  Does not appear to be symptomatic currently  Per ED note, [place], but normally very conversant    Left hip:  Onset - fall 2 days ago  Injury/trauma fall   Per RN, patient was walking yesterday however  Pain is located - appears left hip only  Worse with - cannot obtain  Better with - cannot obtain  Associated with - cannot obtain      Review of Systems  Unable to obtain ROS from patient due to her current mental status      Allergies   Allergen Reactions    Gluten Meal Diarrhea    Demerol     Lidocaine Hcl     Other     Procaine     Tetanus Toxoid, Adsorbed     Vicodin [Hydrocodone-Acetaminophen]     Wheat Bran     Augmentin [Amoxicillin-Pot Clavulanate] Itching    Hydrocodone-Acetaminophen Palpitations    Meperidine Palpitations    Prednisone Palpitations    Tetanus Toxoids Palpitations        Current Facility-Administered Medications   Medication Dose Route Frequency Provider Last Rate Last Dose    [START ON 7/10/2020] vitamin D (CHOLECALCIFEROL) capsule 5,000 Units  5,000 Units Oral Daily Octavio Dennis MD        dilTIAZem (CARDIZEM) tablet 30 mg  30 mg Oral 4x Daily Octavio Dennis MD        divalproex (DEPAKOTE) DR tablet 250 mg  250 mg Oral BID Octavio Dennis MD        donepezil (ARICEPT) tablet 10 mg  10 mg Oral Nightly MD Rita Green ON 7/10/2020] DULoxetine (CYMBALTA) extended release capsule 40 mg  40 mg Oral Daily Octavio Dennis MD        [START ON 7/10/2020] ferrous sulfate hip    Asthma     Atrial fibrillation ([facility] [address]     Broken nose     CHF (congestive heart failure) ([facility]) 2018    Cough variant asthma     Dementia ([facility])     Dyspnea     Essential hypertension 2018    Fatigue     Hematoma     Hyperlipidemia     Insomnia     Lumbar radiculopathy     Osteoporosis     Palpitation     Restless leg syndrome     Shingles     Sinusitis         Past Surgical History:   Procedure Laterality Date    ANKLE SURGERY      left ankle surgery    APPENDECTOMY      CARDIOVERSION      COLONOSCOPY  10/25/10    normal dr Palak Galvan    COLONOSCOPY  2018    polyps dr Brea Fletcher, no repeat needed. no malignancy    ELBOW SURGERY      HYSTERECTOMY      JOINT REPLACEMENT      right elbow    OVARY REMOVAL Left     UPPER GASTROINTESTINAL ENDOSCOPY  2018    normal dr Brea Fletcher       Family History   Problem Relation Age of Onset    Cancer Mother     Stroke Father     Arthritis Other     Asthma Other     Diabetes Other     Heart Disease Other        Social History     Socioeconomic History    Marital status:       Spouse name: Not on file    Number of children: Not on file    Years of education: Not on file    Highest education level: Not on file   Occupational History    Occupation: Retired teacher   Social Needs    Financial resource strain: Not hard at all   [employer] insecurity     Worry: Never true     Inability: Never true   Liquiteria needs     Medical: No     Non-medical: No   Tobacco Use    Smoking status: Former Smoker     Packs/day: 0.25     Years: 20.00     Pack years: 5.00     Last attempt to quit: 1970     Years since quittin.9    Smokeless tobacco: Never Used   Substance and Sexual Activity    Alcohol use: No     Frequency: Never     Binge frequency: Never    Drug use: No    Sexual activity: Not Currently     Partners: Male   Lifestyle    Physical activity     Days per week: 0 days     Minutes per session: 0 min    Stress: Not at all   Relationships    Social connections     Talks on phone: More than three times a week     Gets together: More than three times a week     Attends Jainism service: Never     Active member of club or organization: No     Attends meetings of clubs or organizations: Never     Relationship status:     Intimate partner violence     Fear of current or ex partner: Not on file     Emotionally abused: Not on file     Physically abused: Not on file     Forced sexual activity: Not on file   Other Topics Concern    Not on file   Social History Narrative    Pt's daughter, Ethyl Pickler contact due to pt's cognitive status. Per pts daughter pt can be agressive and combative. Vitals:    07/09/20 1516 07/09/20 1532 07/09/20 1546 07/09/20 1622   BP: (!) 142/77 (!) 132/57 133/72 (!) 144/89   Pulse: 103 93 94 81   Resp: 21 22 17 15   Temp:   97.8 °F (36.6 °C) 97.6 °F (36.4 °C)   TempSrc:    Axillary   SpO2: 97% 96% 97% 94%       Physical Exam  Constitutional - well-groomed, well-nourished appearing, There is no height or weight on file to calculate BMI., normal body habitus  Psychiatric - sleeping currently, does not awaken to verbal stimuli. Cannot test orientation.   HEENT - normocephalic, atraumatic, no ecchymosis/bruising  Cardiovascular - irregularly irregular rhythm, negative peripheral edema, no varicosities, dorsalis pedis pulse 2+ of LLE  Respiratory - respirations unlabored, on room air  Gastrointestinal - abdomen soft NDNT  Skin - no rashes, wounds, or lesions seen  Neck - no step off of spinous processes, no apparent TTP   Neurological - cannot test currently due to patient's mental status  BUE and RLE - no obvious deformity/swelling/ecchymosis   No apparent TTP    PROM major joints intact without crepitus, no effusions seen  LLE - mildly shortened and externally rotated   TTP hip   No knee effusion/deformity   No foot/ankle deformity   No apparent TTP distal thigh, knee, leg, ankle, foot        Imaging:  Images were personally reviewed by myself  I agree with radiologist read unless documented otherwise    Impression   No acute abnormality of the cervical spine.         Impression   Subcapital left femoral neck fracture.           2013 DEXA  IMPRESSION-  [ OSTEOPENIA LUMBAR SPINE AND RIGHT HIP. OSTEOPOROSIS   LEFT HIP. ]        The WHO definition for osteoporosis based on BMD is a T score   below -2.5.  In general, for each whole number SD decrease in BMD,   the relative risk of fracture increases two to three times. Pharmacological intervention is generally warranted in any post   menopausal woman with a hip BMD T score <-2.5, on chronic   corticosteroid therapy with a T    score <-1.5, or with known insufficiency type fractures and BMD   <-1.0.           Labs: All preoperative labs reviewed  coags ordered for tomorrow AM  COVID-19 not detected    Assessment & Plan:  80 y.o. female who presents with:  Left displaced subcapital femoral neck fracture  Osteoporosis  Atrial fibrillation, anticoagulated with Eliquis, last dose this AM  COVID in June, repeat test today negative  AMS - workup per Dr Cheryl Perdomo, does not appear infectious, cleared for surgery    Will attempt to contact patient's daughter tomorrow to discuss recommended treatment - left hip hemiarthroplasty    Possible complications of this fracture and surgery include, but are not limited to, non-union, malunion, dislocation, leg-length discrepancy, loss of reduction, bleeding, infection, persistent pain, weakness, blood clots, hardware failure, periprosthetic fracture, painful hardware, neurovascular injury, numbness around the incision, and wound healing problems. An informed consent form was obtained from the patient's daughter and the patient will proceed with surgery, on the schedule for tomorrow.     Hold Eliquis until after surgery  NPO at midnight    The patient has osteoporosis and likely has or is at risk for fragility fractures. Therefore, I have recommended treatment with bisphosphonates, and DEXA screening if not recently performed to re-establish baseline values. Calcium and vitamin D supplementation may be indicated as well. Will defer to PCP.       Jenn Victor

## 2020-07-09 NOTE — ED NOTES
ED SBAR report provider to RN. Patient to be transported to Room 3128 via stretcher by transport tech. Patient transported with bedside cardiac monitor and with IV medications infusing. IV site clean, dry, and intact. MEWS score and pain assessed as 0 and documented. Updated patient on plan of care.      Jay Riojas RN  07/09/20 2545

## 2020-07-09 NOTE — ED PROVIDER NOTES
MidLevel Attestation   I havepersonally performed and/or participated in the history, exam and medical decision making and agree with all pertinent clinical information. I have also reviewed and agree with the past medical, family and social historyunless otherwise noted. I have personally performed a face to face diagnostic evaluation onthis patient. I have reviewed the mid-levels findings and agree. In brief, Carline Wick is a 80 y.o. female that presented to the emergency department after a fall. Exam she had decreased range of motion and tenderness on the left side. Work-up was obtained which show left femoral neck fracture. PT was consulted and patient was admitted to the hospitalist service for surgery the next day. I have reviewed and interpreted all of the currently available lab results and diagnostics from this visit:  Results for orders placed or performed during the hospital encounter of 07/09/20   Culture, Blood 1   Result Value Ref Range    Blood Culture, Routine       No Growth to date. Any change in status will be called. Culture, Blood 2   Result Value Ref Range    Culture, Blood 2       No Growth to date. Any change in status will be called.    CBC Auto Differential   Result Value Ref Range    WBC 11.7 (H) 4.0 - 11.0 K/uL    RBC 4.03 4.00 - 5.20 M/uL    Hemoglobin 12.1 12.0 - 16.0 g/dL    Hematocrit 37.2 36.0 - 48.0 %    MCV 92.4 80.0 - 100.0 fL    MCH 30.1 26.0 - 34.0 pg    MCHC 32.6 31.0 - 36.0 g/dL    RDW 17.5 (H) 12.4 - 15.4 %    Platelets 774 162 - 630 K/uL    MPV 9.3 5.0 - 10.5 fL    Neutrophils % 80.4 %    Lymphocytes % 8.3 %    Monocytes % 10.6 %    Eosinophils % 0.1 %    Basophils % 0.6 %    Neutrophils Absolute 9.4 (H) 1.7 - 7.7 K/uL    Lymphocytes Absolute 1.0 1.0 - 5.1 K/uL    Monocytes Absolute 1.2 0.0 - 1.3 K/uL    Eosinophils Absolute 0.0 0.0 - 0.6 K/uL    Basophils Absolute 0.1 0.0 - 0.2 K/uL   Basic Metabolic Panel   Result Value Ref Range    Sodium 132 (L) 136 - 145 mmol/L    Potassium 4.1 3.5 - 5.1 mmol/L    Chloride 92 (L) 99 - 110 mmol/L    CO2 25 21 - 32 mmol/L    Anion Gap 15 3 - 16    Glucose 125 (H) 70 - 99 mg/dL    BUN 23 (H) 7 - 20 mg/dL    CREATININE 0.7 0.6 - 1.2 mg/dL    GFR Non-African American >60 >60    GFR African American >60 >60    Calcium 9.4 8.3 - 10.6 mg/dL   Hepatic Function Panel   Result Value Ref Range    Total Protein 7.8 6.4 - 8.2 g/dL    Alb 4.3 3.4 - 5.0 g/dL    Alkaline Phosphatase 201 (H) 40 - 129 U/L    ALT 25 10 - 40 U/L    AST 27 15 - 37 U/L    Total Bilirubin 1.0 0.0 - 1.0 mg/dL    Bilirubin, Direct 0.5 (H) 0.0 - 0.3 mg/dL    Bilirubin, Indirect 0.5 0.0 - 1.0 mg/dL   Troponin   Result Value Ref Range    Troponin <0.01 <0.01 ng/mL   Urinalysis Reflex to Culture   Result Value Ref Range    Color, UA YELLOW Straw/Yellow    Clarity, UA Clear Clear    Glucose, Ur Negative Negative mg/dL    Bilirubin Urine Negative Negative    Ketones, Urine Negative Negative mg/dL    Specific Gravity, UA 1.008 1.005 - 1.030    Blood, Urine Negative Negative    pH, UA 6.0 5.0 - 8.0    Protein, UA Negative Negative mg/dL    Urobilinogen, Urine 1.0 <2.0 E.U./dL    Nitrite, Urine Negative Negative    Leukocyte Esterase, Urine Negative Negative    Microscopic Examination Not Indicated     Urine Type NotGiven     Urine Reflex to Culture Not Indicated    Lactate, Sepsis   Result Value Ref Range    Lactic Acid, Sepsis 1.7 0.4 - 1.9 mmol/L   Blood Gas, Arterial   Result Value Ref Range    pH, Arterial 7.488 (H) 7.350 - 7.450    pCO2, Arterial 34.7 (L) 35.0 - 45.0 mmHg    pO2, Arterial 63.9 (L) 75.0 - 108.0 mmHg    HCO3, Arterial 26.3 21.0 - 29.0 mmol/L    Base Excess, Arterial 3.0 -3.0 - 3.0 mmol/L    Hemoglobin, Art, Extended 11.5 (L) 12.0 - 16.0 g/dL    O2 Sat, Arterial 93.5 >92 %    Carboxyhgb, Arterial 1.5 0.0 - 1.5 %    Methemoglobin, Arterial 0.3 <1.5 %    TCO2, Arterial 27.3 Not Established mmol/L    O2 Content, Arterial 15 Not Established mL/dL    O2 Therapy See comment    COVID-19   Result Value Ref Range    SARS-CoV-2, NAAT Not Detected Not Detected   TSH without Reflex   Result Value Ref Range    TSH 2.76 0.27 - 4.20 uIU/mL   T4, Free   Result Value Ref Range    T4 Free 1.7 0.9 - 1.8 ng/dL   CK   Result Value Ref Range    Total  (H) 26 - 192 U/L   Protime-INR   Result Value Ref Range    Protime 14.9 (H) 10.0 - 13.2 sec    INR 1.28 (H) 0.86 - 1.14   APTT   Result Value Ref Range    aPTT 32.5 24.2 - 36.2 sec   Comprehensive Metabolic Panel w/ Reflex to MG   Result Value Ref Range    Sodium 138 136 - 145 mmol/L    Potassium reflex Magnesium 3.2 (L) 3.5 - 5.1 mmol/L    Chloride 97 (L) 99 - 110 mmol/L    CO2 28 21 - 32 mmol/L    Anion Gap 13 3 - 16    Glucose 89 70 - 99 mg/dL    BUN 21 (H) 7 - 20 mg/dL    CREATININE 0.6 0.6 - 1.2 mg/dL    GFR Non-African American >60 >60    GFR African American >60 >60    Calcium 9.4 8.3 - 10.6 mg/dL    Total Protein 7.0 6.4 - 8.2 g/dL    Alb 3.6 3.4 - 5.0 g/dL    Albumin/Globulin Ratio 1.1 1.1 - 2.2    Total Bilirubin 1.0 0.0 - 1.0 mg/dL    Alkaline Phosphatase 159 (H) 40 - 129 U/L    ALT 20 10 - 40 U/L    AST 22 15 - 37 U/L    Globulin 3.4 g/dL   CBC auto differential   Result Value Ref Range    WBC 9.0 4.0 - 11.0 K/uL    RBC 3.91 (L) 4.00 - 5.20 M/uL    Hemoglobin 11.9 (L) 12.0 - 16.0 g/dL    Hematocrit 36.3 36.0 - 48.0 %    MCV 92.8 80.0 - 100.0 fL    MCH 30.5 26.0 - 34.0 pg    MCHC 32.9 31.0 - 36.0 g/dL    RDW 17.1 (H) 12.4 - 15.4 %    Platelets 567 550 - 961 K/uL    MPV 9.0 5.0 - 10.5 fL    Neutrophils % 71.9 %    Lymphocytes % 9.6 %    Monocytes % 10.8 %    Eosinophils % 6.3 %    Basophils % 1.4 %    Neutrophils Absolute 6.5 1.7 - 7.7 K/uL    Lymphocytes Absolute 0.9 (L) 1.0 - 5.1 K/uL    Monocytes Absolute 1.0 0.0 - 1.3 K/uL    Eosinophils Absolute 0.6 0.0 - 0.6 K/uL    Basophils Absolute 0.1 0.0 - 0.2 K/uL   Magnesium   Result Value Ref Range    Magnesium 2.10 1.80 - 2.40 mg/dL   EKG 12 Lead   Result Value Ref Range Ventricular Rate 117 BPM    QRS Duration 146 ms    Q-T Interval 380 ms    QTc Calculation (Bazett) 530 ms    R Axis -23 degrees    T Axis -7 degrees    Diagnosis       Atrial fibrillation with rapid ventricular responseRight bundle branch blockAbnormal ECGWhen compared with ECG of 24-MAY-2020 21:27,T wave inversion less evident in Inferior leadsT wave inversion no longer evident in Anterolateral leadsConfirmed by CLAUDIA CONTRERAS MD ([id number]) on 7/9/2020 3:53:42 PM   EKG 12 Lead   Result Value Ref Range    Ventricular Rate 138 BPM    Atrial Rate 0 BPM    QRS Duration 138 ms    Q-T Interval 362 ms    QTc Calculation (Bazett) 548 ms    R Axis -30 degrees    T Axis -11 degrees    Diagnosis       Atrial fibrillation with rapid ventricular responseLeft axis deviationRight bundle branch blockConfirmed by Mike REGALADO MD ([id number]) on 7/10/2020 9:29:59 AM   TYPE AND SCREEN   Result Value Ref Range    ABO/Rh O POS     Antibody Screen NEG      No results found.     ED Medication Orders (From admission, onward)    Start Ordered     Status Ordering Provider    07/10/20 2100 07/10/20 1154  senna (SENOKOT) tablet 8.6 mg  NIGHTLY      Acknowledged [place] SIMONS    07/10/20 1539 07/10/20 1539  potassium chloride (KLOR-CON M) extended release tablet 40 mEq  PRN      Acknowledged [place] Petit     07/10/20 1539 07/10/20 1539  potassium bicarb-citric acid (EFFER-K) effervescent tablet 40 mEq  PRN      Acknowledged [place] Petit     07/10/20 1539 07/10/20 1539  potassium chloride 10 mEq/100 mL IVPB (Peripheral Line)  PRN      Acknowledged [place] Petit     07/10/20 1400 07/10/20 1128  enoxaparin (LOVENOX) injection 40 mg  EVERY EVENING     Note to Pharmacy:  Hold on date of surgery    Last MAR action:  Given - by Maryan Celestin on 07/10/20 at [address]    07/10/20 1400 07/10/20 1154  acetaminophen (TYLENOL) tablet 1,000 mg  3 times per day      Last MAR action:  Given - by Maryan Celestin on 07/10/20 at  [facility], [id number] [place] [address] 07/10/20 1215 07/10/20 1158  0.9 % sodium chloride bolus  ONCE      Last MAR action:  Stopped - by Dave Berger on 07/10/20 at [address] [facility] [address], [facility]    07/10/20 0900 07/09/20 1627  vitamin D (CHOLECALCIFEROL) capsule 5,000 Units  DAILY      Last MAR action:  Not Given - by Dave Berger on 07/10/20 at [address] [facility] [address], [place]    07/10/20 0900 07/09/20 1627  DULoxetine (CYMBALTA) extended release capsule 40 mg  DAILY      Last MAR action:  Given - by Sandhya Sanchez on 07/10/20 at [facility], [place]    07/10/20 0900 07/09/20 1627  fluticasone (FLONASE) 50 MCG/ACT nasal spray 2 spray  DAILY      Last MAR action:  Given - by Sandhya Snachez on 07/10/20 at [facility], [place]    07/10/20 0900 07/09/20 1627  metoprolol succinate (TOPROL XL) extended release tablet 50 mg  DAILY      Last MAR action:  Given - by Sandhya Sanchez on 07/10/20 at [facility] [place]    07/10/20 0900 07/09/20 1627  therapeutic multivitamin-minerals 1 tablet  DAILY      Last MAR action:  Given - by Sandhya Sanchez on 07/10/20 at [facility], [place]    07/10/20 0845 07/10/20 0817  dilTIAZem 125 mg in dextrose 5 % 125 mL infusion  CONTINUOUS      Last MAR action:  New Bag - by Dave Berger on 07/10/20 at [address] [facility]    07/10/20 0800 07/09/20 1627  ferrous sulfate EC tablet 324 mg  DAILY WITH BREAKFAST      Last MAR action:  Given - by Sandhya Sanchez on 07/10/20 at [address]    07/10/20 0716 07/10/20 0716  potassium chloride 10 mEq/100 mL IVPB (Peripheral Line)  PRN      Acknowledged Consuello Quivers    07/10/20 0700 07/09/20 1627  levothyroxine (SYNTHROID) tablet 88 mcg  DAILY BEFORE BREAKFAST      Last MAR action:  Not Given - by Lissa Fox on 07/10/20 at [address]    07/10/20 0700 07/09/20 1627  pantoprazole (PROTONIX) tablet 40 mg  DAILY BEFORE BREAKFAST      Last MAR action:  Not Given - by Lissa Fox on 07/10/20 at [address] [facility] [address]    07/09/20 2100 07/09/20 1627  divalproex (DEPAKOTE) DR tablet 250 mg  2 TIMES DAILY      Last MAR action:  Given - by Irving Begum on 07/10/20 at [address]    07/09/20 2100 07/09/20 1627  donepezil (ARICEPT) tablet 10 mg  NIGHTLY      Last MAR action:  Not Given - by Loretta Harrington on 07/09/20 at [facility]    07/09/20 2100 07/09/20 1627  rOPINIRole (REQUIP) tablet 2 mg  NIGHTLY      Last MAR action:  Not Given - by Loretta Harrington on 07/09/20 at [facility]    07/09/20 2100 07/09/20 1627  sodium chloride flush 0.9 % injection 10 mL  2 times per day      Last MAR action:  Given - by Irving Begum on 07/10/20 at [address] [place] [address],[address]    07/09/20 2000 07/09/20 1627  budesonide-formoterol (SYMBICORT) 160-4.5 MCG/ACT inhaler 2 puff  2 TIMES DAILY      Last MAR action:  Given - by Conan Gaucher on 07/10/20 at [facility] [facility]    07/09/20 1700 07/09/20 1627  [Held by provider]  dilTIAZem (CARDIZEM) tablet 30 mg  4 TIMES DAILY     (Held by provider since Fri 7/10/2020 at [facility] by Gogo Heard MD. Reason: Other.)    Last MAR action:  Automatically Held - by Sarah Reece on 07/15/20 at [address]    07/09/20 1645 07/09/20 1627  0.9 % sodium chloride infusion  CONTINUOUS      Last MAR action:  New Bag - by Loretta Harrington on 07/09/20 at [address] [facility] [address]    07/09/20 1627 07/09/20 1627  sodium chloride flush 0.9 % injection 10 mL  PRN      Acknowledged JOHN WHITTAKER    07/09/20 1627 07/09/20 1627  promethazine (PHENERGAN) tablet 12.5 mg  EVERY 6 HOURS PRN      Acknowledged JOHN WHITTAKER    07/09/20 1627 07/09/20 1627  ondansetron (ZOFRAN) injection 4 mg  EVERY 6 HOURS PRN      Acknowledged JOHN WHITTAKER    07/09/20 1627 07/09/20 1627  albuterol sulfate  ([address]) MCG/ACT inhaler 2 puff  EVERY 4 HOURS PRN      Acknowledged JOHN WHITTAKER    07/09/20 1627 07/09/20 1627  polyethylene glycol (GLYCOLAX) packet 17 g  DAILY PRN      Acknowledged JOHN WHITTAKER    07/09/20 1245 07/09/20 1234  acetaminophen

## 2020-07-09 NOTE — H&P
Hospital Medicine History & Physical      PCP: Jose Luis Vieyra, APRN - CNP    Date of Admission: 7/9/2020    Date of Service: Pt seen/examined on 7/9/2020 and Admitted to Inpatient. Chief Complaint:  Fall, altered mental status      History Of Present Illness: The patient is a 80 y.o. female with hx dementia, chronic atrial fibrillation on anticoagulation, restless leg syndrome, HTN, HLD, combined systolic/diastolic CHF, hypothyroidism, asthma, and depression who presents to [facility] with fall and altered mental status. Patient is altered and unable to provide any history. Patient is a nursing home resident at [facility], and fell out of her bed two days ago. It was unwitnessed, and unclear how long the patient was on the ground. A left hip xray was ordered on 7/8, and completed today that showed a left femoral neck fracture. The patient has dementia and some confusion at baseline, but apparently is normally conversant, answers questions appropriately and without difficulty. In the ED, labs were significant for a sodium of 132, chloride of 92, WBC count of 11.7K, alk phos of 201. U/A was negative. Rapid COVID was negative. CT Head without contrast showed atrophy and small vessel ischemic disease. CT Cervical Spine showed no acute abnormality. CXR showed cardiomegaly with resolution of airspace disease.     Past Medical History:        Diagnosis Date    Abdominal wall pain     Arrhythmia     afib,bradycardia    Arthritis     left hip    Asthma     Atrial fibrillation (HCC)     Broken nose 2016    CHF (congestive heart failure) (HCC) 08/2018    Cough variant asthma     Dementia (HCC)     Dyspnea     Essential hypertension 8/1/2018    Fatigue     Hematoma     Hyperlipidemia     Insomnia     Lumbar radiculopathy     Osteoporosis     Palpitation     Restless leg syndrome     Shingles     Sinusitis        Past Surgical History:        Procedure Laterality Date    ANKLE SURGERY  1997    left ankle surgery    APPENDECTOMY      CARDIOVERSION      COLONOSCOPY  10/25/10    normal dr Ayo Dennis    COLONOSCOPY  02/27/2018    polyps dr Sera Boss, no repeat needed. no malignancy    ELBOW SURGERY      HYSTERECTOMY      JOINT REPLACEMENT      right elbow    OVARY REMOVAL Left     UPPER GASTROINTESTINAL ENDOSCOPY  02/27/2018    normal dr Sera Boss       Medications Prior to Admission:    Prior to Admission medications    Medication Sig Start Date End Date Taking?  Authorizing Provider   THIAMINE MONONITRATE PO Take 1,000 mcg by mouth daily   Yes Historical Provider, MD   dilTIAZem (CARDIZEM) 30 MG tablet Take 30 mg by mouth 4 times daily   Yes Historical Provider, MD   potassium bicarb-citric acid (EFFER-K) 20 MEQ TBEF effervescent tablet Take 2 tablets by mouth 2 times daily   Yes Historical Provider, MD   fluticasone (FLONASE) 50 MCG/ACT nasal spray 2 sprays by Each Nostril route daily   Yes Historical Provider, MD   acetaminophen (TYLENOL) 325 MG tablet Take 650 mg by mouth every 4 hours as needed for Pain or Fever   Yes Historical Provider, MD   metoprolol succinate (TOPROL XL) 50 MG extended release tablet Take 1 tablet by mouth daily 6/2/20  Yes Norm Pardo MD   furosemide (LASIX) 20 MG tablet Take 1 tablet by mouth daily 6/2/20  Yes Norm Pardo MD   apixaban (ELIQUIS) 5 MG TABS tablet Take 2.5 mg by mouth 2 times daily    Yes Historical Provider, MD   divalproex (DEPAKOTE) 250 MG DR tablet Take 250 mg by mouth 2 times daily    Yes Historical Provider, MD   levothyroxine (SYNTHROID) 88 MCG tablet TAKE 1 TABLET BY MOUTH DAILY 8/22/19  Yes Sylvia Odonnell MD   pantoprazole (PROTONIX) 40 MG tablet TAKE (1) TABLET BY MOUTH DAILY 8/13/19  Yes Sylvia Odonnell MD   rOPINIRole (REQUIP) 1 MG tablet TAKE TWO (2) TABLETS BY MOUTH NIGHTLY 8/13/19  Yes Ashlee Hobbs MD   SYMBICORT 160-4.5 MCG/ACT AERO INHALE 2 PUFFS TWICE A DAY INTO THE LUNGS 10/5/18  Yes Ashlee Hobbs MD   ferrous sulfate 325 (65 Fe) MG tablet Take 325 mg by mouth daily (with breakfast)   Yes Historical Provider, MD   Multiple Vitamins-Minerals (THERAPEUTIC MULTIVITAMIN-MINERALS) tablet Take 1 tablet by mouth daily   Yes Historical Provider, MD   DULoxetine HCl 40 MG CSDR Take 40 mg by mouth daily    Yes Historical Provider, MD   PROAIR  (90 Base) MCG/ACT inhaler 2 PUFFS INTO LUNGS EVERY 4 HOURS AS NEEDED FOR WHEEZING OR FORSHORTNESS OF BREATH 5/9/18  Yes Thomas Gan DO   LINZESS 290 MCG CAPS capsule Take 290 mcg by mouth every morning (before breakfast)  12/11/17  Yes Historical Provider, MD   donepezil (ARICEPT) 10 MG tablet Take 10 mg by mouth nightly  8/29/17  Yes Historical Provider, MD   Polyethylene Glycol 3350 (MIRALAX PO) Take 17 g by mouth daily    Yes Historical Provider, MD   Cholecalciferol (VITAMIN D3) 5000 units TABS Take 1 tablet by mouth daily    Yes Historical Provider, MD   Compression Stockings MISC by Does not apply route Bilateral lower extremity compression stockings, 15-20 mmHg 3/16/16   Spencer Greenberg MD   Spacer/Aero-Holding Chambers (E-Z SPACER) POLLY 1 Device by Does not apply route daily as needed. 3/10/15   Thomas Gan DO       Allergies:  Gluten meal; Demerol; Lidocaine hcl; Other; Procaine; Tetanus toxoid, adsorbed; Vicodin [hydrocodone-acetaminophen]; Wheat bran; Augmentin [amoxicillin-pot clavulanate]; Hydrocodone-acetaminophen; Meperidine; Prednisone; and Tetanus toxoids    Social History:  The patient currently lives at a nursing facility    TOBACCO:   reports that she quit smoking about 49 years ago. She has a 5.00 pack-year smoking history. She has never used smokeless tobacco.  ETOH:   reports no history of alcohol use. Family History:  Reviewed in detail and negative for DM, Early CAD, Cancer, CVA.  Positive as follows: Problem Relation Age of Onset    Cancer Mother     Stroke Father     Arthritis Other     Asthma Other     Diabetes Other     Heart Disease Other        REVIEW OF SYSTEMS:   Unable to obtain due to altered mental status. PHYSICAL EXAM:    BP (!) 155/71   Pulse 93   Temp 98.6 °F (37 °C)   Resp 20   SpO2 97%     General appearance: Drowsy, unresponsive, no acute distress. HEENT Normal cephalic, atraumatic without obvious deformity. Pupils equal, round, and reactive to light. Extra ocular muscles intact. Conjunctivae/corneas clear. Neck: Supple, No jugular venous distention/bruits. Trachea midline without thyromegaly or adenopathy with full range of motion. Lungs: Clear to auscultation, bilaterally without Rales/Wheezes/Rhonchi with good respiratory effort. Heart: Irregular rate and rhythm with Normal S1/S2 without murmurs, rubs or gallops, point of maximum impulse non-displaced  Abdomen: Soft, non-tender or non-distended without rigidity or guarding and positive bowel sounds all four quadrants. Extremities: Left hip bruising, TTP. No clubbing, cyanosis, or edema bilaterally. Full range of motion without deformity and normal gait intact. Skin: Skin color, texture, turgor normal.  No rashes or lesions. Neurologic: Alert and oriented X 0, unable to cooperate with exam.  Mental status: Unresponsive. Capillary Refill: Acceptable  < 3 seconds  Peripheral Pulses: +3 Easily felt, not easily obliterated with pressure      CXR:  I have reviewed the CXR with the following interpretation: cardiomegaly  EKG:  I have reviewed the EKG with the following interpretation: Afib with RVR, RBBB, unchanged from prior    CT Head WO Contrast   Final Result   Motion limited exam, demonstrating atrophy and small vessel ischemic disease. CT Cervical Spine WO Contrast   Final Result   No acute abnormality of the cervical spine.          XR CHEST PORTABLE   Final Result   Cardiomegaly with no significant finding in the chest.  Resolution of   airspace changes from the prior comparison study. XR HIP 2-3 VW W PELVIS LEFT   Final Result   Subcapital left femoral neck fracture. CBC   Recent Labs     07/09/20  1104   WBC 11.7*   HGB 12.1   HCT 37.2         RENAL  Recent Labs     07/09/20  1104   *   K 4.1   CL 92*   CO2 25   BUN 23*   CREATININE 0.7     LFT'S  Recent Labs     07/09/20  1104   AST 27   ALT 25   BILIDIR 0.5*   BILITOT 1.0   ALKPHOS 201*     COAG  No results for input(s): INR in the last 72 hours. CARDIAC ENZYMES  Recent Labs     07/09/20  1104   TROPONINI <0.01       U/A:    Lab Results   Component Value Date    NITRITE negative 11/27/2017    COLORU YELLOW 07/09/2020    WBCUA 1 05/23/2020    RBCUA 3 05/23/2020    CLARITYU Clear 07/09/2020    SPECGRAV 1.008 07/09/2020    LEUKOCYTESUR Negative 07/09/2020    BLOODU Negative 07/09/2020    GLUCOSEU Negative 07/09/2020       ABG    Lab Results   Component Value Date    TVW9KGP 26.3 07/09/2020    BEART 3.0 07/09/2020    H1HQUPQI 93.5 07/09/2020    PHART 7.488 07/09/2020    COA9UQY 34.7 07/09/2020    PO2ART 63.9 07/09/2020    RGJ7MHV 27.3 07/09/2020           Active Hospital Problems    Diagnosis Date Noted    Left displaced femoral neck fracture ([facility] [address] [S72.002A] 07/09/2020         PHYSICIANS CERTIFICATION:    I certify that [facility] is expected to be hospitalized for more than 2 midnights based on the following assessment and plan:      ASSESSMENT/PLAN:      Acute metabolic encephalopathy - suspect 2/2 mechanical fall with hip fracture on top of underlying dementia. Possible polypharmacy. No evidence of infection including a negative urinalysis, negative CXR, negative CT Head and CT Cervical spine.  Mild leukocytosis likely is reactive.  -continue to monitor  -continue home meds  -OR planned for tomorrow  -consider further workup if mental status after OR  -check TSH and free T4  -hold sedatives    Left displaced femoral neck fracture  -orthopedic surgery consulted, recs appreciated  -hold Eliquis until after procedure  -strict bedrest  -NPO    Mechanical fall -   -workup as above  -check CK    Chronic atrial fibrillation on Eliquis  -tele monitoring  -hold Eliquis until after procedure per orthopedic surgery  -continue other home meds    Leukocytosis - likely reactive  -continue to trend and monitor    Essential hypertension  -continue home meds    Chronic combined systolic and diastolic CHF - appears compensated  -continue home meds  -tele monitoring  -hold Lasix and gentle IVF overnight    Hyponatremia  -gentle IVF  -trend    Hypothyroidism  -check TSH, free T4  -continue home meds    Depression  -continue home meds    Dementia  -continue home meds    Restless leg syndrome  -continue home meds    Hx COVID-19 infection  -does not appear to be clinically relevant       DVT Prophylaxis: hold Eliquis until after procedure  Diet: NPO, sips with meds  Code Status: Full Code  PT/OT Eval Status: defer until after procedure    Dispo - admit med/surg tele inpatient       Prem Wolff MD    Thank you Marvin GURWINDER Rodríguez III - KATHY for the opportunity to be involved in this patient's care. If you have any questions or concerns please feel free to contact me at [phone].

## 2020-07-09 NOTE — DISCHARGE INSTR - COC
[facility] ([facility]) Continuity of Care Form    Patient Name:  Pat Washington  : 3/2/1929    MRN:  [id number]    Admit date:  2020  Discharge date: 20      Code Status Order: DNR-CCA  Advance Directives: Yes    Admitting Physician: Ashley Mcadams MD  PCP: GURWINDER Sarabia CNP    Discharging Nurse: [facility] Unit/Room#: T4Q-7633/0059-15  Discharging Unit Phone Number: [phone]    Emergency Contact:        Past Surgical History:  Past Surgical History:   Procedure Laterality Date    ANKLE SURGERY      left ankle surgery    APPENDECTOMY      CARDIOVERSION      COLONOSCOPY  10/25/10    normal dr Dung Sarabia    COLONOSCOPY  2018    polyps dr Debbie Corbett, no repeat needed. no malignancy    ELBOW SURGERY      HYSTERECTOMY      JOINT REPLACEMENT      right elbow    OVARY REMOVAL Left     UPPER GASTROINTESTINAL ENDOSCOPY  2018    normal dr Debbie Corbett       Immunization History:   Immunization History   Administered Date(s) Administered    Influenza Vaccine, unspecified formulation 2016    Influenza, High Dose (Fluzone 65 yrs and older) 2013, 10/20/2014, 2015, 2017    Influenza, Jose Luis Pinal, 6 mo and older, IM, PF (Flulaval, Fluarix) 10/02/2018    Influenza, Triv, inactivated, subunit, adjuvanted, IM (Fluad 65 yrs and older) 10/01/2019    Pneumococcal Conjugate 13-valent (Rollene Ort) 10/24/2016    Pneumococcal Polysaccharide (Lyjydzxrd83) 1999, 2010    Zoster Live (Zostavax) 2010       Active Problems:  Principal Problem:    Left displaced femoral neck fracture (HCC)  Active Problems:    Osteoporosis    Atrial fibrillation with rapid ventricular response (HCC)    Dementia without behavioral disturbance (HCC)    Chronic anticoagulation    Altered mental status    History of 2019 novel coronavirus disease (COVID-19)  Resolved Problems:    * No resolved hospital problems.  *      Isolation/Infection:       Nurse Assessment:  Last Vital Signs:/69   Pulse 103   Temp 97.5 °F (36.4 °C) (Oral)   Resp 18   Ht 5' 5\" (1.651 m)   Wt 139 lb 5.3 oz (63.2 kg)   SpO2 94%   BMI 23.19 kg/m²   Last documented pain score (0-10 scale): Pain Level: 6  Last Weight:   Wt Readings from Last 1 Encounters:   07/12/20 139 lb 5.3 oz (63.2 kg)     Mental Status:  disoriented,ALERT     IV Access:  - None    Nursing Mobility/ADLs:  Walking   Assisted  Transfer  Assisted  Bathing  Assisted  Dressing  Assisted  Toileting  Assisted  Feeding  Independent  Med Admin  Assisted  Med Delivery   whole    Wound Care Documentation and Therapy:  Keep tan Mepilex dressing clean and intact for 7 days after surgery then remove and leave wound to air. Mepilex is waterproof for showering. Elimination:  Urinary Catheter: Removal Date 7/15/20   Colostomy/Ileostomy: No  Continence:   · Bowel: Yes  · Bladder: No  Date of Last BM: 7/15/20    Intake/Output Summary (Last 24 hours)     Intake/Output Summary (Last 24 hours) at 7/12/2020 1801  Last data filed at 7/11/2020 1844  Gross per 24 hour   Intake 240 ml   Output --   Net 240 ml     Safety Concerns:     Sundowners [place], MUTI FALLS    Impairments/Disabilities:      None    Nutrition Therapy:  Current Nutrition Therapy: DIET CARDIAC;  Routes of Feeding: ORAL  Liquids: Thin Liquids  Daily Fluid Restriction: yes - amount 2000 ML  Last Modified Barium Swallow with Video (Video Swallowing Test): not done    Treatments at the Time of Hospital Discharge:   Respiratory Treatments: ALBUTEROL, SYMBICORT  Oxygen Therapy:  is not on home oxygen therapy. Ventilator:    - No ventilator support    Lab orders for discharge:      Heart Failure Instructions for Daily Management  Patient was treated for chronic combined systolic and diastolic heart failure. she  will require the following:     Please weigh daily on the same scale and approximately the same time of day.   Report weight gain of 3 pounds/day or 5 all that are sent with patient):  None    RN SIGNATURE:  Electronically signed by Star Rome RN on 7/17/20 at 12:59 PM EDT    PHYSICIAN SECTION    Prognosis: Good    Condition at Discharge: Stable    Rehab Potential (if transferring to Rehab): Good    Physician Certification: I certify the above orders, information, and transfer of Yoselyn Solis is necessary for the continuing treatment of the diagnosis listed and that he requires [place] for less 30 days. Update Admission H&P: No change in H&P    PHYSICIAN SIGNATURE:  Electronically signed by GURWINDER Collado CNP on 7/15/20 at 1000 am EDT/ Dr Renae Rea Status Date: 07/09/2020    [facility] Readmission Risk Assessment Score:    Discharging to Facility/ Agency   · Name: [facility]  · Address: [address] [place] [address], [address] [place] [address]  · Phone: [phone]  · Fax: [phone]    / signature: Electronically signed by Eugene Chung RN on 7/17/20 at 9:46 AM EDT    . Followup Dr Sunil Morfin in 2 weeks   [facility], [address] [place] [address], [address]   [id number],   [phone]       Posterior Approach   The main positions and movements to avoid after a posterior approach include crossing your legs, turning your hip and leg inward, or bending the hip more than 90 degrees.  Don't cross your legs. When sitting, do not cross your affected leg. When lying on your back, don't roll your affected leg toward the other leg as you might do when rolling over. A pillow or triangular-shaped wedge may be used to block the legs from crossing.  Don't roll your leg and foot in. Use a pillow between your legs when lying in bed to keep your leg from rolling inward.  Don't allow the knee of your operated leg to cross the midline of your body.  This means don't let your knee move across your body past your navel (belly button). When lying in bed, place pillows between your legs to keep your hip in the correct position.  Don't turn your upper body toward your sore hip. When sitting, swivel your whole body rather than turning your upper body toward your hip.  Don't twist your body toward your operated hip. This means don't stand pigeon-toed. Keep the toes of your affected leg pointed forward when you stand, sit, or walk. If you turn your body in the direction of your surgical hip without pivoting your foot, your hip will be placed in an unsafe position. Remember to lift and turn your foot as you turn in the same direction as your surgical hip.  Don't bend the hip past ninety degrees. This means do not lean too far forward when sitting up in bed. Also, raising your knee up in bed can cause the hip angle to go past ninety degrees. To avoid bending past ninety degrees when sitting in a chair, lean back slightly.  Don't bend over past ninety degrees at the waist. Your hip may go past ninety degrees if you bend over at the waist to tie your shoes or  items off the floor.  Instead, use a reacher to put on your shoes and socks or to  items from the floor.

## 2020-07-10 LAB
A/G RATIO: 1.1 (ref 1.1–2.2)
ALBUMIN SERPL-MCNC: 3.6 G/DL (ref 3.4–5)
ALP BLD-CCNC: 159 U/L (ref 40–129)
ALT SERPL-CCNC: 20 U/L (ref 10–40)
ANION GAP SERPL CALCULATED.3IONS-SCNC: 13 MMOL/L (ref 3–16)
APTT: 32.5 SEC (ref 24.2–36.2)
AST SERPL-CCNC: 22 U/L (ref 15–37)
BASOPHILS ABSOLUTE: 0.1 K/UL (ref 0–0.2)
BASOPHILS RELATIVE PERCENT: 1.4 %
BILIRUB SERPL-MCNC: 1 MG/DL (ref 0–1)
BUN BLDV-MCNC: 21 MG/DL (ref 7–20)
CALCIUM SERPL-MCNC: 9.4 MG/DL (ref 8.3–10.6)
CHLORIDE BLD-SCNC: 97 MMOL/L (ref 99–110)
CO2: 28 MMOL/L (ref 21–32)
CREAT SERPL-MCNC: 0.6 MG/DL (ref 0.6–1.2)
EKG ATRIAL RATE: 0 BPM
EKG DIAGNOSIS: NORMAL
EKG Q-T INTERVAL: 362 MS
EKG QRS DURATION: 138 MS
EKG QTC CALCULATION (BAZETT): 548 MS
EKG R AXIS: -30 DEGREES
EKG T AXIS: -11 DEGREES
EKG VENTRICULAR RATE: 138 BPM
EOSINOPHILS ABSOLUTE: 0.6 K/UL (ref 0–0.6)
EOSINOPHILS RELATIVE PERCENT: 6.3 %
GFR AFRICAN AMERICAN: >60
GFR NON-AFRICAN AMERICAN: >60
GLOBULIN: 3.4 G/DL
GLUCOSE BLD-MCNC: 89 MG/DL (ref 70–99)
HCT VFR BLD CALC: 36.3 % (ref 36–48)
HEMOGLOBIN: 11.9 G/DL (ref 12–16)
INR BLD: 1.28 (ref 0.86–1.14)
LYMPHOCYTES ABSOLUTE: 0.9 K/UL (ref 1–5.1)
LYMPHOCYTES RELATIVE PERCENT: 9.6 %
MAGNESIUM: 2.1 MG/DL (ref 1.8–2.4)
MCH RBC QN AUTO: 30.5 PG (ref 26–34)
MCHC RBC AUTO-ENTMCNC: 32.9 G/DL (ref 31–36)
MCV RBC AUTO: 92.8 FL (ref 80–100)
MONOCYTES ABSOLUTE: 1 K/UL (ref 0–1.3)
MONOCYTES RELATIVE PERCENT: 10.8 %
NEUTROPHILS ABSOLUTE: 6.5 K/UL (ref 1.7–7.7)
NEUTROPHILS RELATIVE PERCENT: 71.9 %
PDW BLD-RTO: 17.1 % (ref 12.4–15.4)
PLATELET # BLD: 178 K/UL (ref 135–450)
PMV BLD AUTO: 9 FL (ref 5–10.5)
POTASSIUM REFLEX MAGNESIUM: 3.2 MMOL/L (ref 3.5–5.1)
PROTHROMBIN TIME: 14.9 SEC (ref 10–13.2)
RBC # BLD: 3.91 M/UL (ref 4–5.2)
SODIUM BLD-SCNC: 138 MMOL/L (ref 136–145)
TOTAL PROTEIN: 7 G/DL (ref 6.4–8.2)
WBC # BLD: 9 K/UL (ref 4–11)

## 2020-07-10 PROCEDURE — 93005 ELECTROCARDIOGRAM TRACING: CPT | Performed by: INTERNAL MEDICINE

## 2020-07-10 PROCEDURE — 2580000003 HC RX 258: Performed by: INTERNAL MEDICINE

## 2020-07-10 PROCEDURE — 6370000000 HC RX 637 (ALT 250 FOR IP): Performed by: INTERNAL MEDICINE

## 2020-07-10 PROCEDURE — 99231 SBSQ HOSP IP/OBS SF/LOW 25: CPT | Performed by: NURSE PRACTITIONER

## 2020-07-10 PROCEDURE — 93010 ELECTROCARDIOGRAM REPORT: CPT | Performed by: INTERNAL MEDICINE

## 2020-07-10 PROCEDURE — 94640 AIRWAY INHALATION TREATMENT: CPT

## 2020-07-10 PROCEDURE — 85730 THROMBOPLASTIN TIME PARTIAL: CPT

## 2020-07-10 PROCEDURE — 6360000002 HC RX W HCPCS: Performed by: ORTHOPAEDIC SURGERY

## 2020-07-10 PROCEDURE — 94760 N-INVAS EAR/PLS OXIMETRY 1: CPT

## 2020-07-10 PROCEDURE — 85025 COMPLETE CBC W/AUTO DIFF WBC: CPT

## 2020-07-10 PROCEDURE — 85610 PROTHROMBIN TIME: CPT

## 2020-07-10 PROCEDURE — 83735 ASSAY OF MAGNESIUM: CPT

## 2020-07-10 PROCEDURE — 2060000000 HC ICU INTERMEDIATE R&B

## 2020-07-10 PROCEDURE — 2500000003 HC RX 250 WO HCPCS: Performed by: INTERNAL MEDICINE

## 2020-07-10 PROCEDURE — 36415 COLL VENOUS BLD VENIPUNCTURE: CPT

## 2020-07-10 PROCEDURE — 80053 COMPREHEN METABOLIC PANEL: CPT

## 2020-07-10 RX ORDER — 0.9 % SODIUM CHLORIDE 0.9 %
500 INTRAVENOUS SOLUTION INTRAVENOUS ONCE
Status: COMPLETED | OUTPATIENT
Start: 2020-07-10 | End: 2020-07-10

## 2020-07-10 RX ORDER — POTASSIUM CHLORIDE 7.45 MG/ML
10 INJECTION INTRAVENOUS PRN
Status: DISCONTINUED | OUTPATIENT
Start: 2020-07-10 | End: 2020-07-17 | Stop reason: HOSPADM

## 2020-07-10 RX ORDER — POTASSIUM CHLORIDE 20 MEQ/1
40 TABLET, EXTENDED RELEASE ORAL PRN
Status: DISCONTINUED | OUTPATIENT
Start: 2020-07-10 | End: 2020-07-17 | Stop reason: HOSPADM

## 2020-07-10 RX ORDER — ACETAMINOPHEN 500 MG
1000 TABLET ORAL EVERY 8 HOURS SCHEDULED
Status: DISCONTINUED | OUTPATIENT
Start: 2020-07-10 | End: 2020-07-11

## 2020-07-10 RX ORDER — SENNA PLUS 8.6 MG/1
1 TABLET ORAL NIGHTLY
Status: DISCONTINUED | OUTPATIENT
Start: 2020-07-10 | End: 2020-07-17 | Stop reason: HOSPADM

## 2020-07-10 RX ADMIN — ACETAMINOPHEN 1000 MG: 500 TABLET, FILM COATED ORAL at 22:13

## 2020-07-10 RX ADMIN — BUDESONIDE AND FORMOTEROL FUMARATE DIHYDRATE 2 PUFF: 160; 4.5 AEROSOL RESPIRATORY (INHALATION) at 08:26

## 2020-07-10 RX ADMIN — SODIUM CHLORIDE 500 ML: 9 INJECTION, SOLUTION INTRAVENOUS at 13:02

## 2020-07-10 RX ADMIN — FLUTICASONE PROPIONATE 2 SPRAY: 50 SPRAY, METERED NASAL at 10:02

## 2020-07-10 RX ADMIN — DIVALPROEX SODIUM 250 MG: 250 TABLET, DELAYED RELEASE ORAL at 20:10

## 2020-07-10 RX ADMIN — MULTIPLE VITAMINS W/ MINERALS TAB 1 TABLET: TAB at 10:02

## 2020-07-10 RX ADMIN — METOPROLOL SUCCINATE 50 MG: 50 TABLET, EXTENDED RELEASE ORAL at 10:02

## 2020-07-10 RX ADMIN — DIVALPROEX SODIUM 250 MG: 250 TABLET, DELAYED RELEASE ORAL at 10:01

## 2020-07-10 RX ADMIN — SODIUM CHLORIDE, PRESERVATIVE FREE 10 ML: 5 INJECTION INTRAVENOUS at 20:10

## 2020-07-10 RX ADMIN — DONEPEZIL HYDROCHLORIDE 10 MG: 10 TABLET, FILM COATED ORAL at 20:10

## 2020-07-10 RX ADMIN — FERROUS SULFATE TAB EC 324 MG (65 MG FE EQUIVALENT) 324 MG: 324 (65 FE) TABLET DELAYED RESPONSE at 10:02

## 2020-07-10 RX ADMIN — ROPINIROLE HYDROCHLORIDE 2 MG: 1 TABLET, FILM COATED ORAL at 20:10

## 2020-07-10 RX ADMIN — SENNOSIDES 8.6 MG: 8.6 TABLET, FILM COATED ORAL at 20:10

## 2020-07-10 RX ADMIN — ENOXAPARIN SODIUM 40 MG: 40 INJECTION SUBCUTANEOUS at 14:37

## 2020-07-10 RX ADMIN — DILTIAZEM HYDROCHLORIDE 5 MG/HR: 5 INJECTION, SOLUTION INTRAVENOUS at 09:25

## 2020-07-10 RX ADMIN — DULOXETINE HYDROCHLORIDE 40 MG: 20 CAPSULE, DELAYED RELEASE ORAL at 10:02

## 2020-07-10 RX ADMIN — SODIUM CHLORIDE, PRESERVATIVE FREE 10 ML: 5 INJECTION INTRAVENOUS at 10:04

## 2020-07-10 RX ADMIN — ACETAMINOPHEN 1000 MG: 500 TABLET, FILM COATED ORAL at 14:36

## 2020-07-10 RX ADMIN — BUDESONIDE AND FORMOTEROL FUMARATE DIHYDRATE 2 PUFF: 160; 4.5 AEROSOL RESPIRATORY (INHALATION) at 20:32

## 2020-07-10 ASSESSMENT — PAIN SCALES - GENERAL
PAINLEVEL_OUTOF10: 8
PAINLEVEL_OUTOF10: 0

## 2020-07-10 NOTE — PROGRESS NOTES
Patient's HR up to the 200s this morning. HR remaining above 150. Patient asymptomatic. Secure message sent to Dr. Celia Wyatt.

## 2020-07-10 NOTE — PROGRESS NOTES
Patient transferred to room 5259 via bed. Report called to Philip Dial RN. Left message with daughter, Mireya Lam for update.

## 2020-07-10 NOTE — PROGRESS NOTES
Physician Progress Note      PATIENT:               Isabel Ferguson  CSN #:                  [id number]  :                       3/2/1929  ADMIT DATE:       2020 10:35 AM  DISCH DATE:  RESPONDING  PROVIDER #:        John Dueñas MD          QUERY TEXT:    Fracture d/t Osteoporosis/Pathological Fracture [facility]    Pt admitted with *Left displaced femoral neck fracture. Pt noted to have   Osteoporosis. If possible, please document in progress notes and discharge   summary if you are evaluating and/or treating any of the following:[[Traumatic   Left displaced femoral neck fracture[de-identified] This patient has a traumatic fracture   of Left displaced femoral neck. The medical record reflects the following:  Risk Factors: 80years old female and Osteoporosis  Clinical Indicators: per Dr Blayne Pineda consult The patient has   osteoporosis and likely has or is at risk for fragility fractures. Therefore,   I have recommended treatment with bisphosphonates, and DEXA screening if not   recently performed to re-establish baseline values. Calcium and vitamin D   supplementation may be indicated as well. DEXA Scan 2013 OSTEOPENIA LUMBAR   SPINE AND RIGHT HIP. OSTEOPOROSIS LEFT HIP  Treatment: Vitamin D and Calcium daily  Options provided:  -- Osteoporotic Left displaced femoral neck Fracture  -- Osteoporotic Left displaced femoral neck fracture following fall which   would not usually break a normal, healthy bone  -- Refer to Clinical Documentation Reviewer    PROVIDER RESPONSE TEXT:    This patient has a osteoporotic fracture of Left displaced femoral neck.     Query created by: Rock Whalen on 7/10/2020 2:20 PM      Electronically signed by:  John Dueñas MD 7/10/2020 2:58 PM

## 2020-07-10 NOTE — PROGRESS NOTES
Spoke with Dr. Catrina Lock about patient HR. EKG ordered. Will replace potassium per order by Dr. Yamil Montgomery MD to order Cardizem drip.

## 2020-07-10 NOTE — PROGRESS NOTES
Hospitalist Progress Note      PCP: GURWINDER Abdi - CNP    Date of Admission: 7/9/2020    Chief Complaint:   Chief Complaint   Patient presents with    Fall     fell yesterday, left hip fx    Altered Mental Status     pt unable to answer questions on arrival, tachy and low O2     Hospital Course: 80 y.o. female with hx dementia, chronic atrial fibrillation on anticoagulation, restless leg syndrome, HTN, HLD, combined systolic/diastolic CHF, hypothyroidism, asthma, and depression who presents to [facility] with fall and altered mental status. Patient is a nursing home resident at [facility], and fell out of her bed two days ago. It was unwitnessed, and unclear how long the patient was on the ground. A left hip xray was ordered on 7/8, and completed today that showed a left femoral neck fracture. The patient has dementia and some confusion at baseline, but apparently is normally conversant, answers questions appropriately and without difficulty.     In the ED, labs were significant for a sodium of 132, chloride of 92, WBC count of 11.7K, alk phos of 201. U/A was negative. Rapid COVID was negative. CT Head without contrast showed atrophy and small vessel ischemic disease. CT Cervical Spine showed no acute abnormality. CXR showed cardiomegaly with resolution of airspace disease. Subjective: In A. fib with RVR this morning with rates up to 200. Mental status improved. Complaining of pain in her hip. Denies chest pain, shortness of breath, nausea, vomiting, fevers, abdominal pain.        Medications:  Reviewed    Infusion Medications   Scheduled Medications    vitamin D  5,000 Units Oral Daily    dilTIAZem  30 mg Oral 4x Daily    divalproex  250 mg Oral BID    donepezil  10 mg Oral Nightly    DULoxetine  40 mg Oral Daily    ferrous sulfate  324 mg Oral Daily with breakfast    fluticasone  2 spray Each Nostril Daily    levothyroxine  88 mcg Oral QAM AC    metoprolol succinate  50 mg Oral Daily    therapeutic multivitamin-minerals  1 tablet Oral Daily    pantoprazole  40 mg Oral QAM AC    rOPINIRole  2 mg Oral Nightly    budesonide-formoterol  2 puff Inhalation BID    sodium chloride flush  10 mL Intravenous 2 times per day    [START ON 7/11/2020] apixaban  2.5 mg Oral BID     PRN Meds: potassium chloride, albuterol sulfate HFA, sodium chloride flush, acetaminophen **OR** acetaminophen, polyethylene glycol, promethazine **OR** ondansetron      Intake/Output Summary (Last 24 hours) at 7/10/2020 0808  Last data filed at 7/10/2020 0430  Gross per 24 hour   Intake 0 ml   Output 2150 ml   Net -2150 ml       Physical Exam Performed:    /60   Pulse 146   Temp 98.4 °F (36.9 °C) (Oral)   Resp 15   SpO2 91%     General appearance: No apparent distress, appears stated age and cooperative. HEENT: Pupils equal, round, and reactive to light. Conjunctivae/corneas clear. Neck: Supple, with full range of motion. Trachea midline. Respiratory:  Normal respiratory effort. Clear to auscultation, bilaterally without Rales/Wheezes/Rhonchi. Cardiovascular: a fib RVR, normal S1/S2 without murmurs, rubs or gallops. Abdomen: Soft, non-tender, non-distended with normal bowel sounds. Musculoskeletal: No clubbing, cyanosis or edema bilaterally. L hip fracture  Skin: Skin color, texture, turgor normal.  No rashes or lesions. Neurologic:  Neurovascularly intact without any focal sensory/motor deficits.  Cranial nerves: II-XII intact, grossly non-focal.  Psychiatric: Alert and oriented to self and place, thought content appropriate, normal insight  Capillary Refill: Brisk,< 3 seconds   Peripheral Pulses: +2 palpable, equal bilaterally       Labs:   Recent Labs     07/09/20  1104 07/10/20  0451   WBC 11.7* 9.0   HGB 12.1 11.9*   HCT 37.2 36.3    178     Recent Labs     07/09/20  1104 07/10/20  0451   * 138   K 4.1 3.2*   CL 92* 97*   CO2 25 28   BUN 23* 21*   CREATININE 0.7 0.6   CALCIUM 9.4 9.4     Recent Labs     07/09/20  1104 07/10/20  0451   AST 27 22   ALT 25 20   BILIDIR 0.5*  --    BILITOT 1.0 1.0   ALKPHOS 201* 159*     Recent Labs     07/10/20  0451   INR 1.28*     Recent Labs     07/09/20  1104   CKTOTAL 508*   TROPONINI <0.01       Urinalysis:      Lab Results   Component Value Date    NITRU Negative 07/09/2020    WBCUA 1 05/23/2020    RBCUA 3 05/23/2020    BLOODU Negative 07/09/2020    SPECGRAV 1.008 07/09/2020    GLUCOSEU Negative 07/09/2020       Radiology:  CT Head WO Contrast   Final Result   Motion limited exam, demonstrating atrophy and small vessel ischemic disease. CT Cervical Spine WO Contrast   Final Result   No acute abnormality of the cervical spine. XR CHEST PORTABLE   Final Result   Cardiomegaly with no significant finding in the chest.  Resolution of   airspace changes from the prior comparison study. XR HIP 2-3 VW W PELVIS LEFT   Final Result   Subcapital left femoral neck fracture. Assessment/Plan:    Active Hospital Problems    Diagnosis    Left displaced femoral neck fracture (HCC) [S72.002A]    Chronic anticoagulation [Z79.01]    Altered mental status [R41.82]    History of 2019 novel coronavirus disease (COVID-19) [Z86.19]    Dementia without behavioral disturbance (HCC) [F03.90]    Atrial fibrillation with rapid ventricular response (HCC) [I48.91]    Osteoporosis [M81.0]     Acute metabolic encephalopathy   Suspect 2/2 mechanical fall with hip fracture on top of underlying dementia. Possible polypharmacy. No evidence of infection including a negative urinalysis, negative CXR, negative CT Head and CT Cervical spine. Mild leukocytosis likely is reactive.  Improved today.   -continue to monitor  -continue home meds  -check TSH and free T4--WNL  -hold sedatives     Left displaced femoral neck fracture  -orthopedic surgery consulted, recs appreciated  -hold Eliquis until after procedure  -strict bedrest  - OR on hold for a fib RVR     Mechanical fall   -workup as above  -check CK--mildly elevated at 500     Chronic atrial fibrillation on Eliquis in RVR  HR up to 200 7/10.   - EKG  - start dilt drip  - cards consult  - replace K  - tele monitoring  -hold Eliquis until after procedure per orthopedic surgery  -continue other home meds     Leukocytosis   Likely reactive  -continue to trend and monitor     Essential hypertension  -continue home meds     Chronic combined systolic and diastolic CHF   Appears compensated  -continue home meds  -tele monitoring  -hold Lasix and gentle IVF overnight     Hyponatremia  Mild.   -gentle IVF  -trend     Hypothyroidism  -check TSH, free T4  -continue home meds     Depression  -continue home meds     Dementia  -continue home meds     Restless leg syndrome  -continue home meds     Hx COVID-19 infection  -does not appear to be clinically relevant. Negative in ED. DVT Prophylaxis: eliquis  Diet: Diet NPO Effective Now Exceptions are: Ice Chips, Sips with Meds  Code Status: DNR-CCA    PT/OT Eval Status: deferred to post op    Dispo - pending    Romelia Aguilar MD    This note was transcribed using [id number]. Please disregard any translational errors.

## 2020-07-11 ENCOUNTER — ANESTHESIA EVENT (OUTPATIENT)
Dept: OPERATING ROOM | Age: 85
DRG: 469 | End: 2020-07-11
Payer: MEDICARE

## 2020-07-11 ENCOUNTER — ANESTHESIA (OUTPATIENT)
Dept: OPERATING ROOM | Age: 85
DRG: 469 | End: 2020-07-11
Payer: MEDICARE

## 2020-07-11 ENCOUNTER — APPOINTMENT (OUTPATIENT)
Dept: GENERAL RADIOLOGY | Age: 85
DRG: 469 | End: 2020-07-11
Payer: MEDICARE

## 2020-07-11 VITALS
DIASTOLIC BLOOD PRESSURE: 53 MMHG | SYSTOLIC BLOOD PRESSURE: 94 MMHG | OXYGEN SATURATION: 96 % | RESPIRATION RATE: 35 BRPM | TEMPERATURE: 96.6 F

## 2020-07-11 LAB
A/G RATIO: 1.1 (ref 1.1–2.2)
ALBUMIN SERPL-MCNC: 3.3 G/DL (ref 3.4–5)
ALP BLD-CCNC: 137 U/L (ref 40–129)
ALT SERPL-CCNC: 17 U/L (ref 10–40)
ANION GAP SERPL CALCULATED.3IONS-SCNC: 14 MMOL/L (ref 3–16)
AST SERPL-CCNC: 18 U/L (ref 15–37)
BASOPHILS ABSOLUTE: 0.1 K/UL (ref 0–0.2)
BASOPHILS RELATIVE PERCENT: 1.5 %
BILIRUB SERPL-MCNC: 0.8 MG/DL (ref 0–1)
BUN BLDV-MCNC: 29 MG/DL (ref 7–20)
CALCIUM SERPL-MCNC: 8.5 MG/DL (ref 8.3–10.6)
CHLORIDE BLD-SCNC: 96 MMOL/L (ref 99–110)
CO2: 25 MMOL/L (ref 21–32)
CREAT SERPL-MCNC: 0.7 MG/DL (ref 0.6–1.2)
EOSINOPHILS ABSOLUTE: 0.7 K/UL (ref 0–0.6)
EOSINOPHILS RELATIVE PERCENT: 8.2 %
GFR AFRICAN AMERICAN: >60
GFR NON-AFRICAN AMERICAN: >60
GLOBULIN: 3.1 G/DL
GLUCOSE BLD-MCNC: 114 MG/DL (ref 70–99)
GLUCOSE BLD-MCNC: 207 MG/DL (ref 70–99)
GLUCOSE BLD-MCNC: 98 MG/DL (ref 70–99)
HCT VFR BLD CALC: 31.8 % (ref 36–48)
HEMOGLOBIN: 10.6 G/DL (ref 12–16)
LYMPHOCYTES ABSOLUTE: 1.6 K/UL (ref 1–5.1)
LYMPHOCYTES RELATIVE PERCENT: 19.2 %
MAGNESIUM: 2.3 MG/DL (ref 1.8–2.4)
MCH RBC QN AUTO: 30.9 PG (ref 26–34)
MCHC RBC AUTO-ENTMCNC: 33.4 G/DL (ref 31–36)
MCV RBC AUTO: 92.5 FL (ref 80–100)
MONOCYTES ABSOLUTE: 1.4 K/UL (ref 0–1.3)
MONOCYTES RELATIVE PERCENT: 16.9 %
NEUTROPHILS ABSOLUTE: 4.5 K/UL (ref 1.7–7.7)
NEUTROPHILS RELATIVE PERCENT: 54.2 %
PDW BLD-RTO: 16.9 % (ref 12.4–15.4)
PERFORMED ON: ABNORMAL
PERFORMED ON: ABNORMAL
PLATELET # BLD: 194 K/UL (ref 135–450)
PMV BLD AUTO: 9.2 FL (ref 5–10.5)
POTASSIUM REFLEX MAGNESIUM: 3.7 MMOL/L (ref 3.5–5.1)
RBC # BLD: 3.43 M/UL (ref 4–5.2)
SODIUM BLD-SCNC: 135 MMOL/L (ref 136–145)
TOTAL CK: 150 U/L (ref 26–192)
TOTAL PROTEIN: 6.4 G/DL (ref 6.4–8.2)
WBC # BLD: 8.3 K/UL (ref 4–11)

## 2020-07-11 PROCEDURE — 7100000000 HC PACU RECOVERY - FIRST 15 MIN: Performed by: ORTHOPAEDIC SURGERY

## 2020-07-11 PROCEDURE — 2580000003 HC RX 258: Performed by: ORTHOPAEDIC SURGERY

## 2020-07-11 PROCEDURE — 72170 X-RAY EXAM OF PELVIS: CPT

## 2020-07-11 PROCEDURE — 0SRS0JZ REPLACEMENT OF LEFT HIP JOINT, FEMORAL SURFACE WITH SYNTHETIC SUBSTITUTE, OPEN APPROACH: ICD-10-PCS | Performed by: ORTHOPAEDIC SURGERY

## 2020-07-11 PROCEDURE — 80053 COMPREHEN METABOLIC PANEL: CPT

## 2020-07-11 PROCEDURE — 2500000003 HC RX 250 WO HCPCS: Performed by: ORTHOPAEDIC SURGERY

## 2020-07-11 PROCEDURE — 2500000003 HC RX 250 WO HCPCS: Performed by: ANESTHESIOLOGY

## 2020-07-11 PROCEDURE — 6370000000 HC RX 637 (ALT 250 FOR IP): Performed by: ORTHOPAEDIC SURGERY

## 2020-07-11 PROCEDURE — 82550 ASSAY OF CK (CPK): CPT

## 2020-07-11 PROCEDURE — 3700000001 HC ADD 15 MINUTES (ANESTHESIA): Performed by: ORTHOPAEDIC SURGERY

## 2020-07-11 PROCEDURE — 94640 AIRWAY INHALATION TREATMENT: CPT

## 2020-07-11 PROCEDURE — 2709999900 HC NON-CHARGEABLE SUPPLY: Performed by: ORTHOPAEDIC SURGERY

## 2020-07-11 PROCEDURE — C1776 JOINT DEVICE (IMPLANTABLE): HCPCS | Performed by: ORTHOPAEDIC SURGERY

## 2020-07-11 PROCEDURE — 3600000005 HC SURGERY LEVEL 5 BASE: Performed by: ORTHOPAEDIC SURGERY

## 2020-07-11 PROCEDURE — 88305 TISSUE EXAM BY PATHOLOGIST: CPT

## 2020-07-11 PROCEDURE — 6370000000 HC RX 637 (ALT 250 FOR IP): Performed by: INTERNAL MEDICINE

## 2020-07-11 PROCEDURE — 27236 TREAT THIGH FRACTURE: CPT | Performed by: ORTHOPAEDIC SURGERY

## 2020-07-11 PROCEDURE — 94150 VITAL CAPACITY TEST: CPT

## 2020-07-11 PROCEDURE — 2500000003 HC RX 250 WO HCPCS

## 2020-07-11 PROCEDURE — 6360000002 HC RX W HCPCS: Performed by: ORTHOPAEDIC SURGERY

## 2020-07-11 PROCEDURE — 83735 ASSAY OF MAGNESIUM: CPT

## 2020-07-11 PROCEDURE — P9045 ALBUMIN (HUMAN), 5%, 250 ML: HCPCS | Performed by: ANESTHESIOLOGY

## 2020-07-11 PROCEDURE — 85025 COMPLETE CBC W/AUTO DIFF WBC: CPT

## 2020-07-11 PROCEDURE — 3600000015 HC SURGERY LEVEL 5 ADDTL 15MIN: Performed by: ORTHOPAEDIC SURGERY

## 2020-07-11 PROCEDURE — 2060000000 HC ICU INTERMEDIATE R&B

## 2020-07-11 PROCEDURE — 6360000002 HC RX W HCPCS: Performed by: ANESTHESIOLOGY

## 2020-07-11 PROCEDURE — 2580000003 HC RX 258: Performed by: ANESTHESIOLOGY

## 2020-07-11 PROCEDURE — 88311 DECALCIFY TISSUE: CPT

## 2020-07-11 PROCEDURE — 3700000000 HC ANESTHESIA ATTENDED CARE: Performed by: ORTHOPAEDIC SURGERY

## 2020-07-11 PROCEDURE — 99222 1ST HOSP IP/OBS MODERATE 55: CPT | Performed by: INTERNAL MEDICINE

## 2020-07-11 PROCEDURE — 7100000001 HC PACU RECOVERY - ADDTL 15 MIN: Performed by: ORTHOPAEDIC SURGERY

## 2020-07-11 PROCEDURE — 36415 COLL VENOUS BLD VENIPUNCTURE: CPT

## 2020-07-11 PROCEDURE — 27299 UNLISTED PX PELVIS/HIP JOINT: CPT | Performed by: ORTHOPAEDIC SURGERY

## 2020-07-11 PROCEDURE — 94760 N-INVAS EAR/PLS OXIMETRY 1: CPT

## 2020-07-11 PROCEDURE — 2700000000 HC OXYGEN THERAPY PER DAY

## 2020-07-11 DEVICE — HEAD BPLR OD48MM ID28MM FEM HIP SELF CNTR: Type: IMPLANTABLE DEVICE | Site: HIP | Status: FUNCTIONAL

## 2020-07-11 DEVICE — IMPL HIP FEM HEAD TAPR 12/14 28MM +5: Type: IMPLANTABLE DEVICE | Site: HIP | Status: FUNCTIONAL

## 2020-07-11 DEVICE — IMPLANTABLE DEVICE: Type: IMPLANTABLE DEVICE | Site: HIP | Status: FUNCTIONAL

## 2020-07-11 RX ORDER — ALBUMIN, HUMAN INJ 5% 5 %
SOLUTION INTRAVENOUS PRN
Status: DISCONTINUED | OUTPATIENT
Start: 2020-07-11 | End: 2020-07-11 | Stop reason: SDUPTHER

## 2020-07-11 RX ORDER — MAGNESIUM HYDROXIDE 1200 MG/15ML
LIQUID ORAL CONTINUOUS PRN
Status: COMPLETED | OUTPATIENT
Start: 2020-07-11 | End: 2020-07-11

## 2020-07-11 RX ORDER — ROCURONIUM BROMIDE 10 MG/ML
INJECTION, SOLUTION INTRAVENOUS PRN
Status: DISCONTINUED | OUTPATIENT
Start: 2020-07-11 | End: 2020-07-11 | Stop reason: SDUPTHER

## 2020-07-11 RX ORDER — OXYCODONE HYDROCHLORIDE AND ACETAMINOPHEN 5; 325 MG/1; MG/1
2 TABLET ORAL PRN
Status: DISCONTINUED | OUTPATIENT
Start: 2020-07-11 | End: 2020-07-11

## 2020-07-11 RX ORDER — TRANEXAMIC ACID 100 MG/ML
INJECTION, SOLUTION INTRAVENOUS
Status: COMPLETED | OUTPATIENT
Start: 2020-07-11 | End: 2020-07-11

## 2020-07-11 RX ORDER — ONDANSETRON 2 MG/ML
4 INJECTION INTRAMUSCULAR; INTRAVENOUS EVERY 6 HOURS PRN
Status: DISCONTINUED | OUTPATIENT
Start: 2020-07-11 | End: 2020-07-17 | Stop reason: HOSPADM

## 2020-07-11 RX ORDER — DEXTROSE MONOHYDRATE 50 MG/ML
100 INJECTION, SOLUTION INTRAVENOUS PRN
Status: DISCONTINUED | OUTPATIENT
Start: 2020-07-11 | End: 2020-07-17 | Stop reason: HOSPADM

## 2020-07-11 RX ORDER — INSULIN GLARGINE 100 [IU]/ML
0.25 INJECTION, SOLUTION SUBCUTANEOUS NIGHTLY
Status: DISCONTINUED | OUTPATIENT
Start: 2020-07-11 | End: 2020-07-13

## 2020-07-11 RX ORDER — LIDOCAINE HYDROCHLORIDE 20 MG/ML
INJECTION, SOLUTION EPIDURAL; INFILTRATION; INTRACAUDAL; PERINEURAL PRN
Status: DISCONTINUED | OUTPATIENT
Start: 2020-07-11 | End: 2020-07-11 | Stop reason: SDUPTHER

## 2020-07-11 RX ORDER — FENTANYL CITRATE 50 UG/ML
25 INJECTION, SOLUTION INTRAMUSCULAR; INTRAVENOUS EVERY 5 MIN PRN
Status: DISCONTINUED | OUTPATIENT
Start: 2020-07-11 | End: 2020-07-11

## 2020-07-11 RX ORDER — PROMETHAZINE HYDROCHLORIDE 25 MG/1
12.5 TABLET ORAL EVERY 6 HOURS PRN
Status: DISCONTINUED | OUTPATIENT
Start: 2020-07-11 | End: 2020-07-17 | Stop reason: HOSPADM

## 2020-07-11 RX ORDER — DEXTROSE MONOHYDRATE 25 G/50ML
12.5 INJECTION, SOLUTION INTRAVENOUS PRN
Status: DISCONTINUED | OUTPATIENT
Start: 2020-07-11 | End: 2020-07-17 | Stop reason: HOSPADM

## 2020-07-11 RX ORDER — PROPOFOL 10 MG/ML
INJECTION, EMULSION INTRAVENOUS PRN
Status: DISCONTINUED | OUTPATIENT
Start: 2020-07-11 | End: 2020-07-11 | Stop reason: SDUPTHER

## 2020-07-11 RX ORDER — KETAMINE HCL IN NACL, ISO-OSM 100MG/10ML
SYRINGE (ML) INJECTION PRN
Status: DISCONTINUED | OUTPATIENT
Start: 2020-07-11 | End: 2020-07-11 | Stop reason: SDUPTHER

## 2020-07-11 RX ORDER — SODIUM CHLORIDE 9 MG/ML
INJECTION, SOLUTION INTRAVENOUS CONTINUOUS PRN
Status: DISCONTINUED | OUTPATIENT
Start: 2020-07-11 | End: 2020-07-11 | Stop reason: SDUPTHER

## 2020-07-11 RX ORDER — OXYCODONE HYDROCHLORIDE AND ACETAMINOPHEN 5; 325 MG/1; MG/1
1 TABLET ORAL PRN
Status: DISCONTINUED | OUTPATIENT
Start: 2020-07-11 | End: 2020-07-11

## 2020-07-11 RX ORDER — OXYCODONE HYDROCHLORIDE 5 MG/1
5 TABLET ORAL EVERY 4 HOURS PRN
Status: DISCONTINUED | OUTPATIENT
Start: 2020-07-11 | End: 2020-07-17 | Stop reason: HOSPADM

## 2020-07-11 RX ORDER — MORPHINE SULFATE 2 MG/ML
2 INJECTION, SOLUTION INTRAMUSCULAR; INTRAVENOUS EVERY 4 HOURS PRN
Status: DISCONTINUED | OUTPATIENT
Start: 2020-07-11 | End: 2020-07-17 | Stop reason: HOSPADM

## 2020-07-11 RX ORDER — ONDANSETRON 2 MG/ML
INJECTION INTRAMUSCULAR; INTRAVENOUS PRN
Status: DISCONTINUED | OUTPATIENT
Start: 2020-07-11 | End: 2020-07-11 | Stop reason: SDUPTHER

## 2020-07-11 RX ORDER — ONDANSETRON 2 MG/ML
4 INJECTION INTRAMUSCULAR; INTRAVENOUS
Status: DISCONTINUED | OUTPATIENT
Start: 2020-07-11 | End: 2020-07-11

## 2020-07-11 RX ORDER — CEFAZOLIN SODIUM 1 G/3ML
INJECTION, POWDER, FOR SOLUTION INTRAMUSCULAR; INTRAVENOUS PRN
Status: DISCONTINUED | OUTPATIENT
Start: 2020-07-11 | End: 2020-07-11 | Stop reason: SDUPTHER

## 2020-07-11 RX ORDER — NICOTINE POLACRILEX 4 MG
15 LOZENGE BUCCAL PRN
Status: DISCONTINUED | OUTPATIENT
Start: 2020-07-11 | End: 2020-07-17 | Stop reason: HOSPADM

## 2020-07-11 RX ORDER — ACETAMINOPHEN 500 MG
500 TABLET ORAL EVERY 4 HOURS PRN
Status: DISCONTINUED | OUTPATIENT
Start: 2020-07-11 | End: 2020-07-17 | Stop reason: HOSPADM

## 2020-07-11 RX ORDER — PHENYLEPHRINE HYDROCHLORIDE 10 MG/ML
INJECTION INTRAVENOUS PRN
Status: DISCONTINUED | OUTPATIENT
Start: 2020-07-11 | End: 2020-07-11 | Stop reason: SDUPTHER

## 2020-07-11 RX ORDER — FENTANYL CITRATE 50 UG/ML
INJECTION, SOLUTION INTRAMUSCULAR; INTRAVENOUS PRN
Status: DISCONTINUED | OUTPATIENT
Start: 2020-07-11 | End: 2020-07-11 | Stop reason: SDUPTHER

## 2020-07-11 RX ADMIN — SODIUM CHLORIDE, PRESERVATIVE FREE 10 ML: 5 INJECTION INTRAVENOUS at 15:56

## 2020-07-11 RX ADMIN — LIDOCAINE HYDROCHLORIDE 60 MG: 20 INJECTION, SOLUTION EPIDURAL; INFILTRATION; INTRACAUDAL; PERINEURAL at 11:59

## 2020-07-11 RX ADMIN — DULOXETINE HYDROCHLORIDE 40 MG: 20 CAPSULE, DELAYED RELEASE ORAL at 15:54

## 2020-07-11 RX ADMIN — Medication 10 MG: at 12:46

## 2020-07-11 RX ADMIN — CEFAZOLIN SODIUM 2 G: 10 INJECTION, POWDER, FOR SOLUTION INTRAVENOUS at 20:40

## 2020-07-11 RX ADMIN — BUDESONIDE AND FORMOTEROL FUMARATE DIHYDRATE 2 PUFF: 160; 4.5 AEROSOL RESPIRATORY (INHALATION) at 08:23

## 2020-07-11 RX ADMIN — Medication 10 MG: at 12:25

## 2020-07-11 RX ADMIN — FENTANYL CITRATE 100 MCG: 50 INJECTION INTRAMUSCULAR; INTRAVENOUS at 11:59

## 2020-07-11 RX ADMIN — SUGAMMADEX 50 MG: 100 INJECTION, SOLUTION INTRAVENOUS at 13:31

## 2020-07-11 RX ADMIN — INSULIN GLARGINE 16 UNITS: 100 INJECTION, SOLUTION SUBCUTANEOUS at 20:36

## 2020-07-11 RX ADMIN — PHENYLEPHRINE HYDROCHLORIDE 100 MCG: 10 INJECTION INTRAVENOUS at 13:16

## 2020-07-11 RX ADMIN — FLUTICASONE PROPIONATE 2 SPRAY: 50 SPRAY, METERED NASAL at 15:54

## 2020-07-11 RX ADMIN — BUDESONIDE AND FORMOTEROL FUMARATE DIHYDRATE 2 PUFF: 160; 4.5 AEROSOL RESPIRATORY (INHALATION) at 21:39

## 2020-07-11 RX ADMIN — SODIUM CHLORIDE, PRESERVATIVE FREE 10 ML: 5 INJECTION INTRAVENOUS at 20:36

## 2020-07-11 RX ADMIN — ALBUMIN (HUMAN) 12.5 G: 12.5 SOLUTION INTRAVENOUS at 13:12

## 2020-07-11 RX ADMIN — PHENYLEPHRINE HYDROCHLORIDE 100 MCG: 10 INJECTION INTRAVENOUS at 13:26

## 2020-07-11 RX ADMIN — ONDANSETRON 4 MG: 2 INJECTION INTRAMUSCULAR; INTRAVENOUS at 13:16

## 2020-07-11 RX ADMIN — INSULIN LISPRO 2 UNITS: 100 INJECTION, SOLUTION INTRAVENOUS; SUBCUTANEOUS at 20:36

## 2020-07-11 RX ADMIN — SENNOSIDES 8.6 MG: 8.6 TABLET, FILM COATED ORAL at 20:36

## 2020-07-11 RX ADMIN — Medication 10 MG: at 13:00

## 2020-07-11 RX ADMIN — PHENYLEPHRINE HYDROCHLORIDE 100 MCG: 10 INJECTION INTRAVENOUS at 12:12

## 2020-07-11 RX ADMIN — PHENYLEPHRINE HYDROCHLORIDE 100 MCG: 10 INJECTION INTRAVENOUS at 13:00

## 2020-07-11 RX ADMIN — METOPROLOL SUCCINATE 50 MG: 50 TABLET, EXTENDED RELEASE ORAL at 09:56

## 2020-07-11 RX ADMIN — PHENYLEPHRINE HYDROCHLORIDE 100 MCG: 10 INJECTION INTRAVENOUS at 11:59

## 2020-07-11 RX ADMIN — CEFAZOLIN SODIUM 2000 MG: 1 INJECTION, POWDER, FOR SOLUTION INTRAMUSCULAR; INTRAVENOUS at 12:16

## 2020-07-11 RX ADMIN — SUGAMMADEX 50 MG: 100 INJECTION, SOLUTION INTRAVENOUS at 13:32

## 2020-07-11 RX ADMIN — Medication 5 MG: at 13:11

## 2020-07-11 RX ADMIN — ROPINIROLE HYDROCHLORIDE 2 MG: 1 TABLET, FILM COATED ORAL at 20:36

## 2020-07-11 RX ADMIN — DONEPEZIL HYDROCHLORIDE 10 MG: 10 TABLET, FILM COATED ORAL at 20:36

## 2020-07-11 RX ADMIN — ROCURONIUM BROMIDE 10 MG: 10 INJECTION INTRAVENOUS at 12:46

## 2020-07-11 RX ADMIN — ROCURONIUM BROMIDE 40 MG: 10 INJECTION INTRAVENOUS at 11:59

## 2020-07-11 RX ADMIN — DIVALPROEX SODIUM 250 MG: 250 TABLET, DELAYED RELEASE ORAL at 20:36

## 2020-07-11 RX ADMIN — SUGAMMADEX 50 MG: 100 INJECTION, SOLUTION INTRAVENOUS at 13:29

## 2020-07-11 RX ADMIN — SODIUM CHLORIDE: 9 INJECTION, SOLUTION INTRAVENOUS at 11:57

## 2020-07-11 RX ADMIN — PROPOFOL 60 MG: 10 INJECTION, EMULSION INTRAVENOUS at 11:59

## 2020-07-11 RX ADMIN — PHENYLEPHRINE HYDROCHLORIDE 100 MCG: 10 INJECTION INTRAVENOUS at 13:06

## 2020-07-11 ASSESSMENT — PULMONARY FUNCTION TESTS
PIF_VALUE: 19
PIF_VALUE: 21
PIF_VALUE: 20
PIF_VALUE: 18
PIF_VALUE: 19
PIF_VALUE: 14
PIF_VALUE: 22
PIF_VALUE: 19
PIF_VALUE: 20
PIF_VALUE: 21
PIF_VALUE: 22
PIF_VALUE: 23
PIF_VALUE: 22
PIF_VALUE: 21
PIF_VALUE: 22
PIF_VALUE: 21
PIF_VALUE: 23
PIF_VALUE: 22
PIF_VALUE: 20
PIF_VALUE: 21
PIF_VALUE: 22
PIF_VALUE: 22
PIF_VALUE: 20
PIF_VALUE: 21
PIF_VALUE: 22
PIF_VALUE: 19
PIF_VALUE: 22
PIF_VALUE: 21
PIF_VALUE: 20
PIF_VALUE: 21
PIF_VALUE: 21
PIF_VALUE: 22
PIF_VALUE: 21
PIF_VALUE: 23
PIF_VALUE: 24
PIF_VALUE: 22
PIF_VALUE: 21
PIF_VALUE: 3
PIF_VALUE: 20
PIF_VALUE: 23
PIF_VALUE: 21
PIF_VALUE: 19
PIF_VALUE: 21
PIF_VALUE: 22
PIF_VALUE: 22
PIF_VALUE: 20
PIF_VALUE: 21
PIF_VALUE: 22
PIF_VALUE: 20
PIF_VALUE: 22
PIF_VALUE: 21
PIF_VALUE: 20
PIF_VALUE: 21
PIF_VALUE: 1
PIF_VALUE: 19
PIF_VALUE: 8
PIF_VALUE: 19
PIF_VALUE: 21
PIF_VALUE: 21
PIF_VALUE: 9
PIF_VALUE: 21
PIF_VALUE: 15
PIF_VALUE: 15
PIF_VALUE: 21
PIF_VALUE: 22
PIF_VALUE: 0
PIF_VALUE: 21
PIF_VALUE: 19
PIF_VALUE: 21
PIF_VALUE: 24
PIF_VALUE: 22
PIF_VALUE: 21
PIF_VALUE: 22
PIF_VALUE: 15
PIF_VALUE: 22
PIF_VALUE: 20
PIF_VALUE: 15
PIF_VALUE: 3
PIF_VALUE: 2
PIF_VALUE: 14
PIF_VALUE: 15
PIF_VALUE: 19
PIF_VALUE: 3
PIF_VALUE: 19
PIF_VALUE: 21
PIF_VALUE: 19
PIF_VALUE: 22
PIF_VALUE: 21
PIF_VALUE: 4
PIF_VALUE: 19
PIF_VALUE: 21
PIF_VALUE: 21

## 2020-07-11 ASSESSMENT — PAIN SCALES - GENERAL
PAINLEVEL_OUTOF10: 0

## 2020-07-11 ASSESSMENT — ENCOUNTER SYMPTOMS: SHORTNESS OF BREATH: 1

## 2020-07-11 ASSESSMENT — PAIN - FUNCTIONAL ASSESSMENT: PAIN_FUNCTIONAL_ASSESSMENT: 0-10

## 2020-07-11 NOTE — PROGRESS NOTES
Spoke to the on call physician from cardiology. He reports that it is likely that the patient will be cleared for surgery tomorrow. Pt is rate controlled on a Cardizem gtt, and denies chest pain or SOB at this time. Will continue to monitor.

## 2020-07-11 NOTE — CONSULTS
[address]                                  CONSULTATION    PATIENT NAME: Flor Shipley                    :        1929  MED REC NO:   [id number]                          ROOM:       5259  ACCOUNT NO:   [de-identified]                           ADMIT DATE: 2020  PROVIDER:     Rhoda Ruiz MD    CARDIOLOGY CONSULTATION    CONSULT DATE:  2020    REFERRING PROVIDER:  Hospitalist    PRIMARY CARE PHYSICIAN:  Dr. Danay Montemayor:  Preop cardiac clearance and atrial  fibrillation with rapid ventricular response. HISTORY OF PRESENT ILLNESS:  The patient is a 80-year-old   female who is awake and alert but not fully oriented, who was admitted  to the hospital after an apparent fall at home with a closed fracture of  the left femur. The patient is scheduled for surgery today. The  patient has a history of atrial fibrillation and is on anticoagulation  at home. She also has a history of CHF. EKG shows atrial fibrillation  with rapid ventricular response and right bundle branch block. The  patient was started on a Cardizem drip with improvement of her heart  rate. At home, she is on Cardizem and metoprolol for rate control. BUN  29, creatinine 0.7. H and H is 10.6 and 31.8. Chest x-ray shows  cardiomegaly. Troponin is normal.  The patient was recovering from  COVID-19. She was retested and is negative. The patient denies any  complaints of chest pain or shortness of breath. Chest x-ray does not  show any vascular congestion. PAST MEDICAL HISTORY:  1. History of COVID-19.  2.  Atrial fibrillation. 3.  Dementia. 4.  Hyperlipidemia. 5.  CHF. HOME MEDICATIONS:  She is on vitamin D, Flonase, metoprolol 50 mg,  Cardizem, ferrous sulfate, levothyroxine, Aricept, and Eliquis.     ALLERGIES:  DEMEROL, LIDOCAINE, PROCAINE, TETANUS TOXOID, VICODIN,  AUGMENTIN, HYDROCODONE, MEPERIDINE, PREDNISONE.    SOCIAL HISTORY:  She lives at a nursing facility. Does not smoke or  drink. FAMILY HISTORY:  Obtained from the chart. There is no history of  coronary artery disease in the family. REVIEW OF SYSTEMS:  Limited due to patient's dementia. Denies any chest  pain or shortness of breath or any bleeding. Only _____ pain in her  left hip. All other systems reviewed were negative. PHYSICAL EXAMINATION:  GENERAL:  She is alert but not oriented. VITAL SIGNS:  Stable. Blood pressure is 122/74, sats 92% on room air,  pulse is 90 and irregular. HEENT:  Pupils are equal in size, reactive to light. Extraocular  muscles intact. NECK:  Supple. No jugular venous distention. LUNGS:  Clear to auscultation. CARDIOVASCULAR:  Irregular rhythm. S1, S2 normal.  Systolic murmur  grade 2/6. ABDOMEN:  Soft, nontender. No organomegaly. EXTREMITIES:  No pedal edema. Pulses are equal bilaterally. Left leg  is rotated externally and shortened. NEUROLOGICAL:  No focal motor deficits. Cranial nerves appear to be  intact. IMPRESSION:  1. Atrial fibrillation with rapid ventricular response. The patient  has permanent atrial fibrillation and is on rate control at home with  anticoagulation. 2.  History of congestive heart failure. Currently clinically stable. 3.  No evidence of unstable angina. 4.  Closed fracture of the left hip femur. 5.  Dementia. RECOMMENDATION:  1. Continue with Cardizem drip perioperatively for rate control. After  surgery, her beta-blocker dose can be increased for rate control. 2.  Check a BNP. 3.  The patient is moderate risk for surgery. Continue with  beta-blockers perioperatively.         Bashir Alexander MD    D: 07/11/2020 10:00:52       T: 07/11/2020 13:40:29     [email or web address]  Job#: [id number]     Doc#: [id number]    CC:  Samuel Vines MD       Primary Care Physician

## 2020-07-11 NOTE — OP NOTE
[facility]    DATE OF PROCEDURE: Saturday, July 11, 2020. PREOPERATIVE DIAGNOSIS:    1)  Left hip displaced femoral neck fracture. POSTOPERATIVE DIAGNOSIS:    1)  Left hip displaced femoral neck fracture. 2)  Left posterolateral hip superficial hematoma, clotted. 3)  Left hip gluteus medius tear. OPERATION PERFORMED:  Left hip hemiarthroplasty, direct lateral.     ANESTHESIA:  General endotracheal.     SURGEON:  Ish Melendez MD     FIRST ASSISTANT:  Trina Tong     BLOOD LOSS:  150 mL. COMPLICATIONS:  None. IMPLANTS:  Depuy hip arthroplasty system. 1.  Size 12 Corail stem, 125 deg standard collar. 2.  28 mm femoral head, +5 mm.  3.  48 mm bipolar shell and liner. INDICATIONS FOR PROCEDURE:  The patient is a 80 y.o. female with a displaced femoral neck fracture. Please refer to my consult note for full preoperative discussion, including treatment options with her daughter. Hemiarthroplasty was chosen as the treatment of choice, and verbal and written informed consent was obtained. The operative site was marked in the pre-operative holding area. OPERATIVE PROCEDURE:  The patient was brought back to the OR and transferred onto the operating room table. General anesthesia was administered by way of endotracheal tube. The patient was brought to the lateral decubitus position with the operative side up. An axillary roll was placed and all pressure points were well padded, including of the peroneal nerve. The patient was secured in this position using the peg board. The operative hip was then prepped and draped in the usual sterile fashion, extending up the flank. An Ioban occlusive drape was used. A full time-out was now performed with all parties in agreement. The patient did receive ancef 2 gm for antibiotic prophylaxis. Standard lateral hip incision centered over the greater trochanter was made and carried down onto the iliotibial band layer.   Superficial length after the hip was re-dislocated. The calcar planer was used to smoothen the neck osteotomy. The surgical field was generously irrigated and the posterior capsule was injected with the periarticular injection. The formal stem was then implanted and malleted to the appropriate depth. Next, the trunnion was cleaned and dried, and the formal bipolar head was malleted into place. The taper was confirmed to be engaged. At this point, the hip was re-reduced and stability was again confirmed as documented above. Chlorhexidine irrigation was used liberally throughout the procedure. Three grams of tranexamic acid was then applied topically. Hemostasis was confirmed. The rest of the periarticular injection was infiltrated over the surrounding musculature and subcutaneous tissue. The hip was then closed in a layered fashion. Vicryl was used to close the hip capsule and gluteus minimus layer. Next, using three #5 Tycron sutures in a Gopal-Logan configuration, the gluteus medius was repaired using transosseous bone tunnels. The gluteus medius repair was then backed up with interrupted Vicryls. The IT band was closed in a watertight fashion using Vicryls as well. The skin was then closed with inverted Vicryls and staples. The incision was cleaned, dried, and covered with a Mepilex occlusive dressing. The drapes were removed and the patient was flipped back supine onto the hospital bed. The patient was then extubated and transferred to the PACU.       Janeth Cosby MD

## 2020-07-11 NOTE — ANESTHESIA PRE PROCEDURE
[facility] Department of Anesthesiology  Pre-Anesthesia Evaluation/Consultation       Name:  Carmen Alcazar  : 3/2/1929  Age:  80 y.o.                                            MRN:  [id number]  Date: 2020           Surgeon: Surgeon(s):  Tomi Torre MD    Procedure: Procedure(s):  HIP HEMIARTHROPLASTY     Allergies   Allergen Reactions    Gluten Meal Diarrhea    Demerol     Lidocaine Hcl     Other     Procaine     Tetanus Toxoid, Adsorbed     Vicodin [Hydrocodone-Acetaminophen]     Wheat Bran     Augmentin [Amoxicillin-Pot Clavulanate] Itching    Hydrocodone-Acetaminophen Palpitations    Meperidine Palpitations    Prednisone Palpitations    Tetanus Toxoids Palpitations     Patient Active Problem List   Diagnosis    Dyspnea    Osteoporosis    Restless leg syndrome    Uncomplicated asthma    Atrial fibrillation with rapid ventricular response (HCC)    Vitamin D deficiency    Hypothyroidism    Buttocks nodule    Essential hypertension    Dementia without behavioral disturbance (HCC)    Pneumonia    Cardiomyopathy ([facility] [place] [address]    Hypoxemia    Viral pneumonia    COVID-19 virus infection    Hyponatremia    Chronic combined systolic (congestive) and diastolic (congestive) heart failure (HCC)    Delirium, acute    Closed fracture of left hip (HCC)    Left displaced femoral neck fracture (HCC)    Chronic anticoagulation    Altered mental status    History of 2019 novel coronavirus disease (COVID-19)     Past Medical History:   Diagnosis Date    Abdominal wall pain     Arrhythmia     afib,bradycardia    Arthritis     left hip    Asthma     Atrial fibrillation (HCC)     Broken nose     CHF (congestive heart failure) (HCC) 2018    Cough variant asthma     Dementia (HCC)     Dyspnea     Essential hypertension 2018    Fatigue     Hematoma     Hyperlipidemia     Insomnia     Lumbar radiculopathy     Osteoporosis     Palpitation     Restless leg syndrome     Shingles     Sinusitis      Past Surgical History:   Procedure Laterality Date    ANKLE SURGERY      left ankle surgery    APPENDECTOMY      CARDIOVERSION      COLONOSCOPY  10/25/10    normal dr Sherly Metcalf    COLONOSCOPY  2018    polyps dr Arcelia Pope, no repeat needed. no malignancy    ELBOW SURGERY      HYSTERECTOMY      JOINT REPLACEMENT      right elbow    OVARY REMOVAL Left     UPPER GASTROINTESTINAL ENDOSCOPY  2018    normal dr Arcelia Pope     Social History     Tobacco Use    Smoking status: Former Smoker     Packs/day: 0.25     Years: 20.00     Pack years: 5.00     Last attempt to quit: 1970     Years since quittin.9    Smokeless tobacco: Never Used   Substance Use Topics    Alcohol use: No     Frequency: Never     Binge frequency: Never    Drug use: No     Medications  No current facility-administered medications on file prior to encounter.       Current Outpatient Medications on File Prior to Encounter   Medication Sig Dispense Refill    THIAMINE MONONITRATE PO Take 1,000 mcg by mouth daily      dilTIAZem (CARDIZEM) 30 MG tablet Take 30 mg by mouth 4 times daily      potassium bicarb-citric acid (EFFER-K) 20 MEQ TBEF effervescent tablet Take 2 tablets by mouth 2 times daily      fluticasone (FLONASE) 50 MCG/ACT nasal spray 2 sprays by Each Nostril route daily      acetaminophen (TYLENOL) 325 MG tablet Take 650 mg by mouth every 4 hours as needed for Pain or Fever      metoprolol succinate (TOPROL XL) 50 MG extended release tablet Take 1 tablet by mouth daily 30 tablet 3    furosemide (LASIX) 20 MG tablet Take 1 tablet by mouth daily 60 tablet 3    apixaban (ELIQUIS) 5 MG TABS tablet Take 2.5 mg by mouth 2 times daily       divalproex (DEPAKOTE) 250 MG DR tablet Take 250 mg by mouth 2 times daily       levothyroxine (SYNTHROID) 88 MCG tablet TAKE 1 TABLET BY MOUTH DAILY 90 tablet 1    pantoprazole (PROTONIX) 40 MG tablet TAKE (1) TABLET BY MOUTH DAILY 90 tablet 1    rOPINIRole (REQUIP) 1 MG tablet TAKE TWO (2) TABLETS BY MOUTH NIGHTLY 180 tablet 1    SYMBICORT 160-4.5 MCG/ACT AERO INHALE 2 PUFFS TWICE A DAY INTO THE LUNGS 11 Inhaler 0    ferrous sulfate 325 (65 Fe) MG tablet Take 325 mg by mouth daily (with breakfast)      Multiple Vitamins-Minerals (THERAPEUTIC MULTIVITAMIN-MINERALS) tablet Take 1 tablet by mouth daily      DULoxetine HCl 40 MG CSDR Take 40 mg by mouth daily       PROAIR  (90 Base) MCG/ACT inhaler 2 PUFFS INTO LUNGS EVERY 4 HOURS AS NEEDED FOR WHEEZING OR FORSHORTNESS OF BREATH 9 Inhaler 0    LINZESS 290 MCG CAPS capsule Take 290 mcg by mouth every morning (before breakfast)       donepezil (ARICEPT) 10 MG tablet Take 10 mg by mouth nightly       Polyethylene Glycol 3350 (MIRALAX PO) Take 17 g by mouth daily       Cholecalciferol (VITAMIN D3) 5000 units TABS Take 1 tablet by mouth daily       Compression Stockings MISC by Does not apply route Bilateral lower extremity compression stockings, 15-20 mmHg 2 each 1    Spacer/Aero-Holding Chambers (E-Z SPACER) POLLY 1 Device by Does not apply route daily as needed.  1 Device 0     Current Facility-Administered Medications   Medication Dose Route Frequency Provider Last Rate Last Dose    potassium chloride 10 mEq/100 mL IVPB (Peripheral Line)  10 mEq Intravenous PRN Josh Vaz MD        dilTIAZem 125 mg in dextrose 5 % 125 mL infusion  5 mg/hr Intravenous Continuous Antoine Perez MD 5 mL/hr at 07/10/20 0925 5 mg/hr at 07/10/20 0925    enoxaparin (LOVENOX) injection 40 mg  40 mg Subcutaneous QPM Josh Vaz MD   40 mg at 07/10/20 1437    acetaminophen (TYLENOL) tablet 1,000 mg  1,000 mg Oral 3 times per day Antoine Perez MD   1,000 mg at 07/10/20 2213    senna (SENOKOT) tablet 8.6 mg  1 tablet Oral Nightly Antoine Perez MD   8.6 mg at 07/10/20 2010    potassium chloride (KLOR-CON M) extended release tablet 40 mEq  40 mEq Oral PRN Antoine Perez MD        Or    potassium bicarb-citric acid (EFFER-K) effervescent tablet 40 mEq  40 mEq Oral PRN Kiran Busby MD        Or    potassium chloride 10 mEq/100 mL IVPB (Peripheral Line)  10 mEq Intravenous PRN Kiran Busby MD        vitamin D (CHOLECALCIFEROL) capsule 5,000 Units  5,000 Units Oral Daily Roselia Ovalles MD        [Held by provider] dilTIAZem (CARDIZEM) tablet 30 mg  30 mg Oral 4x Daily Roselia Ovalles MD        divalproex (DEPAKOTE) DR tablet 250 mg  250 mg Oral BID Roselia Ovalles MD   250 mg at 07/10/20 2010    donepezil (ARICEPT) tablet 10 mg  10 mg Oral Nightly Roselia Ovalles MD   10 mg at 07/10/20 2010    DULoxetine (CYMBALTA) extended release capsule 40 mg  40 mg Oral Daily Roselia Ovalles MD   40 mg at 07/10/20 1002    ferrous sulfate EC tablet 324 mg  324 mg Oral Daily with breakfast Roselia Ovalles MD   324 mg at 07/10/20 1002    fluticasone (FLONASE) 50 MCG/ACT nasal spray 2 spray  2 spray Each Nostril Daily Roselia Ovalles MD   2 spray at 07/10/20 1002    levothyroxine (SYNTHROID) tablet 88 mcg  88 mcg Oral QAM AC Roselia Ovalles MD        metoprolol succinate (TOPROL XL) extended release tablet 50 mg  50 mg Oral Daily Roselia Ovalles MD   50 mg at 07/11/20 0011    therapeutic multivitamin-minerals 1 tablet  1 tablet Oral Daily Roselia Ovalles MD   1 tablet at 07/10/20 1002    pantoprazole (PROTONIX) tablet 40 mg  40 mg Oral QAM AC Roselia Ovalles MD        albuterol sulfate  (90 Base) MCG/ACT inhaler 2 puff  2 puff Inhalation Q4H PRN Roselia Ovalles MD        rOPINIRole (REQUIP) tablet 2 mg  2 mg Oral Nightly Roselia Ovalles MD   2 mg at 07/10/20 2010    budesonide-formoterol (SYMBICORT) 160-4.5 MCG/ACT inhaler 2 puff  2 puff Inhalation BID Roselia Ovalles MD   2 puff at 07/11/20 0823    sodium chloride flush 0.9 % injection 10 mL  10 mL Intravenous 2 times per day Roselia Ovalles MD   10 mL at 07/10/20 2010    sodium chloride flush 0.9 % injection 10 mL  10 mL Intravenous PRN Benjamen Montane Junior Pruett MD        polyethylene glycol [facility]) packet 17 g  17 g Oral Daily PRN Vijay Pearl MD        promethazine (PHENERGAN) tablet 12.5 mg  12.5 mg Oral Q6H PRN Vijay Pearl MD        Or    ondansetron [facility]) injection 4 mg  4 mg Intravenous Q6H PRN Vijay Pearl MD         Facility-Administered Medications Ordered in Other Encounters   Medication Dose Route Frequency Provider Last Rate Last Dose    0.9 % sodium chloride infusion    Continuous PRN Olvin Barlow MD        lidocaine PF 2 % injection    PRN Olvin Barlow MD   60 mg at 20 1159    propofol injection    PRN Olvin Barlow MD   60 mg at 20 1159    rocuronium (ZEMURON) injection    PRN Olvin Barlow MD   40 mg at 20 1159    phenylephrine (VAZCULEP) injection    PRN Olvin Barlow MD   100 mcg at 20 1212    fentaNYL (SUBLIMAZE) injection    PRN Olvin Barlow MD   100 mcg at 20 1159    ceFAZolin (ANCEF) injection   Intravenous PRN Olvin Barlow MD   2,000 mg at 20 1216    ketamine (KETALAR) injection    PRN Olvin Barlow MD   10 mg at 20 1225     Vital Signs (Current)   Vitals:    20 0403 20 0752 20 0824 20 1104   BP: 106/62 122/74  117/60   Pulse: 88 91     Resp: 16 18 18 14   Temp: 97.7 °F (36.5 °C) 98 °F (36.7 °C)  96.8 °F (36 °C)   TempSrc: Oral Oral  Temporal   SpO2: 90% 92%  96%   Weight:       Height:                                              BP Readings from Last 3 Encounters:   20 117/60   20 (!) 119/57   20 108/68     Vital Signs Statistics (for past 48 hrs)     Temp  Av.5 °F (36.4 °C)  Min: 96.8 °F (36 °C)   Min taken time: 20 1104  Max: 98.8 °F (37.1 °C)   Max taken time: 07/10/20 0900  Pulse  Av.3  Min: 78   Min taken time: 20  Max: 148   Max taken time: 07/10/20 0928  Resp  Av.2  Min: 1   Min taken time: 20 1203  Max: 26   Max taken time: 20 1246  BP  Min: 75/40 Min taken time: 20 1215  Max: 155/71   Max taken time: 20 1402  MAP (mmHg)  Av.7  Min: 48   Min taken time: 20 1215  Max: 108   Max taken time: 07/10/20 0928  SpO2  Av.6 %  Min: 83 %   Min taken time: 20 1203  Max: 100 %   Max taken time: 20 1237  BP Readings from Last 3 Encounters:   20 117/60   20 (!) 119/57   20 108/68       BMI  Body mass index is 23.11 kg/m². Estimated body mass index is 23.11 kg/m² as calculated from the following:    Height as of this encounter: 5' 5\" (1.651 m). Weight as of this encounter: 138 lb 14.2 oz (63 kg).     CBC   Lab Results   Component Value Date    WBC 8.3 2020    RBC 3.43 2020    HGB 10.6 2020    HCT 31.8 2020    MCV 92.5 2020    RDW 16.9 2020     2020     CMP    Lab Results   Component Value Date     2020    K 3.7 2020    CL 96 2020    CO2 25 2020    BUN 29 2020    CREATININE 0.7 2020    GFRAA >60 2020    GFRAA >60 2013    AGRATIO 1.1 2020    LABGLOM >60 2020    GLUCOSE 98 2020    PROT 6.4 2020    PROT 6.0 2012    CALCIUM 8.5 2020    BILITOT 0.8 2020    ALKPHOS 137 2020    AST 18 2020    ALT 17 2020     BMP    Lab Results   Component Value Date     2020    K 3.7 2020    CL 96 2020    CO2 25 2020    BUN 29 2020    CREATININE 0.7 2020    CALCIUM 8.5 2020    GFRAA >60 2020    GFRAA >60 2013    LABGLOM >60 2020    GLUCOSE 98 2020     POCGlucose  Recent Labs     20  1104 07/10/20  0451 20  0548   GLUCOSE 125* 89 98      Coags    Lab Results   Component Value Date    PROTIME 14.9 07/10/2020    INR 1.28 07/10/2020    APTT 32.5      HCG (If Applicable) No results found for: PREGTESTUR, PREGSERUM, HCG, HCGQUANT   ABGs   Lab Results   Component Value Date    PHART 7.488 ADDENDUM:    Pt seen and examined, chart reviewed (including anesthesia, drug and allergy history). No interval changes to history and physical examination. Anesthetic plan, risks, benefits, alternatives, and personnel involved discussed with patient. Patient verbalized an understanding and agrees to proceed.       Denzel Martínez MD  July 11, 2020  12:37 PM

## 2020-07-11 NOTE — PROGRESS NOTES
Hospitalist Progress Note      PCP: Jose Luis Vieyra, APRN - CNP    Date of Admission: 7/9/2020    Chief Complaint:   Chief Complaint   Patient presents with    Fall     fell yesterday, left hip fx    Altered Mental Status     pt unable to answer questions on arrival, tachy and low O2     Hospital Course: 80 y.o. female with hx dementia, chronic atrial fibrillation on anticoagulation, restless leg syndrome, HTN, HLD, combined systolic/diastolic CHF, hypothyroidism, asthma, and depression who [facility] with fall and altered mental status. Patient is a nursing home resident at [facility], and fell out of her bed two days ago. It was unwitnessed, and unclear how long the patient was on the ground. A left hip xray was ordered on 7/8, and completed today that showed a left femoral neck fracture. The patient has dementia and some confusion at baseline, but apparently is normally conversant, answers questions appropriately and without difficulty.     In the ED, labs were significant for a sodium of 132, chloride of 92, WBC count of 11.7K, alk phos of 201. U/A was negative. Rapid COVID was negative. CT Head without contrast showed atrophy and small vessel ischemic disease. CT Cervical Spine showed no acute abnormality. CXR showed cardiomegaly with resolution of airspace disease. Subjective:  HR better controlled on dilt drip. Seen after arrival to room from PACU. Patient is drowsy but arousable, a bit confused. Hip feels better. denies chest pain, shortness of breath, nausea, vomiting, fevers, abdominal pain.        Medications:  Reviewed    Infusion Medications    dilTIAZem 5 mg/hr (07/10/20 0925)     Scheduled Medications    enoxaparin  40 mg Subcutaneous QPM    acetaminophen  1,000 mg Oral 3 times per day    senna  1 tablet Oral Nightly    vitamin D  5,000 Units Oral Daily    [Held by provider] dilTIAZem  30 mg Oral 4x Daily    divalproex  250 mg Oral BID    donepezil  10 Peripheral Pulses: +2 palpable, equal bilaterally       Labs:   Recent Labs     07/09/20  1104 07/10/20  0451 07/11/20  0548   WBC 11.7* 9.0 8.3   HGB 12.1 11.9* 10.6*   HCT 37.2 36.3 31.8*    178 194     Recent Labs     07/09/20  1104 07/10/20  0451 07/11/20  0548   * 138 135*   K 4.1 3.2* 3.7   CL 92* 97* 96*   CO2 25 28 25   BUN 23* 21* 29*   CREATININE 0.7 0.6 0.7   CALCIUM 9.4 9.4 8.5     Recent Labs     07/09/20  1104 07/10/20  0451 07/11/20  0548   AST 27 22 18   ALT 25 20 17   BILIDIR 0.5*  --   --    BILITOT 1.0 1.0 0.8   ALKPHOS 201* 159* 137*     Recent Labs     07/10/20  0451   INR 1.28*     Recent Labs     07/09/20  1104   CKTOTAL 508*   TROPONINI <0.01       Urinalysis:      Lab Results   Component Value Date    NITRU Negative 07/09/2020    WBCUA 1 05/23/2020    RBCUA 3 05/23/2020    BLOODU Negative 07/09/2020    SPECGRAV 1.008 07/09/2020    GLUCOSEU Negative 07/09/2020       Radiology:  CT Head WO Contrast   Final Result   Motion limited exam, demonstrating atrophy and small vessel ischemic disease. CT Cervical Spine WO Contrast   Final Result   No acute abnormality of the cervical spine. XR CHEST PORTABLE   Final Result   Cardiomegaly with no significant finding in the chest.  Resolution of   airspace changes from the prior comparison study. XR HIP 2-3 VW W PELVIS LEFT   Final Result   Subcapital left femoral neck fracture. Assessment/Plan:    Active Hospital Problems    Diagnosis    Left displaced femoral neck fracture (HCC) [S72.002A]    Chronic anticoagulation [Z79.01]    Altered mental status [R41.82]    History of 2019 novel coronavirus disease (COVID-19) [Z86.19]    Dementia without behavioral disturbance (HCC) [F03.90]    Atrial fibrillation with rapid ventricular response (HCC) [I48.91]    Osteoporosis [M81.0]     Acute metabolic encephalopathy, improved  Suspect 2/2 mechanical fall with hip fracture on top of underlying dementia. Possible polypharmacy. No evidence of infection including a negative urinalysis, negative CXR, negative CT Head and CT Cervical spine. Mild leukocytosis likely is reactive. Significantly improved day after admission. Somewhat confused again today though seen immediately after surgery  -continue to monitor  -continue home meds  -check TSH and free T4--WNL  -hold sedatives     Left displaced femoral neck fracture  -orthopedic surgery consulted, recs appreciated  -hold Eliquis until after procedure  -strict bedrest  - OR 1/67 without complications     Mechanical fall   -workup as above  -check CK--mildly elevated at 500--trend     Chronic atrial fibrillation on Eliquis in RVR  HR up to 200 on 7/10.   - EKG  -Continued dilt drip perioperatively  - cards consult  - replace K  - tele monitoring  -hold Eliquis until after procedure per orthopedic surgery  -continue other home meds     Leukocytosis, improved  Likely reactive  -continue to trend and monitor     Essential hypertension  -continue home meds     Chronic combined systolic and diastolic CHF   Appears compensated  -continue home meds  -tele monitoring  -hold Lasix and gentle IVF overnight     Hyponatremia  Mild.   -gentle IVF  -trend     Hypothyroidism  -check TSH, free T4  -continue home meds     Depression  -continue home meds     Dementia  -continue home meds     Restless leg syndrome  -continue home meds     Hx COVID-19 infection  -does not appear to be clinically relevant. Negative in ED. DVT Prophylaxis: eliquis  Diet: Diet NPO, After Midnight Exceptions are: Ice Chips, Sips with Meds  Code Status: DNR-CCA    PT/OT Eval Status: deferred to post op    Dispo - pending    Nahomy Handler, MD    This note was transcribed using [id number]. Please disregard any translational errors.

## 2020-07-11 NOTE — PROGRESS NOTES
PM assessment complete, VSS. No acute S/S of distress noted at this time. Rate remains controlled on Cardizem gtt. Will continue to monitor.

## 2020-07-11 NOTE — H&P
PLANNED PROCEDURE:  Left hip hemiarthroplasty for fracture    I have reviewed my notes and patient's imaging, examined the patient, and the following changes are noted:     surgery delayed yesterday due to Afib with RVR  Transferred to [facility] and started on cardizem drip  HR has come down  Evaluated by cardiology this morning, and cleared for surgery      Past Medical History:   Diagnosis Date    Abdominal wall pain     Arrhythmia     afib,bradycardia    Arthritis     left hip    Asthma     Atrial fibrillation ([address]     Broken nose     CHF (congestive heart failure) ([address] 2018    Cough variant asthma     Dementia ([address]     Dyspnea     Essential hypertension 2018    Fatigue     Hematoma     Hyperlipidemia     Insomnia     Lumbar radiculopathy     Osteoporosis     Palpitation     Restless leg syndrome     Shingles     Sinusitis        Past Surgical History:   Procedure Laterality Date    ANKLE SURGERY      left ankle surgery    APPENDECTOMY      CARDIOVERSION      COLONOSCOPY  10/25/10    normal dr Gisele Lima    COLONOSCOPY  2018    polyps dr Janine Fuentes, no repeat needed.  no malignancy    ELBOW SURGERY      HYSTERECTOMY      JOINT REPLACEMENT      right elbow    OVARY REMOVAL Left     UPPER GASTROINTESTINAL ENDOSCOPY  2018    normal dr Janine Fuentes       Social History     Tobacco Use    Smoking status: Former Smoker     Packs/day: 0.25     Years: 20.00     Pack years: 5.00     Last attempt to quit: 1970     Years since quittin.9    Smokeless tobacco: Never Used   Substance Use Topics    Alcohol use: No     Frequency: Never     Binge frequency: Never    Drug use: No       Allergies   Allergen Reactions    Gluten Meal Diarrhea    Demerol     Lidocaine Hcl     Other     Procaine     Tetanus Toxoid, Adsorbed     Vicodin [Hydrocodone-Acetaminophen]     Wheat Bran     Augmentin [Amoxicillin-Pot Clavulanate] Itching    Hydrocodone-Acetaminophen Palpitations    Meperidine Palpitations    Prednisone Palpitations    Tetanus Toxoids Palpitations       Medications Prior to Admission: THIAMINE MONONITRATE PO, Take 1,000 mcg by mouth daily  dilTIAZem (CARDIZEM) 30 MG tablet, Take 30 mg by mouth 4 times daily  potassium bicarb-citric acid (EFFER-K) 20 MEQ TBEF effervescent tablet, Take 2 tablets by mouth 2 times daily  fluticasone (FLONASE) 50 MCG/ACT nasal spray, 2 sprays by Each Nostril route daily  acetaminophen (TYLENOL) 325 MG tablet, Take 650 mg by mouth every 4 hours as needed for Pain or Fever  metoprolol succinate (TOPROL XL) 50 MG extended release tablet, Take 1 tablet by mouth daily  furosemide (LASIX) 20 MG tablet, Take 1 tablet by mouth daily  apixaban (ELIQUIS) 5 MG TABS tablet, Take 2.5 mg by mouth 2 times daily   divalproex (DEPAKOTE) 250 MG DR tablet, Take 250 mg by mouth 2 times daily   levothyroxine (SYNTHROID) 88 MCG tablet, TAKE 1 TABLET BY MOUTH DAILY  pantoprazole (PROTONIX) 40 MG tablet, TAKE (1) TABLET BY MOUTH DAILY  rOPINIRole (REQUIP) 1 MG tablet, TAKE TWO (2) TABLETS BY MOUTH NIGHTLY  SYMBICORT 160-4.5 MCG/ACT AERO, INHALE 2 PUFFS TWICE A DAY INTO THE LUNGS  ferrous sulfate 325 (65 Fe) MG tablet, Take 325 mg by mouth daily (with breakfast)  Multiple Vitamins-Minerals (THERAPEUTIC MULTIVITAMIN-MINERALS) tablet, Take 1 tablet by mouth daily  DULoxetine HCl 40 MG CSDR, Take 40 mg by mouth daily   PROAIR  (90 Base) MCG/ACT inhaler, 2 PUFFS INTO LUNGS EVERY 4 HOURS AS NEEDED FOR WHEEZING OR FORSHORTNESS OF BREATH  LINZESS 290 MCG CAPS capsule, Take 290 mcg by mouth every morning (before breakfast)   donepezil (ARICEPT) 10 MG tablet, Take 10 mg by mouth nightly   Polyethylene Glycol 3350 (MIRALAX PO), Take 17 g by mouth daily   Cholecalciferol (VITAMIN D3) 5000 units TABS, Take 1 tablet by mouth daily   Compression Stockings MISC, by Does not apply route Bilateral lower extremity compression stockings, 15-20 mmHg  Spacer/Aero-Holding Chambers (E-Z SPACER) POLLY, 1 Device by Does not apply route daily as needed.       Current Facility-Administered Medications:     potassium chloride 10 mEq/100 mL IVPB (Peripheral Line), 10 mEq, Intravenous, PRN, Mirza Pittman MD    dilTIAZem 125 mg in dextrose 5 % 125 mL infusion, 5 mg/hr, Intravenous, Continuous, Gino Mathews MD, Last Rate: 5 mL/hr at 07/10/20 0925, 5 mg/hr at 07/10/20 0925    enoxaparin (LOVENOX) injection 40 mg, 40 mg, Subcutaneous, QPM, Mirza Pittman MD, 40 mg at 07/10/20 1437    acetaminophen (TYLENOL) tablet 1,000 mg, 1,000 mg, Oral, 3 times per day, Gino Mathews MD, 1,000 mg at 07/10/20 2213    senna (SENOKOT) tablet 8.6 mg, 1 tablet, Oral, Nightly, Gino Mathews MD, 8.6 mg at 07/10/20 2010    potassium chloride (KLOR-CON M) extended release tablet 40 mEq, 40 mEq, Oral, PRN **OR** potassium bicarb-citric acid (EFFER-K) effervescent tablet 40 mEq, 40 mEq, Oral, PRN **OR** potassium chloride 10 mEq/100 mL IVPB (Peripheral Line), 10 mEq, Intravenous, PRN, Gino Mathews MD    vitamin D (CHOLECALCIFEROL) capsule 5,000 Units, 5,000 Units, Oral, Daily, Smitha Rodriguez MD    [Held by provider] dilTIAZem (CARDIZEM) tablet 30 mg, 30 mg, Oral, 4x Daily, Smitha Rodriguez MD    divalproex (DEPAKOTE) DR tablet 250 mg, 250 mg, Oral, BID, Smitha Rodriguez MD, 250 mg at 07/10/20 2010    donepezil (ARICEPT) tablet 10 mg, 10 mg, Oral, Nightly, Smitha Rodriguez MD, 10 mg at 07/10/20 2010    DULoxetine (CYMBALTA) extended release capsule 40 mg, 40 mg, Oral, Daily, Smitha Rodriguez MD, 40 mg at 07/10/20 1002    ferrous sulfate EC tablet 324 mg, 324 mg, Oral, Daily with breakfast, Smitha Rodriguez MD, 324 mg at 07/10/20 1002    fluticasone (FLONASE) 50 MCG/ACT nasal spray 2 spray, 2 spray, Each Nostril, Daily, Smitha Rodriguez MD, 2 spray at 07/10/20 1002    levothyroxine (SYNTHROID) tablet 88 mcg, 88 mcg, Oral, QAM AC, Smitha Rodriguez MD    metoprolol succinate (TOPROL XL) extended release tablet 50 mg, 50 mg, Oral, Daily, Ba Corona MD, 50 mg at 07/11/20 0956    therapeutic multivitamin-minerals 1 tablet, 1 tablet, Oral, Daily, Ba Corona MD, 1 tablet at 07/10/20 1002    pantoprazole (PROTONIX) tablet 40 mg, 40 mg, Oral, QAM AC, Ba Corona MD    albuterol sulfate  (90 Base) MCG/ACT inhaler 2 puff, 2 puff, Inhalation, Q4H PRN, Ba Corona MD    rOPINIRole (REQUIP) tablet 2 mg, 2 mg, Oral, Nightly, Ba Corona MD, 2 mg at 07/10/20 2010    budesonide-formoterol (SYMBICORT) 160-4.5 MCG/ACT inhaler 2 puff, 2 puff, Inhalation, BID, Ba Corona MD, 2 puff at 07/11/20 0823    sodium chloride flush 0.9 % injection 10 mL, 10 mL, Intravenous, 2 times per day, Ba Corona MD, 10 mL at 07/10/20 2010    sodium chloride flush 0.9 % injection 10 mL, 10 mL, Intravenous, PRN, Ba Corona MD    polyethylene glycol [facility]) packet 17 g, 17 g, Oral, Daily PRN, Ba Corona MD    promethazine (PHENERGAN) tablet 12.5 mg, 12.5 mg, Oral, Q6H PRN **OR** ondansetron (ZOFRAN) injection 4 mg, 4 mg, Intravenous, Q6H PRN, Ba Corona MD    Vitals:    07/11/20 0403 07/11/20 0752 07/11/20 0824 07/11/20 1104   BP: 106/62 122/74  117/60   Pulse: 88 91     Resp: 16 18 18 14   Temp: 97.7 °F (36.5 °C) 98 °F (36.7 °C)  96.8 °F (36 °C)   TempSrc: Oral Oral  Temporal   SpO2: 90% 92%  96%   Weight:       Height:           Body mass index is 23.11 kg/m². Left hip skin clear  SILT SP/DP/T/sural/saphenous nerve distributions; EHL/FHL/TA/GS intact  DP pulse intact 2+    Spoke with daughter Levon Conrad by phone:  I have discussed with her family the severity of hip fractures in the elderly, including its association with high morbidity and mortality in both short- and long-term follow-up.   Factors that are associated with even worse outcomes include altered mental status, dementia, poor baseline functional status, poor pulmonary function, and multiple medical comorbidities. Surgery is generally performed as soon as possible after medical clearance to minimize perioperative complications, including pneumonia, DVT/PE, urinary tract infections, delirium, pressure sores. Risks and benefits of hip fracture treatment with hemiarthroplasty were discussed at length with [facility] and an opportunity for questions was afforded. Possible complications of this fracture and surgery include, but are not limited to, non-union, malunion, dislocation, leg-length discrepancy, loss of reduction, bleeding, infection, persistent pain, weakness, blood clots, hardware failure, periprosthetic fracture, painful hardware, neurovascular injury, numbness around the incision, and wound healing problems. She demonstrated a good understanding of the procedure, anticipated outcomes, possible complications, the post-operative restrictions and therapy required (including possible stay at a rehabilitation facility/skilled nursing facility), and at this time voiced desire to proceed. An informed consent form was signed and the patient will proceed with surgery today.       Leon Mejias MD  7/11/2020

## 2020-07-11 NOTE — PROGRESS NOTES
Patient has returned to room from surgery. Surgical dressing to left hip is clean, dry & intact with ice pack in place. Vital signs are stable. Patient is resting quietly. Call light in reach. Bed alarm on. Will monitor. Decreased carizem gtt to 2.5 ml/hr as pt's heart rate is now in 70s-80s.

## 2020-07-12 LAB
A/G RATIO: 1 (ref 1.1–2.2)
ALBUMIN SERPL-MCNC: 3.3 G/DL (ref 3.4–5)
ALP BLD-CCNC: 117 U/L (ref 40–129)
ALT SERPL-CCNC: 13 U/L (ref 10–40)
ANION GAP SERPL CALCULATED.3IONS-SCNC: 15 MMOL/L (ref 3–16)
AST SERPL-CCNC: 15 U/L (ref 15–37)
BASOPHILS ABSOLUTE: 0.1 K/UL (ref 0–0.2)
BASOPHILS RELATIVE PERCENT: 1.1 %
BILIRUB SERPL-MCNC: 0.7 MG/DL (ref 0–1)
BUN BLDV-MCNC: 32 MG/DL (ref 7–20)
CALCIUM SERPL-MCNC: 8.3 MG/DL (ref 8.3–10.6)
CHLORIDE BLD-SCNC: 99 MMOL/L (ref 99–110)
CO2: 21 MMOL/L (ref 21–32)
CREAT SERPL-MCNC: 0.8 MG/DL (ref 0.6–1.2)
EOSINOPHILS ABSOLUTE: 0.1 K/UL (ref 0–0.6)
EOSINOPHILS RELATIVE PERCENT: 1.1 %
GFR AFRICAN AMERICAN: >60
GFR NON-AFRICAN AMERICAN: >60
GLOBULIN: 3.2 G/DL
GLUCOSE BLD-MCNC: 119 MG/DL (ref 70–99)
GLUCOSE BLD-MCNC: 123 MG/DL (ref 70–99)
GLUCOSE BLD-MCNC: 150 MG/DL (ref 70–99)
HCT VFR BLD CALC: 29.5 % (ref 36–48)
HEMOGLOBIN: 9.7 G/DL (ref 12–16)
LYMPHOCYTES ABSOLUTE: 1.1 K/UL (ref 1–5.1)
LYMPHOCYTES RELATIVE PERCENT: 12 %
MAGNESIUM: 2.5 MG/DL (ref 1.8–2.4)
MCH RBC QN AUTO: 30.7 PG (ref 26–34)
MCHC RBC AUTO-ENTMCNC: 32.8 G/DL (ref 31–36)
MCV RBC AUTO: 93.8 FL (ref 80–100)
MONOCYTES ABSOLUTE: 1.5 K/UL (ref 0–1.3)
MONOCYTES RELATIVE PERCENT: 16.2 %
NEUTROPHILS ABSOLUTE: 6.2 K/UL (ref 1.7–7.7)
NEUTROPHILS RELATIVE PERCENT: 69.6 %
PDW BLD-RTO: 16.9 % (ref 12.4–15.4)
PERFORMED ON: ABNORMAL
PERFORMED ON: ABNORMAL
PLATELET # BLD: 183 K/UL (ref 135–450)
PMV BLD AUTO: 8.9 FL (ref 5–10.5)
POTASSIUM REFLEX MAGNESIUM: 4.1 MMOL/L (ref 3.5–5.1)
RBC # BLD: 3.14 M/UL (ref 4–5.2)
SODIUM BLD-SCNC: 135 MMOL/L (ref 136–145)
TOTAL PROTEIN: 6.5 G/DL (ref 6.4–8.2)
WBC # BLD: 8.9 K/UL (ref 4–11)

## 2020-07-12 PROCEDURE — 6370000000 HC RX 637 (ALT 250 FOR IP): Performed by: INTERNAL MEDICINE

## 2020-07-12 PROCEDURE — 94640 AIRWAY INHALATION TREATMENT: CPT

## 2020-07-12 PROCEDURE — 6360000002 HC RX W HCPCS: Performed by: ORTHOPAEDIC SURGERY

## 2020-07-12 PROCEDURE — 83735 ASSAY OF MAGNESIUM: CPT

## 2020-07-12 PROCEDURE — 2580000003 HC RX 258: Performed by: ORTHOPAEDIC SURGERY

## 2020-07-12 PROCEDURE — 6370000000 HC RX 637 (ALT 250 FOR IP): Performed by: ORTHOPAEDIC SURGERY

## 2020-07-12 PROCEDURE — 36415 COLL VENOUS BLD VENIPUNCTURE: CPT

## 2020-07-12 PROCEDURE — 80053 COMPREHEN METABOLIC PANEL: CPT

## 2020-07-12 PROCEDURE — 94761 N-INVAS EAR/PLS OXIMETRY MLT: CPT

## 2020-07-12 PROCEDURE — 2060000000 HC ICU INTERMEDIATE R&B

## 2020-07-12 PROCEDURE — 85025 COMPLETE CBC W/AUTO DIFF WBC: CPT

## 2020-07-12 PROCEDURE — 2700000000 HC OXYGEN THERAPY PER DAY

## 2020-07-12 PROCEDURE — 99232 SBSQ HOSP IP/OBS MODERATE 35: CPT | Performed by: NURSE PRACTITIONER

## 2020-07-12 RX ORDER — TRAMADOL HYDROCHLORIDE 50 MG/1
50 TABLET ORAL EVERY 6 HOURS PRN
Qty: 20 TABLET | Refills: 0 | Status: SHIPPED | OUTPATIENT
Start: 2020-07-12 | End: 2020-07-17

## 2020-07-12 RX ORDER — POLYETHYLENE GLYCOL 3350 17 G/17G
17 POWDER, FOR SOLUTION ORAL 2 TIMES DAILY
Status: DISCONTINUED | OUTPATIENT
Start: 2020-07-12 | End: 2020-07-17 | Stop reason: HOSPADM

## 2020-07-12 RX ORDER — TRAMADOL HYDROCHLORIDE 50 MG/1
50 TABLET ORAL EVERY 6 HOURS PRN
Status: DISCONTINUED | OUTPATIENT
Start: 2020-07-12 | End: 2020-07-17 | Stop reason: HOSPADM

## 2020-07-12 RX ORDER — METOPROLOL SUCCINATE 50 MG/1
100 TABLET, EXTENDED RELEASE ORAL DAILY
Status: DISCONTINUED | OUTPATIENT
Start: 2020-07-12 | End: 2020-07-17 | Stop reason: HOSPADM

## 2020-07-12 RX ADMIN — DIVALPROEX SODIUM 250 MG: 250 TABLET, DELAYED RELEASE ORAL at 20:39

## 2020-07-12 RX ADMIN — FERROUS SULFATE TAB EC 324 MG (65 MG FE EQUIVALENT) 324 MG: 324 (65 FE) TABLET DELAYED RESPONSE at 09:42

## 2020-07-12 RX ADMIN — POLYETHYLENE GLYCOL 3350 17 G: 17 POWDER, FOR SOLUTION ORAL at 13:16

## 2020-07-12 RX ADMIN — APIXABAN 2.5 MG: 2.5 TABLET, FILM COATED ORAL at 09:44

## 2020-07-12 RX ADMIN — APIXABAN 2.5 MG: 2.5 TABLET, FILM COATED ORAL at 20:41

## 2020-07-12 RX ADMIN — MULTIPLE VITAMINS W/ MINERALS TAB 1 TABLET: TAB at 09:43

## 2020-07-12 RX ADMIN — OXYCODONE 5 MG: 5 TABLET ORAL at 09:14

## 2020-07-12 RX ADMIN — METOPROLOL SUCCINATE 100 MG: 50 TABLET, EXTENDED RELEASE ORAL at 09:42

## 2020-07-12 RX ADMIN — SENNOSIDES 8.6 MG: 8.6 TABLET, FILM COATED ORAL at 20:39

## 2020-07-12 RX ADMIN — Medication 5000 UNITS: at 13:16

## 2020-07-12 RX ADMIN — DILTIAZEM HYDROCHLORIDE 30 MG: 30 TABLET, FILM COATED ORAL at 09:43

## 2020-07-12 RX ADMIN — MORPHINE SULFATE 2 MG: 2 INJECTION, SOLUTION INTRAMUSCULAR; INTRAVENOUS at 01:27

## 2020-07-12 RX ADMIN — FLUTICASONE PROPIONATE 2 SPRAY: 50 SPRAY, METERED NASAL at 13:16

## 2020-07-12 RX ADMIN — POLYETHYLENE GLYCOL 3350 17 G: 17 POWDER, FOR SOLUTION ORAL at 20:42

## 2020-07-12 RX ADMIN — SODIUM CHLORIDE, PRESERVATIVE FREE 10 ML: 5 INJECTION INTRAVENOUS at 20:42

## 2020-07-12 RX ADMIN — DONEPEZIL HYDROCHLORIDE 10 MG: 10 TABLET, FILM COATED ORAL at 20:39

## 2020-07-12 RX ADMIN — CEFAZOLIN SODIUM 2 G: 10 INJECTION, POWDER, FOR SOLUTION INTRAVENOUS at 03:01

## 2020-07-12 RX ADMIN — TRAMADOL HYDROCHLORIDE 50 MG: 50 TABLET, FILM COATED ORAL at 20:44

## 2020-07-12 RX ADMIN — DIVALPROEX SODIUM 250 MG: 250 TABLET, DELAYED RELEASE ORAL at 09:43

## 2020-07-12 RX ADMIN — PANTOPRAZOLE SODIUM 40 MG: 40 TABLET, DELAYED RELEASE ORAL at 06:28

## 2020-07-12 RX ADMIN — DILTIAZEM HYDROCHLORIDE 30 MG: 30 TABLET, FILM COATED ORAL at 13:16

## 2020-07-12 RX ADMIN — DILTIAZEM HYDROCHLORIDE 30 MG: 30 TABLET, FILM COATED ORAL at 20:44

## 2020-07-12 RX ADMIN — SODIUM CHLORIDE, PRESERVATIVE FREE 10 ML: 5 INJECTION INTRAVENOUS at 09:43

## 2020-07-12 RX ADMIN — ROPINIROLE HYDROCHLORIDE 2 MG: 1 TABLET, FILM COATED ORAL at 20:39

## 2020-07-12 RX ADMIN — DILTIAZEM HYDROCHLORIDE 30 MG: 30 TABLET, FILM COATED ORAL at 18:15

## 2020-07-12 RX ADMIN — DULOXETINE HYDROCHLORIDE 40 MG: 20 CAPSULE, DELAYED RELEASE ORAL at 09:43

## 2020-07-12 RX ADMIN — LEVOTHYROXINE SODIUM 88 MCG: 0.09 TABLET ORAL at 06:28

## 2020-07-12 RX ADMIN — BUDESONIDE AND FORMOTEROL FUMARATE DIHYDRATE 2 PUFF: 160; 4.5 AEROSOL RESPIRATORY (INHALATION) at 07:54

## 2020-07-12 RX ADMIN — BUDESONIDE AND FORMOTEROL FUMARATE DIHYDRATE 2 PUFF: 160; 4.5 AEROSOL RESPIRATORY (INHALATION) at 20:27

## 2020-07-12 ASSESSMENT — PAIN DESCRIPTION - LOCATION
LOCATION: HIP

## 2020-07-12 ASSESSMENT — PAIN SCALES - GENERAL
PAINLEVEL_OUTOF10: 6
PAINLEVEL_OUTOF10: 5
PAINLEVEL_OUTOF10: 6
PAINLEVEL_OUTOF10: 7
PAINLEVEL_OUTOF10: 5
PAINLEVEL_OUTOF10: 6

## 2020-07-12 ASSESSMENT — PAIN DESCRIPTION - PAIN TYPE
TYPE: SURGICAL PAIN

## 2020-07-12 ASSESSMENT — PAIN DESCRIPTION - PROGRESSION
CLINICAL_PROGRESSION: NOT CHANGED

## 2020-07-12 ASSESSMENT — PAIN DESCRIPTION - ONSET
ONSET: GRADUAL
ONSET: ON-GOING
ONSET: GRADUAL

## 2020-07-12 ASSESSMENT — PAIN DESCRIPTION - DESCRIPTORS
DESCRIPTORS: DISCOMFORT
DESCRIPTORS: ACHING;DISCOMFORT
DESCRIPTORS: ACHING
DESCRIPTORS: DISCOMFORT
DESCRIPTORS: ACHING

## 2020-07-12 ASSESSMENT — PAIN DESCRIPTION - FREQUENCY
FREQUENCY: CONTINUOUS

## 2020-07-12 ASSESSMENT — PAIN DESCRIPTION - ORIENTATION
ORIENTATION: LEFT

## 2020-07-12 ASSESSMENT — PAIN - FUNCTIONAL ASSESSMENT
PAIN_FUNCTIONAL_ASSESSMENT: PREVENTS OR INTERFERES SOME ACTIVE ACTIVITIES AND ADLS

## 2020-07-12 NOTE — PROGRESS NOTES
Cardiology - PROGRESS NOTE    Admit Date: 7/9/2020     Reason for follow up: AF    70-year-old   female who is awake and alert but not fully oriented, who was admitted  to the hospital after an apparent fall at home with a closed fracture of  the left femur. The patient is scheduled for surgery today. The  patient has a history of atrial fibrillation and is on anticoagulation  at home. She also has a history of CHF. EKG shows atrial fibrillation  with rapid ventricular response and right bundle branch block. The  patient was started on a Cardizem drip with improvement of her heart  rate. At home, she is on Cardizem and metoprolol for rate control      Social History:   reports that she quit smoking about 49 years ago. She has a 5.00 pack-year smoking history. She has never used smokeless tobacco. She reports that she does not drink alcohol or use drugs. Family History: family history includes Arthritis in an other family member; Asthma in an other family member; Cancer in her mother; Diabetes in an other family member; Heart Disease in an other family member; Stroke in her father. Interval History:   Patient seen and examined and notes reviewed     Post left hip hemiarthroplasty   Continues to have episodes of RVR however overall improved  Patient sleepy-currently not conversing    All other systems reviewed and negative except as above. Diet: DIET CARDIAC;       In: 240 [P.O.:240]  Out: -    Wt Readings from Last 3 Encounters:   07/12/20 139 lb 5.3 oz (63.2 kg)   06/02/20 126 lb 14.4 oz (57.6 kg)   11/04/19 153 lb (69.4 kg)           Data:   Scheduled Meds:   Scheduled Meds:   insulin glargine  0.25 Units/kg Subcutaneous Nightly    insulin lispro  0.08 Units/kg Subcutaneous TID WC    insulin lispro  0-6 Units Subcutaneous TID WC    insulin lispro  0-3 Units Subcutaneous Nightly    enoxaparin  40 mg Subcutaneous Daily    senna 1 tablet Oral Nightly    vitamin D  5,000 Units Oral Daily    [Held by provider] dilTIAZem  30 mg Oral 4x Daily    divalproex  250 mg Oral BID    donepezil  10 mg Oral Nightly    DULoxetine  40 mg Oral Daily    ferrous sulfate  324 mg Oral Daily with breakfast    fluticasone  2 spray Each Nostril Daily    levothyroxine  88 mcg Oral QAM AC    metoprolol succinate  50 mg Oral Daily    therapeutic multivitamin-minerals  1 tablet Oral Daily    pantoprazole  40 mg Oral QAM AC    rOPINIRole  2 mg Oral Nightly    budesonide-formoterol  2 puff Inhalation BID    sodium chloride flush  10 mL Intravenous 2 times per day     Continuous Infusions:   dextrose      dilTIAZem Stopped (07/11/20 1502)     PRN Meds:.acetaminophen, glucose, dextrose, glucagon (rDNA), dextrose, oxyCODONE, morphine, promethazine **OR** ondansetron, potassium chloride, potassium chloride **OR** potassium alternative oral replacement **OR** potassium chloride, albuterol sulfate HFA, sodium chloride flush, polyethylene glycol  Continuous Infusions:   dextrose      dilTIAZem Stopped (07/11/20 1502)       Intake/Output Summary (Last 24 hours) at 7/12/2020 0821  Last data filed at 7/11/2020 1844  Gross per 24 hour   Intake 1140 ml   Output 500 ml   Net 640 ml       CBC:   Recent Labs     07/12/20  0620   WBC 8.9   HGB 9.7*        BMP:  Recent Labs     07/12/20  0620   *   K 4.1   CL 99   CO2 21   BUN 32*   CREATININE 0.8   GLUCOSE 123*     ABGs:   Lab Results   Component Value Date    PHART 7.488 07/09/2020    PO2ART 63.9 07/09/2020    MBF2TDT 34.7 07/09/2020     INR:   Recent Labs     07/10/20  0451   INR 1.28*        CARDIAC LABS     ENZYMES:  Recent Labs     07/09/20  1104   TROPONINI <0.01     FASTING LIPID PANEL:  Lab Results   Component Value Date    HDL 42 02/29/2016    HDL 44 04/27/2012    LDLCALC 71 02/29/2016    TRIG 74 02/29/2016     LIVER PROFILE:  Recent Labs     07/10/20  0451 07/11/20  0548 07/12/20  0620   AST 22 18 15   ALT 20 17 13       -----------------------------------------------------------------  Telemetry: personally reviewed   AF HR     EKG:   AF with RVR   Left axis   RBBB     Echo:     Conclusions      Summary   Left ventricle size is normal.   Normal left ventricular wall thickness. Ejection fraction is visually estimated to be 45-50 %. Grade II diastolic dysfunction with elevated LV filling pressures. Mitral annular calcification is present. The mitral valve leaflets are slightly thickened. mild mitral regurgitation is present. Moderate left atrial dilation. The right ventricle is slightly increased in size and is normal in function. TAPSE measures 1.4 cm by objective criteria. Mild pulmonary hypertension. There is mild to moderate tricuspid regurgitation with the systolic   pulmonary artery pressure (SPAP) estimated at 51 mmHg (estimated RA pressure   of 15 mmHg included)   c/w moderate pulmonary HTN. The right atrium is mildly dilated. Objective:   Vitals: /64   Pulse 99   Temp 98 °F (36.7 °C) (Axillary)   Resp 18   Ht 5' 5\" (1.651 m)   Wt 139 lb 5.3 oz (63.2 kg)   SpO2 91%   BMI 23.19 kg/m²   General appearance: sleepy, appears stated age and cooperative  Skin: Skin color, texture, turgor normal. No rashes or ecchymosis. HEENT: Head: Normocephalic, no lesions, without obvious abnormality.   Neck: no adenopathy, no JVD and supple, symmetrical, trachea midline  Lungs: clear to auscultation bilaterally, no accessory muscle use, no respiratory distress,   Heart: irregularly irregular rhythm and S1, S2 normal  Abdomen: soft, non-tender; bowel sounds normal; no masses,  no organomegaly  Neurologic: Mental status: sleepy, currently not conversive, no tremors,       Assessment & Plan:    Patient Active Problem List:     Dyspnea     Osteoporosis     Restless leg syndrome     Uncomplicated asthma     Atrial fibrillation with rapid ventricular response (HCC)     Vitamin D deficiency     Hypothyroidism     Buttocks nodule     Essential hypertension     Dementia without behavioral disturbance ([facility] [address]     Pneumonia     Cardiomyopathy ([facility] [address]     Hypoxemia     Viral pneumonia     COVID-19 virus infection     Hyponatremia     Chronic combined systolic (congestive) and diastolic (congestive) heart failure (HCC)     Delirium, acute     Closed fracture of left hip (HCC)     Left displaced femoral neck fracture (HCC)     Chronic anticoagulation     Altered mental status     History of 2019 novel coronavirus disease (COVID-19)        Plan:  1. Atrial Fibrillation    Chronic however RVR on admission    HR overall improved however a few episodes of RVR    On beta-blocker and cardizem oral     Continue current doses    Continue anticoagulation therapy   2. Hx of CHF-combined systolic and diastolic    Appears compensated on exam    Monitor fluid status     Would restart diuretic therapy prior to d/c    Continue beta-blocker    No ACE/ARB secondary to borderline BP   3. Dementia    Per primary   4. Hip fracture    Defer to ortho   5.  HTN   Stable   Liberalized parameters secondary to age       GURWINDER Velazco-CNP   [facility]  Cardiology   7/12/2020  8:21 AM

## 2020-07-12 NOTE — PROGRESS NOTES
PT/OT has not seen patient today. This RN called down to 3W. Advised to get patient to sit up at bedside and possibly pivot to chair with walker and gait belt if tolerates.

## 2020-07-12 NOTE — PROGRESS NOTES
.        Hospitalist Progress Note      PCP: GURWINDER Chiang - CNP    Date of Admission: 7/9/2020    Chief Complaint:   Chief Complaint   Patient presents with    Fall     fell yesterday, left hip fx    Altered Mental Status     pt unable to answer questions on arrival, tachy and low O2     Hospital Course: 80 y.o. female with hx dementia, chronic atrial fibrillation on anticoagulation, restless leg syndrome, HTN, HLD, combined systolic/diastolic CHF, hypothyroidism, asthma, and depression who presents to [facility] with fall and altered mental status. Patient is a nursing home resident at [facility], and fell out of her bed two days ago. It was unwitnessed, and unclear how long the patient was on the ground. A left hip xray was ordered on 7/8, and completed today that showed a left femoral neck fracture. The patient has dementia and some confusion at baseline, but apparently is normally conversant, answers questions appropriately and without difficulty.     In the ED, labs were significant for a sodium of 132, chloride of 92, WBC count of 11.7K, alk phos of 201. U/A was negative. Rapid COVID was negative. CT Head without contrast showed atrophy and small vessel ischemic disease. CT Cervical Spine showed no acute abnormality. CXR showed cardiomegaly with resolution of airspace disease. Subjective: No complaints this morning. Heart rate up to 120s but had just received her morning meds. Still having some pain in her leg but improved. Denies chest pain, shortness of breath, nausea, vomiting, fevers, abdominal pain.        Medications:  Reviewed    Infusion Medications    dextrose      dilTIAZem Stopped (07/11/20 1502)     Scheduled Medications    insulin glargine  0.25 Units/kg Subcutaneous Nightly    insulin lispro  0.08 Units/kg Subcutaneous TID WC    insulin lispro  0-6 Units Subcutaneous TID WC    insulin lispro  0-3 Units Subcutaneous Nightly    enoxaparin  40 mg Subcutaneous Daily    senna  1 tablet Oral Nightly    vitamin D  5,000 Units Oral Daily    [Held by provider] dilTIAZem  30 mg Oral 4x Daily    divalproex  250 mg Oral BID    donepezil  10 mg Oral Nightly    DULoxetine  40 mg Oral Daily    ferrous sulfate  324 mg Oral Daily with breakfast    fluticasone  2 spray Each Nostril Daily    levothyroxine  88 mcg Oral QAM AC    metoprolol succinate  50 mg Oral Daily    therapeutic multivitamin-minerals  1 tablet Oral Daily    pantoprazole  40 mg Oral QAM AC    rOPINIRole  2 mg Oral Nightly    budesonide-formoterol  2 puff Inhalation BID    sodium chloride flush  10 mL Intravenous 2 times per day     PRN Meds: acetaminophen, glucose, dextrose, glucagon (rDNA), dextrose, oxyCODONE, morphine, promethazine **OR** ondansetron, potassium chloride, potassium chloride **OR** potassium alternative oral replacement **OR** potassium chloride, albuterol sulfate HFA, sodium chloride flush, polyethylene glycol      Intake/Output Summary (Last 24 hours) at 7/12/2020 0829  Last data filed at 7/11/2020 1844  Gross per 24 hour   Intake 1140 ml   Output 500 ml   Net 640 ml       Physical Exam Performed:    /64   Pulse 99   Temp 98 °F (36.7 °C) (Axillary)   Resp 18   Ht 5' 5\" (1.651 m)   Wt 139 lb 5.3 oz (63.2 kg)   SpO2 91%   BMI 23.19 kg/m²     General appearance: No apparent distress, appears stated age and cooperative. HEENT: Pupils equal, round, and reactive to light. Conjunctivae/corneas clear. Neck: Supple, with full range of motion. Trachea midline. Respiratory:  Normal respiratory effort. Clear to auscultation, bilaterally without Rales/Wheezes/Rhonchi. Cardiovascular: a fib, normal S1/S2 without murmurs, rubs or gallops. Abdomen: Soft, non-tender, non-distended with normal bowel sounds. Musculoskeletal: No clubbing, cyanosis or edema bilaterally. L hip surgical site clean, dry, intact  Skin: Skin color, texture, turgor normal.  No rashes or lesions.   Neurologic: Neurovascularly intact without any focal sensory/motor deficits. Cranial nerves: II-XII intact, grossly non-focal.  Psychiatric: Alert and oriented to self and place, thought content appropriate, normal insight  Capillary Refill: Brisk,< 3 seconds   Peripheral Pulses: +2 palpable, equal bilaterally       Labs:   Recent Labs     07/10/20  0451 07/11/20  0548 07/12/20  0620   WBC 9.0 8.3 8.9   HGB 11.9* 10.6* 9.7*   HCT 36.3 31.8* 29.5*    194 183     Recent Labs     07/10/20  0451 07/11/20  0548 07/12/20  0620    135* 135*   K 3.2* 3.7 4.1   CL 97* 96* 99   CO2 28 25 21   BUN 21* 29* 32*   CREATININE 0.6 0.7 0.8   CALCIUM 9.4 8.5 8.3     Recent Labs     07/09/20  1104 07/10/20  0451 07/11/20  0548 07/12/20  0620   AST 27 22 18 15   ALT 25 20 17 13   BILIDIR 0.5*  --   --   --    BILITOT 1.0 1.0 0.8 0.7   ALKPHOS 201* 159* 137* 117     Recent Labs     07/10/20  0451   INR 1.28*     Recent Labs     07/09/20  1104 07/11/20  0548   CKTOTAL 508* 150   TROPONINI <0.01  --        Urinalysis:      Lab Results   Component Value Date    NITRU Negative 07/09/2020    WBCUA 1 05/23/2020    RBCUA 3 05/23/2020    BLOODU Negative 07/09/2020    SPECGRAV 1.008 07/09/2020    GLUCOSEU Negative 07/09/2020       Radiology:  XR PELVIS (1-2 VIEWS)   Final Result   Expected postsurgical changes from left hip hemiarthroplasty. CT Head WO Contrast   Final Result   Motion limited exam, demonstrating atrophy and small vessel ischemic disease. CT Cervical Spine WO Contrast   Final Result   No acute abnormality of the cervical spine. XR CHEST PORTABLE   Final Result   Cardiomegaly with no significant finding in the chest.  Resolution of   airspace changes from the prior comparison study. XR HIP 2-3 VW W PELVIS LEFT   Final Result   Subcapital left femoral neck fracture.                Assessment/Plan:    Active Hospital Problems    Diagnosis    Left displaced femoral neck fracture ([address] [S72.002A]    Chronic anticoagulation [Z79.01]    Altered mental status [R41.82]    History of 2019 novel coronavirus disease (COVID-19) [Z86.19]    Dementia without behavioral disturbance (HCC) [F03.90]    Atrial fibrillation with rapid ventricular response (HCC) [I48.91]    Osteoporosis [M81.0]     Acute metabolic encephalopathy, improved  Suspect 2/2 mechanical fall with hip fracture on top of underlying dementia. Possible polypharmacy. No evidence of infection including a negative urinalysis, negative CXR, negative CT Head and CT Cervical spine. Mild leukocytosis likely is reactive. Significantly improved day after admission.   -continue to monitor  -continue home meds  -check TSH and free T4--WNL  -hold sedatives     Left displaced femoral neck fracture  -orthopedic surgery consulted, recs appreciated  - OR 6/97 without complications  -PT OT pending     Mechanical fall   -workup as above  -check CK--mildly elevated at 500--trend, downtrended     Chronic atrial fibrillation on Eliquis in RVR  HR up to 200 on 7/10.   -Continued dilt drip perioperatively-- will stop, switch back to home PO dilt, incr metoprolol  - cards consult  - replace K  - tele monitoring  - resume eliquis  -continue other home meds     Leukocytosis, improved  Likely reactive  -continue to trend and monitor     Essential hypertension  -continue home meds     Chronic combined systolic and diastolic CHF   Appears compensated  -continue home meds  -tele monitoring  -hold Lasix, recommend restarting at discharge     Hyponatremia  Mild.   -trend     Hypothyroidism  -check TSH, free T4  -continue home meds     Depression  -continue home meds     Dementia  -continue home meds     Restless leg syndrome  -continue home meds     Hx COVID-19 infection  -does not appear to be clinically relevant. Negative in ED.      DVT Prophylaxis: eliquis  Diet: DIET CARDIAC;  Code Status: DNR-CCA    PT/OT Eval Status: ordered    Dispo - pending    Inderjit Caceres MD    This

## 2020-07-13 LAB
A/G RATIO: 1 (ref 1.1–2.2)
ALBUMIN SERPL-MCNC: 3.1 G/DL (ref 3.4–5)
ALP BLD-CCNC: 100 U/L (ref 40–129)
ALT SERPL-CCNC: 10 U/L (ref 10–40)
ANION GAP SERPL CALCULATED.3IONS-SCNC: 13 MMOL/L (ref 3–16)
AST SERPL-CCNC: 16 U/L (ref 15–37)
BASOPHILS ABSOLUTE: 0.1 K/UL (ref 0–0.2)
BASOPHILS RELATIVE PERCENT: 1 %
BILIRUB SERPL-MCNC: 0.9 MG/DL (ref 0–1)
BLOOD CULTURE, ROUTINE: NORMAL
BUN BLDV-MCNC: 41 MG/DL (ref 7–20)
CALCIUM SERPL-MCNC: 8.2 MG/DL (ref 8.3–10.6)
CHLORIDE BLD-SCNC: 94 MMOL/L (ref 99–110)
CO2: 23 MMOL/L (ref 21–32)
CREAT SERPL-MCNC: 1.1 MG/DL (ref 0.6–1.2)
CULTURE, BLOOD 2: NORMAL
EOSINOPHILS ABSOLUTE: 0.1 K/UL (ref 0–0.6)
EOSINOPHILS RELATIVE PERCENT: 1.3 %
GFR AFRICAN AMERICAN: 56
GFR NON-AFRICAN AMERICAN: 47
GLOBULIN: 3.1 G/DL
GLUCOSE BLD-MCNC: 112 MG/DL (ref 70–99)
GLUCOSE BLD-MCNC: 114 MG/DL (ref 70–99)
GLUCOSE BLD-MCNC: 127 MG/DL (ref 70–99)
GLUCOSE BLD-MCNC: 137 MG/DL (ref 70–99)
HCT VFR BLD CALC: 29.4 % (ref 36–48)
HEMATOLOGY PATH CONSULT: NO
HEMOGLOBIN: 9.7 G/DL (ref 12–16)
LYMPHOCYTES ABSOLUTE: 1.4 K/UL (ref 1–5.1)
LYMPHOCYTES RELATIVE PERCENT: 15.4 %
MAGNESIUM: 2.6 MG/DL (ref 1.8–2.4)
MCH RBC QN AUTO: 30.6 PG (ref 26–34)
MCHC RBC AUTO-ENTMCNC: 33 G/DL (ref 31–36)
MCV RBC AUTO: 92.6 FL (ref 80–100)
MONOCYTES ABSOLUTE: 1.8 K/UL (ref 0–1.3)
MONOCYTES RELATIVE PERCENT: 19.1 %
NEUTROPHILS ABSOLUTE: 5.9 K/UL (ref 1.7–7.7)
NEUTROPHILS RELATIVE PERCENT: 63.2 %
PDW BLD-RTO: 16.7 % (ref 12.4–15.4)
PERFORMED ON: ABNORMAL
PLATELET # BLD: 217 K/UL (ref 135–450)
PMV BLD AUTO: 9.1 FL (ref 5–10.5)
POTASSIUM REFLEX MAGNESIUM: 4.6 MMOL/L (ref 3.5–5.1)
RBC # BLD: 3.17 M/UL (ref 4–5.2)
SODIUM BLD-SCNC: 130 MMOL/L (ref 136–145)
TOTAL PROTEIN: 6.2 G/DL (ref 6.4–8.2)
WBC # BLD: 9.3 K/UL (ref 4–11)

## 2020-07-13 PROCEDURE — 80053 COMPREHEN METABOLIC PANEL: CPT

## 2020-07-13 PROCEDURE — 6370000000 HC RX 637 (ALT 250 FOR IP): Performed by: ORTHOPAEDIC SURGERY

## 2020-07-13 PROCEDURE — 2060000000 HC ICU INTERMEDIATE R&B

## 2020-07-13 PROCEDURE — 6370000000 HC RX 637 (ALT 250 FOR IP): Performed by: INTERNAL MEDICINE

## 2020-07-13 PROCEDURE — 94761 N-INVAS EAR/PLS OXIMETRY MLT: CPT

## 2020-07-13 PROCEDURE — 97530 THERAPEUTIC ACTIVITIES: CPT

## 2020-07-13 PROCEDURE — 97162 PT EVAL MOD COMPLEX 30 MIN: CPT

## 2020-07-13 PROCEDURE — 36415 COLL VENOUS BLD VENIPUNCTURE: CPT

## 2020-07-13 PROCEDURE — 83735 ASSAY OF MAGNESIUM: CPT

## 2020-07-13 PROCEDURE — 94640 AIRWAY INHALATION TREATMENT: CPT

## 2020-07-13 PROCEDURE — 2580000003 HC RX 258: Performed by: ORTHOPAEDIC SURGERY

## 2020-07-13 PROCEDURE — 2700000000 HC OXYGEN THERAPY PER DAY

## 2020-07-13 PROCEDURE — 85025 COMPLETE CBC W/AUTO DIFF WBC: CPT

## 2020-07-13 PROCEDURE — 97166 OT EVAL MOD COMPLEX 45 MIN: CPT

## 2020-07-13 RX ORDER — METOPROLOL SUCCINATE 100 MG/1
100 TABLET, EXTENDED RELEASE ORAL DAILY
Qty: 30 TABLET | Refills: 3 | DISCHARGE
Start: 2020-07-14

## 2020-07-13 RX ADMIN — LEVOTHYROXINE SODIUM 88 MCG: 0.09 TABLET ORAL at 06:12

## 2020-07-13 RX ADMIN — DILTIAZEM HYDROCHLORIDE 30 MG: 30 TABLET, FILM COATED ORAL at 20:47

## 2020-07-13 RX ADMIN — APIXABAN 2.5 MG: 2.5 TABLET, FILM COATED ORAL at 10:32

## 2020-07-13 RX ADMIN — DONEPEZIL HYDROCHLORIDE 10 MG: 10 TABLET, FILM COATED ORAL at 20:47

## 2020-07-13 RX ADMIN — SODIUM CHLORIDE, PRESERVATIVE FREE 10 ML: 5 INJECTION INTRAVENOUS at 10:35

## 2020-07-13 RX ADMIN — MULTIPLE VITAMINS W/ MINERALS TAB 1 TABLET: TAB at 10:33

## 2020-07-13 RX ADMIN — DULOXETINE HYDROCHLORIDE 40 MG: 20 CAPSULE, DELAYED RELEASE ORAL at 10:32

## 2020-07-13 RX ADMIN — OXYCODONE 5 MG: 5 TABLET ORAL at 10:33

## 2020-07-13 RX ADMIN — SENNOSIDES 8.6 MG: 8.6 TABLET, FILM COATED ORAL at 20:47

## 2020-07-13 RX ADMIN — FERROUS SULFATE TAB EC 324 MG (65 MG FE EQUIVALENT) 324 MG: 324 (65 FE) TABLET DELAYED RESPONSE at 10:33

## 2020-07-13 RX ADMIN — APIXABAN 2.5 MG: 2.5 TABLET, FILM COATED ORAL at 20:47

## 2020-07-13 RX ADMIN — BUDESONIDE AND FORMOTEROL FUMARATE DIHYDRATE 2 PUFF: 160; 4.5 AEROSOL RESPIRATORY (INHALATION) at 08:08

## 2020-07-13 RX ADMIN — DILTIAZEM HYDROCHLORIDE 30 MG: 30 TABLET, FILM COATED ORAL at 10:32

## 2020-07-13 RX ADMIN — BUDESONIDE AND FORMOTEROL FUMARATE DIHYDRATE 2 PUFF: 160; 4.5 AEROSOL RESPIRATORY (INHALATION) at 20:36

## 2020-07-13 RX ADMIN — DILTIAZEM HYDROCHLORIDE 30 MG: 30 TABLET, FILM COATED ORAL at 13:51

## 2020-07-13 RX ADMIN — DIVALPROEX SODIUM 250 MG: 250 TABLET, DELAYED RELEASE ORAL at 10:33

## 2020-07-13 RX ADMIN — Medication 5000 UNITS: at 13:51

## 2020-07-13 RX ADMIN — DILTIAZEM HYDROCHLORIDE 30 MG: 30 TABLET, FILM COATED ORAL at 18:37

## 2020-07-13 RX ADMIN — ROPINIROLE HYDROCHLORIDE 2 MG: 1 TABLET, FILM COATED ORAL at 20:47

## 2020-07-13 RX ADMIN — SODIUM CHLORIDE, PRESERVATIVE FREE 10 ML: 5 INJECTION INTRAVENOUS at 20:48

## 2020-07-13 RX ADMIN — DIVALPROEX SODIUM 250 MG: 250 TABLET, DELAYED RELEASE ORAL at 20:47

## 2020-07-13 RX ADMIN — POLYETHYLENE GLYCOL 3350 17 G: 17 POWDER, FOR SOLUTION ORAL at 20:47

## 2020-07-13 RX ADMIN — POLYETHYLENE GLYCOL 3350 17 G: 17 POWDER, FOR SOLUTION ORAL at 10:30

## 2020-07-13 RX ADMIN — PANTOPRAZOLE SODIUM 40 MG: 40 TABLET, DELAYED RELEASE ORAL at 06:12

## 2020-07-13 RX ADMIN — METOPROLOL SUCCINATE 100 MG: 50 TABLET, EXTENDED RELEASE ORAL at 10:33

## 2020-07-13 ASSESSMENT — PAIN SCALES - WONG BAKER
WONGBAKER_NUMERICALRESPONSE: 2
WONGBAKER_NUMERICALRESPONSE: 2

## 2020-07-13 ASSESSMENT — PAIN SCALES - GENERAL
PAINLEVEL_OUTOF10: 0
PAINLEVEL_OUTOF10: 8
PAINLEVEL_OUTOF10: 0
PAINLEVEL_OUTOF10: 0
PAINLEVEL_OUTOF10: 3
PAINLEVEL_OUTOF10: 3
PAINLEVEL_OUTOF10: 5

## 2020-07-13 ASSESSMENT — PAIN DESCRIPTION - PROGRESSION: CLINICAL_PROGRESSION: NOT CHANGED

## 2020-07-13 ASSESSMENT — PAIN DESCRIPTION - FREQUENCY: FREQUENCY: CONTINUOUS

## 2020-07-13 ASSESSMENT — PAIN DESCRIPTION - DESCRIPTORS: DESCRIPTORS: ACHING

## 2020-07-13 ASSESSMENT — PAIN - FUNCTIONAL ASSESSMENT: PAIN_FUNCTIONAL_ASSESSMENT: PREVENTS OR INTERFERES WITH MANY ACTIVE NOT PASSIVE ACTIVITIES

## 2020-07-13 ASSESSMENT — PAIN DESCRIPTION - ONSET: ONSET: SUDDEN

## 2020-07-13 ASSESSMENT — PAIN DESCRIPTION - LOCATION: LOCATION: HIP

## 2020-07-13 ASSESSMENT — PAIN DESCRIPTION - PAIN TYPE: TYPE: SURGICAL PAIN

## 2020-07-13 ASSESSMENT — PAIN DESCRIPTION - ORIENTATION: ORIENTATION: LEFT

## 2020-07-13 NOTE — PROGRESS NOTES
[facility] Orthopedic Surgery   Progress Note      S/P :  SUBJECTIVE  Up in chair. Disoriented but pleasant. . Pain is not  described in left hip, She does not recall hip surgery       OBJECTIVE              Physical                      VITALS:  BP 95/60   Pulse 81   Temp 98 °F (36.7 °C) (Oral)   Resp 18   Ht 5' 5\" (1.651 m)   Wt 138 lb 14.2 oz (63 kg)   SpO2 99%   BMI 23.11 kg/m²                     MUSCULOSKELETAL:  left foot NVI. Wiggles toes to command. Pedal pulses are palpable.                    NEUROLOGIC:                                  Sensory:  Touch:  Left Lower Extremity:  normal                                                 Surgical wound appears clean and dry left hip    Data       CBC:   Lab Results   Component Value Date    WBC 9.3 07/13/2020    RBC 3.17 07/13/2020    HGB 9.7 07/13/2020    HCT 29.4 07/13/2020    MCV 92.6 07/13/2020    MCH 30.6 07/13/2020    MCHC 33.0 07/13/2020    RDW 16.7 07/13/2020     07/13/2020    MPV 9.1 07/13/2020        WBC:    Lab Results   Component Value Date    WBC 9.3 07/13/2020        Hemoglobin/Hematocrit:    Lab Results   Component Value Date    HGB 9.7 07/13/2020    HCT 29.4 07/13/2020        PT/INR:    Lab Results   Component Value Date    PROTIME 14.9 07/10/2020    INR 1.28 07/10/2020              Current Inpatient Medications             Current Facility-Administered Medications: metoprolol succinate (TOPROL XL) extended release tablet 100 mg, 100 mg, Oral, Daily  apixaban (ELIQUIS) tablet 2.5 mg, 2.5 mg, Oral, BID  polyethylene glycol (GLYCOLAX) packet 17 g, 17 g, Oral, BID  traMADol (ULTRAM) tablet 50 mg, 50 mg, Oral, Q6H PRN  acetaminophen (TYLENOL) tablet 500 mg, 500 mg, Oral, Q4H PRN  glucose (GLUTOSE) 40 % oral gel 15 g, 15 g, Oral, PRN  dextrose 50 % IV solution, 12.5 g, Intravenous, PRN  glucagon (rDNA) injection 1 mg, 1 mg, Intramuscular, PRN  dextrose 5 % solution, 100 mL/hr, Intravenous, PRN  oxyCODONE (ROXICODONE) immediate release tablet 5 mg, 5 mg, Oral, Q4H PRN  morphine (PF) injection 2 mg, 2 mg, Intravenous, Q4H PRN  promethazine (PHENERGAN) tablet 12.5 mg, 12.5 mg, Oral, Q6H PRN **OR** ondansetron (ZOFRAN) injection 4 mg, 4 mg, Intravenous, Q6H PRN  potassium chloride 10 mEq/100 mL IVPB (Peripheral Line), 10 mEq, Intravenous, PRN  senna (SENOKOT) tablet 8.6 mg, 1 tablet, Oral, Nightly  potassium chloride (KLOR-CON M) extended release tablet 40 mEq, 40 mEq, Oral, PRN **OR** potassium bicarb-citric acid (EFFER-K) effervescent tablet 40 mEq, 40 mEq, Oral, PRN **OR** potassium chloride 10 mEq/100 mL IVPB (Peripheral Line), 10 mEq, Intravenous, PRN  vitamin D (CHOLECALCIFEROL) capsule 5,000 Units, 5,000 Units, Oral, Daily  dilTIAZem (CARDIZEM) tablet 30 mg, 30 mg, Oral, 4x Daily  divalproex (DEPAKOTE) DR tablet 250 mg, 250 mg, Oral, BID  donepezil (ARICEPT) tablet 10 mg, 10 mg, Oral, Nightly  DULoxetine (CYMBALTA) extended release capsule 40 mg, 40 mg, Oral, Daily  ferrous sulfate EC tablet 324 mg, 324 mg, Oral, Daily with breakfast  fluticasone (FLONASE) 50 MCG/ACT nasal spray 2 spray, 2 spray, Each Nostril, Daily  levothyroxine (SYNTHROID) tablet 88 mcg, 88 mcg, Oral, QAM AC  therapeutic multivitamin-minerals 1 tablet, 1 tablet, Oral, Daily  pantoprazole (PROTONIX) tablet 40 mg, 40 mg, Oral, QAM AC  albuterol sulfate  (90 Base) MCG/ACT inhaler 2 puff, 2 puff, Inhalation, Q4H PRN  rOPINIRole (REQUIP) tablet 2 mg, 2 mg, Oral, Nightly  budesonide-formoterol (SYMBICORT) 160-4.5 MCG/ACT inhaler 2 puff, 2 puff, Inhalation, BID  sodium chloride flush 0.9 % injection 10 mL, 10 mL, Intravenous, 2 times per day  sodium chloride flush 0.9 % injection 10 mL, 10 mL, Intravenous, PRN    ASSESSMENT AND PLAN    Fall  Dementia  Left displaced femoral neck fx  Left hip gluteus medial tear  Left hip hematoma  Post left hip hemiarthroplasty per Dr Chaitanya Villar  PT OT seeing.  Posterior hip precautions, WBAT  Eliquis bid will suffice for DVT prophylaxis for at least 30 days after hip surgery  SS for DC planning. FU Dr Acosta Serve 2 weeks after surgery.      Meghna Rodríguez  7/13/2020  1:18 PM

## 2020-07-13 NOTE — PROGRESS NOTES
Occupational Therapy   Occupational Therapy Initial Assessment/ Treatment  Date: 2020   Patient Name: aMhesh Escobar  MRN: [id number]     : 3/2/1929    Date of Service: 2020    Discharge Recommendations:  [address]  OT Equipment Recommendations  Equipment Needed: Yes  Other: defer to dc location  Mahesh Escobar scored a 10 on the AM-PAC ADL Inpatient form. Current research shows that an AM-PAC score of 17 or less is typically not associated with a discharge to the patient's home setting. Based on the patient's AM-PAC score and their current ADL deficits, it is recommended that the patient have 3-5 sessions per week of Occupational Therapy at d/c to increase the patient's independence. Please see assessment section for further patient specific details. If patient discharges prior to next session this note will serve as a discharge summary. Please see below for the latest assessment towards goals. Assessment   Performance deficits / Impairments: Decreased endurance;Decreased functional mobility ; Decreased ADL status; Decreased balance;Decreased strength  Assessment: Prior to admission pt was participating in SNF, pt is a questionable historian however reporting she was completing her ADLs independently. Pt has history of dementia, and had been living at [facility] unit prior. Pt now presents s/p fall at SNF, now s/p L hip hemiarthroplasty. Pt requiring max A x 2 for stand pivot transfers, dependent x 2 for bed mobility. AMPAC score currently indicates non homebound discharge. Pt would benefit from continued OT while in acute care, continue OT POC. Treatment Diagnosis: decreased ADLs and transfers secondary to L hip fx  Prognosis: Fair  Decision Making: Medium Complexity  OT Education: OT Role;Plan of Care  Patient Education: verb understanding  REQUIRES OT FOLLOW UP: Yes  Activity Tolerance  Activity Tolerance: Patient limited by pain; Patient limited by place; Disoriented to time;Disoriented to situation  Observation/Palpation  Observation: left hip bruising and edema noted - surgical dressing clean, dry, intact  Balance  Sitting Balance: Contact guard assistance(sitting EOB)  Standing Balance: Dependent/Total(max A x 2 standing at RW)  Standing Balance  Time: 1-2 mins  Activity: SPT from bed to chair  Comment: with RW, pt placing minimal weight on L LE during pivot  ADL  Additional Comments: Anticipate pt to require max A x 2 for LB dressing. Min to CGA for grooming        Bed mobility  Sit to Supine: 2 Person assistance;Dependent/Total  Transfers  Stand Pivot Transfers: Maximum assistance;2 Person assistance  Sit to stand: 2 Person assistance;Maximum assistance  Stand to sit: Maximum assistance;2 Person assistance  Transfer Comments: pt completing stand pivot transfer, unable to place weight on L LE 2/2 reported pain. Pt required 2PA for stand to sit transfer as pt attempting to sit prior to being close enough to bed     Cognition  Overall Cognitive Status: Exceptions  Arousal/Alertness: Delayed responses to stimuli  Following Commands: Follows one step commands with repetition  Attention Span: Difficulty attending to directions  Memory: Decreased recall of precautions  Safety Judgement: Decreased awareness of need for assistance;Decreased awareness of need for safety  Problem Solving: Assistance required to correct errors made  Insights: Not aware of deficits  Initiation: Requires cues for all  Sequencing: Requires cues for all        Sensation  Overall Sensation Status: WFL(denies numbness or tingling)                Treatment included functional transfer training, ADLs, and patient education.                   Plan   Plan  Times per week: 3-5  Times per day: Daily  Current Treatment Recommendations: Strengthening, Balance Training, Functional Mobility Training, Endurance Training, Self-Care / ADL, Equipment Evaluation, Education, & procurement

## 2020-07-13 NOTE — PROGRESS NOTES
Physical Therapy    Facility/Department: [address] PROGRESSIVE CARE  Initial Assessment    NAME: Zita Partida  : 3/2/1929  MRN: [id number]    Date of Service: 2020    Discharge Recommendations:  Zita Partida scored a 9/24 on the AM-PAC short mobility form. Current research shows that an AM-PAC score of 17 or less is typically not associated with a discharge to the patient's home setting. Based on the patient's AM-PAC score and their current functional mobility deficits, it is recommended that the patient have 3-5 sessions per week of Physical Therapy at d/c to increase the patient's independence. Please see assessment section for further patient specific details. If patient discharges prior to next session this note will serve as a discharge summary. Please see below for the latest assessment towards goals. Patient would benefit from continued therapy after discharge, 3-5 sessions per week   PT Equipment Recommendations  Equipment Needed: No  Other: defer to next level of care    Assessment   Body structures, Functions, Activity limitations: Decreased functional mobility ; Decreased ADL status; Decreased ROM; Decreased strength;Decreased safe awareness;Decreased cognition;Decreased endurance;Decreased balance; Increased pain;Decreased posture  Assessment: Patient demonstrates impaired functional mobility, now requiring significant (A) to perform all mobility and unable to perform any gait or stand pivot transfers. Patient unsafe to return to assisted living at this time from a PT perspective. Patient will continue to benefit from additional skilled PT intervention to facilitate safe mobility and to optimize (I) to promote return to prior level of function as able.   Treatment Diagnosis: impaired functional mobility  Prognosis: Fair;Guarded(secondary to pain and cognition)  Decision Making: Medium Complexity  Patient Education: Patient educated on role of PT, use of call light, hip precautions, and PT recommendations - patient will require frequent reinforcement. Barriers to Learning: cognition, history of dementia  REQUIRES PT FOLLOW UP: Yes  Activity Tolerance  Activity Tolerance: Patient limited by cognitive status; Patient limited by pain  Activity Tolerance: RN notified of increased pain; patient required frequent to constant re-orientation throughout session - unable to recall fall, hip surgery, or any hip precautions       Patient Diagnosis(es): The primary encounter diagnosis was Altered mental status, unspecified altered mental status type. Diagnoses of Closed fracture of neck of left femur, initial encounter ([facility] and History of 2019 novel coronavirus disease (COVID-19) were also pertinent to this visit. has a past medical history of Abdominal wall pain, Arrhythmia, Arthritis, Asthma, Atrial fibrillation ([facility] Broken nose, CHF (congestive heart failure) ([facility] Cough variant asthma, Dementia ([facility] [address] Dyspnea, Essential hypertension, Fatigue, Hematoma, Hyperlipidemia, Insomnia, Lumbar radiculopathy, Osteoporosis, Palpitation, Restless leg syndrome, Shingles, and Sinusitis. has a past surgical history that includes Ankle surgery (1997); Elbow surgery; Colonoscopy (10/25/10); Ovary removal (Left); joint replacement; Appendectomy; Hysterectomy; Cardioversion; Upper gastrointestinal endoscopy (02/27/2018); and Colonoscopy (02/27/2018).     Restrictions  Restrictions/Precautions  Restrictions/Precautions: Fall Risk(high fall risk, diet cardiac)  Lower Extremity Weight Bearing Restrictions  Left Lower Extremity Weight Bearing: Weight Bearing As Tolerated  Position Activity Restriction  Hip Precautions: (direct lateral approach - no combined hip (extension and external rotation), no ADD beyond neutral, no active ABD x 6 weeks post-op, strict walker use x 6 weeks post-op)  Vision/Hearing  Vision: Within Functional Limits  Hearing: Exceptions to [facility]  Hearing Exceptions: Hard of hearing/hearing concerns Subjective  General  Chart Reviewed: Yes  Additional Pertinent Hx: ED 7/9 s/p fall at [facility] (was there for rehab recovering from COVID-19) resulting in left displaced femoral neck fracture s/p left hip hemiarthroplasty 7/11, AMS, AFib with RVR, acute metabolic encephalopathy; PMH: dementia, CHF  Family / Caregiver Present: No  Referring Practitioner: Eliel Cash MD  Referral Date : 07/11/20  Diagnosis: left displaced femoral neck fracture  Follows Commands: Impaired([place], requires repetition)  General Comment  Comments: Patient supine in bed upon arrival - agreeable to PT. Patient on 2L O2 upon arrival - maintained throughout session. Subjective  Subjective: Patient denies pain. Patient does not recall fall or admission to [facility].  Patient is a poor historian with a history of dementia. Orientation  Orientation  Overall Orientation Status: Impaired  Orientation Level: Oriented to person;Disoriented to place; Disoriented to time;Disoriented to situation  Social/Functional History  Social/Functional History  Lives With: Alone  Type of Home: House  Home Layout: Two level, 1/2 bath on main level, Bed/Bath upstairs  Home Access: Stairs to enter with rails  Entrance Stairs - Number of Steps: \"just a few\" - approximately 4 with 1 rail; 12 stairs with 1 rail to second floor bedroom/bathroom  Bathroom Shower/Tub: Tub/Shower unit  Bathroom Toilet: Standard  Bathroom Equipment: Grab bars in shower, Shower chair, Grab bars around toilet  Home Equipment: Rolling walker, [address] Help From: Family  ADL Assistance: Independent  Homemaking Assistance: Needs assistance  Ambulation Assistance: Independent(in home; occasional use of RW or cane \"when needed\")  Transfer Assistance: Independent  Active : No  Additional Comments: Information obtained by PT from patient with questionable reliability. Per RN, patient lives at [facility] unit at baseline.     Objective Observation/Palpation  Observation: left hip bruising and edema noted - surgical dressing clean, dry, intact    AROM RLE (degrees)  RLE AROM: WFL  AROM LLE (degrees)  LLE General AROM: significantly limited by pain - all hip precautions maintained throughout session; knee flexion to 90 degrees seated, hip flexion to 80 degrees seated; ankle WFL  Strength RLE  Comment: grossly 4/5  Strength LLE  Comment: limited by pain, poor quad set, mod A for SLR, no active ABD per precautions  Motor Control  Gross Motor?: WFL  Sensation  Overall Sensation Status: WFL(denies numbness or tingling)  Bed mobility  Rolling to Left: Maximum assistance  Rolling to Right: Maximum assistance  Supine to Sit: Maximum assistance(elevated head of bed, use of rail, step by step directions, significant time to perform)  Scooting:  Moderate assistance(seated at edge of bed with reassurance and significant time to perform secondary to pain)  Transfers  Sit to Stand: Maximum Assistance(max A of 1 to RW from elevated bed height with constant cues, reassurance; max A of 1 to Margarite Margarita with constant cues - requires (A) for midline and forward weight shift)  Stand to sit: Maximum Assistance(max A of 1 from RW and [employer])  Bed to Chair: Dependent/Total(via Margarite Margarita with max A of 1 - recommend (A) of 2 with nursing staff)  Stand Pivot Transfers: Unable to assess(attempted with RW and max A of 1 but unable to complete)  Comment: patient is significantly limited by pain during mobility - RN notified and present during session to address  Ambulation  Ambulation?: (unable to perform)     Balance  Posture: Poor(forward flexed, rounded shoulders, leans to right to offweight left hip)  Sitting - Static: Fair;+  Sitting - Dynamic: Fair  Standing - Static: Poor  Standing - Dynamic: Poor  Comments: sat edge of bed x 12 minutes with cues and (A) for hand placement and midline; stood at RW with mod A of 1 x 30 seconds        Plan   Plan  Times per week:

## 2020-07-13 NOTE — PROGRESS NOTES
BID    donepezil  10 mg Oral Nightly    DULoxetine  40 mg Oral Daily    ferrous sulfate  324 mg Oral Daily with breakfast    fluticasone  2 spray Each Nostril Daily    levothyroxine  88 mcg Oral QAM AC    therapeutic multivitamin-minerals  1 tablet Oral Daily    pantoprazole  40 mg Oral QAM AC    rOPINIRole  2 mg Oral Nightly    budesonide-formoterol  2 puff Inhalation BID    sodium chloride flush  10 mL Intravenous 2 times per day     PRN Meds: traMADol, acetaminophen, glucose, dextrose, glucagon (rDNA), dextrose, oxyCODONE, morphine, promethazine **OR** ondansetron, potassium chloride, potassium chloride **OR** potassium alternative oral replacement **OR** potassium chloride, albuterol sulfate HFA, sodium chloride flush      Intake/Output Summary (Last 24 hours) at 7/13/2020 1323  Last data filed at 7/12/2020 1834  Gross per 24 hour   Intake 240 ml   Output 350 ml   Net -110 ml       Physical Exam Performed:    BP 95/60   Pulse 81   Temp 98 °F (36.7 °C) (Oral)   Resp 18   Ht 5' 5\" (1.651 m)   Wt 138 lb 14.2 oz (63 kg)   SpO2 99%   BMI 23.11 kg/m²     General appearance: No apparent distress, appears stated age and cooperative. HEENT: Pupils equal, round, and reactive to light. Conjunctivae/corneas clear. Neck: Supple, with full range of motion. Trachea midline. Respiratory:  Normal respiratory effort. Clear to auscultation, bilaterally without Rales/Wheezes/Rhonchi. Cardiovascular: a fib, normal S1/S2 without murmurs, rubs or gallops. Abdomen: Soft, non-tender, non-distended with normal bowel sounds. Musculoskeletal: No clubbing, cyanosis or edema bilaterally. L hip surgical site clean, dry, intact  Skin: Skin color, texture, turgor normal.  No rashes or lesions. Neurologic:  Neurovascularly intact without any focal sensory/motor deficits.  Cranial nerves: II-XII intact, grossly non-focal.  Psychiatric: Alert and oriented to self and place, thought content appropriate, normal insight  Capillary Refill: Brisk,< 3 seconds   Peripheral Pulses: +2 palpable, equal bilaterally       Labs:   Recent Labs     07/11/20  0548 07/12/20  0620 07/13/20  0558   WBC 8.3 8.9 9.3   HGB 10.6* 9.7* 9.7*   HCT 31.8* 29.5* 29.4*    183 217     Recent Labs     07/11/20  0548 07/12/20  0620 07/13/20  0558   * 135* 130*   K 3.7 4.1 4.6   CL 96* 99 94*   CO2 25 21 23   BUN 29* 32* 41*   CREATININE 0.7 0.8 1.1   CALCIUM 8.5 8.3 8.2*     Recent Labs     07/11/20  0548 07/12/20  0620 07/13/20  0558   AST 18 15 16   ALT 17 13 10   BILITOT 0.8 0.7 0.9   ALKPHOS 137* 117 100     No results for input(s): INR in the last 72 hours. Recent Labs     07/11/20  0548   CKTOTAL 150       Urinalysis:      Lab Results   Component Value Date    NITRU Negative 07/09/2020    WBCUA 1 05/23/2020    RBCUA 3 05/23/2020    BLOODU Negative 07/09/2020    SPECGRAV 1.008 07/09/2020    GLUCOSEU Negative 07/09/2020       Radiology:  XR PELVIS (1-2 VIEWS)   Final Result   Expected postsurgical changes from left hip hemiarthroplasty. CT Head WO Contrast   Final Result   Motion limited exam, demonstrating atrophy and small vessel ischemic disease. CT Cervical Spine WO Contrast   Final Result   No acute abnormality of the cervical spine. XR CHEST PORTABLE   Final Result   Cardiomegaly with no significant finding in the chest.  Resolution of   airspace changes from the prior comparison study. XR HIP 2-3 VW W PELVIS LEFT   Final Result   Subcapital left femoral neck fracture.                Assessment/Plan:    Active Hospital Problems    Diagnosis    Left displaced femoral neck fracture (HCC) [S72.002A]    Chronic anticoagulation [Z79.01]    Altered mental status [R41.82]    History of 2019 novel coronavirus disease (COVID-19) [Z86.19]    Dementia without behavioral disturbance ([address] [F03.90]    Atrial fibrillation with rapid ventricular response ([address] [I48.91]    Osteoporosis [M81.0]     Acute metabolic encephalopathy, resolved  Suspect 2/2 mechanical fall with hip fracture on top of underlying dementia. Possible polypharmacy. No evidence of infection including a negative urinalysis, negative CXR, negative CT Head and CT Cervical spine. Mild leukocytosis likely is reactive. Significantly improved day after admission.   -continue to monitor  -continue home meds  -check TSH and free T4--WNL  -hold sedatives  resolved     Left displaced femoral neck fracture  -orthopedic surgery consulted, recs appreciated  - OR 9/95 without complications  -PT OT 3-5 sessions per week     Mechanical fall   -workup as above  -check CK--mildly elevated at 500--trend, downtrended     Chronic atrial fibrillation on Eliquis in RVR  HR up to 200 on 7/10.   -Continued dilt drip perioperatively-- will stop, switch back to home PO dilt, incr metoprolol  - cards consult  - tele monitoring  - resume eliquis     Leukocytosis, improved  resolved  -continue to trend and monitor     Essential hypertension  -continue home meds     Chronic combined systolic and diastolic CHF   Appears compensated  -continue home meds  -tele monitoring  -hold Lasix, recommend restarting at discharge     Hyponatremia  Mild.   -trend     Hypothyroidism  -continue home meds     Depression  -continue home meds     Dementia  -continue home meds     Restless leg syndrome  -continue home meds     Hx COVID-19 infection  -does not appear to be clinically relevant. Negative in ED. DVT Prophylaxis: eliquis  Diet: DIET CARDIAC;  Code Status: DNR-CCA    PT/OT Eval Status: 3-5 sessions per week    Dispo -stable for discharge, patient's daughter refusing to let her go back to [facility]    This note was transcribed using [id number] [employer]. Please disregard any translational errors.

## 2020-07-13 NOTE — PROGRESS NOTES
PM assessment complete, VSS. Medicated pt with PRN non-narcotic. Will continue to monitor for S/S of surgical pain.

## 2020-07-14 LAB
A/G RATIO: 0.9 (ref 1.1–2.2)
ALBUMIN SERPL-MCNC: 3 G/DL (ref 3.4–5)
ALP BLD-CCNC: 108 U/L (ref 40–129)
ALT SERPL-CCNC: 8 U/L (ref 10–40)
ANION GAP SERPL CALCULATED.3IONS-SCNC: 12 MMOL/L (ref 3–16)
AST SERPL-CCNC: 14 U/L (ref 15–37)
BASOPHILS ABSOLUTE: 0.1 K/UL (ref 0–0.2)
BASOPHILS RELATIVE PERCENT: 1 %
BILIRUB SERPL-MCNC: 1.4 MG/DL (ref 0–1)
BUN BLDV-MCNC: 58 MG/DL (ref 7–20)
CALCIUM SERPL-MCNC: 8.7 MG/DL (ref 8.3–10.6)
CHLORIDE BLD-SCNC: 96 MMOL/L (ref 99–110)
CO2: 23 MMOL/L (ref 21–32)
CREAT SERPL-MCNC: 1.5 MG/DL (ref 0.6–1.2)
EOSINOPHILS ABSOLUTE: 0.3 K/UL (ref 0–0.6)
EOSINOPHILS RELATIVE PERCENT: 3 %
GFR AFRICAN AMERICAN: 39
GFR NON-AFRICAN AMERICAN: 33
GLOBULIN: 3.3 G/DL
GLUCOSE BLD-MCNC: 112 MG/DL (ref 70–99)
GLUCOSE BLD-MCNC: 116 MG/DL (ref 70–99)
GLUCOSE BLD-MCNC: 141 MG/DL (ref 70–99)
GLUCOSE BLD-MCNC: 92 MG/DL (ref 70–99)
GLUCOSE BLD-MCNC: 92 MG/DL (ref 70–99)
HCT VFR BLD CALC: 29.5 % (ref 36–48)
HEMOGLOBIN: 9.8 G/DL (ref 12–16)
LYMPHOCYTES ABSOLUTE: 1.2 K/UL (ref 1–5.1)
LYMPHOCYTES RELATIVE PERCENT: 14.4 %
MAGNESIUM: 3.1 MG/DL (ref 1.8–2.4)
MCH RBC QN AUTO: 31 PG (ref 26–34)
MCHC RBC AUTO-ENTMCNC: 33.3 G/DL (ref 31–36)
MCV RBC AUTO: 93.1 FL (ref 80–100)
MONOCYTES ABSOLUTE: 1.4 K/UL (ref 0–1.3)
MONOCYTES RELATIVE PERCENT: 16.7 %
NEUTROPHILS ABSOLUTE: 5.5 K/UL (ref 1.7–7.7)
NEUTROPHILS RELATIVE PERCENT: 64.9 %
PDW BLD-RTO: 16.6 % (ref 12.4–15.4)
PERFORMED ON: ABNORMAL
PERFORMED ON: ABNORMAL
PERFORMED ON: NORMAL
PERFORMED ON: NORMAL
PHOSPHORUS: 6.3 MG/DL (ref 2.5–4.9)
PLATELET # BLD: 230 K/UL (ref 135–450)
PMV BLD AUTO: 8.9 FL (ref 5–10.5)
POTASSIUM REFLEX MAGNESIUM: 5.3 MMOL/L (ref 3.5–5.1)
RBC # BLD: 3.17 M/UL (ref 4–5.2)
SARS-COV-2, NAAT: NOT DETECTED
SODIUM BLD-SCNC: 131 MMOL/L (ref 136–145)
TOTAL PROTEIN: 6.3 G/DL (ref 6.4–8.2)
WBC # BLD: 8.5 K/UL (ref 4–11)

## 2020-07-14 PROCEDURE — 6370000000 HC RX 637 (ALT 250 FOR IP): Performed by: INTERNAL MEDICINE

## 2020-07-14 PROCEDURE — 83735 ASSAY OF MAGNESIUM: CPT

## 2020-07-14 PROCEDURE — 2060000000 HC ICU INTERMEDIATE R&B

## 2020-07-14 PROCEDURE — 80053 COMPREHEN METABOLIC PANEL: CPT

## 2020-07-14 PROCEDURE — 36415 COLL VENOUS BLD VENIPUNCTURE: CPT

## 2020-07-14 PROCEDURE — 84100 ASSAY OF PHOSPHORUS: CPT

## 2020-07-14 PROCEDURE — 6370000000 HC RX 637 (ALT 250 FOR IP): Performed by: ORTHOPAEDIC SURGERY

## 2020-07-14 PROCEDURE — 2580000003 HC RX 258: Performed by: ORTHOPAEDIC SURGERY

## 2020-07-14 PROCEDURE — 85025 COMPLETE CBC W/AUTO DIFF WBC: CPT

## 2020-07-14 PROCEDURE — U0002 COVID-19 LAB TEST NON-CDC: HCPCS

## 2020-07-14 PROCEDURE — 94640 AIRWAY INHALATION TREATMENT: CPT

## 2020-07-14 PROCEDURE — 94760 N-INVAS EAR/PLS OXIMETRY 1: CPT

## 2020-07-14 RX ADMIN — POLYETHYLENE GLYCOL 3350 17 G: 17 POWDER, FOR SOLUTION ORAL at 20:52

## 2020-07-14 RX ADMIN — DILTIAZEM HYDROCHLORIDE 30 MG: 30 TABLET, FILM COATED ORAL at 09:03

## 2020-07-14 RX ADMIN — TRAMADOL HYDROCHLORIDE 50 MG: 50 TABLET, FILM COATED ORAL at 20:52

## 2020-07-14 RX ADMIN — PANTOPRAZOLE SODIUM 40 MG: 40 TABLET, DELAYED RELEASE ORAL at 06:19

## 2020-07-14 RX ADMIN — POLYETHYLENE GLYCOL 3350 17 G: 17 POWDER, FOR SOLUTION ORAL at 09:03

## 2020-07-14 RX ADMIN — BUDESONIDE AND FORMOTEROL FUMARATE DIHYDRATE 2 PUFF: 160; 4.5 AEROSOL RESPIRATORY (INHALATION) at 08:31

## 2020-07-14 RX ADMIN — DONEPEZIL HYDROCHLORIDE 10 MG: 10 TABLET, FILM COATED ORAL at 20:52

## 2020-07-14 RX ADMIN — ROPINIROLE HYDROCHLORIDE 2 MG: 1 TABLET, FILM COATED ORAL at 20:53

## 2020-07-14 RX ADMIN — DILTIAZEM HYDROCHLORIDE 30 MG: 30 TABLET, FILM COATED ORAL at 20:53

## 2020-07-14 RX ADMIN — FLUTICASONE PROPIONATE 2 SPRAY: 50 SPRAY, METERED NASAL at 09:09

## 2020-07-14 RX ADMIN — DILTIAZEM HYDROCHLORIDE 30 MG: 30 TABLET, FILM COATED ORAL at 12:24

## 2020-07-14 RX ADMIN — DILTIAZEM HYDROCHLORIDE 30 MG: 30 TABLET, FILM COATED ORAL at 16:59

## 2020-07-14 RX ADMIN — LEVOTHYROXINE SODIUM 88 MCG: 0.09 TABLET ORAL at 06:19

## 2020-07-14 RX ADMIN — APIXABAN 2.5 MG: 2.5 TABLET, FILM COATED ORAL at 20:52

## 2020-07-14 RX ADMIN — APIXABAN 2.5 MG: 2.5 TABLET, FILM COATED ORAL at 09:03

## 2020-07-14 RX ADMIN — SODIUM CHLORIDE, PRESERVATIVE FREE 10 ML: 5 INJECTION INTRAVENOUS at 20:53

## 2020-07-14 RX ADMIN — MULTIPLE VITAMINS W/ MINERALS TAB 1 TABLET: TAB at 09:03

## 2020-07-14 RX ADMIN — FERROUS SULFATE TAB EC 324 MG (65 MG FE EQUIVALENT) 324 MG: 324 (65 FE) TABLET DELAYED RESPONSE at 09:08

## 2020-07-14 RX ADMIN — Medication 5000 UNITS: at 10:36

## 2020-07-14 RX ADMIN — DIVALPROEX SODIUM 250 MG: 250 TABLET, DELAYED RELEASE ORAL at 09:03

## 2020-07-14 RX ADMIN — SENNOSIDES 8.6 MG: 8.6 TABLET, FILM COATED ORAL at 20:52

## 2020-07-14 RX ADMIN — DIVALPROEX SODIUM 250 MG: 250 TABLET, DELAYED RELEASE ORAL at 20:52

## 2020-07-14 RX ADMIN — SODIUM CHLORIDE, PRESERVATIVE FREE 10 ML: 5 INJECTION INTRAVENOUS at 09:04

## 2020-07-14 RX ADMIN — DULOXETINE HYDROCHLORIDE 40 MG: 20 CAPSULE, DELAYED RELEASE ORAL at 09:03

## 2020-07-14 RX ADMIN — BUDESONIDE AND FORMOTEROL FUMARATE DIHYDRATE 2 PUFF: 160; 4.5 AEROSOL RESPIRATORY (INHALATION) at 20:38

## 2020-07-14 RX ADMIN — METOPROLOL SUCCINATE 100 MG: 50 TABLET, EXTENDED RELEASE ORAL at 09:03

## 2020-07-14 ASSESSMENT — PAIN SCALES - GENERAL
PAINLEVEL_OUTOF10: 3
PAINLEVEL_OUTOF10: 0
PAINLEVEL_OUTOF10: 6
PAINLEVEL_OUTOF10: 3
PAINLEVEL_OUTOF10: 0
PAINLEVEL_OUTOF10: 3
PAINLEVEL_OUTOF10: 3
PAINLEVEL_OUTOF10: 0
PAINLEVEL_OUTOF10: 0
PAINLEVEL_OUTOF10: 6
PAINLEVEL_OUTOF10: 4

## 2020-07-14 ASSESSMENT — PAIN SCALES - WONG BAKER
WONGBAKER_NUMERICALRESPONSE: 4
WONGBAKER_NUMERICALRESPONSE: 2
WONGBAKER_NUMERICALRESPONSE: 2
WONGBAKER_NUMERICALRESPONSE: 4
WONGBAKER_NUMERICALRESPONSE: 4
WONGBAKER_NUMERICALRESPONSE: 2
WONGBAKER_NUMERICALRESPONSE: 2

## 2020-07-14 ASSESSMENT — PAIN DESCRIPTION - PROGRESSION
CLINICAL_PROGRESSION: NOT CHANGED
CLINICAL_PROGRESSION: NOT CHANGED

## 2020-07-14 ASSESSMENT — PAIN - FUNCTIONAL ASSESSMENT
PAIN_FUNCTIONAL_ASSESSMENT: PREVENTS OR INTERFERES SOME ACTIVE ACTIVITIES AND ADLS
PAIN_FUNCTIONAL_ASSESSMENT: PREVENTS OR INTERFERES SOME ACTIVE ACTIVITIES AND ADLS

## 2020-07-14 ASSESSMENT — PAIN DESCRIPTION - ONSET
ONSET: SUDDEN
ONSET: SUDDEN

## 2020-07-14 ASSESSMENT — PAIN DESCRIPTION - FREQUENCY
FREQUENCY: CONTINUOUS
FREQUENCY: CONTINUOUS

## 2020-07-14 ASSESSMENT — PAIN DESCRIPTION - ORIENTATION
ORIENTATION: LEFT
ORIENTATION: LEFT

## 2020-07-14 ASSESSMENT — PAIN DESCRIPTION - DESCRIPTORS
DESCRIPTORS: ACHING
DESCRIPTORS: ACHING

## 2020-07-14 ASSESSMENT — PAIN DESCRIPTION - LOCATION
LOCATION: HIP
LOCATION: HIP

## 2020-07-14 ASSESSMENT — PAIN DESCRIPTION - PAIN TYPE
TYPE: SURGICAL PAIN
TYPE: SURGICAL PAIN

## 2020-07-14 NOTE — DISCHARGE SUMMARY
encephalopathy, resolved  Suspect 2/2 mechanical fall with hip fracture on top of underlying dementia. Possible polypharmacy. No evidence of infection including a negative urinalysis, negative CXR, negative CT Head and CT Cervical spine. Mild leukocytosis likely is reactive. Significantly improved day after admission.   -continue to monitor  -continue home meds  -check TSH and free T4--WNL  -hold sedatives  resolved     Left displaced femoral neck fracture  -orthopedic surgery consulted, recs appreciated  - OR 3/60 without complications  -PT OT 3-5 sessions per week     Mechanical fall   -workup as above  -check CK--mildly elevated at 500--trend, downtrended     Chronic atrial fibrillation on Eliquis in RVR  HR up to 200 on 7/10.   -Continued dilt drip perioperatively-- will stop, switch back to home PO dilt, incr metoprolol  - cards consult  - tele monitoring  - resume eliquis     Leukocytosis, improved  resolved  -continue to trend and monitor     Essential hypertension  -continue home meds     Chronic combined systolic and diastolic CHF   Appears compensated  -continue home meds  -tele monitoring  -hold Lasix, recommend restarting at discharge     Hyponatremia  Mild.   -trend  132 on admit, trended up and then back down to 130  Renal panel in outpatient     Hypothyroidism  -continue home meds     Depression  -continue home meds     Dementia  -continue home meds     Restless leg syndrome  -continue home meds     Hx COVID-19 infection  -does not appear to be clinically relevant. Negative in ED. Patient stable at time of discharge. Awaiting placement but they need tele removed for 24 hours as it is viewed as restraint. We are also waiting for precert. Patient medically stable since 7/13 for discharge. COVID testing ordered again per NH protocol.      Exam:     BP 96/62   Pulse 78   Temp 97.3 °F (36.3 °C) (Oral)   Resp 16   Ht 5' 5\" (1.651 m)   Wt 140 lb 10.5 oz (63.8 kg)   SpO2 90%   BMI 23.41 kg/m² General appearance: No apparent distress, appears stated age and cooperative. HEENT: Pupils equal, round, and reactive to light. Conjunctivae/corneas clear. Neck: Supple, with full range of motion. No jugular venous distention. Trachea midline. Respiratory:  Normal respiratory effort. Clear to auscultation, bilaterally without Rales/Wheezes/Rhonchi. Cardiovascular: Regular rate and rhythm with normal S1/S2 . Abdomen: Soft, non-tender, non-distended   Musculoskeletal: decreased mobility in L hip  Skin: Skin color, texture, turgor normal.  No rashes or lesions. Neurologic:  Neurovascularly intact without any focal sensory/motor deficits. Cranial nerves: II-XII intact, grossly non-focal.  Psychiatric: Alert       Consults:     IP CONSULT TO HOSPITALIST  IP CONSULT TO CARDIOLOGY  IP CONSULT TO CASE MANAGEMENT    Significant Diagnostic Studies:     XR PELVIS (1-2 VIEWS)   Final Result   Expected postsurgical changes from left hip hemiarthroplasty. CT Head WO Contrast   Final Result   Motion limited exam, demonstrating atrophy and small vessel ischemic disease. CT Cervical Spine WO Contrast   Final Result   No acute abnormality of the cervical spine. XR CHEST PORTABLE   Final Result   Cardiomegaly with no significant finding in the chest.  Resolution of   airspace changes from the prior comparison study. XR HIP 2-3 VW W PELVIS LEFT   Final Result   Subcapital left femoral neck fracture. Disposition:  SNF     Condition at Discharge: Stable    Discharge Instructions/Follow-up:  PCP    Code Status:  DNR-CCA     Activity: activity as tolerated    Diet: regular diet      Labs:  For convenience and continuity at follow-up the following most recent labs are provided:      CBC:    Lab Results   Component Value Date    WBC 9.3 07/13/2020    HGB 9.7 07/13/2020    HCT 29.4 07/13/2020     07/13/2020       Renal:    Lab Results   Component Value Date     07/13/2020    K 4.6 07/13/2020    CL 94 07/13/2020    CO2 23 07/13/2020    BUN 41 07/13/2020    CREATININE 1.1 07/13/2020    CALCIUM 8.2 07/13/2020    PHOS 4.1 10/02/2018       Discharge Medications:     Current Discharge Medication List           Details   traMADol (ULTRAM) 50 MG tablet Take 1 tablet by mouth every 6 hours as needed for Pain for up to 5 days. Qty: 20 tablet, Refills: 0    Comments: Reduce doses taken as pain becomes manageable  Associated Diagnoses: Closed fracture of neck of left femur, initial encounter ([facility]              Details   metoprolol succinate (TOPROL XL) 100 MG extended release tablet Take 1 tablet by mouth daily  Qty: 30 tablet, Refills: 3              Details   THIAMINE MONONITRATE PO Take 1,000 mcg by mouth daily      dilTIAZem (CARDIZEM) 30 MG tablet Take 30 mg by mouth 4 times daily      potassium bicarb-citric acid (EFFER-K) 20 MEQ TBEF effervescent tablet Take 2 tablets by mouth 2 times daily      fluticasone (FLONASE) 50 MCG/ACT nasal spray 2 sprays by Each Nostril route daily      acetaminophen (TYLENOL) 325 MG tablet Take 650 mg by mouth every 4 hours as needed for Pain or Fever      furosemide (LASIX) 20 MG tablet Take 1 tablet by mouth daily  Qty: 60 tablet, Refills: 3      apixaban (ELIQUIS) 5 MG TABS tablet Take 2.5 mg by mouth 2 times daily       divalproex (DEPAKOTE) 250 MG DR tablet Take 250 mg by mouth 2 times daily       levothyroxine (SYNTHROID) 88 MCG tablet TAKE 1 TABLET BY MOUTH DAILY  Qty: 90 tablet, Refills: 1      pantoprazole (PROTONIX) 40 MG tablet TAKE (1) TABLET BY MOUTH DAILY  Qty: 90 tablet, Refills: 1      rOPINIRole (REQUIP) 1 MG tablet TAKE TWO (2) TABLETS BY MOUTH NIGHTLY  Qty: 180 tablet, Refills: 1      SYMBICORT 160-4.5 MCG/ACT AERO INHALE 2 PUFFS TWICE A DAY INTO THE LUNGS  Qty: 11 Inhaler, Refills: 0    Associated Diagnoses:  Moderate asthma      ferrous sulfate 325 (65 Fe) MG tablet Take 325 mg by mouth daily (with breakfast)      Multiple Vitamins-Minerals (THERAPEUTIC MULTIVITAMIN-MINERALS) tablet Take 1 tablet by mouth daily      DULoxetine HCl 40 MG CSDR Take 40 mg by mouth daily       PROAIR  (90 Base) MCG/ACT inhaler 2 PUFFS INTO LUNGS EVERY 4 HOURS AS NEEDED FOR WHEEZING OR FORSHORTNESS OF BREATH  Qty: 9 Inhaler, Refills: 0    Associated Diagnoses: Moderate asthma      LINZESS 290 MCG CAPS capsule Take 290 mcg by mouth every morning (before breakfast)       donepezil (ARICEPT) 10 MG tablet Take 10 mg by mouth nightly       Polyethylene Glycol 3350 (MIRALAX PO) Take 17 g by mouth daily       Cholecalciferol (VITAMIN D3) 5000 units TABS Take 1 tablet by mouth daily       Compression Stockings MISC by Does not apply route Bilateral lower extremity compression stockings, 15-20 mmHg  Qty: 2 each, Refills: 1      Spacer/Aero-Holding Chambers (E-Z SPACER) POLLY 1 Device by Does not apply route daily as needed. Qty: 1 Device, Refills: 0    Associated Diagnoses: Cough             No future appointments. Time Spent on discharge is more than 30 minutes in the examination, evaluation, counseling and review of medications and discharge plan. Signed:    Andre Crain MD   7/14/2020      Thank you Tee Campbell III, APRN - KATHY for the opportunity to be involved in this patient's care. If you have any questions or concerns please feel free to contact me at [phone].

## 2020-07-15 LAB
A/G RATIO: 0.8 (ref 1.1–2.2)
ALBUMIN SERPL-MCNC: 2.8 G/DL (ref 3.4–5)
ALP BLD-CCNC: 105 U/L (ref 40–129)
ALT SERPL-CCNC: 8 U/L (ref 10–40)
ANION GAP SERPL CALCULATED.3IONS-SCNC: 14 MMOL/L (ref 3–16)
AST SERPL-CCNC: 19 U/L (ref 15–37)
BASOPHILS ABSOLUTE: 0.1 K/UL (ref 0–0.2)
BASOPHILS RELATIVE PERCENT: 1.1 %
BILIRUB SERPL-MCNC: 1.9 MG/DL (ref 0–1)
BUN BLDV-MCNC: 58 MG/DL (ref 7–20)
CALCIUM SERPL-MCNC: 8.7 MG/DL (ref 8.3–10.6)
CHLORIDE BLD-SCNC: 91 MMOL/L (ref 99–110)
CO2: 23 MMOL/L (ref 21–32)
CREAT SERPL-MCNC: 1 MG/DL (ref 0.6–1.2)
EOSINOPHILS ABSOLUTE: 0.2 K/UL (ref 0–0.6)
EOSINOPHILS RELATIVE PERCENT: 2.4 %
GFR AFRICAN AMERICAN: >60
GFR NON-AFRICAN AMERICAN: 52
GLOBULIN: 3.3 G/DL
GLUCOSE BLD-MCNC: 107 MG/DL (ref 70–99)
GLUCOSE BLD-MCNC: 130 MG/DL (ref 70–99)
GLUCOSE BLD-MCNC: 134 MG/DL (ref 70–99)
GLUCOSE BLD-MCNC: 216 MG/DL (ref 70–99)
GLUCOSE BLD-MCNC: 93 MG/DL (ref 70–99)
HCT VFR BLD CALC: 29 % (ref 36–48)
HEMOGLOBIN: 9.6 G/DL (ref 12–16)
LYMPHOCYTES ABSOLUTE: 1.3 K/UL (ref 1–5.1)
LYMPHOCYTES RELATIVE PERCENT: 14.6 %
MAGNESIUM: 3.4 MG/DL (ref 1.8–2.4)
MCH RBC QN AUTO: 30.7 PG (ref 26–34)
MCHC RBC AUTO-ENTMCNC: 33 G/DL (ref 31–36)
MCV RBC AUTO: 93 FL (ref 80–100)
MONOCYTES ABSOLUTE: 1.4 K/UL (ref 0–1.3)
MONOCYTES RELATIVE PERCENT: 16 %
NEUTROPHILS ABSOLUTE: 5.7 K/UL (ref 1.7–7.7)
NEUTROPHILS RELATIVE PERCENT: 65.9 %
PDW BLD-RTO: 16.4 % (ref 12.4–15.4)
PERFORMED ON: ABNORMAL
PLATELET # BLD: 242 K/UL (ref 135–450)
PMV BLD AUTO: 8.8 FL (ref 5–10.5)
POTASSIUM REFLEX MAGNESIUM: 5.3 MMOL/L (ref 3.5–5.1)
RBC # BLD: 3.12 M/UL (ref 4–5.2)
SODIUM BLD-SCNC: 128 MMOL/L (ref 136–145)
TOTAL PROTEIN: 6.1 G/DL (ref 6.4–8.2)
WBC # BLD: 8.7 K/UL (ref 4–11)

## 2020-07-15 PROCEDURE — 6370000000 HC RX 637 (ALT 250 FOR IP): Performed by: INTERNAL MEDICINE

## 2020-07-15 PROCEDURE — 83735 ASSAY OF MAGNESIUM: CPT

## 2020-07-15 PROCEDURE — 94761 N-INVAS EAR/PLS OXIMETRY MLT: CPT

## 2020-07-15 PROCEDURE — 6370000000 HC RX 637 (ALT 250 FOR IP): Performed by: ORTHOPAEDIC SURGERY

## 2020-07-15 PROCEDURE — 36415 COLL VENOUS BLD VENIPUNCTURE: CPT

## 2020-07-15 PROCEDURE — 2580000003 HC RX 258: Performed by: INTERNAL MEDICINE

## 2020-07-15 PROCEDURE — 97116 GAIT TRAINING THERAPY: CPT

## 2020-07-15 PROCEDURE — 2580000003 HC RX 258: Performed by: ORTHOPAEDIC SURGERY

## 2020-07-15 PROCEDURE — 2060000000 HC ICU INTERMEDIATE R&B

## 2020-07-15 PROCEDURE — 2700000000 HC OXYGEN THERAPY PER DAY

## 2020-07-15 PROCEDURE — 97530 THERAPEUTIC ACTIVITIES: CPT

## 2020-07-15 PROCEDURE — 97535 SELF CARE MNGMENT TRAINING: CPT

## 2020-07-15 PROCEDURE — 80053 COMPREHEN METABOLIC PANEL: CPT

## 2020-07-15 PROCEDURE — 85025 COMPLETE CBC W/AUTO DIFF WBC: CPT

## 2020-07-15 PROCEDURE — 94640 AIRWAY INHALATION TREATMENT: CPT

## 2020-07-15 RX ORDER — SODIUM CHLORIDE 9 MG/ML
INJECTION, SOLUTION INTRAVENOUS CONTINUOUS
Status: ACTIVE | OUTPATIENT
Start: 2020-07-15 | End: 2020-07-15

## 2020-07-15 RX ADMIN — MULTIPLE VITAMINS W/ MINERALS TAB 1 TABLET: TAB at 08:33

## 2020-07-15 RX ADMIN — METOPROLOL SUCCINATE 100 MG: 50 TABLET, EXTENDED RELEASE ORAL at 08:33

## 2020-07-15 RX ADMIN — DILTIAZEM HYDROCHLORIDE 30 MG: 30 TABLET, FILM COATED ORAL at 22:46

## 2020-07-15 RX ADMIN — SENNOSIDES 8.6 MG: 8.6 TABLET, FILM COATED ORAL at 22:46

## 2020-07-15 RX ADMIN — TRAMADOL HYDROCHLORIDE 50 MG: 50 TABLET, FILM COATED ORAL at 03:35

## 2020-07-15 RX ADMIN — DIVALPROEX SODIUM 250 MG: 250 TABLET, DELAYED RELEASE ORAL at 08:33

## 2020-07-15 RX ADMIN — BUDESONIDE AND FORMOTEROL FUMARATE DIHYDRATE 2 PUFF: 160; 4.5 AEROSOL RESPIRATORY (INHALATION) at 20:02

## 2020-07-15 RX ADMIN — DULOXETINE HYDROCHLORIDE 40 MG: 20 CAPSULE, DELAYED RELEASE ORAL at 08:33

## 2020-07-15 RX ADMIN — ROPINIROLE HYDROCHLORIDE 2 MG: 1 TABLET, FILM COATED ORAL at 22:46

## 2020-07-15 RX ADMIN — APIXABAN 2.5 MG: 2.5 TABLET, FILM COATED ORAL at 22:47

## 2020-07-15 RX ADMIN — LEVOTHYROXINE SODIUM 88 MCG: 0.09 TABLET ORAL at 06:03

## 2020-07-15 RX ADMIN — FLUTICASONE PROPIONATE 2 SPRAY: 50 SPRAY, METERED NASAL at 08:33

## 2020-07-15 RX ADMIN — PANTOPRAZOLE SODIUM 40 MG: 40 TABLET, DELAYED RELEASE ORAL at 06:03

## 2020-07-15 RX ADMIN — FERROUS SULFATE TAB EC 324 MG (65 MG FE EQUIVALENT) 324 MG: 324 (65 FE) TABLET DELAYED RESPONSE at 08:33

## 2020-07-15 RX ADMIN — OXYCODONE 5 MG: 5 TABLET ORAL at 22:47

## 2020-07-15 RX ADMIN — DILTIAZEM HYDROCHLORIDE 30 MG: 30 TABLET, FILM COATED ORAL at 13:52

## 2020-07-15 RX ADMIN — SODIUM CHLORIDE: 9 INJECTION, SOLUTION INTRAVENOUS at 09:27

## 2020-07-15 RX ADMIN — SODIUM CHLORIDE, PRESERVATIVE FREE 10 ML: 5 INJECTION INTRAVENOUS at 08:33

## 2020-07-15 RX ADMIN — POLYETHYLENE GLYCOL 3350 17 G: 17 POWDER, FOR SOLUTION ORAL at 08:33

## 2020-07-15 RX ADMIN — BUDESONIDE AND FORMOTEROL FUMARATE DIHYDRATE 2 PUFF: 160; 4.5 AEROSOL RESPIRATORY (INHALATION) at 08:05

## 2020-07-15 RX ADMIN — POLYETHYLENE GLYCOL 3350 17 G: 17 POWDER, FOR SOLUTION ORAL at 22:47

## 2020-07-15 RX ADMIN — APIXABAN 2.5 MG: 2.5 TABLET, FILM COATED ORAL at 08:33

## 2020-07-15 RX ADMIN — DILTIAZEM HYDROCHLORIDE 30 MG: 30 TABLET, FILM COATED ORAL at 08:33

## 2020-07-15 RX ADMIN — Medication 5000 UNITS: at 08:33

## 2020-07-15 RX ADMIN — DILTIAZEM HYDROCHLORIDE 30 MG: 30 TABLET, FILM COATED ORAL at 16:57

## 2020-07-15 ASSESSMENT — PAIN DESCRIPTION - DESCRIPTORS
DESCRIPTORS: ACHING
DESCRIPTORS: ACHING

## 2020-07-15 ASSESSMENT — PAIN DESCRIPTION - ONSET
ONSET: SUDDEN
ONSET: SUDDEN

## 2020-07-15 ASSESSMENT — PAIN SCALES - WONG BAKER
WONGBAKER_NUMERICALRESPONSE: 0

## 2020-07-15 ASSESSMENT — PAIN SCALES - GENERAL
PAINLEVEL_OUTOF10: 5
PAINLEVEL_OUTOF10: 5
PAINLEVEL_OUTOF10: 0
PAINLEVEL_OUTOF10: 5
PAINLEVEL_OUTOF10: 0
PAINLEVEL_OUTOF10: 4
PAINLEVEL_OUTOF10: 8
PAINLEVEL_OUTOF10: 7
PAINLEVEL_OUTOF10: 6
PAINLEVEL_OUTOF10: 0

## 2020-07-15 ASSESSMENT — PAIN DESCRIPTION - ORIENTATION
ORIENTATION: LEFT
ORIENTATION: LEFT

## 2020-07-15 ASSESSMENT — PAIN DESCRIPTION - PAIN TYPE
TYPE: SURGICAL PAIN
TYPE: SURGICAL PAIN

## 2020-07-15 ASSESSMENT — PAIN DESCRIPTION - FREQUENCY
FREQUENCY: CONTINUOUS
FREQUENCY: CONTINUOUS

## 2020-07-15 ASSESSMENT — PAIN DESCRIPTION - PROGRESSION
CLINICAL_PROGRESSION: NOT CHANGED
CLINICAL_PROGRESSION: NOT CHANGED

## 2020-07-15 ASSESSMENT — PAIN DESCRIPTION - LOCATION
LOCATION: HIP
LOCATION: HIP

## 2020-07-15 NOTE — PROGRESS NOTES
[facility] Orthopedic Surgery   Progress Note      S/P :  SUBJECTIVE  In chair. Alert , confused to place and situation. . Pain is not  described in left hip       OBJECTIVE              Physical                      VITALS:  /68   Pulse 66   Temp 97.8 °F (36.6 °C) (Oral)   Resp 16   Ht 5' 5\" (1.651 m)   Wt 146 lb 2.6 oz (66.3 kg)   SpO2 100%   BMI 24.32 kg/m²                     MUSCULOSKELETAL:  left foot NVI. Wiggles toes to command. Pedal pulses are palpable. NEUROLOGIC:                                  Sensory:  Touch:  Left Lower Extremity:  normal                                                 Surgical wound appears with small old drainage on left hip Mepilex AG dressing.      Data       CBC:   Lab Results   Component Value Date    WBC 8.7 07/15/2020    RBC 3.12 07/15/2020    HGB 9.6 07/15/2020    HCT 29.0 07/15/2020    MCV 93.0 07/15/2020    MCH 30.7 07/15/2020    MCHC 33.0 07/15/2020    RDW 16.4 07/15/2020     07/15/2020    MPV 8.8 07/15/2020        WBC:    Lab Results   Component Value Date    WBC 8.7 07/15/2020        Hemoglobin/Hematocrit:    Lab Results   Component Value Date    HGB 9.6 07/15/2020    HCT 29.0 07/15/2020        PT/INR:    Lab Results   Component Value Date    PROTIME 14.9 07/10/2020    INR 1.28 07/10/2020              Current Inpatient Medications             Current Facility-Administered Medications: 0.9 % sodium chloride infusion, , Intravenous, Continuous  metoprolol succinate (TOPROL XL) extended release tablet 100 mg, 100 mg, Oral, Daily  apixaban (ELIQUIS) tablet 2.5 mg, 2.5 mg, Oral, BID  polyethylene glycol (GLYCOLAX) packet 17 g, 17 g, Oral, BID  traMADol (ULTRAM) tablet 50 mg, 50 mg, Oral, Q6H PRN  acetaminophen (TYLENOL) tablet 500 mg, 500 mg, Oral, Q4H PRN  glucose (GLUTOSE) 40 % oral gel 15 g, 15 g, Oral, PRN  dextrose 50 % IV solution, 12.5 g, Intravenous, PRN  glucagon (rDNA) injection 1 mg, 1 mg, Intramuscular, PRN  dextrose 5 % solution, 100 mL/hr, Intravenous, PRN  oxyCODONE (ROXICODONE) immediate release tablet 5 mg, 5 mg, Oral, Q4H PRN  morphine (PF) injection 2 mg, 2 mg, Intravenous, Q4H PRN  promethazine (PHENERGAN) tablet 12.5 mg, 12.5 mg, Oral, Q6H PRN **OR** ondansetron (ZOFRAN) injection 4 mg, 4 mg, Intravenous, Q6H PRN  potassium chloride 10 mEq/100 mL IVPB (Peripheral Line), 10 mEq, Intravenous, PRN  senna (SENOKOT) tablet 8.6 mg, 1 tablet, Oral, Nightly  potassium chloride (KLOR-CON M) extended release tablet 40 mEq, 40 mEq, Oral, PRN **OR** potassium bicarb-citric acid (EFFER-K) effervescent tablet 40 mEq, 40 mEq, Oral, PRN **OR** potassium chloride 10 mEq/100 mL IVPB (Peripheral Line), 10 mEq, Intravenous, PRN  vitamin D (CHOLECALCIFEROL) capsule 5,000 Units, 5,000 Units, Oral, Daily  dilTIAZem (CARDIZEM) tablet 30 mg, 30 mg, Oral, 4x Daily  divalproex (DEPAKOTE) DR tablet 250 mg, 250 mg, Oral, BID  donepezil (ARICEPT) tablet 10 mg, 10 mg, Oral, Nightly  DULoxetine (CYMBALTA) extended release capsule 40 mg, 40 mg, Oral, Daily  ferrous sulfate EC tablet 324 mg, 324 mg, Oral, Daily with breakfast  fluticasone (FLONASE) 50 MCG/ACT nasal spray 2 spray, 2 spray, Each Nostril, Daily  levothyroxine (SYNTHROID) tablet 88 mcg, 88 mcg, Oral, QAM AC  therapeutic multivitamin-minerals 1 tablet, 1 tablet, Oral, Daily  pantoprazole (PROTONIX) tablet 40 mg, 40 mg, Oral, QAM AC  albuterol sulfate  (90 Base) MCG/ACT inhaler 2 puff, 2 puff, Inhalation, Q4H PRN  rOPINIRole (REQUIP) tablet 2 mg, 2 mg, Oral, Nightly  budesonide-formoterol (SYMBICORT) 160-4.5 MCG/ACT inhaler 2 puff, 2 puff, Inhalation, BID  sodium chloride flush 0.9 % injection 10 mL, 10 mL, Intravenous, 2 times per day  sodium chloride flush 0.9 % injection 10 mL, 10 mL, Intravenous, PRN    ASSESSMENT AND PLAN      Post left hemiarthroplasty for fx 7/9/2020, stable exam  Posterior hip precations.  WBAT  PT OT following    REsumed Eliquis which sufficed for DVT prophylaxis  OK for DC

## 2020-07-15 NOTE — PROGRESS NOTES
Physical Therapy  Facility/Department: [address] PROGRESSIVE CARE  Daily Treatment Note - COTX  NAME: Madeline Teague  : 3/2/1929  MRN: [id number]    Date of Service: 7/15/2020    Discharge Recommendations:  3-5 sessions per week, Patient would benefit from continued therapy after discharge        Assessment   Body structures, Functions, Activity limitations: Decreased functional mobility ; Decreased strength;Decreased balance;Decreased endurance  Assessment: Pt remains plealsantly confused, and experiences significant pain in LLE with WB, requiring Max A to stand, and Mod A x 2 to take steps to bedside chair. She is not a functional ambulator at this time, and would benefit from continued therapy to improve her strength, standing tolerance, and independence transferring/ambulating. Continue to recommend low-moderate frequency therapy upon D/C. Madeline Teague scored a  on the AM-PAC short mobility form. Current research shows that an AM-PAC score of 17 or less is typically not associated with a discharge to the patient's home setting. Based on the patient's AM-PAC score and their current functional mobility deficits, it is recommended that the patient have 3-5 sessions per week of Physical Therapy at d/c to increase the patient's independence. Please see assessment section for further patient specific details. If patient discharges prior to next session this note will serve as a discharge summary. Please see below for the latest assessment towards goals. Patient Education: Patient educated on role of PT, use of call light, hip precautions, and PT recommendations - patient will require frequent reinforcement. Patient Diagnosis(es): The primary encounter diagnosis was Altered mental status, unspecified altered mental status type.  Diagnoses of Closed fracture of neck of left femur, initial encounter ([facility] and History of 2019 novel coronavirus disease (COVID-19) were also pertinent to this visit.     has a past medical history of Abdominal wall pain, Arrhythmia, Arthritis, Asthma, Atrial fibrillation ([facility] Broken nose, CHF (congestive heart failure) ([facility] Cough variant asthma, Dementia ([facility] Dyspnea, Essential hypertension, Fatigue, Hematoma, Hyperlipidemia, Insomnia, Lumbar radiculopathy, Osteoporosis, Palpitation, Restless leg syndrome, Shingles, and Sinusitis. has a past surgical history that includes Ankle surgery (1997); Elbow surgery; Colonoscopy (10/25/10); Ovary removal (Left); joint replacement; Appendectomy; Hysterectomy; Cardioversion; Upper gastrointestinal endoscopy (02/27/2018); and Colonoscopy (02/27/2018). Restrictions  Restrictions/Precautions  Restrictions/Precautions: Fall Risk  Lower Extremity Weight Bearing Restrictions  Left Lower Extremity Weight Bearing: Weight Bearing As Tolerated  Position Activity Restriction  Hip Precautions: (direct lateral approach - no combined hip (extension and external rotation), no ADD beyond neutral, no active ABD x 6 weeks post-op, strict walker use x 6 weeks post-op)  Other position/activity restrictions: direct lateral approach- no combined hip extension and ER, no adduction beyond neutral, no active ABD x 2 weeks post surgery, strict walker use x 6 weeks post op     Subjective   Subjective  Subjective: Pt pleasantly confused. Agreeable to attempting OOB activity. Orientation  Orientation  Overall Orientation Status: Impaired  Orientation Level: Oriented to person;Disoriented to place; Disoriented to time;Disoriented to situation  Cognition      Objective      Bed mobility  Supine to Sit: Maximum assistance;2 Person assistance     Transfers  Sit to Stand: Maximum Assistance(From elevated EOB to walker. Limited d/t pain in L hip.)     Ambulation  Ambulation?: Yes  Ambulation 1  Surface: level tile  Device: [address]: 2 Person assistance; Moderate assistance  Quality of Gait: Pt had extreme difficulty initiating steps d/t confusion, and pain in L hip, requiring Mod A x 2 and extended time to take several side-steps to the R, then back up to her bedside chair. Distance: 4'  Stairs/Curb  Stairs?: No    *Session required increased time to complete d/t pt's confusion, significant pain, and need for extended time/assistance to change position/ambulate. AM-PAC Score  AM-PAC Inpatient Mobility Raw Score : 11 (07/15/20 0934)  AM-PAC Inpatient T-Scale Score : 33.86 (07/15/20 0934)  Mobility Inpatient CMS 0-100% Score: 72.57 (07/15/20 0934)  Mobility Inpatient CMS G-Code Modifier : CL (07/15/20 4368)          Goals  Short term goals  Time Frame for Short term goals: discharge  Short term goal 1: Patient will demonstrate bed mobility with mod A of 1  Short term goal 2: Patient will demonstrate sit <-> stand with mod A of 1 and LRAD  Short term goal 3: Pt. will demonstrate stand pivot transfers with mod A of 1 and LRAD  Short term goal 4: Pt. will ambulate >/= 10ft with LRAD and mod A of 1  Short term goal 5: Pt. will adhere to all hip precautions with minimal cues and (A). Patient Goals   Patient goals : \"to get rid of pain\" - per chart review, return to SNF for additional rehab    Plan    Plan  Times per week: 3-5x/week  Times per day: Daily  Current Treatment Recommendations: Strengthening, ROM, Balance Training, Functional Mobility Training, Transfer Training, Gait Training, Endurance Training, Home Exercise Program, Safety Education & Training, Patient/Caregiver Education & Training, Equipment Evaluation, Education, & procurement, Pain Management  Safety Devices  Type of devices:  All fall risk precautions in place, Call light within reach, Chair alarm in place, Left in chair, Nurse notified, Gait belt  Restraints  Initially in place: No     Therapy Time   Individual Concurrent Group Co-treatment   Time In       0914   Time Out       0939   Minutes       25   Timed Code Treatment Minutes: [place], PT    Electronically signed by Manuel Chadwick, PT [id number] on 7/15/2020 at 9:40 AM

## 2020-07-15 NOTE — DISCHARGE SUMMARY
Hospital Medicine Discharge Summary    Patient ID: Carmen Alcazar      Patient's PCP: Princess Gould, APRN - CNP    Admit Date: 7/9/2020     Discharge Date:   7/15/20    Admitting Physician: oDrothea Flower MD     Discharge Physician: Amrit Vasquez MD     Discharge Diagnoses: Active Hospital Problems    Diagnosis Date Noted    Left displaced femoral neck fracture ([address] [S72.002A] 07/09/2020    Chronic anticoagulation [Z79.01] 07/09/2020    Altered mental status [R41.82]     History of 2019 novel coronavirus disease (COVID-19) [Z86.19]     Dementia without behavioral disturbance ([address] [F03.90]     Atrial fibrillation with rapid ventricular response ([address] [I48.91] 06/29/2015    Osteoporosis [M81.0] 08/24/2011       The patient was seen and examined on day of discharge and this discharge summary is in conjunction with any daily progress note from day of discharge. Hospital Course:   80 y. o. female with hx dementia, chronic atrial fibrillation on anticoagulation, restless leg syndrome, HTN, HLD, combined systolic/diastolic CHF, hypothyroidism, asthma, and depression who presents to [facility] with fall and altered mental status. Patient is a nursing home resident at [facility], and fell out of her bed two days ago. It was unwitnessed, and unclear how long the patient was on the ground. A left hip xray was ordered on 7/8, and completed today that showed a left femoral neck fracture. The patient has dementia and some confusion at baseline, but apparently is normally conversant, answers questions appropriately and without difficulty.     In the ED, labs were significant for a sodium of 132, chloride of 92, WBC count of 11.7K, alk phos of 201. U/A was negative. Rapid COVID was negative. CT Head without contrast showed atrophy and small vessel ischemic disease. CT Cervical Spine showed no acute abnormality. CXR showed cardiomegaly with resolution of airspace disease.     Acute metabolic encephalopathy, resolved  Suspect 2/2 mechanical fall with hip fracture on top of underlying dementia. Possible polypharmacy. No evidence of infection including a negative urinalysis, negative CXR, negative CT Head and CT Cervical spine. Mild leukocytosis likely is reactive. Significantly improved day after admission.   -continue to monitor  -continue home meds  -check TSH and free T4--WNL  -hold sedatives  resolved     Left displaced femoral neck fracture  -orthopedic surgery consulted, recs appreciated  - OR 6/08 without complications  -PT OT 3-5 sessions per week     Mechanical fall   -workup as above  -check CK--mildly elevated at 500--trend, downtrended     Chronic atrial fibrillation on Eliquis in RVR  HR up to 200 on 7/10.   -Continued dilt drip perioperatively-- will stop, switch back to home PO dilt, incr metoprolol  - cards consult  - tele monitoring  - resume eliquis     Leukocytosis, improved  resolved  -continue to trend and monitor     Essential hypertension  -continue home meds     Chronic combined systolic and diastolic CHF   Appears compensated  -continue home meds  -tele monitoring  -hold Lasix, recommend restarting at discharge     Hyponatremia  Mild.   -trend  132 on admit, trended up and then back down to 130  Renal panel in outpatient     Hypothyroidism  -continue home meds     Depression  -continue home meds     Dementia  -continue home meds     Restless leg syndrome  -continue home meds     Hx COVID-19 infection  -does not appear to be clinically relevant. Negative in ED. Patient stable at time of discharge. Awaiting placement but they need tele removed for 24 hours as it is viewed as restraint. We are also waiting for precert. Patient medically stable since 7/13 for discharge. COVID testing ordered again per NH protocol. This also returned negative.  Today is 3rd avoidable day    Exam:     /66   Pulse 73   Temp 97.6 °F (36.4 °C) (Oral)   Resp 18   Ht 5' 5\" (1.651 m)   Wt 146 lb 2.6 oz (66.3 kg)   SpO2 96%   BMI 24.32 kg/m²     General appearance: No apparent distress, appears stated age and cooperative. HEENT: Pupils equal, round, and reactive to light. Conjunctivae/corneas clear. Neck: Supple, with full range of motion. No jugular venous distention. Trachea midline. Respiratory:  Normal respiratory effort. Clear to auscultation, bilaterally without Rales/Wheezes/Rhonchi. Cardiovascular: Regular rate and rhythm with normal S1/S2 . Abdomen: Soft, non-tender, non-distended   Musculoskeletal: decreased mobility in L hip  Skin: Skin color, texture, turgor normal.  No rashes or lesions. Neurologic:  Neurovascularly intact without any focal sensory/motor deficits. Cranial nerves: II-XII intact, grossly non-focal.  Psychiatric: Alert       Consults:     IP CONSULT TO HOSPITALIST  IP CONSULT TO CARDIOLOGY  IP CONSULT TO CASE MANAGEMENT    Significant Diagnostic Studies:     XR PELVIS (1-2 VIEWS)   Final Result   Expected postsurgical changes from left hip hemiarthroplasty. CT Head WO Contrast   Final Result   Motion limited exam, demonstrating atrophy and small vessel ischemic disease. CT Cervical Spine WO Contrast   Final Result   No acute abnormality of the cervical spine. XR CHEST PORTABLE   Final Result   Cardiomegaly with no significant finding in the chest.  Resolution of   airspace changes from the prior comparison study. XR HIP 2-3 VW W PELVIS LEFT   Final Result   Subcapital left femoral neck fracture. Disposition:  SNF     Condition at Discharge: Stable    Discharge Instructions/Follow-up:  PCP    Code Status:  DNR-CCA     Activity: activity as tolerated    Diet: regular diet      Labs:  For convenience and continuity at follow-up the following most recent labs are provided:      CBC:    Lab Results   Component Value Date    WBC 8.7 07/15/2020    HGB 9.6 07/15/2020    HCT 29.0 07/15/2020     07/15/2020       Renal:    Lab Results   Component Value Date     07/15/2020    K 5.3 07/15/2020    CL 91 07/15/2020    CO2 23 07/15/2020    BUN 58 07/15/2020    CREATININE 1.0 07/15/2020    CALCIUM 8.7 07/15/2020    PHOS 6.3 07/14/2020       Discharge Medications:     Current Discharge Medication List           Details   traMADol (ULTRAM) 50 MG tablet Take 1 tablet by mouth every 6 hours as needed for Pain for up to 5 days. Qty: 20 tablet, Refills: 0    Comments: Reduce doses taken as pain becomes manageable  Associated Diagnoses: Closed fracture of neck of left femur, initial encounter ([facility] [facility]              Details   metoprolol succinate (TOPROL XL) 100 MG extended release tablet Take 1 tablet by mouth daily  Qty: 30 tablet, Refills: 3              Details   THIAMINE MONONITRATE PO Take 1,000 mcg by mouth daily      dilTIAZem (CARDIZEM) 30 MG tablet Take 30 mg by mouth 4 times daily      potassium bicarb-citric acid (EFFER-K) 20 MEQ TBEF effervescent tablet Take 2 tablets by mouth 2 times daily      fluticasone (FLONASE) 50 MCG/ACT nasal spray 2 sprays by Each Nostril route daily      acetaminophen (TYLENOL) 325 MG tablet Take 650 mg by mouth every 4 hours as needed for Pain or Fever      furosemide (LASIX) 20 MG tablet Take 1 tablet by mouth daily  Qty: 60 tablet, Refills: 3      apixaban (ELIQUIS) 5 MG TABS tablet Take 2.5 mg by mouth 2 times daily       divalproex (DEPAKOTE) 250 MG DR tablet Take 250 mg by mouth 2 times daily       levothyroxine (SYNTHROID) 88 MCG tablet TAKE 1 TABLET BY MOUTH DAILY  Qty: 90 tablet, Refills: 1      pantoprazole (PROTONIX) 40 MG tablet TAKE (1) TABLET BY MOUTH DAILY  Qty: 90 tablet, Refills: 1      rOPINIRole (REQUIP) 1 MG tablet TAKE TWO (2) TABLETS BY MOUTH NIGHTLY  Qty: 180 tablet, Refills: 1      SYMBICORT 160-4.5 MCG/ACT AERO INHALE 2 PUFFS TWICE A DAY INTO THE LUNGS  Qty: 11 Inhaler, Refills: 0    Associated Diagnoses:  Moderate asthma      ferrous sulfate 325 (65 Fe) MG tablet Take 325 mg by mouth daily (with breakfast)      Multiple Vitamins-Minerals (THERAPEUTIC MULTIVITAMIN-MINERALS) tablet Take 1 tablet by mouth daily      DULoxetine HCl 40 MG CSDR Take 40 mg by mouth daily       PROAIR  (90 Base) MCG/ACT inhaler 2 PUFFS INTO LUNGS EVERY 4 HOURS AS NEEDED FOR WHEEZING OR FORSHORTNESS OF BREATH  Qty: 9 Inhaler, Refills: 0    Associated Diagnoses: Moderate asthma      LINZESS 290 MCG CAPS capsule Take 290 mcg by mouth every morning (before breakfast)       donepezil (ARICEPT) 10 MG tablet Take 10 mg by mouth nightly       Polyethylene Glycol 3350 (MIRALAX PO) Take 17 g by mouth daily       Cholecalciferol (VITAMIN D3) 5000 units TABS Take 1 tablet by mouth daily       Compression Stockings MISC by Does not apply route Bilateral lower extremity compression stockings, 15-20 mmHg  Qty: 2 each, Refills: 1      Spacer/Aero-Holding Chambers (E-Z SPACER) POLLY 1 Device by Does not apply route daily as needed. Qty: 1 Device, Refills: 0    Associated Diagnoses: Cough             No future appointments. Time Spent on discharge is more than 30 minutes in the examination, evaluation, counseling and review of medications and discharge plan. Signed:    Bill Blair MD   7/15/2020      Thank you Arcenio Owen III, APRN - CNP for the opportunity to be involved in this patient's care. If you have any questions or concerns please feel free to contact me at [phone].

## 2020-07-15 NOTE — PROGRESS NOTES
Occupational Therapy  Facility/Department: [address] PROGRESSIVE CARE  Daily Treatment Note  NAME: Jose Elias Cuadra  : 3/2/1929  MRN: [id number]    Date of Service: 7/15/2020    Discharge Recommendations:  [address]  OT Equipment Recommendations  Equipment Needed: Yes  Other: defer to dc location  Jose Elias Cuadra scored a 10/24 on the AM-PAC ADL Inpatient form. Current research shows that an AM-PAC score of 17 or less is typically not associated with a discharge to the patient's home setting. Based on the patient's AM-PAC score and their current ADL deficits, it is recommended that the patient have 3-5 sessions per week of Occupational Therapy at d/c to increase the patient's independence. Please see assessment section for further patient specific details. If patient discharges prior to next session this note will serve as a discharge summary. Please see below for the latest assessment towards goals. Assessment   Performance deficits / Impairments: Decreased endurance;Decreased functional mobility ; Decreased ADL status; Decreased balance;Decreased strength  Assessment: Pt demonstrating increased sit to stand transfers and stand pivot transfers with RW. Pt currently limited by L hip pain during mobility. Max cueing to initiate seated grooming. Pt would benefit from continued OT while in acute care, continue OT POC. Treatment Diagnosis: decreased ADLs and transfers secondary to L hip fx  OT Education: OT Role;Transfer Training  Patient Education: no learning noted, continue to ed  REQUIRES OT FOLLOW UP: Yes  Activity Tolerance  Activity Tolerance: Patient limited by pain; Patient limited by fatigue;Treatment limited secondary to decreased cognition  Activity Tolerance: Pt reported increased pain in L hip during transfers and bed mobility. Pt with max difficulty advancing R LE  Safety Devices  Safety Devices in place: Yes  Type of devices: Call light within reach;Nurse notified; Left in chair;Chair alarm in place         Patient Diagnosis(es): The primary encounter diagnosis was Altered mental status, unspecified altered mental status type. Diagnoses of Closed fracture of neck of left femur, initial encounter ([address] and History of 2019 novel coronavirus disease (COVID-19) were also pertinent to this visit. has a past medical history of Abdominal wall pain, Arrhythmia, Arthritis, Asthma, Atrial fibrillation ([address] Broken nose, CHF (congestive heart failure) ([address] Cough variant asthma, Dementia ([address] Dyspnea, Essential hypertension, Fatigue, Hematoma, Hyperlipidemia, Insomnia, Lumbar radiculopathy, Osteoporosis, Palpitation, Restless leg syndrome, Shingles, and Sinusitis. has a past surgical history that includes Ankle surgery (1997); Elbow surgery; Colonoscopy (10/25/10); Ovary removal (Left); joint replacement; Appendectomy; Hysterectomy; Cardioversion; Upper gastrointestinal endoscopy (02/27/2018); and Colonoscopy (02/27/2018). Restrictions  Restrictions/Precautions  Restrictions/Precautions: Fall Risk  Lower Extremity Weight Bearing Restrictions  Left Lower Extremity Weight Bearing: Weight Bearing As Tolerated  Position Activity Restriction  Hip Precautions: (direct lateral approach - no combined hip (extension and external rotation), no ADD beyond neutral, no active ABD x 6 weeks post-op, strict walker use x 6 weeks post-op)  Other position/activity restrictions: direct lateral approach- no combined hip extension and ER, no adduction beyond neutral, no active ABD x 2 weeks post surgery, strict walker use x 6 weeks post op  Subjective   General  Chart Reviewed: Yes  Patient assessed for rehabilitation services?: Yes  Additional Pertinent Hx: Pt admitted from SNF where she was recovering s/p COVID. Pt presentedto ED s/p fall, XR: displaced L femur Fx. Pt s/p Left hip hemiarthroplasty, direct lateral on 7/11.  COVID negative on 7/9/20 PMHX includes:  has a past medical history of 0-100% Score: 74.7 (07/13/20 1506)  ADL Inpatient CMS G-Code Modifier : CL (07/13/20 1506)    Goals  Short term goals  Time Frame for Short term goals: by dc- ongoing, continue  Short term goal 1: Pt will complete BSC transfer with max A x 1  Short term goal 2: Pt will complete B UE HEP x 20 reps  Short term goal 3: Pt will complete grooming task with min A from seated position  Patient Goals   Patient goals : pt unable to state       Therapy Time   Individual Concurrent Group Co-treatment   Time In 0914         Time Out 0939         Minutes 25         Timed Code Treatment Minutes: [address] [facility]  [address] [facility], OTR/L [id number]

## 2020-07-16 LAB
A/G RATIO: 1 (ref 1.1–2.2)
ALBUMIN SERPL-MCNC: 2.9 G/DL (ref 3.4–5)
ALP BLD-CCNC: 106 U/L (ref 40–129)
ALT SERPL-CCNC: 9 U/L (ref 10–40)
ANION GAP SERPL CALCULATED.3IONS-SCNC: 12 MMOL/L (ref 3–16)
ANISOCYTOSIS: ABNORMAL
AST SERPL-CCNC: 17 U/L (ref 15–37)
BANDED NEUTROPHILS RELATIVE PERCENT: 1 % (ref 0–7)
BASOPHILS ABSOLUTE: 0.1 K/UL (ref 0–0.2)
BASOPHILS RELATIVE PERCENT: 1 %
BILIRUB SERPL-MCNC: 2.7 MG/DL (ref 0–1)
BUN BLDV-MCNC: 53 MG/DL (ref 7–20)
CALCIUM SERPL-MCNC: 8.5 MG/DL (ref 8.3–10.6)
CHLORIDE BLD-SCNC: 93 MMOL/L (ref 99–110)
CO2: 23 MMOL/L (ref 21–32)
CREAT SERPL-MCNC: 0.7 MG/DL (ref 0.6–1.2)
EOSINOPHILS ABSOLUTE: 0.4 K/UL (ref 0–0.6)
EOSINOPHILS RELATIVE PERCENT: 6 %
GFR AFRICAN AMERICAN: >60
GFR NON-AFRICAN AMERICAN: >60
GLOBULIN: 3 G/DL
GLUCOSE BLD-MCNC: 117 MG/DL (ref 70–99)
GLUCOSE BLD-MCNC: 126 MG/DL (ref 70–99)
GLUCOSE BLD-MCNC: 149 MG/DL (ref 70–99)
GLUCOSE BLD-MCNC: 80 MG/DL (ref 70–99)
GLUCOSE BLD-MCNC: 81 MG/DL (ref 70–99)
HCT VFR BLD CALC: 30.9 % (ref 36–48)
HEMOGLOBIN: 10.2 G/DL (ref 12–16)
LYMPHOCYTES ABSOLUTE: 1.5 K/UL (ref 1–5.1)
LYMPHOCYTES RELATIVE PERCENT: 20 %
MAGNESIUM: 3.1 MG/DL (ref 1.8–2.4)
MCH RBC QN AUTO: 30.2 PG (ref 26–34)
MCHC RBC AUTO-ENTMCNC: 32.9 G/DL (ref 31–36)
MCV RBC AUTO: 91.8 FL (ref 80–100)
METAMYELOCYTES RELATIVE PERCENT: 1 %
MONOCYTES ABSOLUTE: 0.7 K/UL (ref 0–1.3)
MONOCYTES RELATIVE PERCENT: 10 %
NEUTROPHILS ABSOLUTE: 4.7 K/UL (ref 1.7–7.7)
NEUTROPHILS RELATIVE PERCENT: 61 %
PDW BLD-RTO: 17.3 % (ref 12.4–15.4)
PERFORMED ON: ABNORMAL
PERFORMED ON: NORMAL
PLATELET # BLD: 305 K/UL (ref 135–450)
PLATELET SLIDE REVIEW: ADEQUATE
PMV BLD AUTO: 8.8 FL (ref 5–10.5)
POTASSIUM REFLEX MAGNESIUM: 4.9 MMOL/L (ref 3.5–5.1)
RBC # BLD: 3.36 M/UL (ref 4–5.2)
SLIDE REVIEW: ABNORMAL
SODIUM BLD-SCNC: 128 MMOL/L (ref 136–145)
TOTAL PROTEIN: 5.9 G/DL (ref 6.4–8.2)
WBC # BLD: 7.4 K/UL (ref 4–11)

## 2020-07-16 PROCEDURE — 94640 AIRWAY INHALATION TREATMENT: CPT

## 2020-07-16 PROCEDURE — 2700000000 HC OXYGEN THERAPY PER DAY

## 2020-07-16 PROCEDURE — 97530 THERAPEUTIC ACTIVITIES: CPT

## 2020-07-16 PROCEDURE — 2060000000 HC ICU INTERMEDIATE R&B

## 2020-07-16 PROCEDURE — 94761 N-INVAS EAR/PLS OXIMETRY MLT: CPT

## 2020-07-16 PROCEDURE — 80053 COMPREHEN METABOLIC PANEL: CPT

## 2020-07-16 PROCEDURE — 36415 COLL VENOUS BLD VENIPUNCTURE: CPT

## 2020-07-16 PROCEDURE — 85025 COMPLETE CBC W/AUTO DIFF WBC: CPT

## 2020-07-16 PROCEDURE — 83735 ASSAY OF MAGNESIUM: CPT

## 2020-07-16 PROCEDURE — 6370000000 HC RX 637 (ALT 250 FOR IP): Performed by: ORTHOPAEDIC SURGERY

## 2020-07-16 PROCEDURE — 6370000000 HC RX 637 (ALT 250 FOR IP): Performed by: INTERNAL MEDICINE

## 2020-07-16 RX ADMIN — FLUTICASONE PROPIONATE 2 SPRAY: 50 SPRAY, METERED NASAL at 08:49

## 2020-07-16 RX ADMIN — DILTIAZEM HYDROCHLORIDE 30 MG: 30 TABLET, FILM COATED ORAL at 13:41

## 2020-07-16 RX ADMIN — BUDESONIDE AND FORMOTEROL FUMARATE DIHYDRATE 2 PUFF: 160; 4.5 AEROSOL RESPIRATORY (INHALATION) at 20:59

## 2020-07-16 RX ADMIN — FERROUS SULFATE TAB EC 324 MG (65 MG FE EQUIVALENT) 324 MG: 324 (65 FE) TABLET DELAYED RESPONSE at 08:41

## 2020-07-16 RX ADMIN — APIXABAN 2.5 MG: 2.5 TABLET, FILM COATED ORAL at 08:41

## 2020-07-16 RX ADMIN — POLYETHYLENE GLYCOL 3350 17 G: 17 POWDER, FOR SOLUTION ORAL at 21:06

## 2020-07-16 RX ADMIN — DONEPEZIL HYDROCHLORIDE 10 MG: 10 TABLET, FILM COATED ORAL at 21:06

## 2020-07-16 RX ADMIN — BUDESONIDE AND FORMOTEROL FUMARATE DIHYDRATE 2 PUFF: 160; 4.5 AEROSOL RESPIRATORY (INHALATION) at 08:04

## 2020-07-16 RX ADMIN — Medication 5000 UNITS: at 08:49

## 2020-07-16 RX ADMIN — TRAMADOL HYDROCHLORIDE 50 MG: 50 TABLET, FILM COATED ORAL at 13:42

## 2020-07-16 RX ADMIN — DIVALPROEX SODIUM 250 MG: 250 TABLET, DELAYED RELEASE ORAL at 08:41

## 2020-07-16 RX ADMIN — SENNOSIDES 8.6 MG: 8.6 TABLET, FILM COATED ORAL at 21:06

## 2020-07-16 RX ADMIN — POLYETHYLENE GLYCOL 3350 17 G: 17 POWDER, FOR SOLUTION ORAL at 08:41

## 2020-07-16 RX ADMIN — PANTOPRAZOLE SODIUM 40 MG: 40 TABLET, DELAYED RELEASE ORAL at 07:08

## 2020-07-16 RX ADMIN — LEVOTHYROXINE SODIUM 88 MCG: 0.09 TABLET ORAL at 07:08

## 2020-07-16 RX ADMIN — DIVALPROEX SODIUM 250 MG: 250 TABLET, DELAYED RELEASE ORAL at 21:06

## 2020-07-16 RX ADMIN — MULTIPLE VITAMINS W/ MINERALS TAB 1 TABLET: TAB at 08:41

## 2020-07-16 RX ADMIN — OXYCODONE 5 MG: 5 TABLET ORAL at 15:03

## 2020-07-16 RX ADMIN — METOPROLOL SUCCINATE 100 MG: 50 TABLET, EXTENDED RELEASE ORAL at 08:40

## 2020-07-16 RX ADMIN — DULOXETINE HYDROCHLORIDE 40 MG: 20 CAPSULE, DELAYED RELEASE ORAL at 08:41

## 2020-07-16 RX ADMIN — ROPINIROLE HYDROCHLORIDE 2 MG: 1 TABLET, FILM COATED ORAL at 21:06

## 2020-07-16 RX ADMIN — APIXABAN 2.5 MG: 2.5 TABLET, FILM COATED ORAL at 21:06

## 2020-07-16 RX ADMIN — DILTIAZEM HYDROCHLORIDE 30 MG: 30 TABLET, FILM COATED ORAL at 21:06

## 2020-07-16 RX ADMIN — DILTIAZEM HYDROCHLORIDE 30 MG: 30 TABLET, FILM COATED ORAL at 08:41

## 2020-07-16 ASSESSMENT — PAIN DESCRIPTION - ORIENTATION: ORIENTATION: LEFT

## 2020-07-16 ASSESSMENT — PAIN DESCRIPTION - ONSET: ONSET: GRADUAL

## 2020-07-16 ASSESSMENT — PAIN SCALES - GENERAL
PAINLEVEL_OUTOF10: 0
PAINLEVEL_OUTOF10: 8
PAINLEVEL_OUTOF10: 8
PAINLEVEL_OUTOF10: 0
PAINLEVEL_OUTOF10: 9

## 2020-07-16 ASSESSMENT — PAIN DESCRIPTION - DESCRIPTORS: DESCRIPTORS: DISCOMFORT;NAGGING;SHARP

## 2020-07-16 ASSESSMENT — PAIN DESCRIPTION - LOCATION: LOCATION: HIP

## 2020-07-16 ASSESSMENT — PAIN - FUNCTIONAL ASSESSMENT: PAIN_FUNCTIONAL_ASSESSMENT: PREVENTS OR INTERFERES SOME ACTIVE ACTIVITIES AND ADLS

## 2020-07-16 ASSESSMENT — PAIN DESCRIPTION - PAIN TYPE
TYPE: SURGICAL PAIN
TYPE: SURGICAL PAIN

## 2020-07-16 ASSESSMENT — PAIN DESCRIPTION - PROGRESSION: CLINICAL_PROGRESSION: GRADUALLY WORSENING

## 2020-07-16 ASSESSMENT — PAIN DESCRIPTION - FREQUENCY: FREQUENCY: INTERMITTENT

## 2020-07-16 NOTE — PROGRESS NOTES
Notified by Markos Valentin that pt was off the tele monitor. This RN along with the PCA enter the pt's room, both IVs from pts R arm are laying in the bed, along with the tele leads/monitor and pt's nasal canula. RN replaces nasal canula and checks SPO2 which is in the mid 90's. Pt is confused, uncooperative and verbally abusive at this time. Will continue to monitor closely.

## 2020-07-16 NOTE — DISCHARGE SUMMARY
Hospital Medicine Discharge Summary    Patient ID: Michelle Callahan      Patient's PCP: Chinyere Valdes, APRN - CNP    Admit Date: 7/9/2020     Discharge Date:   7/16/20    Admitting Physician: Angelina Egan MD     Discharge Physician: Melvi Daniel MD     Discharge Diagnoses: Active Hospital Problems    Diagnosis Date Noted    Left displaced femoral neck fracture ([address] [S72.002A] 07/09/2020    Chronic anticoagulation [Z79.01] 07/09/2020    Altered mental status [R41.82]     History of 2019 novel coronavirus disease (COVID-19) [Z86.19]     Dementia without behavioral disturbance ([address] [F03.90]     Atrial fibrillation with rapid ventricular response ([address] [I48.91] 06/29/2015    Osteoporosis [M81.0] 08/24/2011       The patient was seen and examined on day of discharge and this discharge summary is in conjunction with any daily progress note from day of discharge. Hospital Course:   80 y. o. female with hx dementia, chronic atrial fibrillation on anticoagulation, restless leg syndrome, HTN, HLD, combined systolic/diastolic CHF, hypothyroidism, asthma, and depression who presents to [facility] with fall and altered mental status. Patient is a nursing home resident at [facility], and fell out of her bed two days ago. It was unwitnessed, and unclear how long the patient was on the ground. A left hip xray was ordered on 7/8, and completed today that showed a left femoral neck fracture. The patient has dementia and some confusion at baseline, but apparently is normally conversant, answers questions appropriately and without difficulty.     In the ED, labs were significant for a sodium of 132, chloride of 92, WBC count of 11.7K, alk phos of 201. U/A was negative. Rapid COVID was negative. CT Head without contrast showed atrophy and small vessel ischemic disease. CT Cervical Spine showed no acute abnormality. CXR showed cardiomegaly with resolution of airspace disease.     Acute metabolic encephalopathy, resolved  Suspect 2/2 mechanical fall with hip fracture on top of underlying dementia. Possible polypharmacy. No evidence of infection including a negative urinalysis, negative CXR, negative CT Head and CT Cervical spine. Mild leukocytosis likely is reactive. Significantly improved day after admission.   -continue to monitor  -continue home meds  -check TSH and free T4--WNL  -hold sedatives  resolved     Left displaced femoral neck fracture  -orthopedic surgery consulted, recs appreciated  - OR 4/84 without complications  -PT OT 3-5 sessions per week     Mechanical fall   -workup as above  -check CK--mildly elevated at 500--trend, downtrended     Chronic atrial fibrillation on Eliquis in RVR  HR up to 200 on 7/10.   -Continued dilt drip perioperatively-- will stop, switch back to home PO dilt, incr metoprolol  - cards consult  - tele monitoring  - resume eliquis     Leukocytosis, improved  resolved  -continue to trend and monitor     Essential hypertension  -continue home meds     Chronic combined systolic and diastolic CHF   Appears compensated  -continue home meds  -tele monitoring  -hold Lasix, recommend restarting at discharge     Hyponatremia  Mild.   -trend  132 on admit, trended up and then back down to 130  Renal panel in outpatient     Hypothyroidism  -continue home meds     Depression  -continue home meds     Dementia  -continue home meds     Restless leg syndrome  -continue home meds     Hx COVID-19 infection  -does not appear to be clinically relevant. Negative in ED. Patient stable at time of discharge. Awaiting placement but they need tele removed for 24 hours as it is viewed as restraint. We are also waiting for precert. Patient medically stable since 7/13 for discharge. COVID testing ordered again per NH protocol. This also returned negative.  Today is 3rd avoidable day    Exam:     BP 98/65   Pulse 79   Temp 97.3 °F (36.3 °C) (Axillary)   Resp 18   Ht 5' 5\" (1.651 m)   Wt 139 lb 5.3 oz (63.2 kg)   SpO2 92%   BMI 23.19 kg/m²     General appearance: No apparent distress, appears stated age and cooperative. HEENT: Pupils equal, round, and reactive to light. Conjunctivae/corneas clear. Neck: Supple, with full range of motion. No jugular venous distention. Trachea midline. Respiratory:  Normal respiratory effort. Clear to auscultation, bilaterally without Rales/Wheezes/Rhonchi. Cardiovascular: Regular rate and rhythm with normal S1/S2 . Abdomen: Soft, non-tender, non-distended   Musculoskeletal: decreased mobility in L hip  Skin: Skin color, texture, turgor normal.  No rashes or lesions. Neurologic:  Neurovascularly intact without any focal sensory/motor deficits. Cranial nerves: II-XII intact, grossly non-focal.  Psychiatric: Alert       Consults:     IP CONSULT TO HOSPITALIST  IP CONSULT TO CARDIOLOGY  IP CONSULT TO CASE MANAGEMENT    Significant Diagnostic Studies:     XR PELVIS (1-2 VIEWS)   Final Result   Expected postsurgical changes from left hip hemiarthroplasty. CT Head WO Contrast   Final Result   Motion limited exam, demonstrating atrophy and small vessel ischemic disease. CT Cervical Spine WO Contrast   Final Result   No acute abnormality of the cervical spine. XR CHEST PORTABLE   Final Result   Cardiomegaly with no significant finding in the chest.  Resolution of   airspace changes from the prior comparison study. XR HIP 2-3 VW W PELVIS LEFT   Final Result   Subcapital left femoral neck fracture. Disposition:  SNF     Condition at Discharge: Stable    Discharge Instructions/Follow-up:  PCP    Code Status:  DNR-CCA     Activity: activity as tolerated    Diet: regular diet      Labs:  For convenience and continuity at follow-up the following most recent labs are provided:      CBC:    Lab Results   Component Value Date    WBC 7.4 07/16/2020    HGB 10.2 07/16/2020    HCT 30.9 07/16/2020     07/16/2020       Renal:    Lab Results   Component Value Date     07/15/2020    K 5.3 07/15/2020    CL 91 07/15/2020    CO2 23 07/15/2020    BUN 58 07/15/2020    CREATININE 1.0 07/15/2020    CALCIUM 8.7 07/15/2020    PHOS 6.3 07/14/2020       Discharge Medications:     Current Discharge Medication List           Details   traMADol (ULTRAM) 50 MG tablet Take 1 tablet by mouth every 6 hours as needed for Pain for up to 5 days. Qty: 20 tablet, Refills: 0    Comments: Reduce doses taken as pain becomes manageable  Associated Diagnoses: Closed fracture of neck of left femur, initial encounter ([facility] [address]              Details   metoprolol succinate (TOPROL XL) 100 MG extended release tablet Take 1 tablet by mouth daily  Qty: 30 tablet, Refills: 3              Details   THIAMINE MONONITRATE PO Take 1,000 mcg by mouth daily      dilTIAZem (CARDIZEM) 30 MG tablet Take 30 mg by mouth 4 times daily      potassium bicarb-citric acid (EFFER-K) 20 MEQ TBEF effervescent tablet Take 2 tablets by mouth 2 times daily      fluticasone (FLONASE) 50 MCG/ACT nasal spray 2 sprays by Each Nostril route daily      acetaminophen (TYLENOL) 325 MG tablet Take 650 mg by mouth every 4 hours as needed for Pain or Fever      furosemide (LASIX) 20 MG tablet Take 1 tablet by mouth daily  Qty: 60 tablet, Refills: 3      apixaban (ELIQUIS) 5 MG TABS tablet Take 2.5 mg by mouth 2 times daily       divalproex (DEPAKOTE) 250 MG DR tablet Take 250 mg by mouth 2 times daily       levothyroxine (SYNTHROID) 88 MCG tablet TAKE 1 TABLET BY MOUTH DAILY  Qty: 90 tablet, Refills: 1      pantoprazole (PROTONIX) 40 MG tablet TAKE (1) TABLET BY MOUTH DAILY  Qty: 90 tablet, Refills: 1      rOPINIRole (REQUIP) 1 MG tablet TAKE TWO (2) TABLETS BY MOUTH NIGHTLY  Qty: 180 tablet, Refills: 1      SYMBICORT 160-4.5 MCG/ACT AERO INHALE 2 PUFFS TWICE A DAY INTO THE LUNGS  Qty: 11 Inhaler, Refills: 0    Associated Diagnoses:  Moderate asthma      ferrous sulfate 325 (65 Fe) MG tablet Take 325 mg by mouth daily (with breakfast)      Multiple Vitamins-Minerals (THERAPEUTIC MULTIVITAMIN-MINERALS) tablet Take 1 tablet by mouth daily      DULoxetine HCl 40 MG CSDR Take 40 mg by mouth daily       PROAIR  (90 Base) MCG/ACT inhaler 2 PUFFS INTO LUNGS EVERY 4 HOURS AS NEEDED FOR WHEEZING OR FORSHORTNESS OF BREATH  Qty: 9 Inhaler, Refills: 0    Associated Diagnoses: Moderate asthma      LINZESS 290 MCG CAPS capsule Take 290 mcg by mouth every morning (before breakfast)       donepezil (ARICEPT) 10 MG tablet Take 10 mg by mouth nightly       Polyethylene Glycol 3350 (MIRALAX PO) Take 17 g by mouth daily       Cholecalciferol (VITAMIN D3) 5000 units TABS Take 1 tablet by mouth daily       Compression Stockings MISC by Does not apply route Bilateral lower extremity compression stockings, 15-20 mmHg  Qty: 2 each, Refills: 1      Spacer/Aero-Holding Chambers (E-Z SPACER) POLLY 1 Device by Does not apply route daily as needed. Qty: 1 Device, Refills: 0    Associated Diagnoses: Cough             No future appointments. Time Spent on discharge is more than 30 minutes in the examination, evaluation, counseling and review of medications and discharge plan. Signed:    Maliha Kramer MD   7/16/2020      Thank you GURWINDER Ivy - CNP for the opportunity to be involved in this patient's care. If you have any questions or concerns please feel free to contact me at [phone].

## 2020-07-16 NOTE — PROGRESS NOTES
alarm in place; All fall risk precautions in place; Patient at risk for falls         Patient Diagnosis(es): The primary encounter diagnosis was Altered mental status, unspecified altered mental status type. Diagnoses of Closed fracture of neck of left femur, initial encounter ([address] and History of 2019 novel coronavirus disease (COVID-19) were also pertinent to this visit. has a past medical history of Abdominal wall pain, Arrhythmia, Arthritis, Asthma, Atrial fibrillation ([address] Broken nose, CHF (congestive heart failure) ([facility] Cough variant asthma, Dementia ([facility] Dyspnea, Essential hypertension, Fatigue, Hematoma, Hyperlipidemia, Insomnia, Lumbar radiculopathy, Osteoporosis, Palpitation, Restless leg syndrome, Shingles, and Sinusitis. has a past surgical history that includes Ankle surgery (1997); Elbow surgery; Colonoscopy (10/25/10); Ovary removal (Left); joint replacement; Appendectomy; Hysterectomy; Cardioversion; Upper gastrointestinal endoscopy (02/27/2018); and Colonoscopy (02/27/2018). Restrictions  Restrictions/Precautions  Restrictions/Precautions: Fall Risk  Lower Extremity Weight Bearing Restrictions  Left Lower Extremity Weight Bearing: Weight Bearing As Tolerated  Position Activity Restriction  Hip Precautions: (direct lateral approach - no combined hip (extension and external rotation), no ADD beyond neutral, no active ABD x 6 weeks post-op, strict walker use x 6 weeks post-op)  Other position/activity restrictions: direct lateral approach- no combined hip extension and ER, no adduction beyond neutral, no active ABD x 2 weeks post surgery, strict walker use x 6 weeks post op; per ortho-posterior approach  Subjective   General  Chart Reviewed: Yes  Patient assessed for rehabilitation services?: Yes  Additional Pertinent Hx: Pt admitted from SNF where she was recovering s/p COVID. Pt presentedto ED s/p fall, XR: displaced L femur Fx. Pt s/p Left hip hemiarthroplasty, direct lateral on 7/11. COVID negative on 7/9/20 PMHX includes:  has a past medical history of Abdominal wall pain, Arrhythmia, Arthritis, Asthma, Atrial fibrillation ([address] Broken nose (2016), CHF (congestive heart failure) ([address] (08/2018), Cough variant asthma, Dementia ([address] Dyspnea, Essential hypertension (8/1/2018), Fatigue, Hematoma, Hyperlipidemia, Insomnia, Lumbar radiculopathy, Osteoporosis, Palpitation, Restless leg syndrome, Shingles, and Sinusitis. Family / Caregiver Present: No  Referring Practitioner: Yamil Ding MD  Diagnosis: L displaced femoral neck Fx  Subjective  Subjective: Pt semi supine in bed upon arrival. Patient agrumentative and requesting to be \"taken to my car\". OT re-oriented patient to place and situation. Patient unable to recall that she has fx her hip. Orientation  Orientation  Overall Orientation Status: Impaired  Orientation Level: Oriented to person;Disoriented to time;Disoriented to situation(patient thought she was in a restaurant)  Objective    ADL  LE Dressing: Dependent/Total(for brief management; PCA assisting with balance as OT assisted with LB management)        Balance  Sitting Balance: Contact guard assistance  Standing Balance: Dependent/Total(min x2 stand)  Standing Balance  Time: PRN  Activity: sit <> Debi Jangl SMSbach  Functional Mobility  Functional Mobility Comments: Total assist with lift device [facility])  Bed mobility  Supine to Sit: Minimal assistance(HOB elevated; increased time; assistance with LLE)  Transfers  Sit to stand: Moderate assistance  Stand to sit: Moderate assistance  Transfer Comments: sit <> [facility]  Overall Cognitive Status: Exceptions  Arousal/Alertness: Delayed responses to stimuli  Following Commands:  Follows one step commands with repetition  Attention Span: Difficulty attending to directions  Memory: Decreased recall of precautions  Safety Judgement: Decreased awareness of need for assistance;Decreased awareness

## 2020-07-16 NOTE — PROGRESS NOTES
Pt is refusing medications at this time. Will attempt at a later time. Pt still confused and being uncooperative. Will continue to monitor.

## 2020-07-16 NOTE — PROGRESS NOTES
Shift assessment completed. See flow sheets. VSS. Patient denies pain. [place] removed. Patient tolerated well. Morning meds given and patient set up for breakfast. Bed alarm on. Call light in reach. No further needs. Will continue monitoring.

## 2020-07-17 VITALS
DIASTOLIC BLOOD PRESSURE: 62 MMHG | HEIGHT: 65 IN | HEART RATE: 73 BPM | TEMPERATURE: 97.5 F | RESPIRATION RATE: 18 BRPM | WEIGHT: 141.09 LBS | OXYGEN SATURATION: 95 % | SYSTOLIC BLOOD PRESSURE: 103 MMHG | BODY MASS INDEX: 23.51 KG/M2

## 2020-07-17 LAB
GLUCOSE BLD-MCNC: 143 MG/DL (ref 70–99)
GLUCOSE BLD-MCNC: 85 MG/DL (ref 70–99)
PERFORMED ON: ABNORMAL
PERFORMED ON: NORMAL

## 2020-07-17 PROCEDURE — 6370000000 HC RX 637 (ALT 250 FOR IP): Performed by: ORTHOPAEDIC SURGERY

## 2020-07-17 PROCEDURE — 94760 N-INVAS EAR/PLS OXIMETRY 1: CPT

## 2020-07-17 PROCEDURE — 6370000000 HC RX 637 (ALT 250 FOR IP): Performed by: INTERNAL MEDICINE

## 2020-07-17 PROCEDURE — 94640 AIRWAY INHALATION TREATMENT: CPT

## 2020-07-17 PROCEDURE — 2700000000 HC OXYGEN THERAPY PER DAY

## 2020-07-17 PROCEDURE — 2580000003 HC RX 258: Performed by: ORTHOPAEDIC SURGERY

## 2020-07-17 RX ADMIN — METOPROLOL SUCCINATE 100 MG: 50 TABLET, EXTENDED RELEASE ORAL at 09:38

## 2020-07-17 RX ADMIN — DIVALPROEX SODIUM 250 MG: 250 TABLET, DELAYED RELEASE ORAL at 09:38

## 2020-07-17 RX ADMIN — FERROUS SULFATE TAB EC 324 MG (65 MG FE EQUIVALENT) 324 MG: 324 (65 FE) TABLET DELAYED RESPONSE at 09:38

## 2020-07-17 RX ADMIN — APIXABAN 2.5 MG: 2.5 TABLET, FILM COATED ORAL at 09:38

## 2020-07-17 RX ADMIN — SODIUM CHLORIDE, PRESERVATIVE FREE 10 ML: 5 INJECTION INTRAVENOUS at 09:38

## 2020-07-17 RX ADMIN — BUDESONIDE AND FORMOTEROL FUMARATE DIHYDRATE 2 PUFF: 160; 4.5 AEROSOL RESPIRATORY (INHALATION) at 09:28

## 2020-07-17 RX ADMIN — FLUTICASONE PROPIONATE 2 SPRAY: 50 SPRAY, METERED NASAL at 09:37

## 2020-07-17 RX ADMIN — MULTIPLE VITAMINS W/ MINERALS TAB 1 TABLET: TAB at 09:38

## 2020-07-17 RX ADMIN — PANTOPRAZOLE SODIUM 40 MG: 40 TABLET, DELAYED RELEASE ORAL at 06:01

## 2020-07-17 RX ADMIN — DILTIAZEM HYDROCHLORIDE 30 MG: 30 TABLET, FILM COATED ORAL at 09:38

## 2020-07-17 RX ADMIN — LEVOTHYROXINE SODIUM 88 MCG: 0.09 TABLET ORAL at 06:01

## 2020-07-17 RX ADMIN — POLYETHYLENE GLYCOL 3350 17 G: 17 POWDER, FOR SOLUTION ORAL at 09:37

## 2020-07-17 RX ADMIN — DULOXETINE HYDROCHLORIDE 40 MG: 20 CAPSULE, DELAYED RELEASE ORAL at 09:37

## 2020-07-17 ASSESSMENT — PAIN SCALES - GENERAL
PAINLEVEL_OUTOF10: 0

## 2020-07-17 ASSESSMENT — PAIN SCALES - WONG BAKER
WONGBAKER_NUMERICALRESPONSE: 0

## 2020-07-17 NOTE — PLAN OF CARE
All goals met/resolved at discharge.
Patient to improve functional mobility by discharge
Problem: Falls - Risk of:  Goal: Absence of physical injury  Description: Absence of physical injury  7/13/2020 0557 by Darell Bañuelos RN  Outcome: Ongoing  7/12/2020 1856 by Neno Kern RN  Outcome: Ongoing     Problem: Falls - Risk of:  Goal: Will remain free from falls  Description: Will remain free from falls  7/13/2020 0557 by Darell Bañuelos RN  Outcome: Ongoing  7/12/2020 1856 by Neno Kern RN  Outcome: Ongoing     Problem: Skin Integrity:  Goal: Will show no infection signs and symptoms  Description: Will show no infection signs and symptoms  7/13/2020 0557 by Darell Bañuelos RN  Outcome: Ongoing  7/12/2020 1856 by Neno Kern RN  Outcome: Ongoing
Problem: Falls - Risk of:  Goal: Will remain free from falls  Description: Will remain free from falls  7/10/2020 0927 by Cosmo Matos RN  Outcome: Ongoing  Note: Fall risk assessment completed. Fall precautions in place. Call light within reach. Pt educated on calling for assistance before getting up. Walkway free of clutter. Will continue to monitor. Electronically signed by Cosmo Matos RN on 7/10/2020 at 9:27 AM      Problem: Pain:  Goal: Pain level will decrease  Description: Pain level will decrease  7/10/2020 0927 by Cosmo Matos RN  Outcome: Ongoing  Note: Educated patient on pain management. Will assess patients pain level per unit protocol, and provide pain management measures as needed. Electronically signed by Cosmo Matos RN on 7/10/2020 at 9:28 AM      Problem: Skin Integrity:  Goal: Will show no infection signs and symptoms  Description: Will show no infection signs and symptoms  7/10/2020 0927 by Cosmo Matos RN  Outcome: Ongoing  Note: Will monitor skin and mucous membranes. Will turn patient every 2 hours, monitor for friction and sheering, and change dressings as needed. Will preform skin assessment every shift.    Electronically signed by Cosmo Matos RN on 7/10/2020 at 9:28 AM
Problem: Falls - Risk of:  Goal: Will remain free from falls  Description: Will remain free from falls  Outcome: Ongoing     Problem: Falls - Risk of:  Goal: Absence of physical injury  Description: Absence of physical injury  Outcome: Ongoing     Problem: Pain:  Goal: Pain level will decrease  Description: Pain level will decrease  Outcome: Ongoing     Problem: Pain:  Goal: Control of acute pain  Description: Control of acute pain  Outcome: Ongoing     Problem: Skin Integrity:  Goal: Will show no infection signs and symptoms  Description: Will show no infection signs and symptoms  Outcome: Ongoing     Problem: Skin Integrity:  Goal: Absence of new skin breakdown  Description: Absence of new skin breakdown  Outcome: Ongoing     Problem: Nutrition Deficit:  Goal: Ability to achieve adequate nutritional intake will improve  Description: Ability to achieve adequate nutritional intake will improve  Outcome: Ongoing
Problem: Falls - Risk of:  Goal: Will remain free from falls  Description: Will remain free from falls  Outcome: Ongoing  Goal: Absence of physical injury  Description: Absence of physical injury  Outcome: Ongoing     Problem: Pain:  Goal: Pain level will decrease  Description: Pain level will decrease  Outcome: Ongoing     Problem: Skin Integrity:  Goal: Will show no infection signs and symptoms  Description: Will show no infection signs and symptoms  Outcome: Ongoing  Goal: Absence of new skin breakdown  Description: Absence of new skin breakdown  Outcome: Ongoing     Problem: Nutrition Deficit:  Goal: Ability to achieve adequate nutritional intake will improve  Description: Ability to achieve adequate nutritional intake will improve  Outcome: Ongoing
of shift. Problem: Skin Integrity:  Goal: Will show no infection signs and symptoms  Description: Will show no infection signs and symptoms  7/10/2020 0112 by Dorys Lopez RN  Outcome: Ongoing  Note: Pt is free of signs and symptoms of infection. Vital signs stable. Will monitor. 7/9/2020 1724 by Ruben Brown RN  Outcome: Ongoing  Note: Pt shows no signs and symptoms of infection at this time. Will monitor patient, labs and vitals. Goal: Absence of new skin breakdown  Description: Absence of new skin breakdown  7/10/2020 0112 by Dorys Lopez RN  Outcome: Ongoing  7/9/2020 1724 by Ruben Brown RN  Outcome: Ongoing  Note: Pt shows no new signs of skin integrity. Will monitor and reposition every two hours. Problem: Nutrition Deficit:  Goal: Ability to achieve adequate nutritional intake will improve  Description: Ability to achieve adequate nutritional intake will improve  7/10/2020 0112 by Dorys Lopez RN  Outcome: Ongoing  7/9/2020 1724 by Ruben Brown RN  Outcome: Ongoing  Note: Pt nutritional intake is poor. Will monitor.

## 2020-07-17 NOTE — CARE COORDINATION
Call from Sonja at [facility] ([phone]) who reports that updated PT/ OT notes are needed for [employer] precert. Pt will need Covid testing within 7 days of admission. Per chart review pt tested negative for Covid-19 on 7/14. Sonja also reports that tele monitor is not considered restraint - only tele sitter (which pt does not have at this time) and that pt is able to move on to SNF facility at this time if precert obtained. Marilu placed call to Tristen Tang requesting pt/ ot treat. Electronically signed by Su Boas, MSW, IFTIKHAR on 7/15/2020 at 9:08 AM    Sw call to Sonja to inform of updated PT/OT notes in chart. Sonja will pull from [facility] and sent for precert.      Electronically signed by Su Boas, MSW, IFTIKHAR on 7/15/2020 at 10:08 AM
Marilu placed call to [facility] at [facility]. For future reference her phone number is [phone] SW left  in regards to referral. Per her voicemail the phone number we can also call is [phone]. Fax number is [phone]. SW left message requesting a return call back regarding this referral as soon as possible. It was reported by previous  that this patient was from [address] [place] [address] and daughter does not want her to return. Respectfully submitted,    BUZZ Madison  [facility]   [phone]    Electronically signed by BUZZ Cunha on 7/14/2020 at 9:08 AM
Marilu placed call to [facility] at [facility]. For future reference her phone number is [phone]. MARILU informed that MD was agreeable to removing restraint for this patient. She will start pre-cert today and we will get COVID testing updated per their requirements. We will follow up once more info comes available.      Respectfully submitted,     BUZZ Barger  [facility]   [phone]     Electronically signed by BUZZ Araya on 7/14/2020 at 9:51 AM
Marilu spoke with Sonja at [facility] ([phone]) who reports they still do not have a bed available at this time. Sonja reports precert is good until 1/91. [facility] has 5-6 discharges planned so bed will possibly be available today or tomorrow. Electronically signed by ALEXA Isidro LSW on 7/16/2020 at 9:44 AM    Call to Sonja who reports bed will not be available today, will call to confirm time for tomorrow. Call to pt daughter to update. IMM letter given over the phone.      Electronically signed by ALEXA Isidro LSW on 7/16/2020 at 4:00 PM
Received a call from Sonja at [facility], she is planning for admission at 1 pm today, but this is discharge dependent. The time may need to be pushed back if the bed is not available. She requested to set transportation up for 1 pm if possible. [facility] for 1 pm today. Nurse updated. CASE MANAGEMENT DISCHARGE SUMMARY:    DISCHARGE DATE: 7/17/2020    DISCHARGED TO: [facility] SNF               REPORT NUMBER: [phone]             FAX NUMBER: [phone] [facility] will pull from Epic)    TRANSPORTATION: [address] 1 pm     PREFERRED PHARMACY: at facility    [address] Yes    TED/MALINDA COMPLETED: Andre Mitratahmina will obtain from [facility]    L/M for patient's daughter, Rachel, to return call. Electronically signed by Ofelia Herrera RN on 7/17/2020 at 9:55 AM     Spoke with Sonja, she confirmed that the patient is good for the 1 pm transportation time for admission to [facility]. Attempted to reach the patient's daughter again, but there was no answer. Attempted to call the patient's granddaughters listed as her contacts, but no answer on their numbers. Updated the patient's nurse regarding transport as well as unable to reach the patient's family.      Electronically signed by Ofelia Herrera RN on 7/17/2020 at 12:32 PM
SW received phone call back from Sonja at [facility] was informed that because patient was on a tele monitor this was considered a restraint. She stated that the patient must be restraint free for 24 hours. Magan Shin MD regarding this. If MD feels as if it is not appropriate to remove monitor we will have to move on to the next facility. SW, family and facility to follow up as more info comes available. Respectfully submitted,    BUZZ Arguello  [facility]   [phone]    Electronically signed by BUZZ Vasquez on 7/14/2020 at 9:25 AM
SW received phone call from Sandra Petit at [facility] today. For future reference her phone number is [phone]. She stated that the patient was out of network but they are willing to accept this patient. SW was informed that there was a max $2500 out of pocket and so far the patient has met $52 of this requirement. SW to relay info to daughter regarding facility response and will call this writer back momentarily. Respectfully submitted,    BUZZ Bhagat  [facility]   [phone]    Electronically signed by BUZZ Boogie on 7/14/2020 at 11:01 AM
SW received phone call from nurse stating that patient was on a telemetry monitor and this is not a restraint this is a heart monitor. Patient is not in need of a sitter at this time. There are no restraints on patient's movement. PADMA informed that she would note this for Ewa Valenzuela as they review our notes daily. Respectfully submitted,    BUZZ Madison  [facility]   [phone]    Electronically signed by BUZZ Cunha on 7/14/2020 at 5:05 PM
Sw placed call to Sonja at [facility] ([phone]) in regards to referral. Sw left vm requesting return call. Pt dtg prefers [facility] at this time.  Does not want pt to return to [facility].     Electronically signed by ALEXA Garcia, SHAYYW on 7/13/2020 at 4:53 PM
Sw received call from Sonja at [facility]. Precert has been approved but bed is currently not available. Sonja reports she will call with bed availability possibly tonight or tomorrow. Call to pt dtg to report that precert has been approved but currently no bed available. Will call to update status when bed ready. Electronically signed by ALEXA Botello LSW on 7/15/2020 at 3:22 PM    Call to pt dtg to discuss alternate SNF options if [facility] unable to accept tomorrow. LM with dtg requesting call back.      Electronically signed by ALEXA Botello LSW on 7/15/2020 at 4:43 PM
PM

## 2020-07-17 NOTE — DISCHARGE SUMMARY
Hospital Medicine Discharge Summary    Patient ID: Beck Holt      Patient's PCP: Hay Clifton, APRN - CNP    Admit Date: 7/9/2020     Discharge Date:   7/17/20    Admitting Physician: Vern Narayan MD     Discharge Physician: Alan Mckeon MD     Discharge Diagnoses: Active Hospital Problems    Diagnosis Date Noted    Left displaced femoral neck fracture ([address] [S72.002A] 07/09/2020    Chronic anticoagulation [Z79.01] 07/09/2020    Altered mental status [R41.82]     History of 2019 novel coronavirus disease (COVID-19) [Z86.19]     Dementia without behavioral disturbance ([address] [F03.90]     Atrial fibrillation with rapid ventricular response ([address] [I48.91] 06/29/2015    Osteoporosis [M81.0] 08/24/2011       The patient was seen and examined on day of discharge and this discharge summary is in conjunction with any daily progress note from day of discharge. Hospital Course:   80 y. o. female with hx dementia, chronic atrial fibrillation on anticoagulation, restless leg syndrome, HTN, HLD, combined systolic/diastolic CHF, hypothyroidism, asthma, and depression who presents to [facility] with fall and altered mental status. Patient is a nursing home resident at [facility], and fell out of her bed two days ago. It was unwitnessed, and unclear how long the patient was on the ground. A left hip xray was ordered on 7/8, and completed today that showed a left femoral neck fracture. The patient has dementia and some confusion at baseline, but apparently is normally conversant, answers questions appropriately and without difficulty.     In the ED, labs were significant for a sodium of 132, chloride of 92, WBC count of 11.7K, alk phos of 201. U/A was negative. Rapid COVID was negative. CT Head without contrast showed atrophy and small vessel ischemic disease. CT Cervical Spine showed no acute abnormality. CXR showed cardiomegaly with resolution of airspace disease.     Acute metabolic encephalopathy, resolved  Suspect 2/2 mechanical fall with hip fracture on top of underlying dementia. Possible polypharmacy. No evidence of infection including a negative urinalysis, negative CXR, negative CT Head and CT Cervical spine. Mild leukocytosis likely is reactive. Significantly improved day after admission.   -continue to monitor  -continue home meds  -check TSH and free T4--WNL  -hold sedatives  resolved     Left displaced femoral neck fracture  -orthopedic surgery consulted, recs appreciated  - OR 2/82 without complications  -PT OT 3-5 sessions per week     Mechanical fall   -workup as above  -check CK--mildly elevated at 500--trend, downtrended     Chronic atrial fibrillation on Eliquis in RVR  HR up to 200 on 7/10.   -Continued dilt drip perioperatively-- will stop, switch back to home PO dilt, incr metoprolol  - cards consult  - tele monitoring  - resume eliquis     Leukocytosis, improved  resolved  -continue to trend and monitor     Essential hypertension  -continue home meds     Chronic combined systolic and diastolic CHF   Appears compensated  -continue home meds  -tele monitoring  -hold Lasix, recommend restarting at discharge     Hyponatremia  Mild.   -trend  132 on admit, trended up and then back down to 130  Renal panel in outpatient     Hypothyroidism  -continue home meds     Depression  -continue home meds     Dementia  -continue home meds     Restless leg syndrome  -continue home meds     Hx COVID-19 infection  -does not appear to be clinically relevant. Negative in ED. Patient stable at time of discharge. Awaiting placement but they need tele removed for 24 hours as it is viewed as restraint. We are also waiting for precert. Patient medically stable since 7/13 for discharge. COVID testing ordered again per NH protocol. This also returned negative.  Today is 4th avoidable day    Exam:     /70   Pulse 78   Temp 97.5 °F (36.4 °C) (Oral)   Resp 16   Ht 5' 5\" (1.651 m)   Wt 141 Results   Component Value Date     07/16/2020    K 4.9 07/16/2020    CL 93 07/16/2020    CO2 23 07/16/2020    BUN 53 07/16/2020    CREATININE 0.7 07/16/2020    CALCIUM 8.5 07/16/2020    PHOS 6.3 07/14/2020       Discharge Medications:     Current Discharge Medication List           Details   traMADol (ULTRAM) 50 MG tablet Take 1 tablet by mouth every 6 hours as needed for Pain for up to 5 days. Qty: 20 tablet, Refills: 0    Comments: Reduce doses taken as pain becomes manageable  Associated Diagnoses: Closed fracture of neck of left femur, initial encounter ([facility] [address]              Details   metoprolol succinate (TOPROL XL) 100 MG extended release tablet Take 1 tablet by mouth daily  Qty: 30 tablet, Refills: 3              Details   THIAMINE MONONITRATE PO Take 1,000 mcg by mouth daily      dilTIAZem (CARDIZEM) 30 MG tablet Take 30 mg by mouth 4 times daily      potassium bicarb-citric acid (EFFER-K) 20 MEQ TBEF effervescent tablet Take 2 tablets by mouth 2 times daily      fluticasone (FLONASE) 50 MCG/ACT nasal spray 2 sprays by Each Nostril route daily      acetaminophen (TYLENOL) 325 MG tablet Take 650 mg by mouth every 4 hours as needed for Pain or Fever      furosemide (LASIX) 20 MG tablet Take 1 tablet by mouth daily  Qty: 60 tablet, Refills: 3      apixaban (ELIQUIS) 5 MG TABS tablet Take 2.5 mg by mouth 2 times daily       divalproex (DEPAKOTE) 250 MG DR tablet Take 250 mg by mouth 2 times daily       levothyroxine (SYNTHROID) 88 MCG tablet TAKE 1 TABLET BY MOUTH DAILY  Qty: 90 tablet, Refills: 1      pantoprazole (PROTONIX) 40 MG tablet TAKE (1) TABLET BY MOUTH DAILY  Qty: 90 tablet, Refills: 1      rOPINIRole (REQUIP) 1 MG tablet TAKE TWO (2) TABLETS BY MOUTH NIGHTLY  Qty: 180 tablet, Refills: 1      SYMBICORT 160-4.5 MCG/ACT AERO INHALE 2 PUFFS TWICE A DAY INTO THE LUNGS  Qty: 11 Inhaler, Refills: 0    Associated Diagnoses:  Moderate asthma      ferrous sulfate 325 (65 Fe) MG tablet Take 325 mg by mouth

## 2020-07-20 ENCOUNTER — TELEPHONE (OUTPATIENT)
Dept: ORTHOPEDIC SURGERY | Age: 85
End: 2020-07-20

## 2020-07-20 NOTE — TELEPHONE ENCOUNTER
Skyla from [facility] called; has questions about pt schd a follow appt after sx. Please advise.     Call [phone] to discuss

## 2020-07-20 NOTE — TELEPHONE ENCOUNTER
I spoke to [facility] and scheduled pt a PO appt.

## 2020-07-22 ENCOUNTER — TELEPHONE (OUTPATIENT)
Dept: ORTHOPEDIC SURGERY | Age: 85
End: 2020-07-22

## 2020-07-22 NOTE — TELEPHONE ENCOUNTER
Sure, have them remove the staples  Get AP pelvis and left hip 2 views, and send images thru PACS  I don't need to see her in person if no issues    Wili eKlly

## 2020-07-22 NOTE — TELEPHONE ENCOUNTER
Sonal Geronimo from [facility] called; req if Dr. Ismael Cordova will be able to do a VV instead of in-office visit for pt on 7/24/20. Also, will like to know will he accommodate x-rays. Please advise.       Call [phone] to discuss

## 2020-07-22 NOTE — TELEPHONE ENCOUNTER
Called and Josue Boone stated that these will get done tomorrow and she will call me back to let me know when they get uploaded. She will have the staples removed and apply steri strips if patient allows.

## 2020-07-27 ENCOUNTER — TELEPHONE (OUTPATIENT)
Dept: ORTHOPEDIC SURGERY | Age: 85
End: 2020-07-27

## 2020-07-28 NOTE — TELEPHONE ENCOUNTER
Called and told Coleman Garcia that I sent a message to Dr. Dave Griggs for his review and I will call her back when I hear from him

## 2020-08-27 ENCOUNTER — APPOINTMENT (OUTPATIENT)
Dept: CT IMAGING | Age: 85
End: 2020-08-27
Payer: MEDICARE

## 2020-08-27 ENCOUNTER — HOSPITAL ENCOUNTER (EMERGENCY)
Age: 85
Discharge: HOME | End: 2020-08-27
Attending: EMERGENCY MEDICINE
Payer: MEDICARE

## 2020-08-27 ENCOUNTER — APPOINTMENT (OUTPATIENT)
Dept: GENERAL RADIOLOGY | Age: 85
End: 2020-08-27
Payer: MEDICARE

## 2020-08-27 VITALS
HEIGHT: 64 IN | TEMPERATURE: 98.2 F | DIASTOLIC BLOOD PRESSURE: 69 MMHG | BODY MASS INDEX: 22.2 KG/M2 | OXYGEN SATURATION: 94 % | WEIGHT: 130 LBS | HEART RATE: 93 BPM | RESPIRATION RATE: 16 BRPM | SYSTOLIC BLOOD PRESSURE: 141 MMHG

## 2020-08-27 LAB
ANION GAP SERPL CALCULATED.3IONS-SCNC: 12 MMOL/L (ref 3–16)
BACTERIA: ABNORMAL /HPF
BASOPHILS ABSOLUTE: 0.1 K/UL (ref 0–0.2)
BASOPHILS RELATIVE PERCENT: 0.9 %
BILIRUBIN URINE: ABNORMAL
BLOOD, URINE: NEGATIVE
BUN BLDV-MCNC: 14 MG/DL (ref 7–20)
CALCIUM SERPL-MCNC: 8.4 MG/DL (ref 8.3–10.6)
CHLORIDE BLD-SCNC: 97 MMOL/L (ref 99–110)
CLARITY: ABNORMAL
CO2: 28 MMOL/L (ref 21–32)
COLOR: ABNORMAL
CREAT SERPL-MCNC: 0.5 MG/DL (ref 0.6–1.2)
EKG ATRIAL RATE: 64 BPM
EKG DIAGNOSIS: NORMAL
EKG Q-T INTERVAL: 418 MS
EKG QRS DURATION: 156 MS
EKG QTC CALCULATION (BAZETT): 525 MS
EKG R AXIS: -16 DEGREES
EKG T AXIS: -41 DEGREES
EKG VENTRICULAR RATE: 95 BPM
EOSINOPHILS ABSOLUTE: 0.1 K/UL (ref 0–0.6)
EOSINOPHILS RELATIVE PERCENT: 1 %
EPITHELIAL CELLS, UA: ABNORMAL /HPF (ref 0–5)
GFR AFRICAN AMERICAN: >60
GFR NON-AFRICAN AMERICAN: >60
GLUCOSE BLD-MCNC: 89 MG/DL (ref 70–99)
GLUCOSE URINE: NEGATIVE MG/DL
HCT VFR BLD CALC: 31.9 % (ref 36–48)
HEMOGLOBIN: 10.3 G/DL (ref 12–16)
KETONES, URINE: 15 MG/DL
LEUKOCYTE ESTERASE, URINE: ABNORMAL
LYMPHOCYTES ABSOLUTE: 1.1 K/UL (ref 1–5.1)
LYMPHOCYTES RELATIVE PERCENT: 16.3 %
MAGNESIUM: 2 MG/DL (ref 1.8–2.4)
MCH RBC QN AUTO: 29.9 PG (ref 26–34)
MCHC RBC AUTO-ENTMCNC: 32.3 G/DL (ref 31–36)
MCV RBC AUTO: 92.4 FL (ref 80–100)
MICROSCOPIC EXAMINATION: YES
MONOCYTES ABSOLUTE: 1.1 K/UL (ref 0–1.3)
MONOCYTES RELATIVE PERCENT: 16.6 %
NEUTROPHILS ABSOLUTE: 4.5 K/UL (ref 1.7–7.7)
NEUTROPHILS RELATIVE PERCENT: 65.2 %
NITRITE, URINE: NEGATIVE
PDW BLD-RTO: 18 % (ref 12.4–15.4)
PH UA: 6 (ref 5–8)
PLATELET # BLD: 179 K/UL (ref 135–450)
PMV BLD AUTO: 9 FL (ref 5–10.5)
POTASSIUM REFLEX MAGNESIUM: 2.7 MMOL/L (ref 3.5–5.1)
PROTEIN UA: 30 MG/DL
RBC # BLD: 3.46 M/UL (ref 4–5.2)
RBC UA: ABNORMAL /HPF (ref 0–4)
SODIUM BLD-SCNC: 137 MMOL/L (ref 136–145)
SPECIFIC GRAVITY UA: 1.03 (ref 1–1.03)
TROPONIN: <0.01 NG/ML
URINE REFLEX TO CULTURE: YES
URINE TYPE: ABNORMAL
UROBILINOGEN, URINE: 4 E.U./DL
WBC # BLD: 6.9 K/UL (ref 4–11)
WBC UA: ABNORMAL /HPF (ref 0–5)

## 2020-08-27 PROCEDURE — 87086 URINE CULTURE/COLONY COUNT: CPT

## 2020-08-27 PROCEDURE — 73522 X-RAY EXAM HIPS BI 3-4 VIEWS: CPT

## 2020-08-27 PROCEDURE — 84484 ASSAY OF TROPONIN QUANT: CPT

## 2020-08-27 PROCEDURE — 85025 COMPLETE CBC W/AUTO DIFF WBC: CPT

## 2020-08-27 PROCEDURE — 93010 ELECTROCARDIOGRAM REPORT: CPT | Performed by: INTERNAL MEDICINE

## 2020-08-27 PROCEDURE — 83735 ASSAY OF MAGNESIUM: CPT

## 2020-08-27 PROCEDURE — 93005 ELECTROCARDIOGRAM TRACING: CPT | Performed by: EMERGENCY MEDICINE

## 2020-08-27 PROCEDURE — 6370000000 HC RX 637 (ALT 250 FOR IP): Performed by: EMERGENCY MEDICINE

## 2020-08-27 PROCEDURE — 73070 X-RAY EXAM OF ELBOW: CPT

## 2020-08-27 PROCEDURE — 70450 CT HEAD/BRAIN W/O DYE: CPT

## 2020-08-27 PROCEDURE — 81001 URINALYSIS AUTO W/SCOPE: CPT

## 2020-08-27 PROCEDURE — 99284 EMERGENCY DEPT VISIT MOD MDM: CPT

## 2020-08-27 PROCEDURE — 80048 BASIC METABOLIC PNL TOTAL CA: CPT

## 2020-08-27 PROCEDURE — 72125 CT NECK SPINE W/O DYE: CPT

## 2020-08-27 RX ORDER — POTASSIUM CHLORIDE 20 MEQ/1
20 TABLET, EXTENDED RELEASE ORAL 2 TIMES DAILY
Qty: 10 TABLET | Refills: 0 | Status: SHIPPED | OUTPATIENT
Start: 2020-08-27 | End: 2020-09-01

## 2020-08-27 RX ORDER — POTASSIUM CHLORIDE 20 MEQ/1
20 TABLET, EXTENDED RELEASE ORAL 2 TIMES DAILY WITH MEALS
Status: DISCONTINUED | OUTPATIENT
Start: 2020-08-27 | End: 2020-08-27

## 2020-08-27 RX ORDER — CEPHALEXIN 500 MG/1
500 CAPSULE ORAL 2 TIMES DAILY
Qty: 10 CAPSULE | Refills: 0 | Status: SHIPPED | OUTPATIENT
Start: 2020-08-27 | End: 2020-09-01

## 2020-08-27 RX ORDER — POTASSIUM CHLORIDE 20 MEQ/1
40 TABLET, EXTENDED RELEASE ORAL ONCE
Status: COMPLETED | OUTPATIENT
Start: 2020-08-27 | End: 2020-08-27

## 2020-08-27 RX ADMIN — POTASSIUM CHLORIDE 40 MEQ: 1500 TABLET, EXTENDED RELEASE ORAL at 09:35

## 2020-08-27 ASSESSMENT — PAIN DESCRIPTION - PAIN TYPE: TYPE: ACUTE PAIN

## 2020-08-27 ASSESSMENT — PAIN DESCRIPTION - LOCATION: LOCATION: ARM

## 2020-08-27 ASSESSMENT — PAIN DESCRIPTION - ORIENTATION: ORIENTATION: RIGHT

## 2020-08-27 ASSESSMENT — PAIN DESCRIPTION - FREQUENCY: FREQUENCY: INTERMITTENT

## 2020-08-27 ASSESSMENT — PAIN SCALES - GENERAL: PAINLEVEL_OUTOF10: 8

## 2020-08-27 NOTE — ED NOTES
RN to pt room; pt alert, lying supine in bed with bed in low position, side rails up x 2, heart monitor in place. Pt IV established, blood work obtained. Will monitor.       Raul Sanders RN  08/27/20 9428

## 2020-08-27 NOTE — ED PROVIDER NOTES
EMERGENCY DEPARTMENT PROVIDER NOTE    Patient Identification  Pt Name: Gabriel Adams  MRN: [id number]  [place] 3/2/1929  Date of evaluation: 8/27/2020  Provider: Cyn Knox DO  PCP: GURWINDER Hodges - CNP    Chief Complaint  [facility] EMS, Unwitnessed fall, pt found on the floor at [facility], [address] stated right hip hurts a little. )      HPI  (History provided by patient)  This is a 80 y.o. female with pertinent past medical history of chronic atrial fibrillation, hypertension, CHF and dementia who was brought in by EMS transportation from [address] for fall which occurred sometime overnight. Fall was unwitnessed, patient was found on the floor of her room by [facility] staff, she was alert and communicative. Patient reports pain to her bilateral hips. Pain is worsened with movement, nothing makes it better. Severity mild. ROS    Const:  No fevers, no chills, no generalized weakness  Skin:  No rash, no lesions  Eyes:  No visual changes, no blurry or double vision, no pain  ENT:  No sore throat, no difficulty swallowing, no ear pan, no sinus pain or congestion  Card:  No chest pain, no palpitations, no edema  Resp:  No shortness of breath, no cough, no wheezing  Abd:  No abdominal pain, no nausea, no vomiting, no diarrhea  Genitourinary:  No dysuria, no hematuria  MSK:  +joint pain, +myalgia  Neuro:  No focal weakness, no headache    All other systems reviewed and negative unless otherwise noted in HPI    I have reviewed the following nursing documentation:  Allergies: Gluten meal; Demerol; Lidocaine hcl; Other; Procaine; Tetanus toxoid, adsorbed; Vicodin [hydrocodone-acetaminophen]; Wheat bran; Augmentin [amoxicillin-pot clavulanate];  Hydrocodone-acetaminophen; Meperidine; Prednisone; and Tetanus toxoids    Past medical history:   Past Medical History:   Diagnosis Date    Abdominal wall pain     Arrhythmia     afib,bradycardia    Arthritis     left hip    Asthma     [facility]     Broken nose 2016    CHF (congestive heart failure) ([facility] 08/2018    Cough variant asthma     Dementia (HCC)     Dyspnea     Essential hypertension 8/1/2018    Fatigue     Hematoma     Hyperlipidemia     Insomnia     Lumbar radiculopathy     Osteoporosis     Palpitation     Restless leg syndrome     Shingles     Sinusitis      Past surgical history:   Past Surgical History:   Procedure Laterality Date    ANKLE SURGERY  1997    left ankle surgery    APPENDECTOMY      CARDIOVERSION      COLONOSCOPY  10/25/10    normal dr [facility]    COLONOSCOPY  02/27/2018    polyps dr Zoie Clark, no repeat needed.  no malignancy    ELBOW SURGERY      HIP SURGERY Left 7/11/2020    HIP HEMIARTHROPLASTY performed by Lorenza Lanier MD at [address] [facility]      right elbow    OVARY REMOVAL Left     UPPER GASTROINTESTINAL ENDOSCOPY  02/27/2018    normal dr Myrna Nair medications:   Previous Medications    ACETAMINOPHEN (TYLENOL) 325 MG TABLET    Take 650 mg by mouth every 4 hours as needed for Pain or Fever    APIXABAN (ELIQUIS) 5 MG TABS TABLET    Take 2.5 mg by mouth 2 times daily     CHOLECALCIFEROL (VITAMIN D3) 5000 UNITS TABS    Take 1 tablet by mouth daily     COMPRESSION STOCKINGS MISC    by Does not apply route Bilateral lower extremity compression stockings, 15-20 mmHg    DILTIAZEM (CARDIZEM) 30 MG TABLET    Take 30 mg by mouth 4 times daily    DIVALPROEX (DEPAKOTE) 250 MG DR TABLET    Take 250 mg by mouth 2 times daily     DONEPEZIL (ARICEPT) 10 MG TABLET    Take 10 mg by mouth nightly     DULOXETINE HCL 40 MG CSDR    Take 40 mg by mouth daily     FERROUS SULFATE 325 (65 FE) MG TABLET    Take 325 mg by mouth daily (with breakfast)    FLUTICASONE (FLONASE) 50 MCG/ACT NASAL SPRAY    2 sprays by Each Nostril route daily    FUROSEMIDE (LASIX) 20 MG TABLET    Take 1 tablet by mouth daily    LEVOTHYROXINE (SYNTHROID) 88 MCG TABLET    TAKE 1 TABLET BY MOUTH DAILY    LINZESS 290 MCG CAPS CAPSULE    Take 290 mcg by mouth every morning (before breakfast)     METOPROLOL SUCCINATE (TOPROL XL) 100 MG EXTENDED RELEASE TABLET    Take 1 tablet by mouth daily    MULTIPLE VITAMINS-MINERALS (THERAPEUTIC MULTIVITAMIN-MINERALS) TABLET    Take 1 tablet by mouth daily    PANTOPRAZOLE (PROTONIX) 40 MG TABLET    TAKE (1) TABLET BY MOUTH DAILY    POLYETHYLENE GLYCOL 3350 (MIRALAX PO)    Take 17 g by mouth daily     POTASSIUM BICARB-CITRIC ACID (EFFER-K) 20 MEQ TBEF EFFERVESCENT TABLET    Take 2 tablets by mouth 2 times daily    PROAIR  (90 BASE) MCG/ACT INHALER    2 PUFFS INTO LUNGS EVERY 4 HOURS AS NEEDED FOR WHEEZING OR FORSHORTNESS OF BREATH    ROPINIROLE (REQUIP) 1 MG TABLET    TAKE TWO (2) TABLETS BY MOUTH NIGHTLY    SPACER/AERO-HOLDING CHAMBERS (E-Z SPACER) POLLY    1 Device by Does not apply route daily as needed. SYMBICORT 160-4.5 MCG/ACT AERO    INHALE 2 PUFFS TWICE A DAY INTO THE LUNGS    THIAMINE MONONITRATE PO    Take 1,000 mcg by mouth daily       Social history:  reports that she quit smoking about 50 years ago. She has a 5.00 pack-year smoking history. She has never used smokeless tobacco. She reports that she does not drink alcohol or use drugs. Family history:    Family History   Problem Relation Age of Onset    Cancer Mother     Stroke Father     Arthritis Other     Asthma Other     Diabetes Other     Heart Disease Other          Exam  ED Triage Vitals   BP Temp Temp Source Pulse Resp SpO2 Height Weight   08/27/20 0637 08/27/20 0637 08/27/20 0637 08/27/20 0637 08/27/20 0637 08/27/20 0751 08/27/20 0637 08/27/20 0637   (!) 148/70 98.2 °F (36.8 °C) Oral 83 16 94 % 5' 4\" (1.626 m) 130 lb (59 kg)     Nursing note and vitals reviewed. Constitutional: Well developed, well nourished. Non-toxic in appearance.   HENT:      Head: Normocephalic, small abrasion to left frontal scalp with overlying scab, no wound gapping, no active bleeding, no raccoon eyes no acute fracture. Suspect small joint effusion. CT Cervical Spine WO Contrast   Final Result   No acute abnormality of the cervical spine. CT Head WO Contrast   Final Result   No acute intracranial abnormality.              Labs  Results for orders placed or performed during the hospital encounter of 08/27/20   CBC Auto Differential   Result Value Ref Range    WBC 6.9 4.0 - 11.0 K/uL    RBC 3.46 (L) 4.00 - 5.20 M/uL    Hemoglobin 10.3 (L) 12.0 - 16.0 g/dL    Hematocrit 31.9 (L) 36.0 - 48.0 %    MCV 92.4 80.0 - 100.0 fL    MCH 29.9 26.0 - 34.0 pg    MCHC 32.3 31.0 - 36.0 g/dL    RDW 18.0 (H) 12.4 - 15.4 %    Platelets 035 530 - 853 K/uL    MPV 9.0 5.0 - 10.5 fL    Neutrophils % 65.2 %    Lymphocytes % 16.3 %    Monocytes % 16.6 %    Eosinophils % 1.0 %    Basophils % 0.9 %    Neutrophils Absolute 4.5 1.7 - 7.7 K/uL    Lymphocytes Absolute 1.1 1.0 - 5.1 K/uL    Monocytes Absolute 1.1 0.0 - 1.3 K/uL    Eosinophils Absolute 0.1 0.0 - 0.6 K/uL    Basophils Absolute 0.1 0.0 - 0.2 K/uL   Basic Metabolic Panel w/ Reflex to MG   Result Value Ref Range    Sodium 137 136 - 145 mmol/L    Potassium reflex Magnesium 2.7 (LL) 3.5 - 5.1 mmol/L    Chloride 97 (L) 99 - 110 mmol/L    CO2 28 21 - 32 mmol/L    Anion Gap 12 3 - 16    Glucose 89 70 - 99 mg/dL    BUN 14 7 - 20 mg/dL    CREATININE 0.5 (L) 0.6 - 1.2 mg/dL    GFR Non-African American >60 >60    GFR African American >60 >60    Calcium 8.4 8.3 - 10.6 mg/dL   Troponin   Result Value Ref Range    Troponin <0.01 <0.01 ng/mL   Urinalysis Reflex to Culture    Specimen: Urine, clean catch   Result Value Ref Range    Color, UA JANE (A) Straw/Yellow    Clarity, UA TURBID (A) Clear    Glucose, Ur Negative Negative mg/dL    Bilirubin Urine MODERATE (A) Negative    Ketones, Urine 15 (A) Negative mg/dL    Specific Gravity, UA 1.027 1.005 - 1.030    Blood, Urine Negative Negative    pH, UA 6.0 5.0 - 8.0    Protein, UA 30 (A) Negative mg/dL    Urobilinogen, Urine 4.0 (A) <2.0 E.U./dL    Nitrite, Urine Negative Negative    Leukocyte Esterase, Urine SMALL (A) Negative    Microscopic Examination YES     Urine Type NotGiven     Urine Reflex to Culture Yes    Microscopic Urinalysis   Result Value Ref Range    WBC, UA 10-20 (A) 0 - 5 /HPF    RBC, UA 3-4 0 - 4 /HPF    Epithelial Cells, UA 6-10 (A) 0 - 5 /HPF    Bacteria, UA 3+ (A) None Seen /HPF   Magnesium   Result Value Ref Range    Magnesium 2.00 1.80 - 2.40 mg/dL   EKG 12 Lead   Result Value Ref Range    Ventricular Rate 95 BPM    Atrial Rate 64 BPM    QRS Duration 156 ms    Q-T Interval 418 ms    QTc Calculation (Bazett) 525 ms    R Axis -16 degrees    T Axis -41 degrees    Diagnosis Atrial fibrillationRight bundle branch block        Screenings           MDM and ED Course    Patient afebrile and nontoxic. No distress. CT head without hemorrhage or mass-effect, CT cervical spine without acute fracture or dislocation. Patient is without any focal neurologic deficits, nothing to suggest cord injury. Plain films of left hip and pelvis are without evidence of acute fracture, dislocation or hardware malalignment. EKG with chronic atrial fibrillation, currently rate controlled. Troponin normal, ACS or malignant dysrhythmia not evident. Lab work-up with significant hypokalemia and will replace. Renal function at baseline. Fall was unwitnessed, however patient was alert and at baseline when found by staff, syncope is considered however mechanical fall does seem more likely. UA does have evidence of potential early UTI and will treat. Patient felt safe for discharge back to her nursing home where she will be closely monitored. Strict return precautions were discussed. Final Impression  1. Closed head injury, initial encounter    2. Acute strain of neck muscle, initial encounter    3. Acute cystitis without hematuria    4.  Hypokalemia        Blood pressure (!) 141/69, pulse 93, temperature 98.2 °F (36.8 °C), temperature source Oral, resp. rate 16, height 5' 4\" (1.626 m), weight 130 lb (59 kg), SpO2 94 %, not currently breastfeeding. Disposition:  DISPOSITION Decision To Discharge 08/27/2020 09:58:53 AM      Patient Referrals:  No follow-up provider specified. Discharge Medications:  New Prescriptions    CEPHALEXIN (KEFLEX) 500 MG CAPSULE    Take 1 capsule by mouth 2 times daily for 5 days    POTASSIUM CHLORIDE (KLOR-CON M) 20 MEQ EXTENDED RELEASE TABLET    Take 1 tablet by mouth 2 times daily for 5 days       Discontinued Medications:  Discontinued Medications    No medications on file       This chart was generated using the [address] [place] [address] [address]  dictation system. I created this record but it may contain dictation errors given the limitations of this technology.     Brennon Ramos DO (electronically signed)  Attending Emergency Physician       Brennon Ramos DO  08/28/20 [id number]

## 2020-08-27 NOTE — ED NOTES
[facility] here to transport pt back to [facility]. Report given to transport team,  all pt belongings with squad, discharge instructions and prescriptions given to transport team as well.       Gissell Munguia, RN  08/27/20 0561

## 2020-08-27 NOTE — ED NOTES
Bed: 05  Expected date: 8/27/20  Expected time:   Means of arrival:   Comments:  [facility], RN  08/27/20 0983

## 2020-08-28 ENCOUNTER — CARE COORDINATION (OUTPATIENT)
Dept: CARE COORDINATION | Age: 85
End: 2020-08-28

## 2020-08-28 LAB — URINE CULTURE, ROUTINE: NORMAL

## 2020-09-01 ENCOUNTER — CARE COORDINATION (OUTPATIENT)
Dept: CARE COORDINATION | Age: 85
End: 2020-09-01

## 2020-09-01 NOTE — CARE COORDINATION
Received return callback from pt/family member, who identifies as Cheikh Connelly (dtr), acknowledging vm x2 left last week by this writer. Cheikh Connelly offers that pt is LTC @ [facility], suffering series of falls and other health care concerns - but continues in care of home visiting physician service. Alicia verbalized appreciation for the outreach, but shares no further outreach is necessary for care coordination through [facility] for this reason. Muriel Collins for return callback & wished pt/family best outcomes forward; and intent to note chart for no further care coordination outreach, as appropriate. .  Patient Excluded from Care Coordination?  Yes     The patient will be excluded from Care Coordination for the following reason: Patient resides in LTC/Group Home and no longer sees PCP within [facility]

## 2020-09-22 ENCOUNTER — APPOINTMENT (OUTPATIENT)
Dept: GENERAL RADIOLOGY | Age: 85
DRG: 292 | End: 2020-09-22
Payer: MEDICARE

## 2020-09-22 ENCOUNTER — HOSPITAL ENCOUNTER (INPATIENT)
Age: 85
LOS: 8 days | Discharge: NURSING, REHAB OR LONG-TERM CARE | DRG: 292 | End: 2020-09-30
Attending: INTERNAL MEDICINE | Admitting: INTERNAL MEDICINE
Payer: MEDICARE

## 2020-09-22 PROBLEM — I50.43 ACUTE ON CHRONIC COMBINED SYSTOLIC (CONGESTIVE) AND DIASTOLIC (CONGESTIVE) HEART FAILURE (HCC): Status: ACTIVE | Noted: 2020-09-22

## 2020-09-22 LAB
ANION GAP SERPL CALCULATED.3IONS-SCNC: 10 MMOL/L (ref 3–16)
BASOPHILS ABSOLUTE: 0.1 K/UL (ref 0–0.2)
BASOPHILS RELATIVE PERCENT: 1.3 %
BILIRUBIN URINE: ABNORMAL
BLOOD, URINE: NEGATIVE
BUN BLDV-MCNC: 16 MG/DL (ref 7–20)
CALCIUM SERPL-MCNC: 8.7 MG/DL (ref 8.3–10.6)
CHLORIDE BLD-SCNC: 98 MMOL/L (ref 99–110)
CLARITY: CLEAR
CO2: 29 MMOL/L (ref 21–32)
COLOR: ABNORMAL
CREAT SERPL-MCNC: <0.5 MG/DL (ref 0.6–1.2)
EKG ATRIAL RATE: 39 BPM
EKG DIAGNOSIS: NORMAL
EKG Q-T INTERVAL: 406 MS
EKG QRS DURATION: 150 MS
EKG QTC CALCULATION (BAZETT): 512 MS
EKG R AXIS: 80 DEGREES
EKG T AXIS: -54 DEGREES
EKG VENTRICULAR RATE: 96 BPM
EOSINOPHILS ABSOLUTE: 0.1 K/UL (ref 0–0.6)
EOSINOPHILS RELATIVE PERCENT: 0.8 %
EPITHELIAL CELLS, UA: 1 /HPF (ref 0–5)
GFR AFRICAN AMERICAN: >60
GFR NON-AFRICAN AMERICAN: >60
GLUCOSE BLD-MCNC: 126 MG/DL (ref 70–99)
GLUCOSE URINE: NEGATIVE MG/DL
HCT VFR BLD CALC: 34.1 % (ref 36–48)
HEMOGLOBIN: 11.2 G/DL (ref 12–16)
HYALINE CASTS: 8 /LPF (ref 0–8)
KETONES, URINE: 15 MG/DL
LACTIC ACID: 1.4 MMOL/L (ref 0.4–2)
LEUKOCYTE ESTERASE, URINE: NEGATIVE
LYMPHOCYTES ABSOLUTE: 1.6 K/UL (ref 1–5.1)
LYMPHOCYTES RELATIVE PERCENT: 25.7 %
MAGNESIUM: 1.8 MG/DL (ref 1.8–2.4)
MCH RBC QN AUTO: 29.8 PG (ref 26–34)
MCHC RBC AUTO-ENTMCNC: 33 G/DL (ref 31–36)
MCV RBC AUTO: 90.3 FL (ref 80–100)
MICROSCOPIC EXAMINATION: YES
MONOCYTES ABSOLUTE: 0.9 K/UL (ref 0–1.3)
MONOCYTES RELATIVE PERCENT: 14.3 %
NEUTROPHILS ABSOLUTE: 3.7 K/UL (ref 1.7–7.7)
NEUTROPHILS RELATIVE PERCENT: 57.9 %
NITRITE, URINE: NEGATIVE
PDW BLD-RTO: 18.8 % (ref 12.4–15.4)
PH UA: 6.5 (ref 5–8)
PLATELET # BLD: 256 K/UL (ref 135–450)
PMV BLD AUTO: 9.1 FL (ref 5–10.5)
POTASSIUM REFLEX MAGNESIUM: 2.9 MMOL/L (ref 3.5–5.1)
PRO-BNP: 7108 PG/ML (ref 0–449)
PROTEIN UA: 100 MG/DL
RBC # BLD: 3.78 M/UL (ref 4–5.2)
RBC UA: 4 /HPF (ref 0–4)
REASON FOR REJECTION: NORMAL
REJECTED TEST: NORMAL
SODIUM BLD-SCNC: 137 MMOL/L (ref 136–145)
SPECIFIC GRAVITY UA: 1.02 (ref 1–1.03)
TROPONIN: <0.01 NG/ML
URINE TYPE: ABNORMAL
UROBILINOGEN, URINE: 2 E.U./DL
WBC # BLD: 6.4 K/UL (ref 4–11)
WBC UA: 1 /HPF (ref 0–5)

## 2020-09-22 PROCEDURE — 6370000000 HC RX 637 (ALT 250 FOR IP): Performed by: INTERNAL MEDICINE

## 2020-09-22 PROCEDURE — 83880 ASSAY OF NATRIURETIC PEPTIDE: CPT

## 2020-09-22 PROCEDURE — 6360000002 HC RX W HCPCS: Performed by: STUDENT IN AN ORGANIZED HEALTH CARE EDUCATION/TRAINING PROGRAM

## 2020-09-22 PROCEDURE — 2580000003 HC RX 258: Performed by: STUDENT IN AN ORGANIZED HEALTH CARE EDUCATION/TRAINING PROGRAM

## 2020-09-22 PROCEDURE — U0003 INFECTIOUS AGENT DETECTION BY NUCLEIC ACID (DNA OR RNA); SEVERE ACUTE RESPIRATORY SYNDROME CORONAVIRUS 2 (SARS-COV-2) (CORONAVIRUS DISEASE [COVID-19]), AMPLIFIED PROBE TECHNIQUE, MAKING USE OF HIGH THROUGHPUT TECHNOLOGIES AS DESCRIBED BY CMS-2020-01-R: HCPCS

## 2020-09-22 PROCEDURE — 99285 EMERGENCY DEPT VISIT HI MDM: CPT

## 2020-09-22 PROCEDURE — 83605 ASSAY OF LACTIC ACID: CPT

## 2020-09-22 PROCEDURE — 94761 N-INVAS EAR/PLS OXIMETRY MLT: CPT

## 2020-09-22 PROCEDURE — 1200000000 HC SEMI PRIVATE

## 2020-09-22 PROCEDURE — 71045 X-RAY EXAM CHEST 1 VIEW: CPT

## 2020-09-22 PROCEDURE — 93010 ELECTROCARDIOGRAM REPORT: CPT | Performed by: INTERNAL MEDICINE

## 2020-09-22 PROCEDURE — 96375 TX/PRO/DX INJ NEW DRUG ADDON: CPT

## 2020-09-22 PROCEDURE — 94640 AIRWAY INHALATION TREATMENT: CPT

## 2020-09-22 PROCEDURE — 6360000002 HC RX W HCPCS: Performed by: INTERNAL MEDICINE

## 2020-09-22 PROCEDURE — 84484 ASSAY OF TROPONIN QUANT: CPT

## 2020-09-22 PROCEDURE — 83735 ASSAY OF MAGNESIUM: CPT

## 2020-09-22 PROCEDURE — 36415 COLL VENOUS BLD VENIPUNCTURE: CPT

## 2020-09-22 PROCEDURE — 96361 HYDRATE IV INFUSION ADD-ON: CPT

## 2020-09-22 PROCEDURE — 80048 BASIC METABOLIC PNL TOTAL CA: CPT

## 2020-09-22 PROCEDURE — 81001 URINALYSIS AUTO W/SCOPE: CPT

## 2020-09-22 PROCEDURE — 93005 ELECTROCARDIOGRAM TRACING: CPT | Performed by: STUDENT IN AN ORGANIZED HEALTH CARE EDUCATION/TRAINING PROGRAM

## 2020-09-22 PROCEDURE — 85025 COMPLETE CBC W/AUTO DIFF WBC: CPT

## 2020-09-22 RX ORDER — POTASSIUM CHLORIDE 20 MEQ/1
40 TABLET, EXTENDED RELEASE ORAL PRN
Status: DISCONTINUED | OUTPATIENT
Start: 2020-09-22 | End: 2020-09-30 | Stop reason: HOSPADM

## 2020-09-22 RX ORDER — DIVALPROEX SODIUM 250 MG/1
250 TABLET, DELAYED RELEASE ORAL 2 TIMES DAILY
Status: DISCONTINUED | OUTPATIENT
Start: 2020-09-22 | End: 2020-09-30 | Stop reason: HOSPADM

## 2020-09-22 RX ORDER — DULOXETIN HYDROCHLORIDE 20 MG/1
40 CAPSULE, DELAYED RELEASE ORAL DAILY
Status: DISCONTINUED | OUTPATIENT
Start: 2020-09-22 | End: 2020-09-30 | Stop reason: HOSPADM

## 2020-09-22 RX ORDER — PANTOPRAZOLE SODIUM 40 MG/1
40 TABLET, DELAYED RELEASE ORAL
Status: DISCONTINUED | OUTPATIENT
Start: 2020-09-22 | End: 2020-09-30 | Stop reason: HOSPADM

## 2020-09-22 RX ORDER — POTASSIUM CHLORIDE 7.45 MG/ML
10 INJECTION INTRAVENOUS PRN
Status: DISCONTINUED | OUTPATIENT
Start: 2020-09-22 | End: 2020-09-30 | Stop reason: HOSPADM

## 2020-09-22 RX ORDER — POTASSIUM CHLORIDE 7.45 MG/ML
20 INJECTION INTRAVENOUS ONCE
Status: COMPLETED | OUTPATIENT
Start: 2020-09-22 | End: 2020-09-22

## 2020-09-22 RX ORDER — ROPINIROLE 1 MG/1
0.5 TABLET, FILM COATED ORAL NIGHTLY
Status: DISCONTINUED | OUTPATIENT
Start: 2020-09-22 | End: 2020-09-30 | Stop reason: HOSPADM

## 2020-09-22 RX ORDER — METOPROLOL SUCCINATE 50 MG/1
100 TABLET, EXTENDED RELEASE ORAL DAILY
Status: DISCONTINUED | OUTPATIENT
Start: 2020-09-22 | End: 2020-09-30 | Stop reason: HOSPADM

## 2020-09-22 RX ORDER — POLYETHYLENE GLYCOL 3350 17 G/17G
17 POWDER, FOR SOLUTION ORAL DAILY
Status: DISCONTINUED | OUTPATIENT
Start: 2020-09-22 | End: 2020-09-30 | Stop reason: HOSPADM

## 2020-09-22 RX ORDER — LIDOCAINE 4 G/G
1 PATCH TOPICAL DAILY
COMMUNITY

## 2020-09-22 RX ORDER — ALBUTEROL SULFATE 90 UG/1
2 AEROSOL, METERED RESPIRATORY (INHALATION) 4 TIMES DAILY
Status: DISCONTINUED | OUTPATIENT
Start: 2020-09-22 | End: 2020-09-30 | Stop reason: HOSPADM

## 2020-09-22 RX ORDER — BUDESONIDE AND FORMOTEROL FUMARATE DIHYDRATE 160; 4.5 UG/1; UG/1
2 AEROSOL RESPIRATORY (INHALATION) 2 TIMES DAILY PRN
Status: DISCONTINUED | OUTPATIENT
Start: 2020-09-22 | End: 2020-09-30 | Stop reason: HOSPADM

## 2020-09-22 RX ORDER — DEXAMETHASONE SODIUM PHOSPHATE 10 MG/ML
6 INJECTION, SOLUTION INTRAMUSCULAR; INTRAVENOUS ONCE
Status: COMPLETED | OUTPATIENT
Start: 2020-09-22 | End: 2020-09-22

## 2020-09-22 RX ORDER — DILTIAZEM HYDROCHLORIDE 120 MG/1
120 CAPSULE, COATED, EXTENDED RELEASE ORAL DAILY
Status: DISCONTINUED | OUTPATIENT
Start: 2020-09-23 | End: 2020-09-30 | Stop reason: HOSPADM

## 2020-09-22 RX ORDER — FUROSEMIDE 10 MG/ML
40 INJECTION INTRAMUSCULAR; INTRAVENOUS 2 TIMES DAILY
Status: DISCONTINUED | OUTPATIENT
Start: 2020-09-22 | End: 2020-09-23

## 2020-09-22 RX ORDER — ACETAMINOPHEN 325 MG/1
650 TABLET ORAL EVERY 4 HOURS PRN
Status: DISCONTINUED | OUTPATIENT
Start: 2020-09-22 | End: 2020-09-30 | Stop reason: HOSPADM

## 2020-09-22 RX ORDER — FLUTICASONE PROPIONATE 50 MCG
2 SPRAY, SUSPENSION (ML) NASAL DAILY
Status: DISCONTINUED | OUTPATIENT
Start: 2020-09-22 | End: 2020-09-30 | Stop reason: HOSPADM

## 2020-09-22 RX ORDER — SODIUM CHLORIDE 9 MG/ML
1000 INJECTION, SOLUTION INTRAVENOUS ONCE
Status: COMPLETED | OUTPATIENT
Start: 2020-09-22 | End: 2020-09-22

## 2020-09-22 RX ORDER — LEVOTHYROXINE SODIUM 0.03 MG/1
50 TABLET ORAL DAILY
Status: DISCONTINUED | OUTPATIENT
Start: 2020-09-22 | End: 2020-09-30 | Stop reason: HOSPADM

## 2020-09-22 RX ORDER — THIAMINE MONONITRATE (VIT B1) 100 MG
50 TABLET ORAL DAILY
Status: DISCONTINUED | OUTPATIENT
Start: 2020-09-22 | End: 2020-09-30 | Stop reason: HOSPADM

## 2020-09-22 RX ORDER — CLONAZEPAM 0.5 MG/1
0.5 TABLET ORAL 2 TIMES DAILY PRN
Status: ON HOLD | COMMUNITY
End: 2020-09-25 | Stop reason: HOSPADM

## 2020-09-22 RX ORDER — ALBUTEROL SULFATE 2.5 MG/3ML
2.5 SOLUTION RESPIRATORY (INHALATION) 4 TIMES DAILY
Status: DISCONTINUED | OUTPATIENT
Start: 2020-09-22 | End: 2020-09-22

## 2020-09-22 RX ORDER — M-VIT,TX,IRON,MINS/CALC/FOLIC 27MG-0.4MG
1 TABLET ORAL DAILY
Status: DISCONTINUED | OUTPATIENT
Start: 2020-09-22 | End: 2020-09-30 | Stop reason: HOSPADM

## 2020-09-22 RX ORDER — POTASSIUM CHLORIDE 20 MEQ/1
20 TABLET, EXTENDED RELEASE ORAL 2 TIMES DAILY
Status: DISCONTINUED | OUTPATIENT
Start: 2020-09-22 | End: 2020-09-22 | Stop reason: CLARIF

## 2020-09-22 RX ORDER — FERROUS SULFATE 325(65) MG
325 TABLET ORAL
Status: DISCONTINUED | OUTPATIENT
Start: 2020-09-23 | End: 2020-09-30 | Stop reason: HOSPADM

## 2020-09-22 RX ORDER — LIDOCAINE 4 G/G
1 PATCH TOPICAL DAILY
Status: ON HOLD | COMMUNITY
End: 2020-09-25 | Stop reason: HOSPADM

## 2020-09-22 RX ORDER — POTASSIUM CHLORIDE 750 MG/1
10 TABLET, FILM COATED, EXTENDED RELEASE ORAL DAILY
Status: ON HOLD | COMMUNITY
End: 2020-09-25 | Stop reason: HOSPADM

## 2020-09-22 RX ORDER — FUROSEMIDE 10 MG/ML
20 INJECTION INTRAMUSCULAR; INTRAVENOUS ONCE
Status: COMPLETED | OUTPATIENT
Start: 2020-09-22 | End: 2020-09-22

## 2020-09-22 RX ORDER — DONEPEZIL HYDROCHLORIDE 5 MG/1
10 TABLET, FILM COATED ORAL NIGHTLY
Status: DISCONTINUED | OUTPATIENT
Start: 2020-09-22 | End: 2020-09-30 | Stop reason: HOSPADM

## 2020-09-22 RX ORDER — IPRATROPIUM BROMIDE AND ALBUTEROL SULFATE 2.5; .5 MG/3ML; MG/3ML
1 SOLUTION RESPIRATORY (INHALATION) EVERY 4 HOURS PRN
Status: DISCONTINUED | OUTPATIENT
Start: 2020-09-22 | End: 2020-09-30 | Stop reason: HOSPADM

## 2020-09-22 RX ADMIN — POTASSIUM CHLORIDE 10 MEQ: 7.46 INJECTION, SOLUTION INTRAVENOUS at 22:56

## 2020-09-22 RX ADMIN — DEXAMETHASONE SODIUM PHOSPHATE 6 MG: 10 INJECTION, SOLUTION INTRAMUSCULAR; INTRAVENOUS at 10:32

## 2020-09-22 RX ADMIN — POTASSIUM BICARBONATE 40 MEQ: 782 TABLET, EFFERVESCENT ORAL at 17:03

## 2020-09-22 RX ADMIN — APIXABAN 2.5 MG: 2.5 TABLET, FILM COATED ORAL at 23:07

## 2020-09-22 RX ADMIN — ALBUTEROL SULFATE 2.5 MG: 2.5 SOLUTION RESPIRATORY (INHALATION) at 13:54

## 2020-09-22 RX ADMIN — MULTIPLE VITAMINS W/ MINERALS TAB 1 TABLET: TAB at 17:03

## 2020-09-22 RX ADMIN — ROPINIROLE HYDROCHLORIDE 0.5 MG: 1 TABLET, FILM COATED ORAL at 23:07

## 2020-09-22 RX ADMIN — DULOXETINE HYDROCHLORIDE 40 MG: 20 CAPSULE, DELAYED RELEASE ORAL at 17:02

## 2020-09-22 RX ADMIN — POTASSIUM CHLORIDE 10 MEQ: 7.46 INJECTION, SOLUTION INTRAVENOUS at 17:10

## 2020-09-22 RX ADMIN — DILTIAZEM HYDROCHLORIDE 30 MG: 30 TABLET, FILM COATED ORAL at 17:03

## 2020-09-22 RX ADMIN — FLUTICASONE PROPIONATE 2 SPRAY: 50 SPRAY, METERED NASAL at 17:06

## 2020-09-22 RX ADMIN — PANTOPRAZOLE SODIUM 40 MG: 40 TABLET, DELAYED RELEASE ORAL at 17:03

## 2020-09-22 RX ADMIN — DONEPEZIL HYDROCHLORIDE 10 MG: 5 TABLET, FILM COATED ORAL at 23:07

## 2020-09-22 RX ADMIN — ALBUTEROL SULFATE 2 PUFF: 90 AEROSOL, METERED RESPIRATORY (INHALATION) at 19:56

## 2020-09-22 RX ADMIN — SODIUM CHLORIDE 1000 ML: 9 INJECTION, SOLUTION INTRAVENOUS at 09:46

## 2020-09-22 RX ADMIN — DIVALPROEX SODIUM 250 MG: 250 TABLET, DELAYED RELEASE ORAL at 23:07

## 2020-09-22 RX ADMIN — POTASSIUM CHLORIDE 20 MEQ: 7.46 INJECTION, SOLUTION INTRAVENOUS at 11:58

## 2020-09-22 RX ADMIN — POTASSIUM CHLORIDE 10 MEQ: 7.46 INJECTION, SOLUTION INTRAVENOUS at 18:40

## 2020-09-22 RX ADMIN — LEVOTHYROXINE SODIUM 50 MCG: 0.03 TABLET ORAL at 17:03

## 2020-09-22 RX ADMIN — FUROSEMIDE 40 MG: 10 INJECTION, SOLUTION INTRAMUSCULAR; INTRAVENOUS at 17:40

## 2020-09-22 RX ADMIN — Medication 50 MG: at 17:03

## 2020-09-22 RX ADMIN — FUROSEMIDE 20 MG: 10 INJECTION, SOLUTION INTRAMUSCULAR; INTRAVENOUS at 13:28

## 2020-09-22 RX ADMIN — DILTIAZEM HYDROCHLORIDE 90 MG: 60 TABLET, FILM COATED ORAL at 18:16

## 2020-09-22 RX ADMIN — METOPROLOL SUCCINATE 100 MG: 50 TABLET, EXTENDED RELEASE ORAL at 17:03

## 2020-09-22 RX ADMIN — ALBUTEROL SULFATE 2.5 MG: 2.5 SOLUTION RESPIRATORY (INHALATION) at 10:08

## 2020-09-22 ASSESSMENT — PAIN SCALES - GENERAL
PAINLEVEL_OUTOF10: 0
PAINLEVEL_OUTOF10: 2

## 2020-09-22 ASSESSMENT — ENCOUNTER SYMPTOMS
COUGH: 0
SHORTNESS OF BREATH: 0

## 2020-09-22 ASSESSMENT — PAIN DESCRIPTION - LOCATION: LOCATION: BUTTOCKS

## 2020-09-22 ASSESSMENT — PAIN DESCRIPTION - PAIN TYPE: TYPE: ACUTE PAIN

## 2020-09-22 NOTE — CARE COORDINATION
Discharge Planning Assessment    RN/SW discharge planner met with patient/ (and family member) to discuss reason for admission, current living situation, and potential needs at the time of discharge. Pt in ED d/t CHF and COVID rule out     Demographics/Insurance verified:  Yes    Current type of dwelling:  Assisted Living at [facility]. Pt is in their memory care unit. Patient from ECF/SW confirmed with:  El [facility], Director, [phone]    Living arrangements:  Assisted Living    Level of function/Support:  Dementia     Tentative discharge plan:  Possibly back to Assisted Living at [facility]. Facility director, Abena Owusu, stated to call her personally with the COVID results. Stated they do have the ability to quarantine but since pt is in memory care unit, she would need a sitter 24/7 which she is unsure if they would be able to provide. Transportation at the time of discharge:  Pt will need transport back.     Electronically signed by ALEXA Hamilton, IFTIKHAR on 9/22/2020 at 1:28 PM

## 2020-09-22 NOTE — ED NOTES
PT pulled out PIV, PT -130s, Increased wheezing, resolved while Dr Ash Johnson at bedside.       Jus Selby RN  09/22/20 [id number] Qasim Parra RN  09/22/20 [id number]

## 2020-09-22 NOTE — ED NOTES
Bed: 05  Expected date:   Expected time:   Means of arrival:   Comments:  [place] [id number] [facility]  09/22/20 0909

## 2020-09-22 NOTE — ED NOTES
PT attends and linens changed prior to transfer to floor.   PT taken up by myself via stretcher     Franko Conrad RN  09/22/20 0546

## 2020-09-22 NOTE — FLOWSHEET NOTE
Patient arrived to  from the ED. In from [facility] for diarrhea, possible Covid exposure and audible wheezing. Covid swab completed in the ED. Patient received fluid bolus and IV lasix. Patient alert but confused. Very [place]. Incontinent of bowel and bladder. Camera placed in room. Bed alarm engaged. Call light in reach.          09/22/20 1515   Vital Signs   Temp 97.4 °F (36.3 °C)   Temp Source Oral   Pulse 113   Heart Rate Source Monitor   Resp 18   BP (!) 159/86   BP Location Right upper arm   BP Upper/Lower Upper   MAP (mmHg) 100   Level of Consciousness 0   MEWS Score 3   Patient Currently in Pain No

## 2020-09-22 NOTE — ED NOTES
PIV placed, labs obtained. NS bolus infusing as ordered. TV turned on for PT. PT st cath for urine. PT denies any needs at this time. Bed alarm activated. Side rails up X2, call light in reach. New green and grey tube sent to lab.       Jena Lawler RN  09/22/20 9075

## 2020-09-22 NOTE — PROGRESS NOTES
Call placed to [facility] to verify medications. Nurse unavailable at this time.

## 2020-09-22 NOTE — ED PROVIDER NOTES
[address] [facility]      Pt Name: Penny Phelan  MRN: [id number]  [place] 3/2/1929  Date of evaluation: 9/22/2020  Provider: Germaine Machado MD    CHIEF COMPLAINT       Chief Complaint   Patient presents with    Concern For COVID-19     PT arrived via EMS from [facility] with c/o possibly being exposed to [place]. PT with diarrhea since last night. PT with audible wheezing         HISTORY OF PRESENT ILLNESS   (Location/Symptom, Timing/Onset, Context/Setting, Quality, Duration, Modifying Factors, Severity)  Note limiting factors. Penny Phelan is a 80 y.o. female  history of chronic atrial fibrillation on elliquis, hypertension, CHF and dementia who presents to the emergency department from nursing home for wheezing x1 day. Noted to be wheezing this morning with mild tachycardia. She was treated at her nursing home by someone who was recently diagnosed with COVID-19. Patient denies symptoms to me. She does not feel short of breath and is denying cough or chest pain. Remainder of history limited secondary to dementia. Nursing Notes were reviewed. REVIEW OF SYSTEMS    (2-9 systems for level 4, 10 or more for level 5)     Review of Systems   Unable to perform ROS: Dementia   Respiratory: Negative for cough and shortness of breath. Cardiovascular: Negative for chest pain and palpitations. Except as noted above the remainder of the review of systems was reviewed and negative.        PAST MEDICAL HISTORY     Past Medical History:   Diagnosis Date    Abdominal wall pain     Arrhythmia     afib,bradycardia    Arthritis     left hip    Asthma     Atrial fibrillation ([address]     Broken nose 2016    CHF (congestive heart failure) ([address] 08/2018    Cough variant asthma     Dementia (HCC)     Dyspnea     Essential hypertension 8/1/2018    Fatigue     Hematoma     Hyperlipidemia     Insomnia     Lumbar radiculopathy     Osteoporosis     Palpitation     Restless leg syndrome     Shingles     Sinusitis          SURGICAL HISTORY       Past Surgical History:   Procedure Laterality Date    ANKLE SURGERY  1997    left ankle surgery    APPENDECTOMY      CARDIOVERSION      COLONOSCOPY  10/25/10    normal dr Shawn Cutler    COLONOSCOPY  02/27/2018    polyps dr Herminio Banda, no repeat needed.  no malignancy    ELBOW SURGERY      HIP SURGERY Left 7/11/2020    HIP HEMIARTHROPLASTY performed by Alphonso Gomez MD at [facility]      right elbow    OVARY REMOVAL Left     UPPER GASTROINTESTINAL ENDOSCOPY  02/27/2018    normal dr Jasmin Jalloh       Previous Medications    ACETAMINOPHEN (TYLENOL) 325 MG TABLET    Take 650 mg by mouth every 4 hours as needed for Pain or Fever    APIXABAN (ELIQUIS) 5 MG TABS TABLET    Take 2.5 mg by mouth 2 times daily     CHOLECALCIFEROL (VITAMIN D3) 5000 UNITS TABS    Take 1 tablet by mouth daily     COMPRESSION STOCKINGS MISC    by Does not apply route Bilateral lower extremity compression stockings, 15-20 mmHg    DILTIAZEM (CARDIZEM) 30 MG TABLET    Take 30 mg by mouth 4 times daily    DIVALPROEX (DEPAKOTE) 250 MG DR TABLET    Take 250 mg by mouth 2 times daily     DONEPEZIL (ARICEPT) 10 MG TABLET    Take 10 mg by mouth nightly     DULOXETINE HCL 40 MG CSDR    Take 40 mg by mouth daily     FERROUS SULFATE 325 (65 FE) MG TABLET    Take 325 mg by mouth daily (with breakfast)    FLUTICASONE (FLONASE) 50 MCG/ACT NASAL SPRAY    2 sprays by Each Nostril route daily    FUROSEMIDE (LASIX) 20 MG TABLET    Take 1 tablet by mouth daily    LEVOTHYROXINE (SYNTHROID) 88 MCG TABLET    TAKE 1 TABLET BY MOUTH DAILY    LINZESS 290 MCG CAPS CAPSULE    Take 290 mcg by mouth every morning (before breakfast)     METOPROLOL SUCCINATE (TOPROL XL) 100 MG EXTENDED RELEASE TABLET    Take 1 tablet by mouth daily    MULTIPLE VITAMINS-MINERALS (THERAPEUTIC MULTIVITAMIN-MINERALS) TABLET    Take 1 tablet by mouth daily    PANTOPRAZOLE (PROTONIX) 40 MG TABLET    TAKE (1) TABLET BY MOUTH DAILY    POLYETHYLENE GLYCOL 3350 (MIRALAX PO)    Take 17 g by mouth daily     POTASSIUM BICARB-CITRIC ACID (EFFER-K) 20 MEQ TBEF EFFERVESCENT TABLET    Take 2 tablets by mouth 2 times daily    POTASSIUM CHLORIDE (KLOR-CON M) 20 MEQ EXTENDED RELEASE TABLET    Take 1 tablet by mouth 2 times daily for 5 days    PROAIR  (90 BASE) MCG/ACT INHALER    2 PUFFS INTO LUNGS EVERY 4 HOURS AS NEEDED FOR WHEEZING OR FORSHORTNESS OF BREATH    ROPINIROLE (REQUIP) 1 MG TABLET    TAKE TWO (2) TABLETS BY MOUTH NIGHTLY    SPACER/AERO-HOLDING CHAMBERS (E-Z SPACER) POLLY    1 Device by Does not apply route daily as needed. SYMBICORT 160-4.5 MCG/ACT AERO    INHALE 2 PUFFS TWICE A DAY INTO THE LUNGS    THIAMINE MONONITRATE PO    Take 1,000 mcg by mouth daily       ALLERGIES     Gluten meal; Demerol; Lidocaine hcl; Other; Procaine; Tetanus toxoid, adsorbed; Vicodin [hydrocodone-acetaminophen]; Wheat bran; Augmentin [amoxicillin-pot clavulanate]; Hydrocodone-acetaminophen; Meperidine; Prednisone; and Tetanus toxoids    FAMILY HISTORY       Family History   Problem Relation Age of Onset    Cancer Mother     Stroke Father     Arthritis Other     Asthma Other     Diabetes Other     Heart Disease Other           SOCIAL HISTORY       Social History     Socioeconomic History    Marital status:       Spouse name: None    Number of children: None    Years of education: None    Highest education level: None   Occupational History    Occupation: Retired teacher   Social Needs    Financial resource strain: Not hard at all   [employer] insecurity     Worry: Never true     Inability: Never true   [employer] needs     Medical: No     Non-medical: No   Tobacco Use    Smoking status: Former Smoker     Packs/day: 0.25     Years: 20.00     Pack years: 5.00     Last attempt to quit: 1970     Years since quittin.1 mass.      Tenderness: There is no abdominal tenderness. Musculoskeletal:      Comments: 2+ pitting edema to bilateral calves   Skin:     General: Skin is warm and dry. Capillary Refill: Capillary refill takes less than 2 seconds. Neurological:      Mental Status: She is alert and oriented to person, place, and time. Psychiatric:         Mood and Affect: Mood normal.         Behavior: Behavior normal.         DIAGNOSTIC RESULTS     EKG: All EKG's are interpreted by the Emergency Department Physician who either signs or Co-signs this chart in the absence of a cardiologist.    The Ekg interpreted by me in the absence of a cardiologist shows. a fib rate 96   +QRS widening in setting of known RBBB  No GAMALIEL when compared to 8/27/20      RADIOLOGY:   Non-plain film images such as CT, Ultrasound and MRI are read by the radiologist. Plain radiographic images are visualized and preliminarily interpreted by the emergency physician with the below findings:    Echo 2018 reviewed   Left ventricle size is normal.   Normal left ventricular wall thickness. Ejection fraction is visually estimated to be 45-50 %. Grade II diastolic dysfunction with elevated LV filling pressures. Mitral annular calcification is present. The mitral valve leaflets are slightly thickened. mild mitral regurgitation is present. Moderate left atrial dilation. The right ventricle is slightly increased in size and is normal in function. TAPSE measures 1.4 cm by objective criteria. Mild pulmonary hypertension. There is mild to moderate tricuspid regurgitation with the systolic   pulmonary artery pressure (SPAP) estimated at 51 mmHg (estimated RA pressure   of 15 mmHg included)   c/w moderate pulmonary HTN.    The right atrium is mildly dilated  Interpretation per the Radiologist below, if available at the time of this note:    XR CHEST PORTABLE   Final Result   Cardiomegaly and diffuse interstitial prominence suggesting fluid overload   and/or CHF. No definite acute airspace disease.                LABS:  Labs Reviewed   CBC WITH AUTO DIFFERENTIAL - Abnormal; Notable for the following components:       Result Value    RBC 3.78 (*)     Hemoglobin 11.2 (*)     Hematocrit 34.1 (*)     RDW 18.8 (*)     All other components within normal limits    Narrative:     Performed at:  [facility] [address]   Phone [phone] - Abnormal; Notable for the following components:    Bilirubin Urine SMALL (*)     Ketones, Urine 15 (*)     Protein,  (*)     Urobilinogen, Urine 2.0 (*)     All other components within normal limits    Narrative:     Performed at:  [facility] [address], [place], [facility]   Phone [phone]   BASIC METABOLIC PANEL W/ REFLEX TO MG FOR LOW K - Abnormal; Notable for the following components:    Potassium reflex Magnesium 2.9 (*)     Chloride 98 (*)     Glucose 126 (*)     CREATININE <0.5 (*)     All other components within normal limits    Narrative:     Ethan Rivera  [facility] tel. [phone],  Chemistry results called to and read back by Shaquille Correia, 09/22/2020  10:37, by Bryant Gentile  Performed at:  [facility] [address], [place], [address] [employer]   Phone [phone] - Abnormal; Notable for the following components:    Pro-BNP 7,108 (*)     All other components within normal limits    Narrative:     Ethan Rivera  [facility] tel. [phone],  Chemistry results called to and read back by Shaquille Correia, 09/22/2020  10:37, by Bryant Gentile  Performed at:  [facility] [address], [place], [facility]   Phone [phone]    Narrative:     [facility] tel. [phone],  Rejected Test Name/Called to: [email or web address],geronimo bnpdany,chris/jackson,rn, 09/22/2020  09:58, by Bryant Gentile  Performed at:  [facility]  [address] [facility],  [place], [address]   Phone [phone]   TROPONIN    Narrative:     Avinash Beth  [facility] tel. [phone],  Chemistry results called to and read back by Jai Gilbert, 09/22/2020  10:37, by Deepak Almaguer  Performed at:  [facility]  [address],  [place], [address]   Phone [phone]   LACTIC ACID, PLASMA    Narrative:     Performed at:  [facility]  [address] [facility],  [place], [address]   Phone [phone]   MICROSCOPIC URINALYSIS    Narrative:     Performed at:  [facility]  [address] [facility],  [place], [address]   Phone [phone]   MAGNESIUM    Narrative:     Avinash Beth  ER tel. [phone],  Chemistry results called to and read back by Jai Gilbert, 09/22/2020  10:37, by Deepak Almaguer  Performed at:  [facility]  [address] [facility],  [place], [address]   Phone [phone]   BNP acutely elevated in the setting of fluid overload  Troponin normal    CBC: no clinically significant anemia, leukocytosis, thrombocytopenia  BMP: Positive hypokalemia, no KAI      All other labs were within normal range or not returned as of this dictation.     EMERGENCY DEPARTMENT COURSE and DIFFERENTIAL DIAGNOSIS/MDM:   Vitals:    Vitals:    09/22/20 1305 09/22/20 1306 09/22/20 1307 09/22/20 1354   BP:       Pulse: 134 136 126    Resp: 20 19 17 16   Temp:       SpO2:    93%           Medications   albuterol (PROVENTIL) nebulizer solution 2.5 mg (2.5 mg Nebulization Given 9/22/20 1008)   0.9 % sodium chloride infusion (0 mLs Intravenous Stopped 9/22/20 1320)   dexamethasone (PF) (DECADRON) injection 6 mg (6 mg Intravenous Given 9/22/20 1032)   potassium chloride 10 mEq/100 mL IVPB (Peripheral Line) (0 mEq Intravenous Stopped 9/22/20 1320)   albuterol (PROVENTIL) nebulizer solution 2.5 mg (2.5 mg Nebulization Given 9/22/20 1357) furosemide (LASIX) injection 20 mg (20 mg Intravenous Given 9/22/20 8460)       Chart review: Seen here in emergency room August- for fall at nursing home likely mechanical.  At that time she was noted to have early UTI treated with Keflex. Patient with history of systolic heart failure presenting to the ER for shortness of breath and wheezing. On arrival she has significant expiratory wheezing but not acutely hypoxic. I had concern for volume overload vs obstructive lung disease and nebs/steriods were started. No fevers, white count or lactate elevation. She is not septic today. Is moderately hypokalemic, repletion started IV. Chest x-ray more concerning for pulmonary edema rather than infiltrate. In the setting of significant electrolyte derangements and significant wheeze I will admit her for further management of likely heart failure. On re eval the patient has increased wheezing and tachycardia. Her BP remains stable. I will start IV lasix here prior to potassium repletion. FINAL IMPRESSION      1. Congestive heart failure, unspecified HF chronicity, unspecified heart failure type [facility])          DISPOSITION/PLAN   DISPOSITION        PATIENT REFERRED TO:  Omar Niño MD  [facility] [id number]  [phone]            DISCHARGE MEDICATIONS:  New Prescriptions    No medications on file     Controlled Substances Monitoring:     RX Monitoring 3/25/2018   Attestation The Prescription Monitoring Report was requested today but not available.    Periodic Controlled Substance Monitoring -       (Please note that portions of this note were completed with a voice recognition program.  Efforts were made to edit the dictations but occasionally words are mis-transcribed.)    Niki Jones MD (electronically signed)  Attending Emergency Physician         Hermes Ng MD  09/22/20 1129

## 2020-09-23 PROBLEM — J12.9 VIRAL PNEUMONIA: Status: RESOLVED | Noted: 2020-05-24 | Resolved: 2020-09-23

## 2020-09-23 PROBLEM — U07.1 COVID-19 VIRUS INFECTION: Status: RESOLVED | Noted: 2020-05-25 | Resolved: 2020-09-23

## 2020-09-23 PROBLEM — Z79.01 CHRONIC ANTICOAGULATION: Status: RESOLVED | Noted: 2020-07-09 | Resolved: 2020-09-23

## 2020-09-23 PROBLEM — I48.20 CHRONIC ATRIAL FIBRILLATION (HCC): Status: ACTIVE | Noted: 2020-09-23

## 2020-09-23 PROBLEM — S72.002A LEFT DISPLACED FEMORAL NECK FRACTURE (HCC): Status: RESOLVED | Noted: 2020-07-09 | Resolved: 2020-09-23

## 2020-09-23 PROBLEM — J18.9 PNEUMONIA: Status: RESOLVED | Noted: 2018-09-28 | Resolved: 2020-09-23

## 2020-09-23 PROBLEM — R09.02 HYPOXEMIA: Status: RESOLVED | Noted: 2020-05-24 | Resolved: 2020-09-23

## 2020-09-23 PROBLEM — R41.0 DELIRIUM, ACUTE: Status: RESOLVED | Noted: 2020-06-03 | Resolved: 2020-09-23

## 2020-09-23 LAB
ANION GAP SERPL CALCULATED.3IONS-SCNC: 14 MMOL/L (ref 3–16)
BUN BLDV-MCNC: 17 MG/DL (ref 7–20)
CALCIUM SERPL-MCNC: 8.7 MG/DL (ref 8.3–10.6)
CHLORIDE BLD-SCNC: 100 MMOL/L (ref 99–110)
CO2: 25 MMOL/L (ref 21–32)
CREAT SERPL-MCNC: 0.6 MG/DL (ref 0.6–1.2)
GFR AFRICAN AMERICAN: >60
GFR NON-AFRICAN AMERICAN: >60
GLUCOSE BLD-MCNC: 167 MG/DL (ref 70–99)
POTASSIUM SERPL-SCNC: 4 MMOL/L (ref 3.5–5.1)
SODIUM BLD-SCNC: 139 MMOL/L (ref 136–145)
TROPONIN: <0.01 NG/ML

## 2020-09-23 PROCEDURE — 80048 BASIC METABOLIC PNL TOTAL CA: CPT

## 2020-09-23 PROCEDURE — 84484 ASSAY OF TROPONIN QUANT: CPT

## 2020-09-23 PROCEDURE — 6360000002 HC RX W HCPCS: Performed by: INTERNAL MEDICINE

## 2020-09-23 PROCEDURE — 99223 1ST HOSP IP/OBS HIGH 75: CPT | Performed by: INTERNAL MEDICINE

## 2020-09-23 PROCEDURE — 96365 THER/PROPH/DIAG IV INF INIT: CPT

## 2020-09-23 PROCEDURE — 94640 AIRWAY INHALATION TREATMENT: CPT

## 2020-09-23 PROCEDURE — 36415 COLL VENOUS BLD VENIPUNCTURE: CPT

## 2020-09-23 PROCEDURE — 96366 THER/PROPH/DIAG IV INF ADDON: CPT

## 2020-09-23 PROCEDURE — 1200000000 HC SEMI PRIVATE

## 2020-09-23 PROCEDURE — 6370000000 HC RX 637 (ALT 250 FOR IP): Performed by: INTERNAL MEDICINE

## 2020-09-23 PROCEDURE — 94761 N-INVAS EAR/PLS OXIMETRY MLT: CPT

## 2020-09-23 RX ORDER — FUROSEMIDE 40 MG/1
40 TABLET ORAL 2 TIMES DAILY
Status: DISCONTINUED | OUTPATIENT
Start: 2020-09-23 | End: 2020-09-30 | Stop reason: HOSPADM

## 2020-09-23 RX ORDER — HALOPERIDOL 5 MG/ML
5 INJECTION INTRAMUSCULAR EVERY 4 HOURS PRN
Status: DISCONTINUED | OUTPATIENT
Start: 2020-09-23 | End: 2020-09-24

## 2020-09-23 RX ADMIN — DONEPEZIL HYDROCHLORIDE 10 MG: 5 TABLET, FILM COATED ORAL at 21:22

## 2020-09-23 RX ADMIN — FERROUS SULFATE TAB 325 MG (65 MG ELEMENTAL FE) 325 MG: 325 (65 FE) TAB at 09:13

## 2020-09-23 RX ADMIN — CHOLECALCIFEROL TAB 125 MCG (5000 UNIT) 5000 UNITS: 125 TAB at 09:13

## 2020-09-23 RX ADMIN — POTASSIUM BICARBONATE 40 MEQ: 782 TABLET, EFFERVESCENT ORAL at 21:22

## 2020-09-23 RX ADMIN — POLYETHYLENE GLYCOL 3350 17 G: 17 POWDER, FOR SOLUTION ORAL at 09:11

## 2020-09-23 RX ADMIN — DIVALPROEX SODIUM 250 MG: 250 TABLET, DELAYED RELEASE ORAL at 21:22

## 2020-09-23 RX ADMIN — ALBUTEROL SULFATE 2 PUFF: 90 AEROSOL, METERED RESPIRATORY (INHALATION) at 08:30

## 2020-09-23 RX ADMIN — DIVALPROEX SODIUM 250 MG: 250 TABLET, DELAYED RELEASE ORAL at 09:11

## 2020-09-23 RX ADMIN — PANTOPRAZOLE SODIUM 40 MG: 40 TABLET, DELAYED RELEASE ORAL at 09:12

## 2020-09-23 RX ADMIN — POTASSIUM CHLORIDE 10 MEQ: 7.46 INJECTION, SOLUTION INTRAVENOUS at 01:37

## 2020-09-23 RX ADMIN — DILTIAZEM HYDROCHLORIDE 120 MG: 120 CAPSULE, COATED, EXTENDED RELEASE ORAL at 09:13

## 2020-09-23 RX ADMIN — ALBUTEROL SULFATE 2 PUFF: 90 AEROSOL, METERED RESPIRATORY (INHALATION) at 16:34

## 2020-09-23 RX ADMIN — METOPROLOL SUCCINATE 100 MG: 50 TABLET, EXTENDED RELEASE ORAL at 09:11

## 2020-09-23 RX ADMIN — POTASSIUM BICARBONATE 40 MEQ: 782 TABLET, EFFERVESCENT ORAL at 09:12

## 2020-09-23 RX ADMIN — HALOPERIDOL LACTATE 5 MG: 5 INJECTION, SOLUTION INTRAMUSCULAR at 21:22

## 2020-09-23 RX ADMIN — MULTIPLE VITAMINS W/ MINERALS TAB 1 TABLET: TAB at 09:12

## 2020-09-23 RX ADMIN — APIXABAN 2.5 MG: 2.5 TABLET, FILM COATED ORAL at 09:12

## 2020-09-23 RX ADMIN — LEVOTHYROXINE SODIUM 50 MCG: 0.03 TABLET ORAL at 09:12

## 2020-09-23 RX ADMIN — APIXABAN 2.5 MG: 2.5 TABLET, FILM COATED ORAL at 21:22

## 2020-09-23 RX ADMIN — Medication 50 MG: at 09:11

## 2020-09-23 RX ADMIN — ALBUTEROL SULFATE 2 PUFF: 90 AEROSOL, METERED RESPIRATORY (INHALATION) at 21:00

## 2020-09-23 RX ADMIN — ROPINIROLE HYDROCHLORIDE 0.5 MG: 1 TABLET, FILM COATED ORAL at 21:22

## 2020-09-23 RX ADMIN — FUROSEMIDE 40 MG: 40 TABLET ORAL at 17:21

## 2020-09-23 RX ADMIN — DULOXETINE HYDROCHLORIDE 40 MG: 20 CAPSULE, DELAYED RELEASE ORAL at 09:12

## 2020-09-23 ASSESSMENT — PAIN SCALES - GENERAL
PAINLEVEL_OUTOF10: 0

## 2020-09-23 NOTE — CONSULTS
[facility]  Advanced CHF/Pulmonary Hypertension   Cardiac Evaluation      Zoraida Shipman  YOB: 1929    Requesting PHysician:  Dr. Susannah Singleton      Chief Complaint   Patient presents with    Concern For [id number]     PT arrived via EMS from [facility] with c/o possibly being exposed to [place]. PT with diarrhea since last night. PT with audible wheezing        History of Present Illness:  Tawana Perez is a 79 yo female with a history of chronic atrial fibrillation on Eliquis, hypertension, HF, and dementia who presented to the ED from her nursing home for wheezing for 1 day. She was noted to be wheezing with mild tachycardia  She had recently been treated at her nursing home by someone who was recently diagnosed with COVID. No symptoms of SOB. No cough or chest pain. Unable to get a valid history given her dementia. According to the notes, she was also having diarrhea. She was swabbed for COVID in the ED. In the ED, she received both a fluid bolus and IV Lasix. She remains alert but confused. She has known incontinence. We are consulted for SOB, elevated BNP level. Labs in ED:  bnp 7100, troponin < 0.01 x 2    Echo:  8/1/18:   Left ventricle size is normal.   Normal left ventricular wall thickness. Ejection fraction is visually estimated to be 45-50 %. Grade II diastolic dysfunction with elevated LV filling pressures. Mitral annular calcification is present. The mitral valve leaflets are slightly thickened. mild mitral regurgitation is present. Moderate left atrial dilation. The right ventricle is slightly increased in size and is normal in function. TAPSE measures 1.4 cm by objective criteria. Mild pulmonary hypertension. There is mild to moderate tricuspid regurgitation with the systolic   pulmonary artery pressure (SPAP) estimated at 51 mmHg (estimated RA pressure   of 15 mmHg included)   c/w moderate pulmonary HTN.    The right atrium is mildly dilated.     Meds Prior to admission:  KCl 10 meq po qd  toprol xl 100 mg po qd  diltiazem 30 qid  Lasix 20 qd  eliquis 2.5 bid  depakote    Allergies   Allergen Reactions    Gluten Meal Diarrhea    Demerol     Lidocaine Hcl     Other     Procaine     Tetanus Toxoid, Adsorbed     Vicodin [Hydrocodone-Acetaminophen]     Wheat Bran     Augmentin [Amoxicillin-Pot Clavulanate] Itching    Hydrocodone-Acetaminophen Palpitations    Meperidine Palpitations    Prednisone Palpitations    Tetanus Toxoids Palpitations     Current Facility-Administered Medications   Medication Dose Route Frequency Provider Last Rate Last Dose    furosemide (LASIX) tablet 40 mg  40 mg Oral BID Henrry Houston MD        acetaminophen (TYLENOL) tablet 650 mg  650 mg Oral Q4H PRN Henrry Houston MD        apixaban Jeannett Curb) tablet 2.5 mg  2.5 mg Oral BID Henrry Houston MD   2.5 mg at 09/23/20 0912    vitamin D3 (CHOLECALCIFEROL) tablet 5,000 Units  5,000 Units Oral Daily Henrry Houston MD   5,000 Units at 09/23/20 0913    divalproex (DEPAKOTE) DR tablet 250 mg  250 mg Oral BID Henrry Houston MD   250 mg at 09/23/20 0911    donepezil (ARICEPT) tablet 10 mg  10 mg Oral Nightly Henrry Houston MD   10 mg at 09/22/20 2307    DULoxetine (CYMBALTA) extended release capsule 40 mg  40 mg Oral Daily Henrry Houston MD   40 mg at 09/23/20 0912    ferrous sulfate (IRON 325) tablet 325 mg  325 mg Oral Daily with breakfast Henrry Houston MD   325 mg at 09/23/20 0913    fluticasone (FLONASE) 50 MCG/ACT nasal spray 2 spray  2 spray Each Nostril Daily Henrry Houston MD   2 spray at 09/22/20 1706    levothyroxine (SYNTHROID) tablet 50 mcg  50 mcg Oral Daily Henrry Houston MD   50 mcg at 09/23/20 0912    linaclotide (LINZESS) capsule 290 mcg  290 mcg Oral QAM AC Henrry Houston MD        metoprolol succinate (TOPROL XL) extended release tablet 100 mg  100 mg Oral Daily Henrry Houston MD   100 mg at 09/23/20 0911    therapeutic multivitamin-minerals 1 tablet  1 tablet Oral Daily Christiano Woodruff MD   1 tablet at 09/23/20 0912    pantoprazole (PROTONIX) tablet 40 mg  40 mg Oral QAM AC Christiano Woodruff MD   40 mg at 09/23/20 0912    polyethylene glycol (GLYCOLAX) packet 17 g  17 g Oral Daily Christiano Woodruff MD   17 g at 09/23/20 0911    potassium bicarb-citric acid (EFFER-K) effervescent tablet 40 mEq  40 mEq Oral BID Christiano Woodruff MD   40 mEq at 09/23/20 0912    ipratropium-albuterol (DUONEB) nebulizer solution 1 ampule  1 ampule Inhalation Q4H PRN Christiano Woodruff MD        rOPINIRole (REQUIP) tablet 0.5 mg  0.5 mg Oral Nightly Christiano Woodruff MD   0.5 mg at 09/22/20 2307    budesonide-formoterol (SYMBICORT) 160-4.5 MCG/ACT inhaler 2 puff  2 puff Inhalation BID PRN Christiano Woodruff MD        vitamin B-1 (THIAMINE) tablet 50 mg  50 mg Oral Daily Christiano Woodruff MD   50 mg at 09/23/20 0911    albuterol sulfate  (90 Base) MCG/ACT inhaler 2 puff  2 puff Inhalation 4x daily Christiano Woodruff MD   Stopped at 09/23/20 1307    potassium chloride (KLOR-CON M) extended release tablet 40 mEq  40 mEq Oral PRN Christiano Woodruff MD        Or    potassium bicarb-citric acid (EFFER-K) effervescent tablet 40 mEq  40 mEq Oral PRN Christiano Woodruff MD        Or    potassium chloride 10 mEq/100 mL IVPB (Peripheral Line)  10 mEq Intravenous PRN Christiano Woodruff  mL/hr at 09/23/20 0137 10 mEq at 09/23/20 4679    dilTIAZem (CARDIZEM CD) extended release capsule 120 mg  120 mg Oral Daily Christiano Woodruff MD   120 mg at 09/23/20 0913       Past Medical History:   Diagnosis Date    Abdominal wall pain     Arrhythmia     afib,bradycardia    Arthritis     left hip    Asthma     Atrial fibrillation ([address]     Broken nose 2016    CHF (congestive heart failure) ([facility] 08/2018    Cough variant asthma     Dementia ([facility]     Dyspnea     Essential hypertension 8/1/2018    Fatigue     Hematoma     Hyperlipidemia     Insomnia     Lumbar radiculopathy     Osteoporosis     Palpitation     Restless leg syndrome     Shingles     Sinusitis      Past Surgical History:   Procedure Laterality Date    ANKLE SURGERY      left ankle surgery    APPENDECTOMY      CARDIOVERSION      COLONOSCOPY  10/25/10    normal dr Jarret Gordon    COLONOSCOPY  2018    polyps dr Fabrice Fisher, no repeat needed. no malignancy    ELBOW SURGERY      HIP SURGERY Left 2020    HIP HEMIARTHROPLASTY performed by Mara Bell MD at [facility]      right elbow    OVARY REMOVAL Left     UPPER GASTROINTESTINAL ENDOSCOPY  2018    normal dr Fabrice Fisher     Family History   Problem Relation Age of Onset    Cancer Mother     Stroke Father     Arthritis Other     Asthma Other     Diabetes Other     Heart Disease Other      Social History     Socioeconomic History    Marital status:       Spouse name: Not on file    Number of children: Not on file    Years of education: Not on file    Highest education level: Not on file   Occupational History    Occupation: Retired teacher   Social Needs    Financial resource strain: Not hard at all   [employer] insecurity     Worry: Never true     Inability: Never true   [employer] needs     Medical: No     Non-medical: No   Tobacco Use    Smoking status: Former Smoker     Packs/day: 0.25     Years: 20.00     Pack years: 5.00     Last attempt to quit: 1970     Years since quittin.1    Smokeless tobacco: Never Used   Substance and Sexual Activity    Alcohol use: No     Frequency: Never     Binge frequency: Never    Drug use: No    Sexual activity: Not Currently     Partners: Male   Lifestyle    Physical activity     Days per week: 0 days     Minutes per session: 0 min    Stress: Not at all   Relationships    Social connections     Talks on phone: More than three times a week     Gets together: More than three times a week     Attends Protestant service: Never     Active member of club or organization: No     Attends meetings of clubs or organizations: Never     Relationship status:     Intimate partner violence     Fear of current or ex partner: Not on file     Emotionally abused: Not on file     Physically abused: Not on file     Forced sexual activity: Not on file   Other Topics Concern    Not on file   Social History Narrative    Pt's daughter, [place] contact due to pt's cognitive status. Per pts daughter pt can be agressive and combative. Review of Systems:   · Constitutional: there has been no unanticipated weight loss. There's been no change in energy level, sleep pattern, or activity level. · Eyes: No visual changes or diplopia. No scleral icterus. · ENT: No Headaches, hearing loss or vertigo. No mouth sores or sore throat. · Cardiovascular: Reviewed in HPI  · Respiratory: No cough or wheezing, no sputum production. No hematemesis. · Gastrointestinal: No abdominal pain, appetite loss, blood in stools. No change in bowel or bladder habits. · Genitourinary: No dysuria, trouble voiding, or hematuria. · Musculoskeletal:  No gait disturbance, weakness or joint complaints. · Integumentary: No rash or pruritis. · Neurological: No headache, diplopia, change in muscle strength, numbness or tingling. No change in gait, balance, coordination, mood, affect, memory, mentation, behavior. · Psychiatric: No anxiety, no depression. · Endocrine: No malaise, fatigue or temperature intolerance. No excessive thirst, fluid intake, or urination. No tremor. · Hematologic/Lymphatic: No abnormal bruising or bleeding, blood clots or swollen lymph nodes. · Allergic/Immunologic: No nasal congestion or hives.     Physical Examination:    Vitals:    09/23/20 0830 09/23/20 0900 09/23/20 1215 09/23/20 1530   BP:  130/84 139/80 (!) 140/86   Pulse:  88 78 81   Resp:  17 16 18   Temp:  97.6 °F (36.4 °C) 97.2 °F (36.2 °C) 97.1 °F (36.2 °C)   TempSrc: Continue diltiazem 120 mg po qd for hypertension and atrial fib  4. Continue toprol XL  5  Continue Eliquis  6. She does not appear to have decompensated HF at this time  7. CHF education reinforced. ~salt restriction  ~fluid restriction  ~medication compliance  ~daily weights and notify of any significant weight gain/loss  ~establish with CHF nurse  ~outpatient follow-up with our CHF team      I appreciate the opportunity of cooperating in the care of this patient.     Dean Calvert M.D., [facility]

## 2020-09-23 NOTE — H&P
[facility]                     [facility], [address]                              HISTORY AND PHYSICAL    PATIENT NAME: Mickle Severance                    :        1929  MED REC NO:   [id number]                          ROOM:       0052  ACCOUNT NO:   [de-identified]                           ADMIT DATE: 2020  PROVIDER:     Gretchen Thompson MD    HISTORY OF PRESENT ILLNESS:  The patient is a 70-year-old white lady  came to the emergency room with history of increasing shortness of  breath. She arrived via EMS from the [facility]. The patient also  had a possible exposure to COVID. The patient also has some diarrhea. There is some increased shortness of breath and wheezing. The patient  is a poor historian overall. Denied any chest pain. PAST MEDICAL HISTORY:  Pertinent for chronic sinusitis, herpes zoster,  restless legs syndrome, palpitation, osteoporosis, lumbar radiculopathy,  chronic insomnia, hyperlipidemia, fatigue, essential hypertension,  exertional dyspnea, dementia, congestive heart failure, fracture of the  nasal bone, atrial fibrillation, bronchial asthma, osteoarthritis,  cardiac arrhythmia and abdominal wall pain. PAST SURGICAL HISTORY:  Pertinent for ankle surgery, colonoscopy, upper  GI endoscopy, hip surgery, appendectomy, cardioversion, elbow surgery,  hysterectomy, joint replacement and ovarian removal.    FAMILY HISTORY:  Both her parents are  because of natural  causes. Mother had some kind of cancer. Father  of a stroke. SOCIAL HISTORY:  She is a . She has three grown children. She  used to work as a . She never did any drinking or drug  abuse. She used to smoke up to quarter pack a day, but she quit about  20 years ago. There is no history of illicit substance abuse. She is  unable to self-care and institutionalized for [facility] care.     REVIEW OF SYSTEMS:  Negative for loss of consciousness. Does have mild  orthopnea. Does have significant resting and exertional shortness of  breath. Denies angina pectoris. Denies any speech disturbance. No  seizure activity. No visual blurring. No dysphagia. No abdominal  pain. No hematemesis. No melena. Does have urinary incontinence, but  no hematuria. Does have chronic musculoskeletal pain. Does have  difficulty in ambulation and poor coordination. Does have increased  tendency to fall. PHYSICAL EXAMINATION:  GENERAL:  Alert, awake, oriented x0. Pleasantly confused 80-year-old  white lady, appears to be responding to all the commands appropriately,  but has no orientation of place, person or time. VITAL SIGNS:  Her temperature is 97.6, blood pressure 130/84,  respirations 17, heart rate 88, O2 sat is 95% on room air. HEENT:  Oral mucosa dry. SKIN:  Warm and dry. NECK:  Supple. Faint carotid bruit. No jugular venous distention. No  lymphadenopathy. No thyromegaly. LUNGS:  Vesicular breath sounds. Bibasilar inspiratory crackles. No  wheezing. HEART:  Regular rate and rhythm. S1, S2. No gallop rhythm. 2/6  holosystolic murmur. ABDOMEN:  Soft, nontender. Bowel sounds present. EXTREMITIES:  Show trace edema. NEUROLOGIC:  The patient has generalized neuromuscular weakness, poor  coordination. LABORATORY EVALUATION:  Shows sodium 137, potassium 2.9, chloride 98,  CO2 29, BUN 16, creatinine 0.5, lactic acid level 1.4, blood sugar is  126. ProBNP level 7108. Troponin is less than 0.01. Urinalysis  negative for acute UTI. DIAGNOSTIC DATA:  Chest x-ray, cardiomegaly with diffuse interstitial  prominence suggesting fluid overload and/or CHF, no definite acute  airspace disease. The patient had a transthoracic echocardiogram more  than two years ago, at that time LV ejection fraction was 45-50% with  grade II diastolic dysfunction.     ASSESSMENT:  Acute-on-chronic combined heart failure, hypokalemia,  senile dementia, hypertension, vitamin D deficiency, uncomplicated  asthma, restless legs syndrome, osteoporosis, hypothyroidism, prior  history of COVID-19 viral infection, essential hypertension, dyspnea,  dementia without behavioral disturbances, chronic combined systolic and  diastolic heart failure, cardiomyopathy. PLAN:  Get her admitted. Treat her with IV Lasix. Transthoracic  echocardiogram.  Cardiology consultation. Monitoring electrolyte,  supplement potassium, enrolling to congestive heart failure long-term  management program which is run by [facility]. The  patient may also be considered for Eva Quiet or sacubitril/valsartan  combination if appropriate by Dr. Patrick Rojas.         Jonathon Mcfarlane MD    D: 09/23/2020 10:08:05       T: 09/23/2020 10:14:33     [id number]  Job#: [id number]     Doc#: [id number]    CC:

## 2020-09-23 NOTE — PROGRESS NOTES
Patient Active Problem List   Diagnosis    Dyspnea    Osteoporosis    Restless leg syndrome    Uncomplicated asthma    Atrial fibrillation with rapid ventricular response ([address]    Vitamin D deficiency    Hypokalemia    Hypothyroidism    Buttocks nodule    Essential hypertension    Dementia without behavioral disturbance ([address]    Cardiomyopathy ([address]    Chronic combined systolic (congestive) and diastolic (congestive) heart failure (HCC)    Altered mental status    History of 2019 novel coronavirus disease (COVID-19)    Acute on chronic combined systolic (congestive) and diastolic (congestive) heart failure ([address]   H&P dictated

## 2020-09-23 NOTE — CARE COORDINATION
DEEPTI returned call to Ling TATUM at [facility]. Updated on pt status and that Covid and therapy evals are pending. She states they have no skilled services but she can be called to help coordinate home care if needed. She can be reached at [phone].     Markus Coronel RN, BSN  [phone]

## 2020-09-23 NOTE — PROGRESS NOTES
PT pulled IV out, had new IV placed and pulled out that one as well a few minutes later. New IV placed and wrapped for protection. Shift assessment completed. Expiratory wheezes noted, respirations even. Occasional cough noted. VSS. Call light in reach, bed alarm engaged.

## 2020-09-24 LAB
ANION GAP SERPL CALCULATED.3IONS-SCNC: 10 MMOL/L (ref 3–16)
BUN BLDV-MCNC: 21 MG/DL (ref 7–20)
CALCIUM SERPL-MCNC: 8.8 MG/DL (ref 8.3–10.6)
CHLORIDE BLD-SCNC: 97 MMOL/L (ref 99–110)
CO2: 31 MMOL/L (ref 21–32)
CREAT SERPL-MCNC: 0.6 MG/DL (ref 0.6–1.2)
GFR AFRICAN AMERICAN: >60
GFR NON-AFRICAN AMERICAN: >60
GLUCOSE BLD-MCNC: 110 MG/DL (ref 70–99)
MAGNESIUM: 1.9 MG/DL (ref 1.8–2.4)
POTASSIUM SERPL-SCNC: 4 MMOL/L (ref 3.5–5.1)
SARS-COV-2, NAA: NOT DETECTED
SODIUM BLD-SCNC: 138 MMOL/L (ref 136–145)

## 2020-09-24 PROCEDURE — 1200000000 HC SEMI PRIVATE

## 2020-09-24 PROCEDURE — 83735 ASSAY OF MAGNESIUM: CPT

## 2020-09-24 PROCEDURE — 97530 THERAPEUTIC ACTIVITIES: CPT

## 2020-09-24 PROCEDURE — 36415 COLL VENOUS BLD VENIPUNCTURE: CPT

## 2020-09-24 PROCEDURE — 97535 SELF CARE MNGMENT TRAINING: CPT

## 2020-09-24 PROCEDURE — 94640 AIRWAY INHALATION TREATMENT: CPT

## 2020-09-24 PROCEDURE — 99232 SBSQ HOSP IP/OBS MODERATE 35: CPT | Performed by: CLINICAL NURSE SPECIALIST

## 2020-09-24 PROCEDURE — 6370000000 HC RX 637 (ALT 250 FOR IP): Performed by: INTERNAL MEDICINE

## 2020-09-24 PROCEDURE — 94761 N-INVAS EAR/PLS OXIMETRY MLT: CPT

## 2020-09-24 PROCEDURE — 80048 BASIC METABOLIC PNL TOTAL CA: CPT

## 2020-09-24 PROCEDURE — 97166 OT EVAL MOD COMPLEX 45 MIN: CPT

## 2020-09-24 PROCEDURE — 97162 PT EVAL MOD COMPLEX 30 MIN: CPT

## 2020-09-24 RX ORDER — HALOPERIDOL 5 MG/ML
2 INJECTION INTRAMUSCULAR EVERY 4 HOURS PRN
Status: DISCONTINUED | OUTPATIENT
Start: 2020-09-24 | End: 2020-09-30 | Stop reason: HOSPADM

## 2020-09-24 RX ADMIN — ALBUTEROL SULFATE 2 PUFF: 90 AEROSOL, METERED RESPIRATORY (INHALATION) at 20:10

## 2020-09-24 RX ADMIN — BUDESONIDE AND FORMOTEROL FUMARATE DIHYDRATE 2 PUFF: 160; 4.5 AEROSOL RESPIRATORY (INHALATION) at 07:39

## 2020-09-24 RX ADMIN — CHOLECALCIFEROL TAB 125 MCG (5000 UNIT) 5000 UNITS: 125 TAB at 11:36

## 2020-09-24 RX ADMIN — ROPINIROLE HYDROCHLORIDE 0.5 MG: 1 TABLET, FILM COATED ORAL at 22:36

## 2020-09-24 RX ADMIN — DONEPEZIL HYDROCHLORIDE 10 MG: 5 TABLET, FILM COATED ORAL at 22:36

## 2020-09-24 RX ADMIN — APIXABAN 2.5 MG: 2.5 TABLET, FILM COATED ORAL at 22:36

## 2020-09-24 RX ADMIN — FUROSEMIDE 40 MG: 40 TABLET ORAL at 18:52

## 2020-09-24 RX ADMIN — ALBUTEROL SULFATE 2 PUFF: 90 AEROSOL, METERED RESPIRATORY (INHALATION) at 07:39

## 2020-09-24 RX ADMIN — ALBUTEROL SULFATE 2 PUFF: 90 AEROSOL, METERED RESPIRATORY (INHALATION) at 11:06

## 2020-09-24 RX ADMIN — MULTIPLE VITAMINS W/ MINERALS TAB 1 TABLET: TAB at 11:36

## 2020-09-24 RX ADMIN — DILTIAZEM HYDROCHLORIDE 120 MG: 120 CAPSULE, COATED, EXTENDED RELEASE ORAL at 11:36

## 2020-09-24 RX ADMIN — DULOXETINE HYDROCHLORIDE 40 MG: 20 CAPSULE, DELAYED RELEASE ORAL at 11:36

## 2020-09-24 RX ADMIN — ALBUTEROL SULFATE 2 PUFF: 90 AEROSOL, METERED RESPIRATORY (INHALATION) at 14:58

## 2020-09-24 RX ADMIN — Medication 50 MG: at 11:36

## 2020-09-24 RX ADMIN — POTASSIUM BICARBONATE 40 MEQ: 782 TABLET, EFFERVESCENT ORAL at 22:35

## 2020-09-24 RX ADMIN — DIVALPROEX SODIUM 250 MG: 250 TABLET, DELAYED RELEASE ORAL at 22:35

## 2020-09-24 RX ADMIN — PANTOPRAZOLE SODIUM 40 MG: 40 TABLET, DELAYED RELEASE ORAL at 05:58

## 2020-09-24 RX ADMIN — METOPROLOL SUCCINATE 100 MG: 50 TABLET, EXTENDED RELEASE ORAL at 11:36

## 2020-09-24 ASSESSMENT — PAIN SCALES - GENERAL
PAINLEVEL_OUTOF10: 0

## 2020-09-24 NOTE — PROGRESS NOTES
Shift assessment completed at this time. VSS, respirations even and unlabored. Non-productive cough noted. Dr. Jordan Manual notified about Pt getting out of bed often and becoming safety risk. Haldol ordered PRN and given. Will continue to monitor. Resting with call light in reach. Bed alarm engaged.

## 2020-09-24 NOTE — PROGRESS NOTES
[facility] cardiology in the past (Dr. Bharti Hdz) but in their office since Oct 2018. Patient will return back to facility at discharge. CHF education placed in discharge summary along with CHF pathway in [facility] for discharge to SNF    discussed importance of lifestyle changes: patient needs daily weights and low sodium diet in place on discharge to facility    PATIENT/CAREGIVER TEACHING:    Level of patient/caregiver understanding able to:   [ ] Verbalize understanding [ ] Demonstrate understanding [ ] Teach back   [ ] Needs reinforcement [x ] Other: no evidence of education      Time spent teaching: 10 mins    1. WEIGHT: Admit Weight: 162 lb 1.6 oz (73.5 kg)      Today  Weight: 159 lb 8 oz (72.3 kg)   2. I/O No intake or output data in the 24 hours ending 09/24/20 6826    Recommendations:   1. Patient should follow back with cardiology at discharge  2. Continue daily weights, sodium restriction-- will place in [address] [place] [address] for discharge back to snf.        ODALYS ADRIAN 9/24/2020 2:44 PM

## 2020-09-24 NOTE — DISCHARGE INSTR - COC
Continuity of Care Form  CHF Pathway  _x_ Cardiovascular Assessment. Titrate O2 to keep SaO2 greater than 90%    _x_ Daily Weights- Baseline Wt:159 lb  Call MD if:   3 pound weight gain or loss in one day OR 5 pound weight gain in one week       _x_No added salt diet (3-4 G sodium) and 64 oz fluid restriction          _x_ Med List attached:   Hold Coreg/Metoprolol if HR less than 45 or patient symptomatic*           Patient Name: Leonel Fuentes   :  3/2/1929  MRN:  [id number]    Admit date:  2020  Discharge date:  2020    Code Status Order: DNR-CCA   Advance Directives:   [place] Directive Type of Healthcare Directive Copy in [address] Agent's Name Healthcare Agent's Phone Number    [phone]  No, patient does not have an advance directive for healthcare treatment -- -- -- -- --            Admitting Physician:  Shena Zheng MD  PCP: Sara Curtis MD    Discharging Nurse: Zulay Cruz 23 Unit/Room#: 9GU-1202/6295-17  Discharging Unit Phone Number: [phone]    Emergency Contact:   Extended Emergency Contact Information  Primary Emergency Contact: LaminAlicia  Address: [address] [place] [address] Phone: [phone]  Mobile Phone: [phone]  Relation: Child  Secondary Emergency Contact: [address] Phone: [phone]  Relation: Grandchild    Past Surgical History:  Past Surgical History:   Procedure Laterality Date    ANKLE SURGERY      left ankle surgery    APPENDECTOMY      CARDIOVERSION      COLONOSCOPY  10/25/10    normal dr Nivia Simpson    COLONOSCOPY  2018    polyps dr Federico Bah, no repeat needed.  no malignancy    ELBOW SURGERY      HIP SURGERY Left 2020    HIP HEMIARTHROPLASTY performed by Danette Walls MD at [address] [place] [address]      right elbow    OVARY REMOVAL Left     UPPER GASTROINTESTINAL ENDOSCOPY  02/27/2018    normal dr Gini Ballard       Immunization History:   Immunization History   Administered Date(s) Administered    Influenza Vaccine, unspecified formulation 09/30/2016    Influenza, High Dose (Fluzone 65 yrs and older) 09/19/2013, 10/20/2014, 09/08/2015, 09/05/2017    Influenza, Melonie Broody, 6 mo and older, IM, PF (Flulaval, Fluarix) 10/02/2018    Influenza, Triv, inactivated, subunit, adjuvanted, IM (Fluad 65 yrs and older) 10/01/2019    Pneumococcal Conjugate 13-valent ([id number]) 10/24/2016    Pneumococcal Polysaccharide ([id number]) 04/01/1999, 11/16/2010    Zoster Live (Zostavax) 11/16/2010       Active Problems:  Patient Active Problem List   Diagnosis Code    Dyspnea R06.00    Osteoporosis M81.0    Restless leg syndrome O13.96    Uncomplicated asthma Z34.452    Vitamin D deficiency E55.9    Hypokalemia E87.6    Hypothyroidism E03.9    Buttocks nodule R22.2    Essential hypertension I10    Dementia without behavioral disturbance ([address] F03.90    Cardiomyopathy ([address] I42.9    Chronic combined systolic and diastolic heart failure (HCC) I50.42    Altered mental status R41.82    History of 2019 novel coronavirus disease (COVID-19) Z86.19    Acute on chronic combined systolic (congestive) and diastolic (congestive) heart failure (HCC) I50.43    Chronic atrial fibrillation I48.20    Elevated brain natriuretic peptide (BNP) level R79.89       Isolation/Infection:   Isolation            Droplet Plus          Patient Infection Status       Infection Onset Added Last Indicated Last Indicated By Review Planned Expiration Resolved Resolved By    None active    Resolved    COVID-19 Rule Out 09/22/20 09/22/20 09/22/20 COVID-19 (Ordered)   09/24/20 Rule-Out Test Resulted    COVID-19 Rule Out 07/14/20 07/14/20 07/14/20 COVID-19 (Ordered)   07/14/20 Rule-Out Test Resulted    COVID-19 05/23/20 05/24/20 06/02/20 COVID-19   07/10/20 Jarrod Gordon RN    C-diff Rule Out 05/23/20 05/23/20 05/23/20 Clostridium difficile toxin/antigen (Ordered)   05/26/20 Vazquez Velez RN    COVID-19 Rule Out 05/23/20 05/23/20 05/23/20 COVID-19 (Ordered)   05/24/20 Rule-Out Test Resulted            Nurse Assessment:  Last Vital Signs: /85   Pulse 66   Temp 97.1 °F (36.2 °C) (Axillary)   Resp 18   Ht 5' 4\" (1.626 m)   Wt 159 lb 8 oz (72.3 kg)   SpO2 96%   BMI 27.38 kg/m²     Last documented pain score (0-10 scale): Pain Level: 0  Last Weight:   Wt Readings from Last 1 Encounters:   09/24/20 159 lb 8 oz (72.3 kg)     Mental Status:  disoriented and alert    IV Access:  - None    Nursing Mobility/ADLs:  Walking   Dependent  Transfer  Dependent  Bathing  Dependent  Dressing  Dependent  Toileting  Dependent  Feeding  Dependent  Med Admin  Dependent  Med Delivery   crushed and prefers mixed with applesauce    Wound Care Documentation and Therapy:        Elimination:  Continence:   · Bowel: No  · Bladder: No  Urinary Catheter: None   Colostomy/Ileostomy/Ileal Conduit: No       Date of Last BM: 9/29/2020  No intake or output data in the 24 hours ending 09/24/20 1435  No intake/output data recorded. Safety Concerns: At Risk for Falls    Impairments/Disabilities:      None    Nutrition Therapy:  Current Nutrition Therapy:   - Oral Diet:  Cardiac and Gluten Free    Routes of Feeding: Oral  Liquids: Thin Liquids  Daily Fluid Restriction: no  Last Modified Barium Swallow with Video (Video Swallowing Test): not done    Treatments at the Time of Hospital Discharge:   Respiratory Treatments: See med list  Oxygen Therapy:  is on oxygen at 1 L/min per nasal cannula. Ventilator:    - No ventilator support    Rehab Therapies:   Weight Bearing Status/Restrictions: No weight bearing restirctions  Other Medical Equipment (for information only, NOT a DME order):     Other Treatments:     Patient's personal belongings (please select all that are sent with patient):       RN SIGNATURE:  Electronically signed by Scar Ellington RN on 9/30/20 at 1:37 PM EDT    CASE MANAGEMENT/SOCIAL WORK SECTION    Inpatient Status Date: 9/22/2020    Readmission Risk Assessment Score:  Readmission Risk              Risk of Unplanned Readmission:        36           Discharging to Facility/ Agency   · Name: [facility]  · [address]  · SBJFO:[phone] for report  · Fax:[phone]    Dialysis Facility (if applicable)   · Name:  · Address:  · Dialysis Schedule:  · Phone:  · Fax:    / signature: Cristian Ballesteros RN, BSN  [phone]      PHYSICIAN SECTION    Prognosis: Guarded    Condition at Discharge: Stable    Rehab Potential (if transferring to Rehab): Guarded    Recommended Labs or Other Treatments After Discharge: group home care     Physician Certification: I certify the above information and transfer of Penny Garciat  is necessary for the continuing treatment of the diagnosis listed and that she requires Intermediate Nursing Care for greater 30 days.      Update Admission H&P: No change in H&P    PHYSICIAN SIGNATURE:  Electronically signed by Vane Figueroa MD on 9/25/20 at 4:28 PM EDT

## 2020-09-24 NOTE — PROGRESS NOTES
Physical Therapy    Facility/Department: [address] ONCOLOGY  Initial Assessment    NAME: Leighton Velasquez  : 3/2/1929  MRN: [id number]    Date of Service: 2020    Discharge Recommendations: Leighton Velasquez scored a  on the AM-PAC short mobility form. Current research shows that an AM-PAC score of 17 or less is typically not associated with a discharge to the patient's home setting. Based on the patient's AM-PAC score and their current functional mobility deficits, it is recommended that the patient have 3-5 sessions per week of Physical Therapy at d/c to increase the patient's independence. Please see assessment section for further patient specific details. If patient discharges prior to next session this note will serve as a discharge summary. Please see below for the latest assessment towards goals. PT Equipment Recommendations  Equipment Needed: No    Assessment   Body structures, Functions, Activity limitations: Decreased functional mobility ; Decreased strength;Decreased endurance;Decreased balance;Decreased cognition  Assessment: The pt presented with lethargy today that impaired her balance, strength and activity tolerance. She has confusion at baseline. She is a fall risk. Prognosis: Good  Decision Making: Medium Complexity  PT Education: PT Role;Plan of Care;Orientation;Precautions;Transfer Training  Patient Education: pt was unable to retain new information today  Barriers to Learning: cognition  REQUIRES PT FOLLOW UP: Yes  Activity Tolerance  Activity Tolerance: Treatment limited secondary to medical complications (free text); Patient Tolerated treatment well  Activity Tolerance: limited by lethargy, pt remained calm, BP was [facility]       Patient Diagnosis(es): The encounter diagnosis was Congestive heart failure, unspecified HF chronicity, unspecified heart failure type ([facility]      has a past medical history of Abdominal wall pain, Arrhythmia, Arthritis, Asthma, Atrial fibrillation History  Type of Home: Facility([facility], memory care unit)  ADL Assistance: Needs assistance(pt gets assistance with toileting and showers)  Ambulation Assistance: Needs assistance  Transfer Assistance: Needs assistance(SBA for stand pivot transfers to the toilet)  Additional Comments: Pt was unable to provide PLOF. The pt had a hip surgery in 2020 and required assistance of 2 for mobility during that inpatient stay. Staff at nursing home called for present PLOF. Pt is a fall risk so staff at the SNF have been keeping the pt in her w/c more than allowing her to walk. The pt will spontaneously attempt to walk, though.     Objective     Observation/Palpation  Observation: swelling noted in B thighs, worse at L incision over hip    AROM RLE (degrees)  RLE AROM: WFL  AROM LLE (degrees)  LLE AROM : WFL  Strength RLE  Comment: pt did not follow commands for MMT, able to bear weight in standing  Strength LLE  Comment: pt did not follow commands for MMT, able to bear weight in standing  Tone RLE  RLE Tone: Normotonic  Tone LLE  LLE Tone: Normotonic  Motor Control  Gross Motor?: (limited assessment due to lethargy)     Bed mobility  Rolling to Left: Maximum assistance(pt was assisted with pericare as she was found to have been incontinent)  Rolling to Right: Maximum assistance  Supine to Sit: Dependent/Total;2 Person assistance(max A of 2)  Scootin Person assistance;Dependent/Total(max A of 2)  Transfers  Stand Pivot Transfers: Maximum Assistance  Ambulation  Ambulation?: No(unable due to lethargy)     Balance  Posture: Fair  Sitting - Static: Poor(min A sitting EOB, pt was assisted with bathing)  Sitting - Dynamic: Poor  Standing - Static: Poor(max A)  Standing - Dynamic: Poor        Plan   Plan  Times per week: 3-5  Current Treatment Recommendations: Strengthening, ROM, Balance Training, Functional Mobility Training, Transfer Training, Gait Training, Endurance Training, Neuromuscular Re-education, Safety

## 2020-09-24 NOTE — PROGRESS NOTES
Occupational Therapy   Occupational Therapy Initial Assessment  Date: 2020   Patient Name: Teresa Raygoza  MRN: [id number]     : 3/2/1929    Date of Service: 2020    Discharge Recommendations:    Teresa Raygoza scored a 10 on the AM-PAC ADL Inpatient form. Current research shows that an AM-PAC score of 17 or less is typically not associated with a discharge to the patient's home setting. Based on the patient's AM-PAC score and their current ADL deficits, it is recommended that the patient have 3-5 sessions per week of Occupational Therapy at d/c to increase the patient's independence. Please see assessment section for further patient specific details. If patient discharges prior to next session this note will serve as a discharge summary. Please see below for the latest assessment towards goals. OT Equipment Recommendations  Equipment Needed: No  Other: defer to next level of care    Assessment   Performance deficits / Impairments: Decreased functional mobility ; Decreased ADL status; Decreased safe awareness;Decreased cognition;Decreased endurance;Decreased balance;Decreased posture  Assessment: Pt is currently functioning below occupational baseline and demo the deficits listed above, pt would benefit from continued skilled OT services to address these deficits and increase independence, safety, and ease with all occupational pursuits.   Treatment Diagnosis: Decreased ADL status, functional mobility, and functional transfers d/t Chronic combined systolic and diastolic heart failure ([address]  Prognosis: Fair  Decision Making: Medium Complexity  OT Education: OT Role;Plan of Care;Transfer Training;Orientation  Patient Education: pt is not independent in learning and will require reinforcement  Barriers to Learning: cognition  REQUIRES OT FOLLOW UP: Yes  Activity Tolerance  Activity Tolerance: Treatment limited secondary to decreased cognition;Patient limited by fatigue  Activity Tolerance: Pt very lethargic throughout entire session  Safety Devices  Safety Devices in place: Yes  Type of devices: Left in chair;Chair alarm in place;Nurse notified; All fall risk precautions in place; Patient at risk for falls;Call light within reach(breakfast setup in front of pt)  Restraints  Initially in place: No           Patient Diagnosis(es): The encounter diagnosis was Congestive heart failure, unspecified HF chronicity, unspecified heart failure type ([address] has a past medical history of Abdominal wall pain, Arrhythmia, Arthritis, Asthma, Atrial fibrillation ([address] Broken nose, CHF (congestive heart failure) ([address] Cough variant asthma, Dementia ([address] Dyspnea, Essential hypertension, Fatigue, Hematoma, Hyperlipidemia, Insomnia, Lumbar radiculopathy, Osteoporosis, Palpitation, Restless leg syndrome, Shingles, and Sinusitis. has a past surgical history that includes Ankle surgery (1997); Elbow surgery; Colonoscopy (10/25/10); Ovary removal (Left); joint replacement; Appendectomy; Hysterectomy; Cardioversion; Upper gastrointestinal endoscopy (02/27/2018); Colonoscopy (02/27/2018); and hip surgery (Left, 7/11/2020).     Treatment Diagnosis: Decreased ADL status, functional mobility, and functional transfers d/t Chronic combined systolic and diastolic heart failure (HCC)      Restrictions  Restrictions/Precautions  Restrictions/Precautions: Fall Risk, Weight Bearing(HIGH FALL RISK)  Lower Extremity Weight Bearing Restrictions  Left Lower Extremity Weight Bearing: Weight Bearing As Tolerated  Position Activity Restriction  Hip Precautions: ((direct lateral approach - no combined hip (extension and external rotation), no ADD beyond neutral, no active ABD x 6 weeks post-op, strict walker use x 6 weeks post-op))  Other position/activity restrictions: Kathi Jeff is a 80 y.o. female  history of chronic atrial fibrillation on elliquis, hypertension, CHF and dementia who presents to the emergency department from nursing home for wheezing x1 day. Noted to be wheezing this morning with mild tachycardia. She was treated at her nursing home by someone who was recently diagnosed with COVID-19. COVID test pending. Cardiology consulted. Subjective   General  Chart Reviewed: Yes  Patient assessed for rehabilitation services?: Yes  Additional Pertinent Hx: PMH: Pertinent for chronic sinusitis, herpes zoster, restless legs syndrome, palpitation, osteoporosis, lumbar radiculopathy,chronic insomnia, hyperlipidemia, fatigue, essential hypertension, exertional dyspnea, dementia, congestive heart failure, fracture of the nasal bone, atrial fibrillation, bronchial asthma, osteoarthritis, cardiac arrhythmia and abdominal wall pain. Family / Caregiver Present: No  Referring Practitioner: Olman Michel MD  Diagnosis: Chronic combined systolic and diastolic heart failure ([facility] [address]  Subjective  Subjective: Pt supine in bed upon arrival, confused and lethargic but agreeable to OT/PT evaluation. Patient Currently in Pain: Denies  Vital Signs  Patient Currently in Pain: Denies    Social/Functional History  Social/Functional History  Type of Home: Facility([facility] care [facility])  ADL Assistance: Needs assistance(pt gets assistance with toileting and showers)  Ambulation Assistance: Needs assistance  Transfer Assistance: Needs assistance(SBA for stand pivot transfers to the toilet)  Additional Comments: Pt was unable to provide PLOF. The pt had a hip surgery in July 2020 and required assistance of 2 for mobility during that inpatient stay. Staff at nursing home called for present PLOF. Pt is a fall risk so staff at the SNF have been keeping the pt in her w/c more than allowing her to walk. The pt will spontaneously attempt to walk, though.        Objective    Orientation  Overall Orientation Status: Impaired  Orientation Level: Disoriented X4  Observation/Palpation  Posture: Fair  Observation: swelling noted in B thighs, worse at L precautions;Decreased recall of recent events;Decreased long term memory;Decreased short term memory  Safety Judgement: Decreased awareness of need for assistance;Decreased awareness of need for safety  Problem Solving: Decreased awareness of errors;Assistance required to identify errors made;Assistance required to correct errors made;Assistance required to implement solutions;Assistance required to generate solutions  Insights: Not aware of deficits  Initiation: Requires cues for all  Sequencing: Requires cues for all  Cognition Comment: pt very lethargic this date requiring max verbal and tactile cueing throughout entire session to open eyes  Perception  Overall Perceptual Status: Impaired  Initiation: Hand over hand to initiate tasks     Sensation  Overall Sensation Status: WFL        LUE AROM (degrees)  LUE AROM : WFL  Left Hand AROM (degrees)  Left Hand AROM: WFL  RUE AROM (degrees)  RUE AROM : WFL  Right Hand AROM (degrees)  Right Hand AROM: WFL  LUE Strength  Gross LUE Strength: WFL  RUE Strength  Gross RUE Strength: WFL                   Plan   Plan  Times per week: 3-5x/wk  Times per day: Daily  Plan weeks: d/c  Current Treatment Recommendations: Strengthening, Balance Training, Endurance Training, Functional Mobility Training, Safety Education & Training, Self-Care / ADL    G-Code     OutComes Score                                                  -PAC Score        -[facility] Inpatient Daily Activity Raw Score: 10 (09/24/20 1516)  AM-PAC Inpatient ADL T-Scale Score : 27.31 (09/24/20 1516)  ADL Inpatient CMS 0-100% Score: 74.7 (09/24/20 1516)  ADL Inpatient CMS G-Code Modifier : CL (09/24/20 1516)    Goals  Short term goals  Time Frame for Short term goals: d/c  Short term goal 1: Pt will complete UB dressing with setup and supervision assist  Short term goal 2: Pt will complete stand step and sit<>stand transfers with min(A)  Short term goal 3: Pt will complete LB dressing with mod(A)  Short term goal 4: Pt will complete bed mobility with mod(A)  Short term goal 5: Pt will improve seated balance and demo ability to maintain midline while completing functional task with SBA  Long term goals  Time Frame for Long term goals : STG=LTG  Patient Goals   Patient goals : pt did not state       Therapy Time   Individual Concurrent Group Co-treatment   Time In 0837         Time Out 0904         Minutes 27         Timed Code Treatment Minutes: [address], [address] [place], [place] [address]

## 2020-09-24 NOTE — PROGRESS NOTES
Pt very drowsy today, unable to give morning meds. Pt did begin to stay awake at dinner, gave daily/AM meds only, see MAR, and assisted pt with eating dinner. Pt oni po intake well. Pt remained alert, calm, pleasant and cooperative with care.

## 2020-09-24 NOTE — PROGRESS NOTES
Jennifer   Daily Progress Note      Admit Date:  9/22/2020    HPI:    Ms. Nesha Gaines is a 80year old female with history of chronic AF on eliquis, HTN, sHF and dementia, hyperbilirubinemia after recent left hip fracture with large hematoma  She lives at [facility]    She was sent to ED from nursing home for wheezing, diarrhea and mild tachycardia. Her covid test is neg but had been positive end of May. She received IV fluids and lasix. Subjective:  Patient is being seen for elevated bnp. There were no acute overnight cardiac events. Creat 0.6 potassium 4.0 Hgb 11.2 weight 162->159 incontinent of urine, diaper. She is sitting up in the chair, sleeping with breakfast in front of her. She can only tell me that she is cold    Objective:   BP (!) 141/100   Pulse 81   Temp 97.5 °F (36.4 °C) (Oral)   Resp 18   Ht 5' 4\" (1.626 m)   Wt 159 lb 8 oz (72.3 kg)   SpO2 94%   BMI 27.38 kg/m²   No intake or output data in the 24 hours ending 09/24/20 0948       Physical Exam:  General:  Awake, alert, dementia  Skin:  Warm and dry. No unusual bruising or rash  Neck:  Supple. No JVD or carotid bruit appreciated  Chest:  Normal effort.   Clear to auscultation, no wheezes/rhonchi/rales  Cardiovascular:  RRR, S1/S2, no murmur/gallop/rub  Abdomen:  Soft, nontender, +bowel sounds  Extremities:  No edema  Neurological: No focal deficits  Psychological: Normal mood and affect      Medications:    furosemide  40 mg Oral BID    apixaban  2.5 mg Oral BID    vitamin D3  5,000 Units Oral Daily    divalproex  250 mg Oral BID    donepezil  10 mg Oral Nightly    DULoxetine  40 mg Oral Daily    ferrous sulfate  325 mg Oral Daily with breakfast    fluticasone  2 spray Each Nostril Daily    levothyroxine  50 mcg Oral Daily    linaclotide  290 mcg Oral QAM AC    metoprolol succinate  100 mg Oral Daily    therapeutic multivitamin-minerals  1 tablet Oral Daily    pantoprazole  40 mg Oral QAM AC    time    Discussed with patient who is agreeable with plan of care. Thank you for allowing me to participate in the care of your patient.     Domo oMnae, CNS

## 2020-09-25 LAB
LV EF: 55 %
LVEF MODALITY: NORMAL

## 2020-09-25 PROCEDURE — 1200000000 HC SEMI PRIVATE

## 2020-09-25 PROCEDURE — 6370000000 HC RX 637 (ALT 250 FOR IP): Performed by: INTERNAL MEDICINE

## 2020-09-25 PROCEDURE — 99232 SBSQ HOSP IP/OBS MODERATE 35: CPT | Performed by: CLINICAL NURSE SPECIALIST

## 2020-09-25 PROCEDURE — 93306 TTE W/DOPPLER COMPLETE: CPT

## 2020-09-25 PROCEDURE — 94640 AIRWAY INHALATION TREATMENT: CPT

## 2020-09-25 RX ORDER — ROPINIROLE 0.5 MG/1
0.5 TABLET, FILM COATED ORAL NIGHTLY
Qty: 90 TABLET | Refills: 3 | Status: SHIPPED | OUTPATIENT
Start: 2020-09-25

## 2020-09-25 RX ORDER — FUROSEMIDE 40 MG/1
40 TABLET ORAL 2 TIMES DAILY
Qty: 60 TABLET | Refills: 3 | Status: SHIPPED | OUTPATIENT
Start: 2020-09-25

## 2020-09-25 RX ORDER — DILTIAZEM HYDROCHLORIDE 120 MG/1
120 CAPSULE, COATED, EXTENDED RELEASE ORAL DAILY
Qty: 30 CAPSULE | Refills: 3 | Status: SHIPPED | OUTPATIENT
Start: 2020-09-26

## 2020-09-25 RX ADMIN — APIXABAN 2.5 MG: 2.5 TABLET, FILM COATED ORAL at 20:40

## 2020-09-25 RX ADMIN — POLYETHYLENE GLYCOL 3350 17 G: 17 POWDER, FOR SOLUTION ORAL at 10:36

## 2020-09-25 RX ADMIN — FLUTICASONE PROPIONATE 2 SPRAY: 50 SPRAY, METERED NASAL at 10:38

## 2020-09-25 RX ADMIN — ROPINIROLE HYDROCHLORIDE 0.5 MG: 1 TABLET, FILM COATED ORAL at 20:40

## 2020-09-25 RX ADMIN — BUDESONIDE AND FORMOTEROL FUMARATE DIHYDRATE 2 PUFF: 160; 4.5 AEROSOL RESPIRATORY (INHALATION) at 19:26

## 2020-09-25 RX ADMIN — LINACLOTIDE 290 MCG: 145 CAPSULE, GELATIN COATED ORAL at 10:45

## 2020-09-25 RX ADMIN — FERROUS SULFATE TAB 325 MG (65 MG ELEMENTAL FE) 325 MG: 325 (65 FE) TAB at 10:36

## 2020-09-25 RX ADMIN — ALBUTEROL SULFATE 2 PUFF: 90 AEROSOL, METERED RESPIRATORY (INHALATION) at 15:53

## 2020-09-25 RX ADMIN — PANTOPRAZOLE SODIUM 40 MG: 40 TABLET, DELAYED RELEASE ORAL at 05:47

## 2020-09-25 RX ADMIN — METOPROLOL SUCCINATE 100 MG: 50 TABLET, EXTENDED RELEASE ORAL at 10:36

## 2020-09-25 RX ADMIN — ALBUTEROL SULFATE 2 PUFF: 90 AEROSOL, METERED RESPIRATORY (INHALATION) at 19:26

## 2020-09-25 RX ADMIN — APIXABAN 2.5 MG: 2.5 TABLET, FILM COATED ORAL at 10:36

## 2020-09-25 RX ADMIN — DILTIAZEM HYDROCHLORIDE 120 MG: 120 CAPSULE, COATED, EXTENDED RELEASE ORAL at 10:35

## 2020-09-25 RX ADMIN — DONEPEZIL HYDROCHLORIDE 10 MG: 5 TABLET, FILM COATED ORAL at 20:40

## 2020-09-25 RX ADMIN — DIVALPROEX SODIUM 250 MG: 250 TABLET, DELAYED RELEASE ORAL at 10:35

## 2020-09-25 RX ADMIN — FUROSEMIDE 40 MG: 40 TABLET ORAL at 10:36

## 2020-09-25 RX ADMIN — Medication 50 MG: at 10:37

## 2020-09-25 RX ADMIN — CHOLECALCIFEROL TAB 125 MCG (5000 UNIT) 5000 UNITS: 125 TAB at 10:36

## 2020-09-25 RX ADMIN — POTASSIUM BICARBONATE 40 MEQ: 782 TABLET, EFFERVESCENT ORAL at 20:40

## 2020-09-25 RX ADMIN — MULTIPLE VITAMINS W/ MINERALS TAB 1 TABLET: TAB at 10:36

## 2020-09-25 RX ADMIN — POTASSIUM BICARBONATE 40 MEQ: 782 TABLET, EFFERVESCENT ORAL at 10:35

## 2020-09-25 RX ADMIN — LEVOTHYROXINE SODIUM 50 MCG: 0.03 TABLET ORAL at 10:36

## 2020-09-25 RX ADMIN — DIVALPROEX SODIUM 250 MG: 250 TABLET, DELAYED RELEASE ORAL at 20:40

## 2020-09-25 RX ADMIN — DULOXETINE HYDROCHLORIDE 40 MG: 20 CAPSULE, DELAYED RELEASE ORAL at 10:36

## 2020-09-25 RX ADMIN — FUROSEMIDE 40 MG: 40 TABLET ORAL at 18:37

## 2020-09-25 ASSESSMENT — PAIN SCALES - GENERAL
PAINLEVEL_OUTOF10: 0

## 2020-09-25 NOTE — PROGRESS NOTES
Shift assessment completed at this time. VSS, respirations even and unlabored. Occasional cough noted. Pt cleaned up and [place] placed. Pt resting comfortably with call light in reach and bed alarm engaged.

## 2020-09-25 NOTE — PROGRESS NOTES
Department of Internal Medicine  General Internal Medicine   Progress Note      SUBJECTIVE  Still shows intermittent agitation but was very somnolent after 5 mg Haldol per nursing staff    History obtained from chart review and the patient  General ROS: positive for  - fatigue, fever and malaise  negative for - chills or night sweats  Psychological ROS: positive for - anxiety, disorientation, irritability, memory difficulties and sleep disturbances  negative for - behavioral disorder, hallucinations or hostility  Ophthalmic ROS: negative  Respiratory ROS: positive for - cough, shortness of breath, sputum changes and tachypnea  negative for - hemoptysis, pleuritic pain, stridor or wheezing  Cardiovascular ROS: positive for - dyspnea on exertion  negative for - chest pain  Gastrointestinal ROS: no abdominal pain, change in bowel habits, or black or bloody stools  Genito-Urinary ROS: no dysuria, trouble voiding, or hematuria  Musculoskeletal ROS: chronic pain   Neurological ROS: no TIA or stroke symptoms  Dermatological ROS: negative    OBJECTIVE      Medications      Current Facility-Administered Medications: haloperidol lactate (HALDOL) injection 2 mg, 2 mg, Intramuscular, Q4H PRN  furosemide (LASIX) tablet 40 mg, 40 mg, Oral, BID  acetaminophen (TYLENOL) tablet 650 mg, 650 mg, Oral, Q4H PRN  apixaban (ELIQUIS) tablet 2.5 mg, 2.5 mg, Oral, BID  vitamin D3 (CHOLECALCIFEROL) tablet 5,000 Units, 5,000 Units, Oral, Daily  divalproex (DEPAKOTE) DR tablet 250 mg, 250 mg, Oral, BID  donepezil (ARICEPT) tablet 10 mg, 10 mg, Oral, Nightly  DULoxetine (CYMBALTA) extended release capsule 40 mg, 40 mg, Oral, Daily  ferrous sulfate (IRON 325) tablet 325 mg, 325 mg, Oral, Daily with breakfast  fluticasone (FLONASE) 50 MCG/ACT nasal spray 2 spray, 2 spray, Each Nostril, Daily  levothyroxine (SYNTHROID) tablet 50 mcg, 50 mcg, Oral, Daily  linaclotide (LINZESS) capsule 290 mcg, 290 mcg, Oral, QAM AC  metoprolol succinate (TOPROL XL) extended release tablet 100 mg, 100 mg, Oral, Daily  therapeutic multivitamin-minerals 1 tablet, 1 tablet, Oral, Daily  pantoprazole (PROTONIX) tablet 40 mg, 40 mg, Oral, QAM AC  polyethylene glycol (GLYCOLAX) packet 17 g, 17 g, Oral, Daily  potassium bicarb-citric acid (EFFER-K) effervescent tablet 40 mEq, 40 mEq, Oral, BID  ipratropium-albuterol (DUONEB) nebulizer solution 1 ampule, 1 ampule, Inhalation, Q4H PRN  rOPINIRole (REQUIP) tablet 0.5 mg, 0.5 mg, Oral, Nightly  budesonide-formoterol (SYMBICORT) 160-4.5 MCG/ACT inhaler 2 puff, 2 puff, Inhalation, BID PRN  vitamin B-1 (THIAMINE) tablet 50 mg, 50 mg, Oral, Daily  albuterol sulfate  (90 Base) MCG/ACT inhaler 2 puff, 2 puff, Inhalation, 4x daily  potassium chloride (KLOR-CON M) extended release tablet 40 mEq, 40 mEq, Oral, PRN **OR** potassium bicarb-citric acid (EFFER-K) effervescent tablet 40 mEq, 40 mEq, Oral, PRN **OR** potassium chloride 10 mEq/100 mL IVPB (Peripheral Line), 10 mEq, Intravenous, PRN  dilTIAZem (CARDIZEM CD) extended release capsule 120 mg, 120 mg, Oral, Daily    Physical      Vitals: /72   Pulse 77   Temp 97.4 °F (36.3 °C) (Oral)   Resp 16   Ht 5' 4\" (1.626 m)   Wt 161 lb 6.4 oz (73.2 kg)   SpO2 95%   BMI 27.70 kg/m²   Temp: Temp: 97.4 °F (36.3 °C)  Max: Temp  Av.3 °F (36.3 °C)  Min: 96.8 °F (36 °C)  Max: 97.6 °F (36.4 °C)  Respiration range:  Resp  Av  Min: 16  Max: 18  Pulse Range:  Pulse  Av.3  Min: 72  Max: 92  Blood pressure range:  Systolic (77WVL), QTX:532 , Min:123 , BKB:646   , Diastolic (58ICT), YZM:38, Min:70, Max:83    SpO2  Av.5 %  Min: 92 %  Max: 98 %    Intake/Output Summary (Last 24 hours) at 2020 1621  Last data filed at 2020 0545  Gross per 24 hour   Intake 360 ml   Output 100 ml   Net 260 ml       Vent settings:  Pulse  Av.8  Min: 66  Max: 142  Resp  Av.6  Min: 16  Max: 25  SpO2  Av.5 %  Min: 91 %  Max: 98 %    CONSTITUTIONAL:  fatigued, alert, cooperative, mild distress, appears stated age and normal weight  EYES:  Unremarkable   NECK:  Mild JVD  and supple, symmetrical, trachea midline  BACK:  symmetric and no curvature  LUNGS:  tachypneic, Moderate respiratory distress, moderate air exchange, no retractions and crackles diffuse, rhonchi diffuse  CARDIOVASCULAR:  normal apical pulses, regular rate and rhythm with ectopic beats, normal S1 and S2, no S3 and no S4  ABDOMEN:  Soft scaphoid BS + non tender   GENITAL/URINARY:  NEX   MUSCULOSKELETAL:  Trace edema   there is no redness, warmth, or swelling of the joints  NEUROLOGIC:  No acute focal deficit   SKIN:  Warm moist mild pallor  and no bruising or bleeding    Data      Lab Results   Component Value Date    WBC 6.4 09/22/2020    HGB 11.2 (L) 09/22/2020    HCT 34.1 (L) 09/22/2020    MCV 90.3 09/22/2020     09/22/2020     Lab Results   Component Value Date     09/24/2020    K 4.0 09/24/2020    K 2.9 09/22/2020    CL 97 09/24/2020    CO2 31 09/24/2020    BUN 21 09/24/2020    CREATININE 0.6 09/24/2020    GLUCOSE 110 09/24/2020    CALCIUM 8.8 09/24/2020          ASSESSMENT AND PLAN     Active Problems:    Dyspnea    Osteoporosis    Restless leg syndrome    Uncomplicated asthma    Vitamin D deficiency    Hypokalemia    Hypothyroidism    Buttocks nodule    Essential hypertension    Dementia without behavioral disturbance (HCC)    Cardiomyopathy (HCC)    Chronic combined systolic and diastolic heart failure (HCC)    History of 2019 novel coronavirus disease (COVID-19)    Acute on chronic combined systolic (congestive) and diastolic (congestive) heart failure (HCC)    Chronic atrial fibrillation    Elevated brain natriuretic peptide (BNP) level  Resolved Problems:    * No resolved hospital problems.  *     ct present medical management , check TTE  Should be dismissal ready soon   Very poor functional status AM PAC score for PT OT is barely 9-10/24

## 2020-09-25 NOTE — PROGRESS NOTES
2023 Patient assessment complete. Took medications without difficulties. Denies any needs at this time. Care plan and education reviewed and agreed upon with patient. Up in chair, alarm on, call light within reach. Will continue to monitor.

## 2020-09-25 NOTE — PROGRESS NOTES
Daily    pantoprazole  40 mg Oral QAM AC    polyethylene glycol  17 g Oral Daily    potassium bicarb-citric acid  40 mEq Oral BID    rOPINIRole  0.5 mg Oral Nightly    vitamin B-1  50 mg Oral Daily    albuterol sulfate HFA  2 puff Inhalation 4x daily    dilTIAZem  120 mg Oral Daily         Lab Data:  CBC:   No results for input(s): WBC, HGB, PLT in the last 72 hours. BMP:    Recent Labs     09/23/20  0453 09/24/20  0516    138   K 4.0 4.0   CO2 25 31   BUN 17 21*   CREATININE 0.6 0.6     INR:  No results for input(s): INR in the last 72 hours. BNP:    No results for input(s): PROBNP in the last 72 hours. Diagnostics:  Echo:  8/1/18:   Left ventricle size is normal.   Normal left ventricular wall thickness.   Ejection fraction is visually estimated to be 45-50 %.  Grade II diastolic dysfunction with elevated LV filling pressures.   Mitral annular calcification is present.   The mitral valve leaflets are slightly thickened.   mild mitral regurgitation is present.   Moderate left atrial dilation.   The right ventricle is slightly increased in size and is normal in function.   TAPSE measures 1.4 cm by objective criteria.   Mild pulmonary hypertension.   There is mild to moderate tricuspid regurgitation with the systolic   pulmonary artery pressure (SPAP) estimated at 51 mmHg (estimated RA pressure   of 15 mmHg included)   c/w moderate pulmonary HTN.   The right atrium is mildly dilated    Assessment:    1. Chronic combined systolic and diastolic heart failure  2.  covid negative  3. Severe dementia  4. Chronic atrial fibrillation, on eliquis  5. Essential hypertension  6. Elevated probnp      Plan:    1. Continue lasix 40 mg po bid  2. Continue diltiazem 120 mg po daily  3. Continue toprol 100 mg daily  4.   CHF nurse following for diet education, fluid restriction and daily weights    Grace Du from cardiology standpoint for discharge back to [facility]    She does not appear to have decompensated HF at this time    Discussed with patient who is agreeable with plan of care. Thank you for allowing me to participate in the care of your patient.     Dolores Humphreys, CNS

## 2020-09-25 NOTE — PLAN OF CARE
Problem: Skin Integrity:  Goal: Will show no infection signs and symptoms  Description: Will show no infection signs and symptoms  9/25/2020 1527 by Collis Aase  Outcome: Ongoing  9/25/2020 0304 by John Bhagat RN  Outcome: Ongoing  Goal: Absence of new skin breakdown  Description: Absence of new skin breakdown  9/25/2020 1527 by Collis Aase  Outcome: Ongoing  9/25/2020 0304 by John Bhagat RN  Outcome: Ongoing     Problem: Falls - Risk of:  Goal: Will remain free from falls  Description: Will remain free from falls  9/25/2020 1527 by Collis Aase  Outcome: Ongoing  9/25/2020 0304 by John Bhagat RN  Outcome: Ongoing  Goal: Absence of physical injury  Description: Absence of physical injury  9/25/2020 1527 by Collis Aase  Outcome: Ongoing  9/25/2020 0304 by John Bhagat RN  Outcome: Ongoing     Problem: Pain:  Description: Pain management should include both nonpharmacologic and pharmacologic interventions.   Goal: Pain level will decrease  Description: Pain level will decrease  9/25/2020 1527 by Collis Aase  Outcome: Ongoing  9/25/2020 0304 by John Bhagat RN  Outcome: Ongoing  Goal: Control of acute pain  Description: Control of acute pain  9/25/2020 1527 by Collis Aase  Outcome: Ongoing  9/25/2020 0304 by John Bhagat RN  Outcome: Ongoing  Goal: Control of chronic pain  Description: Control of chronic pain  9/25/2020 1527 by Collis Aase  Outcome: Ongoing  9/25/2020 0304 by John Bhagat RN  Outcome: Ongoing     Problem: Musculor/Skeletal Functional Status  Goal: Highest potential functional level  9/25/2020 1527 by Collis Aase  Outcome: Ongoing  9/25/2020 0304 by John Bhagat RN  Outcome: Ongoing  Goal: Absence of falls  9/25/2020 1527 by Collis Aase  Outcome: Ongoing  9/25/2020 0304 by John Bhagat RN  Outcome: Ongoing

## 2020-09-26 PROCEDURE — 6370000000 HC RX 637 (ALT 250 FOR IP): Performed by: INTERNAL MEDICINE

## 2020-09-26 PROCEDURE — 1200000000 HC SEMI PRIVATE

## 2020-09-26 PROCEDURE — 94640 AIRWAY INHALATION TREATMENT: CPT

## 2020-09-26 PROCEDURE — 94761 N-INVAS EAR/PLS OXIMETRY MLT: CPT

## 2020-09-26 RX ADMIN — LINACLOTIDE 290 MCG: 145 CAPSULE, GELATIN COATED ORAL at 05:45

## 2020-09-26 RX ADMIN — Medication 50 MG: at 08:28

## 2020-09-26 RX ADMIN — DIVALPROEX SODIUM 250 MG: 250 TABLET, DELAYED RELEASE ORAL at 21:45

## 2020-09-26 RX ADMIN — METOPROLOL SUCCINATE 100 MG: 50 TABLET, EXTENDED RELEASE ORAL at 08:28

## 2020-09-26 RX ADMIN — ALBUTEROL SULFATE 2 PUFF: 90 AEROSOL, METERED RESPIRATORY (INHALATION) at 16:15

## 2020-09-26 RX ADMIN — FUROSEMIDE 40 MG: 40 TABLET ORAL at 08:28

## 2020-09-26 RX ADMIN — APIXABAN 2.5 MG: 2.5 TABLET, FILM COATED ORAL at 21:44

## 2020-09-26 RX ADMIN — POTASSIUM BICARBONATE 40 MEQ: 782 TABLET, EFFERVESCENT ORAL at 21:45

## 2020-09-26 RX ADMIN — CHOLECALCIFEROL TAB 125 MCG (5000 UNIT) 5000 UNITS: 125 TAB at 08:28

## 2020-09-26 RX ADMIN — FERROUS SULFATE TAB 325 MG (65 MG ELEMENTAL FE) 325 MG: 325 (65 FE) TAB at 08:28

## 2020-09-26 RX ADMIN — PANTOPRAZOLE SODIUM 40 MG: 40 TABLET, DELAYED RELEASE ORAL at 05:45

## 2020-09-26 RX ADMIN — ROPINIROLE HYDROCHLORIDE 0.5 MG: 1 TABLET, FILM COATED ORAL at 21:44

## 2020-09-26 RX ADMIN — APIXABAN 2.5 MG: 2.5 TABLET, FILM COATED ORAL at 08:28

## 2020-09-26 RX ADMIN — DULOXETINE HYDROCHLORIDE 40 MG: 20 CAPSULE, DELAYED RELEASE ORAL at 08:28

## 2020-09-26 RX ADMIN — FUROSEMIDE 40 MG: 40 TABLET ORAL at 17:52

## 2020-09-26 RX ADMIN — DONEPEZIL HYDROCHLORIDE 10 MG: 5 TABLET, FILM COATED ORAL at 21:44

## 2020-09-26 RX ADMIN — DIVALPROEX SODIUM 250 MG: 250 TABLET, DELAYED RELEASE ORAL at 08:28

## 2020-09-26 RX ADMIN — POTASSIUM BICARBONATE 40 MEQ: 782 TABLET, EFFERVESCENT ORAL at 08:27

## 2020-09-26 RX ADMIN — MULTIPLE VITAMINS W/ MINERALS TAB 1 TABLET: TAB at 08:28

## 2020-09-26 RX ADMIN — POLYETHYLENE GLYCOL 3350 17 G: 17 POWDER, FOR SOLUTION ORAL at 08:29

## 2020-09-26 RX ADMIN — ALBUTEROL SULFATE 2 PUFF: 90 AEROSOL, METERED RESPIRATORY (INHALATION) at 09:00

## 2020-09-26 RX ADMIN — DILTIAZEM HYDROCHLORIDE 120 MG: 120 CAPSULE, COATED, EXTENDED RELEASE ORAL at 08:28

## 2020-09-26 RX ADMIN — FLUTICASONE PROPIONATE 2 SPRAY: 50 SPRAY, METERED NASAL at 08:34

## 2020-09-26 RX ADMIN — ALBUTEROL SULFATE 2 PUFF: 90 AEROSOL, METERED RESPIRATORY (INHALATION) at 21:01

## 2020-09-26 RX ADMIN — ALBUTEROL SULFATE 2 PUFF: 90 AEROSOL, METERED RESPIRATORY (INHALATION) at 12:28

## 2020-09-26 RX ADMIN — LEVOTHYROXINE SODIUM 50 MCG: 0.03 TABLET ORAL at 08:28

## 2020-09-26 ASSESSMENT — PAIN SCALES - GENERAL
PAINLEVEL_OUTOF10: 0

## 2020-09-26 NOTE — PROGRESS NOTES
Shift assessment complete. VSS. Scheduled medications given. Breakfast tray set up and patient eating at this time. Patient does not express needs and does not appear to be in distress. Bed alarm engaged, call light in reach. Will monitor.

## 2020-09-26 NOTE — PROGRESS NOTES
Shift assessment complete. Vital signs stable. On room air. Scheduled medications given whole in applesauce. Patient is awake, resting in bed. Respirations are even and unlabored. Fall precautions in place and bed alarm engaged. Will continue to monitor.

## 2020-09-26 NOTE — PROGRESS NOTES
Department of Internal Medicine  General Internal Medicine   Progress Note      SUBJECTIVE  Much more calm  , no breathing distress   History obtained from chart review and the patient  General ROS: positive for  - fatigue, fever and malaise  negative for - chills or night sweats  Psychological ROS: positive for - anxiety, disorientation, irritability, memory difficulties and sleep disturbances  negative for - behavioral disorder, hallucinations or hostility  Ophthalmic ROS: negative  Respiratory ROS: positive for - cough, shortness of breath, sputum changes and tachypnea  negative for - hemoptysis, pleuritic pain, stridor or wheezing  Cardiovascular ROS: positive for - dyspnea on exertion  negative for - chest pain  Gastrointestinal ROS: no abdominal pain, change in bowel habits, or black or bloody stools  Genito-Urinary ROS: no dysuria, trouble voiding, or hematuria  Musculoskeletal ROS: chronic pain   Neurological ROS: no TIA or stroke symptoms  Dermatological ROS: negative    OBJECTIVE      Medications      Current Facility-Administered Medications: haloperidol lactate (HALDOL) injection 2 mg, 2 mg, Intramuscular, Q4H PRN  furosemide (LASIX) tablet 40 mg, 40 mg, Oral, BID  acetaminophen (TYLENOL) tablet 650 mg, 650 mg, Oral, Q4H PRN  apixaban (ELIQUIS) tablet 2.5 mg, 2.5 mg, Oral, BID  vitamin D3 (CHOLECALCIFEROL) tablet 5,000 Units, 5,000 Units, Oral, Daily  divalproex (DEPAKOTE) DR tablet 250 mg, 250 mg, Oral, BID  donepezil (ARICEPT) tablet 10 mg, 10 mg, Oral, Nightly  DULoxetine (CYMBALTA) extended release capsule 40 mg, 40 mg, Oral, Daily  ferrous sulfate (IRON 325) tablet 325 mg, 325 mg, Oral, Daily with breakfast  fluticasone (FLONASE) 50 MCG/ACT nasal spray 2 spray, 2 spray, Each Nostril, Daily  levothyroxine (SYNTHROID) tablet 50 mcg, 50 mcg, Oral, Daily  linaclotide (LINZESS) capsule 290 mcg, 290 mcg, Oral, QAM AC  metoprolol succinate (TOPROL XL) extended release tablet 100 mg, 100 mg, Oral, Daily  therapeutic multivitamin-minerals 1 tablet, 1 tablet, Oral, Daily  pantoprazole (PROTONIX) tablet 40 mg, 40 mg, Oral, QAM AC  polyethylene glycol (GLYCOLAX) packet 17 g, 17 g, Oral, Daily  potassium bicarb-citric acid (EFFER-K) effervescent tablet 40 mEq, 40 mEq, Oral, BID  ipratropium-albuterol (DUONEB) nebulizer solution 1 ampule, 1 ampule, Inhalation, Q4H PRN  rOPINIRole (REQUIP) tablet 0.5 mg, 0.5 mg, Oral, Nightly  budesonide-formoterol (SYMBICORT) 160-4.5 MCG/ACT inhaler 2 puff, 2 puff, Inhalation, BID PRN  vitamin B-1 (THIAMINE) tablet 50 mg, 50 mg, Oral, Daily  albuterol sulfate  (90 Base) MCG/ACT inhaler 2 puff, 2 puff, Inhalation, 4x daily  potassium chloride (KLOR-CON M) extended release tablet 40 mEq, 40 mEq, Oral, PRN **OR** potassium bicarb-citric acid (EFFER-K) effervescent tablet 40 mEq, 40 mEq, Oral, PRN **OR** potassium chloride 10 mEq/100 mL IVPB (Peripheral Line), 10 mEq, Intravenous, PRN  dilTIAZem (CARDIZEM CD) extended release capsule 120 mg, 120 mg, Oral, Daily    Physical      Vitals: BP (!) 147/84   Pulse 85   Temp 97.3 °F (36.3 °C) (Oral)   Resp 16   Ht 5' 4\" (1.626 m)   Wt 141 lb 6.4 oz (64.1 kg)   SpO2 97%   BMI 24.27 kg/m²   Temp: Temp: 97.3 °F (36.3 °C)  Max: Temp  Av.4 °F (36.3 °C)  Min: 97.3 °F (36.3 °C)  Max: 97.5 °F (36.4 °C)  Respiration range:  Resp  Av.3  Min: 16  Max: 18  Pulse Range:  Pulse  Av  Min: 67  Max: 91  Blood pressure range:  Systolic (12VMC), EAZ:157 , Min:123 , AEV:530   , Diastolic (20IVV), DLV:31, Min:71, Max:84    SpO2  Av.3 %  Min: 92 %  Max: 97 %    Intake/Output Summary (Last 24 hours) at 2020 0851  Last data filed at 2020 0453  Gross per 24 hour   Intake --   Output 1900 ml   Net -1900 ml       Vent settings:  Pulse  Av.9  Min: 66  Max: 142  Resp  Av.4  Min: 16  Max: 25  SpO2  Av.5 %  Min: 91 %  Max: 98 %    CONSTITUTIONAL:  fatigued, alert, cooperative, mild distress, appears stated age and normal weight  EYES:  Unremarkable   NECK:  Mild JVD  and supple, symmetrical, trachea midline  BACK:  symmetric and no curvature  LUNGS:  tachypneic, Moderate respiratory distress, moderate air exchange, no retractions and crackles diffuse, rhonchi diffuse  CARDIOVASCULAR:  normal apical pulses, regular rate and rhythm with ectopic beats, normal S1 and S2, no S3 and no S4  ABDOMEN:  Soft scaphoid BS + non tender   GENITAL/URINARY:  NEX   MUSCULOSKELETAL:  Trace edema   there is no redness, warmth, or swelling of the joints  NEUROLOGIC:  No acute focal deficit   SKIN:  Warm moist mild pallor  and no bruising or bleeding    Data      Lab Results   Component Value Date    WBC 6.4 09/22/2020    HGB 11.2 (L) 09/22/2020    HCT 34.1 (L) 09/22/2020    MCV 90.3 09/22/2020     09/22/2020     Lab Results   Component Value Date     09/24/2020    K 4.0 09/24/2020    K 2.9 09/22/2020    CL 97 09/24/2020    CO2 31 09/24/2020    BUN 21 09/24/2020    CREATININE 0.6 09/24/2020    GLUCOSE 110 09/24/2020    CALCIUM 8.8 09/24/2020          ASSESSMENT AND PLAN     Active Problems:    Dyspnea    Osteoporosis    Restless leg syndrome    Uncomplicated asthma    Vitamin D deficiency    Hypokalemia    Hypothyroidism    Buttocks nodule    Essential hypertension    Dementia without behavioral disturbance (HCC)    Cardiomyopathy (HCC)    Chronic combined systolic and diastolic heart failure (HCC)    History of 2019 novel coronavirus disease (COVID-19)    Acute on chronic combined systolic (congestive) and diastolic (congestive) heart failure (HCC)    Chronic atrial fibrillation    Elevated brain natriuretic peptide (BNP) level  Resolved Problems:    * No resolved hospital problems.  *     patient is dismissal ready orders placed       This patient is discharge appropriate  and I have put dismissal orders , if this patient still remains in the hospital that would be purely for administrative reasons which are beyond my control and I sincerely hope it should not affect my Kalyan Noel of Stay statistics

## 2020-09-26 NOTE — CARE COORDINATION
No return voicemail from daughter on  or main case management voicemail regarding facility choices. Second voicemail left with daughter. Patient has a discharge order. A pre-cert is required with patient's insurance. Referrals sent to facilities on [facility] near [place], [place] per Medicare's website. Referrals sent to [place], Home at [facility], and [address] [place] [address]. Addendum: Therapy asked to see patient Sunday in order to start pre-cert on Monday. *Case management will continue to follow progress and update discharge plan as needed.     Navi Narayan, SHANEN, RN  [phone]

## 2020-09-27 PROCEDURE — 1200000000 HC SEMI PRIVATE

## 2020-09-27 PROCEDURE — 97530 THERAPEUTIC ACTIVITIES: CPT

## 2020-09-27 PROCEDURE — 6370000000 HC RX 637 (ALT 250 FOR IP): Performed by: INTERNAL MEDICINE

## 2020-09-27 PROCEDURE — 94640 AIRWAY INHALATION TREATMENT: CPT

## 2020-09-27 PROCEDURE — 94760 N-INVAS EAR/PLS OXIMETRY 1: CPT

## 2020-09-27 PROCEDURE — 94761 N-INVAS EAR/PLS OXIMETRY MLT: CPT

## 2020-09-27 PROCEDURE — 97535 SELF CARE MNGMENT TRAINING: CPT

## 2020-09-27 RX ADMIN — LEVOTHYROXINE SODIUM 50 MCG: 0.03 TABLET ORAL at 09:17

## 2020-09-27 RX ADMIN — POLYETHYLENE GLYCOL 3350 17 G: 17 POWDER, FOR SOLUTION ORAL at 09:14

## 2020-09-27 RX ADMIN — DULOXETINE HYDROCHLORIDE 40 MG: 20 CAPSULE, DELAYED RELEASE ORAL at 09:17

## 2020-09-27 RX ADMIN — DILTIAZEM HYDROCHLORIDE 120 MG: 120 CAPSULE, COATED, EXTENDED RELEASE ORAL at 09:17

## 2020-09-27 RX ADMIN — DIVALPROEX SODIUM 250 MG: 250 TABLET, DELAYED RELEASE ORAL at 09:17

## 2020-09-27 RX ADMIN — DIVALPROEX SODIUM 250 MG: 250 TABLET, DELAYED RELEASE ORAL at 21:33

## 2020-09-27 RX ADMIN — ROPINIROLE HYDROCHLORIDE 0.5 MG: 1 TABLET, FILM COATED ORAL at 21:33

## 2020-09-27 RX ADMIN — METOPROLOL SUCCINATE 100 MG: 50 TABLET, EXTENDED RELEASE ORAL at 09:17

## 2020-09-27 RX ADMIN — FUROSEMIDE 40 MG: 40 TABLET ORAL at 17:13

## 2020-09-27 RX ADMIN — FLUTICASONE PROPIONATE 2 SPRAY: 50 SPRAY, METERED NASAL at 09:22

## 2020-09-27 RX ADMIN — LINACLOTIDE 290 MCG: 145 CAPSULE, GELATIN COATED ORAL at 06:10

## 2020-09-27 RX ADMIN — Medication 50 MG: at 09:17

## 2020-09-27 RX ADMIN — BUDESONIDE AND FORMOTEROL FUMARATE DIHYDRATE 2 PUFF: 160; 4.5 AEROSOL RESPIRATORY (INHALATION) at 09:00

## 2020-09-27 RX ADMIN — FERROUS SULFATE TAB 325 MG (65 MG ELEMENTAL FE) 325 MG: 325 (65 FE) TAB at 09:17

## 2020-09-27 RX ADMIN — ALBUTEROL SULFATE 2 PUFF: 90 AEROSOL, METERED RESPIRATORY (INHALATION) at 09:00

## 2020-09-27 RX ADMIN — DONEPEZIL HYDROCHLORIDE 10 MG: 5 TABLET, FILM COATED ORAL at 21:33

## 2020-09-27 RX ADMIN — CHOLECALCIFEROL TAB 125 MCG (5000 UNIT) 5000 UNITS: 125 TAB at 09:17

## 2020-09-27 RX ADMIN — POTASSIUM BICARBONATE 40 MEQ: 782 TABLET, EFFERVESCENT ORAL at 09:13

## 2020-09-27 RX ADMIN — ALBUTEROL SULFATE 2 PUFF: 90 AEROSOL, METERED RESPIRATORY (INHALATION) at 15:13

## 2020-09-27 RX ADMIN — ALBUTEROL SULFATE 2 PUFF: 90 AEROSOL, METERED RESPIRATORY (INHALATION) at 21:39

## 2020-09-27 RX ADMIN — FUROSEMIDE 40 MG: 40 TABLET ORAL at 09:17

## 2020-09-27 RX ADMIN — APIXABAN 2.5 MG: 2.5 TABLET, FILM COATED ORAL at 21:33

## 2020-09-27 RX ADMIN — POTASSIUM BICARBONATE 40 MEQ: 782 TABLET, EFFERVESCENT ORAL at 21:33

## 2020-09-27 RX ADMIN — MULTIPLE VITAMINS W/ MINERALS TAB 1 TABLET: TAB at 09:17

## 2020-09-27 RX ADMIN — APIXABAN 2.5 MG: 2.5 TABLET, FILM COATED ORAL at 09:17

## 2020-09-27 RX ADMIN — ALBUTEROL SULFATE 2 PUFF: 90 AEROSOL, METERED RESPIRATORY (INHALATION) at 11:10

## 2020-09-27 RX ADMIN — PANTOPRAZOLE SODIUM 40 MG: 40 TABLET, DELAYED RELEASE ORAL at 06:10

## 2020-09-27 ASSESSMENT — PAIN SCALES - GENERAL
PAINLEVEL_OUTOF10: 0

## 2020-09-27 NOTE — PROGRESS NOTES
Shift assessment complete. VSS. Scheduled medications given. Patient appears confused at this time and not interested in taking medications or eating, however did cooperate with medication. Patient refused inhaler from RT, O2 at 88%, placed on 2L oxygen and raised HOB,  O2 now at 100%. Bed alarm engaged, call light in reach. Will monitor.

## 2020-09-27 NOTE — PLAN OF CARE
Problem: Skin Integrity:  Goal: Will show no infection signs and symptoms  Description: Will show no infection signs and symptoms  Outcome: Ongoing  Note: No evidence of skin breakdown noted this shift. Problem: Falls - Risk of:  Goal: Will remain free from falls  Description: Will remain free from falls  Outcome: Ongoing  Note: Remains free from falls this shift. Problem: Pain:  Goal: Pain level will decrease  Description: Pain level will decrease  Outcome: Ongoing  Note: No report of pain this shift.

## 2020-09-27 NOTE — PROGRESS NOTES
Occupational Therapy  Facility/Department: [address] ONCOLOGY  Daily Treatment Note  NAME: Fran Porter  : 3/2/1929  MRN: [id number]    Date of Service: 2020    Discharge Recommendations: Fran Porter scored a 6/24 on the AM-PAC ADL Inpatient form. Current research shows that an AM-PAC score of 17 or less is typically not associated with a discharge to the patient's home setting. Based on the patient's AM-PAC score and their current ADL deficits, it is recommended that the patient have 3-5 sessions per week of Occupational Therapy at d/c to increase the patient's independence. Please see assessment section for further patient specific details. If patient discharges prior to next session this note will serve as a discharge summary. Please see below for the latest assessment towards goals. OT Equipment Recommendations  Other: defer to next level of care    Assessment   Performance deficits / Impairments: Decreased functional mobility ; Decreased ADL status; Decreased safe awareness;Decreased cognition;Decreased endurance;Decreased balance;Decreased posture  Assessment: Pt is currently functioning below occupational baseline and demo the deficits listed above, pt would benefit from continued skilled OT services to address these deficits and increase independence, safety, and ease with all occupational pursuits. Treatment Diagnosis: Decreased ADL status, functional mobility, and functional transfers d/t Chronic combined systolic and diastolic heart failure ([facility] [address]  OT Education: OT Role;Plan of Care;Transfer Training;Orientation  Patient Education: pt is not independent in learning and will require reinforcement  Barriers to Learning: cognition  REQUIRES OT FOLLOW UP: Yes  Activity Tolerance  Activity Tolerance: Treatment limited secondary to decreased cognition;Patient limited by fatigue  Safety Devices  Type of devices:  All fall risk precautions in place;Nurse notified;Call light within reach; Patient at risk for falls; Left in chair;Chair alarm in place(Safety camera on patient)  Restraints  Initially in place: No         Patient Diagnosis(es): The encounter diagnosis was Congestive heart failure, unspecified HF chronicity, unspecified heart failure type ([facility] [address] has a past medical history of Abdominal wall pain, Arrhythmia, Arthritis, Asthma, Atrial fibrillation ([address] Broken nose, CHF (congestive heart failure) ([address] Cough variant asthma, Dementia ([address] Dyspnea, Essential hypertension, Fatigue, Hematoma, Hyperlipidemia, Insomnia, Lumbar radiculopathy, Osteoporosis, Palpitation, Restless leg syndrome, Shingles, and Sinusitis. has a past surgical history that includes Ankle surgery (1997); Elbow surgery; Colonoscopy (10/25/10); Ovary removal (Left); joint replacement; Appendectomy; Hysterectomy; Cardioversion; Upper gastrointestinal endoscopy (02/27/2018); Colonoscopy (02/27/2018); and hip surgery (Left, 7/11/2020). Restrictions  Restrictions/Precautions  Restrictions/Precautions: Fall Risk, Weight Bearing  Required Braces or Orthoses?: No  Lower Extremity Weight Bearing Restrictions  Left Lower Extremity Weight Bearing: Weight Bearing As Tolerated  Position Activity Restriction  Hip Precautions: ((direct lateral approach - no combined hip (extension and external rotation), no ADD beyond neutral, no active ABD x 6 weeks post-op, strict walker use x 6 weeks post-op))  Other position/activity restrictions: Laron Ness is a 80 y.o. female  history of chronic atrial fibrillation on elliquis, hypertension, CHF and dementia who presents to the emergency department from nursing home for wheezing x1 day. Noted to be wheezing this morning with mild tachycardia. She was treated at her nursing home by someone who was recently diagnosed with COVID-19. COVID test pending. Cardiology consulted.   Subjective   General  Chart Reviewed: Yes  Patient assessed for rehabilitation services?: Yes  Additional Pertinent Hx: PMH: Pertinent for chronic sinusitis, herpes zoster, restless legs syndrome, palpitation, osteoporosis, lumbar radiculopathy,chronic insomnia, hyperlipidemia, fatigue, essential hypertension, exertional dyspnea, dementia, congestive heart failure, fracture of the nasal bone, atrial fibrillation, bronchial asthma, osteoarthritis, cardiac arrhythmia and abdominal wall pain. Response to previous treatment: Patient unable to report, no changes reported from family or staff  Family / Caregiver Present: No  Referring Practitioner: Monica Winter MD  Diagnosis: Chronic combined systolic and diastolic heart failure ([facility] [address]  Subjective  Subjective: Patient supine in bed, finished lunch. Pleasant but confused    Patient on room air ar 91-93%, O2 tubing off when entered room. Placed back on 2L with sats 93+%  Vital Signs  Patient Currently in Pain: Denies   Orientation  Orientation  Orientation Level: Oriented to person;Disoriented to situation;Disoriented to time;Disoriented to place  Objective    ADL  Feeding: Setup  Grooming: Minimal assistance(Min assist and cues to wash face)  LE Dressing: Dependent/Total(socks)  Toileting: Dependent/Total  Additional Comments: Patient alert and eyes open entire time, oriented to self only. Patient incont of large amount of urine-- full bed and linen change. Balance  Sitting Balance: Minimal assistance  Standing Balance: Moderate assistance  Standing Balance  Time: @ 30 seconds  Activity: Stance at RW with mod of 2, mod of 1 once in stance at RW, max cues  Comment: Mod assist once in stance, able to take small steps with cues     Transfers  Stand Step Transfers: Moderate assistance(Mod assist once in stance, max cues)  Sit to stand: Maximum assistance  Stand to sit: Moderate assistance                       Cognition  Overall Cognitive Status: Exceptions  Arousal/Alertness: Appropriate responses to stimuli  Following Commands:  Follows one step commands with repetition; Inconsistently follows commands  Attention Span: Difficulty dividing attention  Memory: Decreased recall of biographical Information;Decreased recall of precautions;Decreased recall of recent events;Decreased long term memory;Decreased short term memory  Safety Judgement: Decreased awareness of need for assistance;Decreased awareness of need for safety  Insights: Not aware of deficits  Initiation: Requires cues for all  Sequencing: Requires cues for all  Cognition Comment: Patient more alert then last OT session, following simple commands during ADL and transfer.      Perception  Overall Perceptual Status: Impaired  Initiation: Hand over hand to initiate tasks                                   Plan   Plan  Times per week: 3-5x/wk  Times per day: Daily  Plan weeks: d/c  Specific instructions for Next Treatment: continue cotx  Current Treatment Recommendations: Strengthening, Balance Training, Endurance Training, Functional Mobility Training, Safety Education & Training, Self-Care / ADL  G-Code     OutComes Score                                                  AM-PAC Score        AM-[facility] Inpatient Daily Activity Raw Score: 6 (09/27/20 1407)  AM-PAC Inpatient ADL T-Scale Score : 17.07 (09/27/20 1407)  ADL Inpatient CMS 0-100% Score: 100 (09/27/20 1407)  ADL Inpatient CMS G-Code Modifier : CN (09/27/20 1407)    Goals  Short term goals  Time Frame for Short term goals: d/c  Short term goal 1: Pt will complete UB dressing with setup and supervision assist-- Max assist  Short term goal 2: Pt will complete stand step and sit<>stand transfers with min(A)-- Mod once in stance at [facility] term goal 3: Pt will complete LB dressing with mod(A)-- not met, dependent with sock, incont of urine  Short term goal 4: Pt will complete bed mobility with mod(A)-- max/dep  Short term goal 5: Pt will improve seated balance and demo ability to maintain midline while completing functional task with SBA-- not met, min assist  Long term goals  Time Frame for Long term goals : STG=LTG  Patient Goals   Patient goals : pt did not state       Therapy Time   Individual Concurrent Group Co-treatment   Time In       1325   Time Out       1351   Minutes       26         Timed Code Treatment Minutes:  26 Minutes    Total Treatment Minutes:  601 Doctor José Miguel Mazariegos [facility], OTR/L [address]

## 2020-09-27 NOTE — PROGRESS NOTES
Awakened for VS which are stable. Early am meds administered whole in applesauce, patient still tried to chew them despite instruction not to, swallowed with drinks of water. Again found incontinent of large amount of urine, timmy care provided, brief, bed pad, gown and Purewick changed. Repositioned to left side, no other needs voiced.

## 2020-09-27 NOTE — PROGRESS NOTES
Physical Therapy  Facility/Department: [address] ONCOLOGY  Daily Treatment Note  NAME: Clay Rosales  : 3/2/1929  MRN: [id number]    Date of Service: 2020    Discharge Recommendations:Gina Killian scored a  on the AM-PAC short mobility form. Current research shows that an AM-PAC score of 17 or less is typically not associated with a discharge to the patient's home setting. Based on the patient's AM-PAC score and their current functional mobility deficits, it is recommended that the patient have 3-5 sessions per week of Physical Therapy at d/c to increase the patient's independence. Please see assessment section for further patient specific details. If patient discharges prior to next session this note will serve as a discharge summary. Please see below for the latest assessment towards goals. PT Equipment Recommendations  Equipment Needed: No    Assessment   Body structures, Functions, Activity limitations: Decreased functional mobility ; Decreased strength;Decreased endurance;Decreased balance;Decreased cognition;Decreased ROM; Decreased safe awareness  Assessment: Pt is limited by the above deficit s and would benefit from skilled PT services to maximize pt functional mobility and safety prior to discharge. Pt is agreeable to PT and able to follow simple commands. Treatment Diagnosis: decreased strength, balance, functional mobility  Prognosis: Good  PT Education: PT Role;Plan of Care;Orientation;Precautions;Transfer Training  Patient Education: pt was unable to retain new information today  Barriers to Learning: cognition  REQUIRES PT FOLLOW UP: Yes  Activity Tolerance  Activity Tolerance: Patient Tolerated treatment well;Patient limited by cognitive status     Patient Diagnosis(es): The encounter diagnosis was Congestive heart failure, unspecified HF chronicity, unspecified heart failure type ([facility] [address]      has a past medical history of Abdominal wall pain, Arrhythmia, Arthritis, Asthma, Atrial fibrillation ([facility] [address] Broken nose, CHF (congestive heart failure) ([facility] [address] Cough variant asthma, Dementia ([facility] [address] Dyspnea, Essential hypertension, Fatigue, Hematoma, Hyperlipidemia, Insomnia, Lumbar radiculopathy, Osteoporosis, Palpitation, Restless leg syndrome, Shingles, and Sinusitis. has a past surgical history that includes Ankle surgery (1997); Elbow surgery; Colonoscopy (10/25/10); Ovary removal (Left); joint replacement; Appendectomy; Hysterectomy; Cardioversion; Upper gastrointestinal endoscopy (02/27/2018); Colonoscopy (02/27/2018); and hip surgery (Left, 7/11/2020). Restrictions  Restrictions/Precautions  Restrictions/Precautions: Fall Risk, Weight Bearing  Required Braces or Orthoses?: No  Lower Extremity Weight Bearing Restrictions  Left Lower Extremity Weight Bearing: Weight Bearing As Tolerated  Position Activity Restriction  Hip Precautions: ((direct lateral approach - no combined hip (extension and external rotation), no ADD beyond neutral, no active ABD x 6 weeks post-op, strict walker use x 6 weeks post-op))  Other position/activity restrictions: Estrella Thompson is a 80 y.o. female  history of chronic atrial fibrillation on elliquis, hypertension, CHF and dementia who presents to the emergency department from nursing home for wheezing x1 day. Noted to be wheezing this morning with mild tachycardia. She was treated at her nursing home by someone who was recently diagnosed with COVID-19. COVID test pending. Cardiology consulted.      Subjective   General  Chart Reviewed: Yes  Family / Caregiver Present: No  Subjective  Subjective: Pt oriented only to person; does not indicate pain, does not answer when asked about pain level  General Comment  Comments: Pt supine in bed upon arrival; agreeable to PT/OT  Pain Assessment  Pain Assessment: Advanced Dementia       Orientation  Orientation  Overall Orientation Status: Impaired  Orientation Level: Oriented to person     Objective   Bed mobility  Rolling to Left: Maximum assistance  Rolling to Right: Maximum assistance  Supine to Sit: Dependent/Total(Dependent)  Comment: Pt's brief and Purewick saturated with urine; pt rolled bilaterally for pericare and donning a clean brief; nursing made aware  Transfers  Sit to Stand: Dependent/Total;2 Person Assistance(mod A of 2)  Stand to sit: 2 Person Assistance;Dependent/Total  Bed to Chair: Moderate assistance(with RW, able to take small steps; posterior lean)        Balance  Posture: Fair  Sitting - Static: Fair;-(min A; posterior leaning)  Sitting - Dynamic: Poor  Standing - Static: Poor(stood with RW with mod A; flexed posture, posterior lean)  Standing - Dynamic: Poor                AM-PAC Score  AM-PAC Inpatient Mobility Raw Score : 8 (09/27/20 1406)  AM-PAC Inpatient T-Scale Score : 28.52 (09/27/20 1406)  Mobility Inpatient CMS 0-100% Score: 86.62 (09/27/20 1406)  Mobility Inpatient CMS G-Code Modifier : CM (09/27/20 1406)          Goals  Short term goals  Time Frame for Short term goals: To be met prior to DC  Short term goal 1: Pt will perform bed mobility with min A  Short term goal 2: Pt will perform sit to/from stand with min A  Short term goal 3: Pt will ambulate 20' with RW and min A  Patient Goals   Patient goals : ALHAJI due to dementia  No goals met    Plan    Plan  Times per week: 3-5  Current Treatment Recommendations: Strengthening, ROM, Balance Training, Functional Mobility Training, Transfer Training, Gait Training, Endurance Training, Neuromuscular Re-education, Safety Education & Training  Safety Devices  Type of devices:  All fall risk precautions in place, Call light within reach, Chair alarm in place, Gait belt, Patient at risk for falls, Left in chair, Telesitter in use, Nurse notified(JEB Scott)     Therapy Time   Individual Concurrent Group Co-treatment   Time In 1325         Time Out 1351         Minutes 26         Timed Code Treatment Minutes: 26 Minutes       Matthieu Russell, PT Nona Keller, [facility]

## 2020-09-27 NOTE — PROGRESS NOTES
Patient resting in bed at this time and does not appear to be in distress. Incontinent of urine, brief changed and purwick in place. Patient repositioned back in bed and does not express needs at this time. Bed alarm engaged, call light in reach. Will monitor.

## 2020-09-27 NOTE — PROGRESS NOTES
Patient checked for incontinence at this time, timmy care provided and brief changed. Patient repositioned in bed and does not appear to be in distress. Bed alarm engaged, call light in reach. Will monitor.

## 2020-09-27 NOTE — PROGRESS NOTES
Department of Internal Medicine  General Internal Medicine   Progress Note      SUBJECTIVE  No obvious respiratory distress , emotionally also calm but totally confused   History obtained from chart review and the patient  General ROS: positive for  - fatigue, fever and malaise  negative for - chills or night sweats  Psychological ROS: positive for - anxiety, disorientation, irritability, memory difficulties and sleep disturbances  negative for - behavioral disorder, hallucinations or hostility  Ophthalmic ROS: negative  Respiratory ROS: positive for - cough, shortness of breath, sputum changes and tachypnea  negative for - hemoptysis, pleuritic pain, stridor or wheezing  Cardiovascular ROS: positive for - dyspnea on exertion  negative for - chest pain  Gastrointestinal ROS: no abdominal pain, change in bowel habits, or black or bloody stools  Genito-Urinary ROS: no dysuria, trouble voiding, or hematuria  Musculoskeletal ROS: chronic pain   Neurological ROS: no TIA or stroke symptoms  Dermatological ROS: negative    OBJECTIVE      Medications      Current Facility-Administered Medications: haloperidol lactate (HALDOL) injection 2 mg, 2 mg, Intramuscular, Q4H PRN  furosemide (LASIX) tablet 40 mg, 40 mg, Oral, BID  acetaminophen (TYLENOL) tablet 650 mg, 650 mg, Oral, Q4H PRN  apixaban (ELIQUIS) tablet 2.5 mg, 2.5 mg, Oral, BID  vitamin D3 (CHOLECALCIFEROL) tablet 5,000 Units, 5,000 Units, Oral, Daily  divalproex (DEPAKOTE) DR tablet 250 mg, 250 mg, Oral, BID  donepezil (ARICEPT) tablet 10 mg, 10 mg, Oral, Nightly  DULoxetine (CYMBALTA) extended release capsule 40 mg, 40 mg, Oral, Daily  ferrous sulfate (IRON 325) tablet 325 mg, 325 mg, Oral, Daily with breakfast  fluticasone (FLONASE) 50 MCG/ACT nasal spray 2 spray, 2 spray, Each Nostril, Daily  levothyroxine (SYNTHROID) tablet 50 mcg, 50 mcg, Oral, Daily  linaclotide (LINZESS) capsule 290 mcg, 290 mcg, Oral, QAM AC  metoprolol succinate (TOPROL XL) extended release tablet 100 mg, 100 mg, Oral, Daily  therapeutic multivitamin-minerals 1 tablet, 1 tablet, Oral, Daily  pantoprazole (PROTONIX) tablet 40 mg, 40 mg, Oral, QAM AC  polyethylene glycol (GLYCOLAX) packet 17 g, 17 g, Oral, Daily  potassium bicarb-citric acid (EFFER-K) effervescent tablet 40 mEq, 40 mEq, Oral, BID  ipratropium-albuterol (DUONEB) nebulizer solution 1 ampule, 1 ampule, Inhalation, Q4H PRN  rOPINIRole (REQUIP) tablet 0.5 mg, 0.5 mg, Oral, Nightly  budesonide-formoterol (SYMBICORT) 160-4.5 MCG/ACT inhaler 2 puff, 2 puff, Inhalation, BID PRN  vitamin B-1 (THIAMINE) tablet 50 mg, 50 mg, Oral, Daily  albuterol sulfate  (90 Base) MCG/ACT inhaler 2 puff, 2 puff, Inhalation, 4x daily  potassium chloride (KLOR-CON M) extended release tablet 40 mEq, 40 mEq, Oral, PRN **OR** potassium bicarb-citric acid (EFFER-K) effervescent tablet 40 mEq, 40 mEq, Oral, PRN **OR** potassium chloride 10 mEq/100 mL IVPB (Peripheral Line), 10 mEq, Intravenous, PRN  dilTIAZem (CARDIZEM CD) extended release capsule 120 mg, 120 mg, Oral, Daily    Physical      Vitals: /66   Pulse 93   Temp 97.7 °F (36.5 °C) (Oral)   Resp 18   Ht 5' 4\" (1.626 m)   Wt 141 lb 6.4 oz (64.1 kg)   SpO2 95%   BMI 24.27 kg/m²   Temp: Temp: 97.7 °F (36.5 °C)  Max: Temp  Av.5 °F (36.4 °C)  Min: 97.3 °F (36.3 °C)  Max: 97.7 °F (36.5 °C)  Respiration range:  Resp  Av.3  Min: 16  Max: 18  Pulse Range:  Pulse  Av.6  Min: 80  Max: 93  Blood pressure range:  Systolic (51PVY), QSB:607 , Min:129 , DPC:394   , Diastolic (29NQS), YCQ:70, Min:66, Max:84    SpO2  Av.4 %  Min: 90 %  Max: 97 %    Intake/Output Summary (Last 24 hours) at 2020 4350  Last data filed at 2020 1751  Gross per 24 hour   Intake --   Output 2500 ml   Net -2500 ml       Vent settings:  Pulse  Av.5  Min: 66  Max: 142  Resp  Av.3  Min: 16  Max: 25  SpO2  Av.4 %  Min: 90 %  Max: 98 %    CONSTITUTIONAL:  fatigued, alert, cooperative, mild distress, appears stated age and normal weight  EYES:  Unremarkable   NECK:  Mild JVD  and supple, symmetrical, trachea midline  BACK:  symmetric and no curvature  LUNGS:  tachypneic, Moderate respiratory distress, moderate air exchange, no retractions and crackles diffuse, rhonchi diffuse  CARDIOVASCULAR:  normal apical pulses, regular rate and rhythm with ectopic beats, normal S1 and S2, no S3 and no S4  ABDOMEN:  Soft scaphoid BS + non tender   GENITAL/URINARY:  NEX   MUSCULOSKELETAL:  Trace edema   there is no redness, warmth, or swelling of the joints  NEUROLOGIC:  No acute focal deficit   SKIN:  Warm moist mild pallor  and no bruising or bleeding    Data      Lab Results   Component Value Date    WBC 6.4 09/22/2020    HGB 11.2 (L) 09/22/2020    HCT 34.1 (L) 09/22/2020    MCV 90.3 09/22/2020     09/22/2020     Lab Results   Component Value Date     09/24/2020    K 4.0 09/24/2020    K 2.9 09/22/2020    CL 97 09/24/2020    CO2 31 09/24/2020    BUN 21 09/24/2020    CREATININE 0.6 09/24/2020    GLUCOSE 110 09/24/2020    CALCIUM 8.8 09/24/2020          ASSESSMENT AND PLAN     Active Problems:    Dyspnea    Osteoporosis    Restless leg syndrome    Uncomplicated asthma    Vitamin D deficiency    Hypokalemia    Hypothyroidism    Buttocks nodule    Essential hypertension    Dementia without behavioral disturbance (HCC)    Cardiomyopathy (HCC)    Chronic combined systolic and diastolic heart failure (HCC)    History of 2019 novel coronavirus disease (COVID-19)    Acute on chronic combined systolic (congestive) and diastolic (congestive) heart failure (HCC)    Chronic atrial fibrillation    Elevated brain natriuretic peptide (BNP) level  Resolved Problems:    * No resolved hospital problems.  *     patient  Family still  Looking at [employer] , needs insurance pre- cert , patient has discharge order in Place , she could be here for days and days       This patient is discharge appropriate  and I have put dismissal orders , if this patient still remains in the hospital that would be purely for administrative reasons which are beyond my control and I sincerely hope it should not affect my Mary[place] of Stay statistics

## 2020-09-27 NOTE — PROGRESS NOTES
VSS, assessment as charted. Meds administered crushed in applesauce except Depakote given whole in applesauce, swallowed with effervescent potassium. Suction canister is full, patient found incontinent of urine. Allyson care provided, brief, bed pad and gown changed. Repositioned, bed alarm active and camera in room for safety. Patient is confused but cooperative with care, no needs voiced.

## 2020-09-28 LAB — SARS-COV-2, NAAT: NOT DETECTED

## 2020-09-28 PROCEDURE — 94761 N-INVAS EAR/PLS OXIMETRY MLT: CPT

## 2020-09-28 PROCEDURE — 6370000000 HC RX 637 (ALT 250 FOR IP): Performed by: INTERNAL MEDICINE

## 2020-09-28 PROCEDURE — 2700000000 HC OXYGEN THERAPY PER DAY

## 2020-09-28 PROCEDURE — 94640 AIRWAY INHALATION TREATMENT: CPT

## 2020-09-28 PROCEDURE — 1200000000 HC SEMI PRIVATE

## 2020-09-28 PROCEDURE — U0002 COVID-19 LAB TEST NON-CDC: HCPCS

## 2020-09-28 RX ADMIN — FLUTICASONE PROPIONATE 2 SPRAY: 50 SPRAY, METERED NASAL at 08:38

## 2020-09-28 RX ADMIN — METOPROLOL SUCCINATE 100 MG: 50 TABLET, EXTENDED RELEASE ORAL at 08:32

## 2020-09-28 RX ADMIN — PANTOPRAZOLE SODIUM 40 MG: 40 TABLET, DELAYED RELEASE ORAL at 06:25

## 2020-09-28 RX ADMIN — FUROSEMIDE 40 MG: 40 TABLET ORAL at 08:35

## 2020-09-28 RX ADMIN — FUROSEMIDE 40 MG: 40 TABLET ORAL at 18:19

## 2020-09-28 RX ADMIN — ALBUTEROL SULFATE 2 PUFF: 90 AEROSOL, METERED RESPIRATORY (INHALATION) at 08:44

## 2020-09-28 RX ADMIN — DIVALPROEX SODIUM 250 MG: 250 TABLET, DELAYED RELEASE ORAL at 20:11

## 2020-09-28 RX ADMIN — DIVALPROEX SODIUM 250 MG: 250 TABLET, DELAYED RELEASE ORAL at 08:32

## 2020-09-28 RX ADMIN — APIXABAN 2.5 MG: 2.5 TABLET, FILM COATED ORAL at 08:32

## 2020-09-28 RX ADMIN — ALBUTEROL SULFATE 2 PUFF: 90 AEROSOL, METERED RESPIRATORY (INHALATION) at 19:27

## 2020-09-28 RX ADMIN — DONEPEZIL HYDROCHLORIDE 10 MG: 5 TABLET, FILM COATED ORAL at 20:11

## 2020-09-28 RX ADMIN — ALBUTEROL SULFATE 2 PUFF: 90 AEROSOL, METERED RESPIRATORY (INHALATION) at 11:15

## 2020-09-28 RX ADMIN — FERROUS SULFATE TAB 325 MG (65 MG ELEMENTAL FE) 325 MG: 325 (65 FE) TAB at 08:33

## 2020-09-28 RX ADMIN — CHOLECALCIFEROL TAB 125 MCG (5000 UNIT) 5000 UNITS: 125 TAB at 08:30

## 2020-09-28 RX ADMIN — MULTIPLE VITAMINS W/ MINERALS TAB 1 TABLET: TAB at 08:33

## 2020-09-28 RX ADMIN — ROPINIROLE HYDROCHLORIDE 0.5 MG: 1 TABLET, FILM COATED ORAL at 20:11

## 2020-09-28 RX ADMIN — LEVOTHYROXINE SODIUM 50 MCG: 0.03 TABLET ORAL at 08:38

## 2020-09-28 RX ADMIN — APIXABAN 2.5 MG: 2.5 TABLET, FILM COATED ORAL at 20:11

## 2020-09-28 RX ADMIN — Medication 50 MG: at 08:30

## 2020-09-28 RX ADMIN — DILTIAZEM HYDROCHLORIDE 120 MG: 120 CAPSULE, COATED, EXTENDED RELEASE ORAL at 08:32

## 2020-09-28 RX ADMIN — DULOXETINE HYDROCHLORIDE 40 MG: 20 CAPSULE, DELAYED RELEASE ORAL at 08:30

## 2020-09-28 RX ADMIN — POTASSIUM BICARBONATE 40 MEQ: 782 TABLET, EFFERVESCENT ORAL at 20:12

## 2020-09-28 RX ADMIN — POTASSIUM BICARBONATE 40 MEQ: 782 TABLET, EFFERVESCENT ORAL at 08:34

## 2020-09-28 RX ADMIN — POLYETHYLENE GLYCOL 3350 17 G: 17 POWDER, FOR SOLUTION ORAL at 08:33

## 2020-09-28 RX ADMIN — LINACLOTIDE 290 MCG: 145 CAPSULE, GELATIN COATED ORAL at 06:25

## 2020-09-28 ASSESSMENT — PAIN SCALES - GENERAL
PAINLEVEL_OUTOF10: 0
PAINLEVEL_OUTOF10: 0

## 2020-09-28 NOTE — CARE COORDINATION
CM returned call to [facility] at [facility] [phone] and updated on poc that pt will go to [facility] for rehab pending pre cert.    Emilia Loera RN, BSN  [phone]

## 2020-09-28 NOTE — CARE COORDINATION
Received call back from Medardo Mejia at [facility] and he is starting pre cert. CM called and left vm for daughter Claudeen Fabry updating [phone].     Hernandez Colin, RN, BSN  [phone]

## 2020-09-28 NOTE — CARE COORDINATION
DEEPTI RN spoke to admissions at [facility] [place]. [address] They are unable to accept the patient.      SHANE FloydN, RN  [phone]

## 2020-09-28 NOTE — PROGRESS NOTES
Pt resting in bed, eyes closed. Awakes to verbal stimuli; however very sleepy. Health assessment complete. Fall precautions in place, call light within reach. Will continue to monitor.

## 2020-09-28 NOTE — CARE COORDINATION
Cm called Timur Driscoll at [facility] and gave pt information for referral. She states any new admissions with managed care plans they have to admit by Oct 1st d/t to changes with system. She will review pt. She is aware pt is on camera and that is fine as long as no sitter. Aware CM has ordered rapid covid since last test was 9/24. CM called and spoke to [facility] at [facility] [phone] and pt is not in network with them. CM had received a vm that was left at 0630 this am  from pt's daughter Shruthi Mishra [phone]. CM returned call and had to leave vm updating on poc so far. CM will continue to follow for d/c planning.      Mariano Swain RN, BSN  [phone]

## 2020-09-28 NOTE — PLAN OF CARE
Problem: Skin Integrity:  Goal: Will show no infection signs and symptoms  Description: Will show no infection signs and symptoms  9/27/2020 2307 by Carla Hamilton RN  Outcome: Ongoing  9/27/2020 1432 by Garry Mares RN  Outcome: Ongoing  Goal: Absence of new skin breakdown  Description: Absence of new skin breakdown  9/27/2020 2307 by Carla Hamilton RN  Outcome: Ongoing  9/27/2020 1432 by Garry Mares RN  Outcome: Ongoing     Problem: Falls - Risk of:  Goal: Will remain free from falls  Description: Will remain free from falls  9/27/2020 2307 by Carla Hamilton RN  Outcome: Ongoing  9/27/2020 1432 by Garry Mares RN  Outcome: Ongoing  Goal: Absence of physical injury  Description: Absence of physical injury  9/27/2020 2307 by Carla Hamilton RN  Outcome: Ongoing  9/27/2020 1432 by Garry Mares RN  Outcome: Ongoing     Problem: Pain:  Goal: Pain level will decrease  Description: Pain level will decrease  9/27/2020 2307 by Carla Hamilton RN  Outcome: Ongoing  9/27/2020 1432 by Garry Mares RN  Outcome: Ongoing  Goal: Control of acute pain  Description: Control of acute pain  9/27/2020 2307 by Carla Hamilton RN  Outcome: Ongoing  9/27/2020 1432 by Garry Mares RN  Outcome: Ongoing  Goal: Control of chronic pain  Description: Control of chronic pain  9/27/2020 2307 by Carla Hamilton RN  Outcome: Ongoing  9/27/2020 1432 by Garry Mares RN  Outcome: Ongoing     Problem: Musculor/Skeletal Functional Status  Goal: Highest potential functional level  9/27/2020 2307 by Carla Hamilton RN  Outcome: Ongoing  9/27/2020 1432 by Garry Mares RN  Outcome: Ongoing  Goal: Absence of falls  9/27/2020 2307 by Carla Hamilton RN  Outcome: Ongoing  9/27/2020 1432 by Garry Mares RN  Outcome: Ongoing     Problem: OXYGENATION/RESPIRATORY FUNCTION  Goal: Patient will maintain patent airway  Outcome: Ongoing  Goal: Patient will achieve/maintain normal respiratory rate/effort  Description: Respiratory rate and effort will be within normal limits for the patient  Outcome: Ongoing     Problem: HEMODYNAMIC STATUS  Goal: Patient has stable vital signs and fluid balance  Outcome: Ongoing     Problem: FLUID AND ELECTROLYTE IMBALANCE  Goal: Fluid and electrolyte balance are achieved/maintained  Outcome: Ongoing     Problem: ACTIVITY INTOLERANCE/IMPAIRED MOBILITY  Goal: Mobility/activity is maintained at optimum level for patient  Outcome: Ongoing

## 2020-09-28 NOTE — PROGRESS NOTES
Shift assessment completed, pt is alert but confuse, VSS, fall precautions in place, call light within reach, denies any pain and doesn't appear to be in any discomfort. See flowsheets and MAR. Will monitor pt. The care plan and education has been reviewed and mutually agreed upon with the patient.

## 2020-09-29 LAB
ANION GAP SERPL CALCULATED.3IONS-SCNC: 7 MMOL/L (ref 3–16)
BUN BLDV-MCNC: 20 MG/DL (ref 7–20)
CALCIUM SERPL-MCNC: 8.7 MG/DL (ref 8.3–10.6)
CHLORIDE BLD-SCNC: 89 MMOL/L (ref 99–110)
CO2: 35 MMOL/L (ref 21–32)
CREAT SERPL-MCNC: 0.6 MG/DL (ref 0.6–1.2)
GFR AFRICAN AMERICAN: >60
GFR NON-AFRICAN AMERICAN: >60
GLUCOSE BLD-MCNC: 102 MG/DL (ref 70–99)
POTASSIUM SERPL-SCNC: 3.5 MMOL/L (ref 3.5–5.1)
SODIUM BLD-SCNC: 131 MMOL/L (ref 136–145)

## 2020-09-29 PROCEDURE — 1200000000 HC SEMI PRIVATE

## 2020-09-29 PROCEDURE — 2700000000 HC OXYGEN THERAPY PER DAY

## 2020-09-29 PROCEDURE — 6370000000 HC RX 637 (ALT 250 FOR IP): Performed by: INTERNAL MEDICINE

## 2020-09-29 PROCEDURE — 94761 N-INVAS EAR/PLS OXIMETRY MLT: CPT

## 2020-09-29 PROCEDURE — 80048 BASIC METABOLIC PNL TOTAL CA: CPT

## 2020-09-29 PROCEDURE — 36415 COLL VENOUS BLD VENIPUNCTURE: CPT

## 2020-09-29 PROCEDURE — 94640 AIRWAY INHALATION TREATMENT: CPT

## 2020-09-29 RX ORDER — MORPHINE SULFATE 10 MG/.5ML
0.5 SOLUTION ORAL EVERY 4 HOURS PRN
Qty: 30 ML | Refills: 0 | Status: SHIPPED | OUTPATIENT
Start: 2020-09-29 | End: 2020-10-02

## 2020-09-29 RX ADMIN — FLUTICASONE PROPIONATE 2 SPRAY: 50 SPRAY, METERED NASAL at 09:28

## 2020-09-29 RX ADMIN — POLYETHYLENE GLYCOL 3350 17 G: 17 POWDER, FOR SOLUTION ORAL at 09:23

## 2020-09-29 RX ADMIN — FUROSEMIDE 40 MG: 40 TABLET ORAL at 09:21

## 2020-09-29 RX ADMIN — FUROSEMIDE 40 MG: 40 TABLET ORAL at 17:12

## 2020-09-29 RX ADMIN — ALBUTEROL SULFATE 2 PUFF: 90 AEROSOL, METERED RESPIRATORY (INHALATION) at 16:36

## 2020-09-29 RX ADMIN — ALBUTEROL SULFATE 2 PUFF: 90 AEROSOL, METERED RESPIRATORY (INHALATION) at 19:46

## 2020-09-29 RX ADMIN — CHOLECALCIFEROL TAB 125 MCG (5000 UNIT) 5000 UNITS: 125 TAB at 09:21

## 2020-09-29 RX ADMIN — APIXABAN 2.5 MG: 2.5 TABLET, FILM COATED ORAL at 09:22

## 2020-09-29 RX ADMIN — DIVALPROEX SODIUM 250 MG: 250 TABLET, DELAYED RELEASE ORAL at 09:22

## 2020-09-29 RX ADMIN — MULTIPLE VITAMINS W/ MINERALS TAB 1 TABLET: TAB at 09:47

## 2020-09-29 RX ADMIN — POTASSIUM BICARBONATE 40 MEQ: 782 TABLET, EFFERVESCENT ORAL at 21:31

## 2020-09-29 RX ADMIN — BUDESONIDE AND FORMOTEROL FUMARATE DIHYDRATE 2 PUFF: 160; 4.5 AEROSOL RESPIRATORY (INHALATION) at 08:58

## 2020-09-29 RX ADMIN — LINACLOTIDE 290 MCG: 145 CAPSULE, GELATIN COATED ORAL at 09:27

## 2020-09-29 RX ADMIN — Medication 50 MG: at 09:23

## 2020-09-29 RX ADMIN — ALBUTEROL SULFATE 2 PUFF: 90 AEROSOL, METERED RESPIRATORY (INHALATION) at 08:58

## 2020-09-29 RX ADMIN — DONEPEZIL HYDROCHLORIDE 10 MG: 5 TABLET, FILM COATED ORAL at 21:31

## 2020-09-29 RX ADMIN — APIXABAN 2.5 MG: 2.5 TABLET, FILM COATED ORAL at 21:31

## 2020-09-29 RX ADMIN — POTASSIUM BICARBONATE 40 MEQ: 782 TABLET, EFFERVESCENT ORAL at 09:20

## 2020-09-29 RX ADMIN — METOPROLOL SUCCINATE 100 MG: 50 TABLET, EXTENDED RELEASE ORAL at 09:21

## 2020-09-29 RX ADMIN — LEVOTHYROXINE SODIUM 50 MCG: 0.03 TABLET ORAL at 09:21

## 2020-09-29 RX ADMIN — DULOXETINE HYDROCHLORIDE 40 MG: 20 CAPSULE, DELAYED RELEASE ORAL at 09:20

## 2020-09-29 RX ADMIN — DIVALPROEX SODIUM 250 MG: 250 TABLET, DELAYED RELEASE ORAL at 21:31

## 2020-09-29 RX ADMIN — ROPINIROLE HYDROCHLORIDE 0.5 MG: 1 TABLET, FILM COATED ORAL at 21:31

## 2020-09-29 RX ADMIN — DILTIAZEM HYDROCHLORIDE 120 MG: 120 CAPSULE, COATED, EXTENDED RELEASE ORAL at 09:20

## 2020-09-29 RX ADMIN — PANTOPRAZOLE SODIUM 40 MG: 40 TABLET, DELAYED RELEASE ORAL at 07:43

## 2020-09-29 RX ADMIN — ALBUTEROL SULFATE 2 PUFF: 90 AEROSOL, METERED RESPIRATORY (INHALATION) at 13:09

## 2020-09-29 RX ADMIN — FERROUS SULFATE TAB 325 MG (65 MG ELEMENTAL FE) 325 MG: 325 (65 FE) TAB at 09:21

## 2020-09-29 ASSESSMENT — PAIN SCALES - GENERAL
PAINLEVEL_OUTOF10: 0

## 2020-09-29 NOTE — CARE COORDINATION
Plan to return to [place] w/ hospice- [facility] (admissions, Wanda Fairly [phone])- hospital eval this evening vs tomorrow morning per [facility].     Ethan Shows MSW, [address]

## 2020-09-29 NOTE — CARE COORDINATION
Pt denied for SNF. Perfect serve sent to MD.  Also palliative care consult placed this date.     Maranda Trujillo MSW, [address]

## 2020-09-29 NOTE — CONSULTS
Palliative Care:        80-year-old white female; came to the emergency room with history of increasing shortness of breath. She arrived via EMS from the [facility]. The patient had a possible exposure to COVID. The patient presents with diarrhea. 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 Admitted 9/22/20 and initiating IV Lasix therapy. Echocardiogram 9/25/20:   Summary:  Normal left ventricle size, wall thickness and systolic function with an estimated ejection fraction of 55%. No regional wall motion abnormalities are seen. E/e\"= 12.7 . Flattened in systole consistent with right ventricular pressure overload. The aortic valve is thickened/calcified with decreased leaflet mobility consistent with aortic stenosis. Mild aortic stenosis with a peak velocity of 2.3m/s and a mean pressure gradient of 16mmHg. Right ventricular systolic function is moderately reduced. The right ventricle is severely enlarged. Severe tricuspid regurgitation. Estimated pulmonary artery systolic pressure is moderately elevated at 50 mmHg assuming a right atrial pressure of 15 mmHg. Systolic flow reversal in the hepatic vein consistent with severe tricuspid regurgitation. The right atrium is severely dilated. IVC size is severely dilated (>2.1 cm) and collapses < 50% with respiration consistent with elevated RA pressure (15 mmHg). CXR 9/22/20:   FINDINGS:    Cardiomegaly appears unchanged.  Diffuse interstitial prominence bilaterally. No definite acute pulmonary infiltrate.  No significant pleural effusion.  No    pneumothorax or subdiaphragmatic free air. No acute osseous abnormality    identified.              Impression    Cardiomegaly and diffuse interstitial prominence suggesting fluid overload    and/or CHF.         No definite acute airspace disease.       Past Medical History:   has a past medical history of Abdominal wall pain, Arrhythmia, Arthritis, Asthma, Atrial fibrillation ([address] Broken nose, CHF (congestive heart failure) (HCC), Cough variant asthma, Dementia ([facility] [address] Dyspnea, Essential hypertension, Fatigue, Hematoma, Hyperlipidemia, Insomnia, Lumbar radiculopathy, Osteoporosis, Palpitation, Restless leg syndrome, Shingles, and Sinusitis. Past Surgical History:   has a past surgical history that includes Ankle surgery (1997); Elbow surgery; Colonoscopy (10/25/10); Ovary removal (Left); joint replacement; Appendectomy; Hysterectomy; Cardioversion; Upper gastrointestinal endoscopy (02/27/2018); Colonoscopy (02/27/2018); and hip surgery (Left, 7/11/2020). Advance Directives:   DNR-CCA       Problem Severity: Pain/Other Symptoms:   Denies pain. Bed Mobility/Toileting/Transfer:  Impaired functional mobility. Fall risk. Hip precautions. Walker to be used for ambulation. Performance Status:        Palliative Performance Scale:  100% []Full Normal activity & work No evidence of disease  90%   [] Full Normal activity & work Some evidence of disease  80%   [] Full Normal activity with Effort Some evidence of disease  70%   [] Reduced Unable Normal Job/Work Significant disease Full Normal or reduced  60%   [] Ambulation reduced; Significant disease; Can't do hobbies/housework; intake normal   or reduced; occasional assist; LOC full/confusion  50%   [] Mainly sit/lie; Extensive disease; Can't do any work; Considerable assist; intake normal  Or reduced; LOC full/confusion  40%   [x] Mainly in bed; Extensive disease; Mainly assist; intake normal or reduced; occasional assist; LOC full/confusion  30%   [] Bed Bound; Extensive disease; Total care; intake reduced; LOC full/confusion  20%   [] Bed Bound; Extensive disease; Total care; intake minimal; Drowsy/coma  10%   [] Bed Bound; Extensive disease; Total care; Mouth care only; Drowsy/coma    PPS 40%  Symptom Assessment: Appetite/Nausea/Bowels/Fatigue:    Appetite poor. Needs assistance with meals. Incontinent. WT. 122 lbs. BMI 20.94.  Current Albumin 2.9 as of 9/25/20. Social History:   reports that she quit smoking about 50 years ago. She has a 5.00 pack-year smoking history. She has never used smokeless tobacco. She reports that she does not drink alcohol or use drugs. Family History:  family history includes Arthritis in an other family member; Asthma in an other family member; Cancer in her mother; Diabetes in an other family member; Heart Disease in an other family member; Stroke in her father. Psychological/Spiritual:   . Two children. 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 Daughter agrees for [employer] spiritual support. Family Discussion:       Situation:   Call to daughter Tam[facility]). Discussed overall clinical/physical status of patient and unable to initiate PT/OT with insurance denial. Emotional and education support provided with discussions. Background:  Discussed DC plan. Robert Little wants patient to return to [place]. Reviewed hospice philosophy. Agrees to hospice consult with [facility] for patient. Discussed Advance Directives/Code status. Alicia verbalizing understanding of DNR-CCA vs. DNR-CC. Wishes are to remain DNR-CCA at this time. Monet Little states if her mother was unable to maintain ventilation support with intubation that she would change code status at that time to [facility] and not have tracheotomy placed. Assessment:  -Patient unable to express nedds. VS WNL. Patient denies pain.   -Patient is [place]. Monet Little feels her mother would appreciate  Spiritual support. Recommendation:  -Perfect serve physician for hospice and  consults. -Fax request to [phone] (Admissions Rosalind Montgomery). -Provide update to Ruthy TATUM on this process. Will continue to offer support as needed.

## 2020-09-29 NOTE — PLAN OF CARE
Problem: Skin Integrity:  Goal: Will show no infection signs and symptoms  Description: Will show no infection signs and symptoms  9/29/2020 1248 by Umesh Wang RN  Outcome: Ongoing  9/29/2020 0026 by Fabian Mota RN  Outcome: Ongoing  Goal: Absence of new skin breakdown  Description: Absence of new skin breakdown  9/29/2020 1248 by Umesh Wang RN  Outcome: Ongoing  9/29/2020 0026 by Fabian Mota RN  Outcome: Ongoing     Problem: Falls - Risk of:  Goal: Will remain free from falls  Description: Will remain free from falls  9/29/2020 1248 by Umesh Wang RN  Outcome: Ongoing  9/29/2020 0026 by Fabian Mota RN  Outcome: Ongoing  Goal: Absence of physical injury  Description: Absence of physical injury  9/29/2020 1248 by Umesh Wang RN  Outcome: Ongoing  9/29/2020 0026 by Fabian Mota RN  Outcome: Ongoing     Problem: Pain:  Goal: Pain level will decrease  Description: Pain level will decrease  9/29/2020 1248 by Umesh Wang RN  Outcome: Ongoing  9/29/2020 0026 by Fabian Mota RN  Outcome: Ongoing  Goal: Control of acute pain  Description: Control of acute pain  9/29/2020 1248 by Umesh Wang RN  Outcome: Ongoing  9/29/2020 0026 by Fabian Mota RN  Outcome: Ongoing  Goal: Control of chronic pain  Description: Control of chronic pain  9/29/2020 0026 by Fabian Mota RN  Outcome: Ongoing     Problem: Musculor/Skeletal Functional Status  Goal: Highest potential functional level  9/29/2020 0026 by Fabian Mota RN  Outcome: Ongoing  Goal: Absence of falls  9/29/2020 0026 by Fabian Mota RN  Outcome: Ongoing     Problem: OXYGENATION/RESPIRATORY FUNCTION  Goal: Patient will maintain patent airway  9/29/2020 0026 by Fabian Mota RN  Outcome: Ongoing  Goal: Patient will achieve/maintain normal respiratory rate/effort  Description: Respiratory rate and effort will be within normal limits for the patient  9/29/2020 0026 by Fabian Mota RN  Outcome: Ongoing     Problem: HEMODYNAMIC STATUS  Goal: Patient has stable vital signs and fluid balance  9/29/2020 0026 by Frederick Flowers RN  Outcome: Ongoing     Problem: FLUID AND ELECTROLYTE IMBALANCE  Goal: Fluid and electrolyte balance are achieved/maintained  9/29/2020 0026 by Frederick Flowers RN  Outcome: Ongoing     Problem: ACTIVITY INTOLERANCE/IMPAIRED MOBILITY  Goal: Mobility/activity is maintained at optimum level for patient  9/29/2020 0026 by Frederick Flowers RN  Outcome: Ongoing

## 2020-09-29 NOTE — PROGRESS NOTES
(LINZESS) capsule 290 mcg, 290 mcg, Oral, QAM AC  metoprolol succinate (TOPROL XL) extended release tablet 100 mg, 100 mg, Oral, Daily  therapeutic multivitamin-minerals 1 tablet, 1 tablet, Oral, Daily  pantoprazole (PROTONIX) tablet 40 mg, 40 mg, Oral, QAM AC  polyethylene glycol (GLYCOLAX) packet 17 g, 17 g, Oral, Daily  potassium bicarb-citric acid (EFFER-K) effervescent tablet 40 mEq, 40 mEq, Oral, BID  ipratropium-albuterol (DUONEB) nebulizer solution 1 ampule, 1 ampule, Inhalation, Q4H PRN  rOPINIRole (REQUIP) tablet 0.5 mg, 0.5 mg, Oral, Nightly  budesonide-formoterol (SYMBICORT) 160-4.5 MCG/ACT inhaler 2 puff, 2 puff, Inhalation, BID PRN  vitamin B-1 (THIAMINE) tablet 50 mg, 50 mg, Oral, Daily  albuterol sulfate  (90 Base) MCG/ACT inhaler 2 puff, 2 puff, Inhalation, 4x daily  potassium chloride (KLOR-CON M) extended release tablet 40 mEq, 40 mEq, Oral, PRN **OR** potassium bicarb-citric acid (EFFER-K) effervescent tablet 40 mEq, 40 mEq, Oral, PRN **OR** potassium chloride 10 mEq/100 mL IVPB (Peripheral Line), 10 mEq, Intravenous, PRN  dilTIAZem (CARDIZEM CD) extended release capsule 120 mg, 120 mg, Oral, Daily    Physical      Vitals: /77   Pulse 88   Temp 97.5 °F (36.4 °C) (Oral)   Resp 18   Ht 5' 4\" (1.626 m)   Wt 124 lb (56.2 kg)   SpO2 96%   BMI 21.28 kg/m²   Temp: Temp: 97.5 °F (36.4 °C)  Max: Temp  Av.8 °F (36.6 °C)  Min: 97.1 °F (36.2 °C)  Max: 98.4 °F (36.9 °C)  Respiration range:  Resp  Av.7  Min: 16  Max: 18  Pulse Range:  Pulse  Av  Min: 85  Max: 97  Blood pressure range:  Systolic (24FVZ), SDP:868 , Min:96 , IWD:866   , Diastolic (13SUS), LDW:79, Min:59, Max:77    SpO2  Av.6 %  Min: 96 %  Max: 98 %    Intake/Output Summary (Last 24 hours) at 2020 0233  Last data filed at 2020 0145  Gross per 24 hour   Intake --   Output 2700 ml   Net -2700 ml       Vent settings:  Pulse  Av.4  Min: 66  Max: 142  Resp  Av  Min: 16  Max: 25  SpO2  Avg: 94.5 %  Min: 90 %  Max: 98 %    CONSTITUTIONAL:  fatigued, alert, cooperative, mild distress, appears stated age and normal weight  EYES:  Unremarkable   NECK:  Mild JVD  and supple, symmetrical, trachea midline  BACK:  symmetric and no curvature  LUNGS:  tachypneic, Moderate respiratory distress, moderate air exchange, no retractions and crackles diffuse, rhonchi diffuse  CARDIOVASCULAR:  normal apical pulses, regular rate and rhythm with ectopic beats, normal S1 and S2, no S3 and no S4  ABDOMEN:  Soft scaphoid BS + non tender   GENITAL/URINARY:  NEX   MUSCULOSKELETAL:  Trace edema   there is no redness, warmth, or swelling of the joints  NEUROLOGIC:  No acute focal deficit   SKIN:  Warm moist mild pallor  and no bruising or bleeding    Data      Lab Results   Component Value Date    WBC 6.4 09/22/2020    HGB 11.2 (L) 09/22/2020    HCT 34.1 (L) 09/22/2020    MCV 90.3 09/22/2020     09/22/2020     Lab Results   Component Value Date     09/24/2020    K 4.0 09/24/2020    K 2.9 09/22/2020    CL 97 09/24/2020    CO2 31 09/24/2020    BUN 21 09/24/2020    CREATININE 0.6 09/24/2020    GLUCOSE 110 09/24/2020    CALCIUM 8.8 09/24/2020          ASSESSMENT AND PLAN     Active Problems:    Dyspnea    Osteoporosis    Restless leg syndrome    Uncomplicated asthma    Vitamin D deficiency    Hypokalemia    Hypothyroidism    Buttocks nodule    Essential hypertension    Dementia without behavioral disturbance (HCC)    Cardiomyopathy (HCC)    Chronic combined systolic and diastolic heart failure (HCC)    History of 2019 novel coronavirus disease (COVID-19)    Acute on chronic combined systolic (congestive) and diastolic (congestive) heart failure (HCC)    Chronic atrial fibrillation    Elevated brain natriuretic peptide (BNP) level  Resolved Problems:    * No resolved hospital problems. *     patient placement is a pending and time consuming issue, needs pre-cert for [facility] .  Ideally she needs palliative care         This

## 2020-09-29 NOTE — PROGRESS NOTES
Department of Internal Medicine  General Internal Medicine   Progress Note      SUBJECTIVE   Denies unusual SOB but still confused and disoriented no restlessness     History obtained from chart review and the patient  General ROS: positive for  - fatigue, fever and malaise  negative for - chills or night sweats  Psychological ROS: positive for - anxiety, disorientation, irritability, memory difficulties and sleep disturbances  negative for - behavioral disorder, hallucinations or hostility  Ophthalmic ROS: negative  Respiratory ROS: positive for - cough, shortness of breath, sputum changes and tachypnea  negative for - hemoptysis, pleuritic pain, stridor or wheezing  Cardiovascular ROS: positive for - dyspnea on exertion  negative for - chest pain  Gastrointestinal ROS: no abdominal pain, change in bowel habits, or black or bloody stools  Genito-Urinary ROS: no dysuria, trouble voiding, or hematuria  Musculoskeletal ROS: chronic pain   Neurological ROS: no TIA or stroke symptoms  Dermatological ROS: negative    OBJECTIVE      Medications      Current Facility-Administered Medications: influenza quadrivalent split vaccine (FLUZONE;FLUARIX;FLULAVAL;AFLURIA) injection 0.5 mL, 0.5 mL, Intramuscular, Once  haloperidol lactate (HALDOL) injection 2 mg, 2 mg, Intramuscular, Q4H PRN  furosemide (LASIX) tablet 40 mg, 40 mg, Oral, BID  acetaminophen (TYLENOL) tablet 650 mg, 650 mg, Oral, Q4H PRN  apixaban (ELIQUIS) tablet 2.5 mg, 2.5 mg, Oral, BID  vitamin D3 (CHOLECALCIFEROL) tablet 5,000 Units, 5,000 Units, Oral, Daily  divalproex (DEPAKOTE) DR tablet 250 mg, 250 mg, Oral, BID  donepezil (ARICEPT) tablet 10 mg, 10 mg, Oral, Nightly  DULoxetine (CYMBALTA) extended release capsule 40 mg, 40 mg, Oral, Daily  ferrous sulfate (IRON 325) tablet 325 mg, 325 mg, Oral, Daily with breakfast  fluticasone (FLONASE) 50 MCG/ACT nasal spray 2 spray, 2 spray, Each Nostril, Daily  levothyroxine (SYNTHROID) tablet 50 mcg, 50 mcg, Oral, Daily  linaclotide (LINZESS) capsule 290 mcg, 290 mcg, Oral, QAM AC  metoprolol succinate (TOPROL XL) extended release tablet 100 mg, 100 mg, Oral, Daily  therapeutic multivitamin-minerals 1 tablet, 1 tablet, Oral, Daily  pantoprazole (PROTONIX) tablet 40 mg, 40 mg, Oral, QAM AC  polyethylene glycol (GLYCOLAX) packet 17 g, 17 g, Oral, Daily  potassium bicarb-citric acid (EFFER-K) effervescent tablet 40 mEq, 40 mEq, Oral, BID  ipratropium-albuterol (DUONEB) nebulizer solution 1 ampule, 1 ampule, Inhalation, Q4H PRN  rOPINIRole (REQUIP) tablet 0.5 mg, 0.5 mg, Oral, Nightly  budesonide-formoterol (SYMBICORT) 160-4.5 MCG/ACT inhaler 2 puff, 2 puff, Inhalation, BID PRN  vitamin B-1 (THIAMINE) tablet 50 mg, 50 mg, Oral, Daily  albuterol sulfate  (90 Base) MCG/ACT inhaler 2 puff, 2 puff, Inhalation, 4x daily  potassium chloride (KLOR-CON M) extended release tablet 40 mEq, 40 mEq, Oral, PRN **OR** potassium bicarb-citric acid (EFFER-K) effervescent tablet 40 mEq, 40 mEq, Oral, PRN **OR** potassium chloride 10 mEq/100 mL IVPB (Peripheral Line), 10 mEq, Intravenous, PRN  dilTIAZem (CARDIZEM CD) extended release capsule 120 mg, 120 mg, Oral, Daily    Physical      Vitals: /68   Pulse 80   Temp 98.2 °F (36.8 °C) (Oral)   Resp 16   Ht 5' 4\" (1.626 m)   Wt 122 lb (55.3 kg)   SpO2 97%   BMI 20.94 kg/m²   Temp: Temp: 98.2 °F (36.8 °C)  Max: Temp  Av.8 °F (36.6 °C)  Min: 97.1 °F (36.2 °C)  Max: 98.4 °F (36.9 °C)  Respiration range:  Resp  Av.4  Min: 16  Max: 18  Pulse Range:  Pulse  Av  Min: 80  Max: 103  Blood pressure range:  Systolic (31LKE), MUF:351 , Min:102 , VUL:998   , Diastolic (41NRF), UJO:85, Min:60, Max:77    SpO2  Av.6 %  Min: 96 %  Max: 97 %    Intake/Output Summary (Last 24 hours) at 2020 1613  Last data filed at 2020 0145  Gross per 24 hour   Intake --   Output 1350 ml   Net -1350 ml       Vent settings:  Pulse  Av.2  Min: 66  Max: 142  Resp  Av.9  Min: 16 Max: 25  SpO2  Av.6 %  Min: 90 %  Max: 98 %    CONSTITUTIONAL:  fatigued, alert, cooperative, mild distress, appears stated age and normal weight  EYES:  Unremarkable   NECK:  Mild JVD  and supple, symmetrical, trachea midline  BACK:  symmetric and no curvature  LUNGS:  tachypneic, Moderate respiratory distress, moderate air exchange, no retractions and crackles diffuse, rhonchi diffuse  CARDIOVASCULAR:  normal apical pulses, regular rate and rhythm with ectopic beats, normal S1 and S2, no S3 and no S4  ABDOMEN:  Soft scaphoid BS + non tender   GENITAL/URINARY:  NEX   MUSCULOSKELETAL:  Trace edema   there is no redness, warmth, or swelling of the joints  NEUROLOGIC:  No acute focal deficit   SKIN:  Warm moist mild pallor  and no bruising or bleeding    Data      Lab Results   Component Value Date    WBC 6.4 2020    HGB 11.2 (L) 2020    HCT 34.1 (L) 2020    MCV 90.3 2020     2020     Lab Results   Component Value Date     2020    K 3.5 2020    K 2.9 2020    CL 89 2020    CO2 35 2020    BUN 20 2020    CREATININE 0.6 2020    GLUCOSE 102 2020    CALCIUM 8.7 2020          ASSESSMENT AND PLAN     Active Problems:    Dyspnea    Osteoporosis    Restless leg syndrome    Uncomplicated asthma    Vitamin D deficiency    Hypokalemia    Hypothyroidism    Buttocks nodule    Essential hypertension    Dementia without behavioral disturbance (HCC)    Cardiomyopathy (HCC)    Chronic combined systolic and diastolic heart failure (HCC)    History of 2019 novel coronavirus disease (COVID-19)    Acute on chronic combined systolic (congestive) and diastolic (congestive) heart failure (HCC)    Chronic atrial fibrillation    Elevated brain natriuretic peptide (BNP) level  Resolved Problems:    * No resolved hospital problems.  *     family may consider palliaitve care       This patient is discharge appropriate  and I have put dismissal

## 2020-09-29 NOTE — PLAN OF CARE
Problem: Skin Integrity:  Goal: Will show no infection signs and symptoms  Description: Will show no infection signs and symptoms  9/29/2020 0026 by Chasity Rooney RN  Outcome: Ongoing  Goal: Absence of new skin breakdown  Description: Absence of new skin breakdown  9/29/2020 0026 by Chasity Rooney RN  Outcome: Ongoing     Problem: Falls - Risk of:  Goal: Will remain free from falls  Description: Will remain free from falls  9/29/2020 0026 by Chasity Rooney RN  Outcome: Ongoing  Goal: Absence of physical injury  Description: Absence of physical injury  9/29/2020 0026 by Chasity Rooney RN  Outcome: Ongoing     Problem: Pain:  Goal: Pain level will decrease  Description: Pain level will decrease  9/29/2020 0026 by Chasity Rooney RN  Outcome: Ongoing  Goal: Control of acute pain  Description: Control of acute pain  9/29/2020 0026 by Chasity Rooney RN  Outcome: Ongoing  Goal: Control of chronic pain  Description: Control of chronic pain  Outcome: Ongoing     Problem: Musculor/Skeletal Functional Status  Goal: Highest potential functional level  Outcome: Ongoing  Goal: Absence of falls  Outcome: Ongoing     Problem: HEMODYNAMIC STATUS  Goal: Patient has stable vital signs and fluid balance  Outcome: Ongoing     Problem: FLUID AND ELECTROLYTE IMBALANCE  Goal: Fluid and electrolyte balance are achieved/maintained  Outcome: Ongoing     Problem: ACTIVITY INTOLERANCE/IMPAIRED MOBILITY  Goal: Mobility/activity is maintained at optimum level for patient  Outcome: Ongoing

## 2020-09-29 NOTE — CARE COORDINATION
SW spoke with pt's daughter Jenny Jonas, [phone]. Reported insurance denied skilled stay. SW explained palliative care nurse to follow up.     Scott Pacheco MSW, [address]

## 2020-09-30 VITALS
TEMPERATURE: 97.5 F | BODY MASS INDEX: 21.36 KG/M2 | DIASTOLIC BLOOD PRESSURE: 56 MMHG | HEIGHT: 64 IN | RESPIRATION RATE: 18 BRPM | OXYGEN SATURATION: 96 % | WEIGHT: 125.1 LBS | SYSTOLIC BLOOD PRESSURE: 90 MMHG | HEART RATE: 89 BPM

## 2020-09-30 PROBLEM — E44.0 MODERATE MALNUTRITION (HCC): Chronic | Status: ACTIVE | Noted: 2020-09-30

## 2020-09-30 PROCEDURE — 94761 N-INVAS EAR/PLS OXIMETRY MLT: CPT

## 2020-09-30 PROCEDURE — 6360000002 HC RX W HCPCS: Performed by: INTERNAL MEDICINE

## 2020-09-30 PROCEDURE — 6370000000 HC RX 637 (ALT 250 FOR IP): Performed by: INTERNAL MEDICINE

## 2020-09-30 PROCEDURE — 90686 IIV4 VACC NO PRSV 0.5 ML IM: CPT | Performed by: INTERNAL MEDICINE

## 2020-09-30 PROCEDURE — G0008 ADMIN INFLUENZA VIRUS VAC: HCPCS | Performed by: INTERNAL MEDICINE

## 2020-09-30 PROCEDURE — 94640 AIRWAY INHALATION TREATMENT: CPT

## 2020-09-30 PROCEDURE — 2700000000 HC OXYGEN THERAPY PER DAY

## 2020-09-30 RX ADMIN — ALBUTEROL SULFATE 2 PUFF: 90 AEROSOL, METERED RESPIRATORY (INHALATION) at 11:56

## 2020-09-30 RX ADMIN — DILTIAZEM HYDROCHLORIDE 120 MG: 120 CAPSULE, COATED, EXTENDED RELEASE ORAL at 10:35

## 2020-09-30 RX ADMIN — APIXABAN 2.5 MG: 2.5 TABLET, FILM COATED ORAL at 10:35

## 2020-09-30 RX ADMIN — ALBUTEROL SULFATE 2 PUFF: 90 AEROSOL, METERED RESPIRATORY (INHALATION) at 08:14

## 2020-09-30 RX ADMIN — BUDESONIDE AND FORMOTEROL FUMARATE DIHYDRATE 2 PUFF: 160; 4.5 AEROSOL RESPIRATORY (INHALATION) at 08:14

## 2020-09-30 RX ADMIN — FERROUS SULFATE TAB 325 MG (65 MG ELEMENTAL FE) 325 MG: 325 (65 FE) TAB at 10:35

## 2020-09-30 RX ADMIN — LEVOTHYROXINE SODIUM 50 MCG: 0.03 TABLET ORAL at 10:34

## 2020-09-30 RX ADMIN — MULTIPLE VITAMINS W/ MINERALS TAB 1 TABLET: TAB at 10:34

## 2020-09-30 RX ADMIN — DULOXETINE HYDROCHLORIDE 40 MG: 20 CAPSULE, DELAYED RELEASE ORAL at 10:34

## 2020-09-30 RX ADMIN — POLYETHYLENE GLYCOL 3350 17 G: 17 POWDER, FOR SOLUTION ORAL at 10:35

## 2020-09-30 RX ADMIN — FUROSEMIDE 40 MG: 40 TABLET ORAL at 17:14

## 2020-09-30 RX ADMIN — Medication 50 MG: at 10:34

## 2020-09-30 RX ADMIN — FLUTICASONE PROPIONATE 2 SPRAY: 50 SPRAY, METERED NASAL at 11:03

## 2020-09-30 RX ADMIN — PANTOPRAZOLE SODIUM 40 MG: 40 TABLET, DELAYED RELEASE ORAL at 06:51

## 2020-09-30 RX ADMIN — DIVALPROEX SODIUM 250 MG: 250 TABLET, DELAYED RELEASE ORAL at 10:35

## 2020-09-30 RX ADMIN — INFLUENZA A VIRUS A/VICTORIA/2454/2019 IVR-207 (H1N1) ANTIGEN (PROPIOLACTONE INACTIVATED), INFLUENZA A VIRUS A/HONG KONG/2671/2019 IVR-208 (H3N2) ANTIGEN (PROPIOLACTONE INACTIVATED), INFLUENZA B VIRUS B/VICTORIA/705/2018 BVR-11 ANTIGEN (PROPIOLACTONE INACTIVATED), INFLUENZA B VIRUS B/PHUKET/3073/2013 BVR-1B ANTIGEN (PROPIOLACTONE INACTIVATED) 0.5 ML: 15; 15; 15; 15 INJECTION, SUSPENSION INTRAMUSCULAR at 13:02

## 2020-09-30 RX ADMIN — METOPROLOL SUCCINATE 100 MG: 50 TABLET, EXTENDED RELEASE ORAL at 10:35

## 2020-09-30 RX ADMIN — FUROSEMIDE 40 MG: 40 TABLET ORAL at 10:35

## 2020-09-30 RX ADMIN — POTASSIUM BICARBONATE 40 MEQ: 782 TABLET, EFFERVESCENT ORAL at 10:44

## 2020-09-30 RX ADMIN — CHOLECALCIFEROL TAB 125 MCG (5000 UNIT) 5000 UNITS: 125 TAB at 10:35

## 2020-09-30 ASSESSMENT — PAIN SCALES - GENERAL
PAINLEVEL_OUTOF10: 0

## 2020-09-30 NOTE — CARE COORDINATION
Received vm from Ling at [facility] that they received all information faxed. CM called her back and confirmed 5:30 transport  time. Discharge Plan:     Patient discharged to:[facility] with [facility] hospice following up   SW/DC Planner faxed, [address] [place] and AVS to:[phone]  Narcotic Prescriptions faxed were:[phone]  RN: Hedy Lee will call report to:     [phone]  Medical Transport with: [facility] [place]  [phone]   time: 5:30 pm today   Family advised of discharge?:Yes CM notified daughter Juventino Mitchell?:  Not required pt came from facility   All discharge needs met per case management.     Gary Barth RN, BSN  [phone]

## 2020-09-30 NOTE — PLAN OF CARE
Problem: Skin Integrity:  Goal: Will show no infection signs and symptoms  Description: Will show no infection signs and symptoms  9/30/2020 0017 by Taylor Valadez RN  Outcome: Ongoing  Goal: Absence of new skin breakdown  Description: Absence of new skin breakdown  9/30/2020 0017 by Taylor Valadez RN  Outcome: Ongoing     Problem: Falls - Risk of:  Goal: Will remain free from falls  Description: Will remain free from falls  9/30/2020 0017 by Taylor Valadez RN  Outcome: Ongoing  9Goal: Absence of physical injury  Description: Absence of physical injury  9/30/2020 0017 by Taylor Valadez RN  Outcome: Ongoing     Problem: Pain:  Goal: Pain level will decrease  Description: Pain level will decrease  9/30/2020 0017 by Taylor Valadez RN  Outcome: Ongoing  Goal: Control of acute pain  Description: Control of acute pain  9/30/2020 0017 by Taylor Valadez RN  Outcome: Ongoing  Goal: Control of chronic pain  Description: Control of chronic pain  Outcome: Ongoing     Problem: Musculor/Skeletal Functional Status  Goal: Highest potential functional level  Outcome: Ongoing  Goal: Absence of falls  Outcome: Ongoing     Problem: OXYGENATION/RESPIRATORY FUNCTION  Goal: Patient will maintain patent airway  Outcome: Ongoing  Goal: Patient will achieve/maintain normal respiratory rate/effort  Description: Respiratory rate and effort will be within normal limits for the patient  Outcome: Ongoing     Problem: HEMODYNAMIC STATUS  Goal: Patient has stable vital signs and fluid balance  Outcome: Ongoing     Problem: FLUID AND ELECTROLYTE IMBALANCE  Goal: Fluid and electrolyte balance are achieved/maintained  Outcome: Ongoing     Problem: ACTIVITY INTOLERANCE/IMPAIRED MOBILITY  Goal: Mobility/activity is maintained at optimum level for patient  Outcome: Ongoing

## 2020-09-30 NOTE — PROGRESS NOTES
Department of Internal Medicine  General Internal Medicine   Progress Note      SUBJECTIVE  No respiratory distress , moderate confusion and disorientation     History obtained from chart review and the patient  General ROS: positive for  - fatigue, fever and malaise  negative for - chills or night sweats  Psychological ROS: positive for - anxiety, disorientation, irritability, memory difficulties and sleep disturbances  negative for - behavioral disorder, hallucinations or hostility  Ophthalmic ROS: negative  Respiratory ROS: positive for - cough, shortness of breath, sputum changes and tachypnea  negative for - hemoptysis, pleuritic pain, stridor or wheezing  Cardiovascular ROS: positive for - dyspnea on exertion  negative for - chest pain  Gastrointestinal ROS: no abdominal pain, change in bowel habits, or black or bloody stools  Genito-Urinary ROS: no dysuria, trouble voiding, or hematuria  Musculoskeletal ROS: chronic pain   Neurological ROS: no TIA or stroke symptoms  Dermatological ROS: negative    OBJECTIVE      Medications      Current Facility-Administered Medications: haloperidol lactate (HALDOL) injection 2 mg, 2 mg, Intramuscular, Q4H PRN  furosemide (LASIX) tablet 40 mg, 40 mg, Oral, BID  acetaminophen (TYLENOL) tablet 650 mg, 650 mg, Oral, Q4H PRN  apixaban (ELIQUIS) tablet 2.5 mg, 2.5 mg, Oral, BID  vitamin D3 (CHOLECALCIFEROL) tablet 5,000 Units, 5,000 Units, Oral, Daily  divalproex (DEPAKOTE) DR tablet 250 mg, 250 mg, Oral, BID  donepezil (ARICEPT) tablet 10 mg, 10 mg, Oral, Nightly  DULoxetine (CYMBALTA) extended release capsule 40 mg, 40 mg, Oral, Daily  ferrous sulfate (IRON 325) tablet 325 mg, 325 mg, Oral, Daily with breakfast  fluticasone (FLONASE) 50 MCG/ACT nasal spray 2 spray, 2 spray, Each Nostril, Daily  levothyroxine (SYNTHROID) tablet 50 mcg, 50 mcg, Oral, Daily  linaclotide (LINZESS) capsule 290 mcg, 290 mcg, Oral, QAM AC  metoprolol succinate (TOPROL XL) extended release tablet 100 stated age and normal weight  EYES:  Unremarkable   NECK:  Mild JVD  and supple, symmetrical, trachea midline  BACK:  symmetric and no curvature  LUNGS:  tachypneic, Moderate respiratory distress, moderate air exchange, no retractions and crackles diffuse, rhonchi diffuse  CARDIOVASCULAR:  normal apical pulses, regular rate and rhythm with ectopic beats, normal S1 and S2, no S3 and no S4  ABDOMEN:  Soft scaphoid BS + non tender   GENITAL/URINARY:  NEX   MUSCULOSKELETAL:  Trace edema   there is no redness, warmth, or swelling of the joints  NEUROLOGIC:  No acute focal deficit   SKIN:  Warm moist mild pallor  and no bruising or bleeding    Data      Lab Results   Component Value Date    WBC 6.4 09/22/2020    HGB 11.2 (L) 09/22/2020    HCT 34.1 (L) 09/22/2020    MCV 90.3 09/22/2020     09/22/2020     Lab Results   Component Value Date     09/29/2020    K 3.5 09/29/2020    K 2.9 09/22/2020    CL 89 09/29/2020    CO2 35 09/29/2020    BUN 20 09/29/2020    CREATININE 0.6 09/29/2020    GLUCOSE 102 09/29/2020    CALCIUM 8.7 09/29/2020          ASSESSMENT AND PLAN     Active Problems:    Dyspnea    Osteoporosis    Restless leg syndrome    Uncomplicated asthma    Vitamin D deficiency    Hypokalemia    Hypothyroidism    Buttocks nodule    Essential hypertension    Dementia without behavioral disturbance (HCC)    Cardiomyopathy (HCC)    Chronic combined systolic and diastolic heart failure (HCC)    History of 2019 novel coronavirus disease (COVID-19)    Acute on chronic combined systolic (congestive) and diastolic (congestive) heart failure (HCC)    Chronic atrial fibrillation    Elevated brain natriuretic peptide (BNP) level  Resolved Problems:    * No resolved hospital problems.  *     patient is discharge ready  , family signing up with [facility] hospice dismissal anticipated soon       This patient is discharge appropriate  and I have put dismissal orders , if this patient still remains in the hospital that would be purely for administrative reasons which are beyond my control and I sincerely hope it should not affect my Yehuda Docker of Stay statistics

## 2020-09-30 NOTE — PROGRESS NOTES
Nutrition Assessment     Type and Reason for Visit: Initial(LOS)    Nutrition Recommendations/Plan:  No nutrition needs at this time. Nutrition Assessment:  Pt is nutritionally compromised AEB physical malnutrition assessment outlined below. Pt only oriented to person and assuming PO intake is poor. Significant wt loss of 16lbs (11%) in the past three months. Pt to discharge to SNF with hospice. Will monitor at low risk d/t hospice. Consult RD for any additional nutrition needs. Malnutrition Assessment:  Malnutrition Status: Moderate malnutrition    Nutrition Related Findings: Active BS. +1 trace RUE edema. Trace LUE edema. +1 trace BLE edema. Trace sacral edema. -8.9 liters.       Current Nutrition Therapies:    DIET CARDIAC; Gluten Free    Anthropometric Measures:  · Height: 5' 4\" (162.6 cm)  · Current Body Wt: 125 lb (56.7 kg)   · BMI: 21.4    Nutrition Diagnosis:   No nutrition diagnosis at this time(d/c with hospice)    Nutrition Interventions:   Food and/or Nutrient Delivery:  Continue Current Diet  Nutrition Education/Counseling:  No recommendation at this time   Coordination of Nutrition Care:  No recommendation at this time    Goals:  No nutrition goals at this time as pt is discharging with hospice       Discharge Planning:    No discharge needs at this time     Electronically signed by Aditi Kee RD, LD on 9/30/20 at 10:03 AM EDT    Contact: [phone]

## 2020-09-30 NOTE — PROGRESS NOTES
Patient discharged to [facility]), report called, IV removed, belongings sent with patient.   Patient transported via sqaud

## 2020-10-02 ENCOUNTER — TELEPHONE (OUTPATIENT)
Dept: OTHER | Age: 85
End: 2020-10-02

## 2020-10-02 NOTE — TELEPHONE ENCOUNTER
Patient discharged to hospice at [facility] - no call made at this time

## 2020-10-28 ENCOUNTER — APPOINTMENT (OUTPATIENT)
Dept: CT IMAGING | Age: 85
End: 2020-10-28
Payer: MEDICARE

## 2020-10-28 ENCOUNTER — HOSPITAL ENCOUNTER (EMERGENCY)
Age: 85
Discharge: HOME | End: 2020-10-29
Payer: MEDICARE

## 2020-10-28 ENCOUNTER — APPOINTMENT (OUTPATIENT)
Dept: GENERAL RADIOLOGY | Age: 85
End: 2020-10-28
Payer: MEDICARE

## 2020-10-28 PROCEDURE — 70450 CT HEAD/BRAIN W/O DYE: CPT

## 2020-10-28 PROCEDURE — 71101 X-RAY EXAM UNILAT RIBS/CHEST: CPT

## 2020-10-28 PROCEDURE — 99284 EMERGENCY DEPT VISIT MOD MDM: CPT

## 2020-10-28 PROCEDURE — 72125 CT NECK SPINE W/O DYE: CPT

## 2020-10-28 PROCEDURE — 73522 X-RAY EXAM HIPS BI 3-4 VIEWS: CPT

## 2020-10-28 ASSESSMENT — ENCOUNTER SYMPTOMS
DIARRHEA: 0
WHEEZING: 0
ABDOMINAL PAIN: 0
SHORTNESS OF BREATH: 0
NAUSEA: 0
RHINORRHEA: 0
COUGH: 0
VOMITING: 0

## 2020-10-28 ASSESSMENT — PAIN DESCRIPTION - LOCATION: LOCATION: BACK;HIP;HEAD

## 2020-10-28 ASSESSMENT — PAIN SCALES - GENERAL: PAINLEVEL_OUTOF10: 7

## 2020-10-29 VITALS
HEART RATE: 92 BPM | OXYGEN SATURATION: 93 % | SYSTOLIC BLOOD PRESSURE: 105 MMHG | WEIGHT: 125 LBS | TEMPERATURE: 98.2 F | BODY MASS INDEX: 21.46 KG/M2 | RESPIRATION RATE: 20 BRPM | DIASTOLIC BLOOD PRESSURE: 53 MMHG

## 2020-10-29 NOTE — ED PROVIDER NOTES
[address] [facility]        Pt Name: Pelon Cuellar  MRN: [id number]  [place] 3/2/1929  Date of evaluation: 10/28/2020  Provider: Antony Wynn PA-C  PCP: Lisandro Yin MD    RENE. I have evaluated this patient. My supervising physician was available for consultation. CHIEF COMPLAINT       Chief Complaint   Patient presents with    Fall     Pt in from nursing home by EMS due to a fall. Pt was found on the ground by staff, unsure how the patient fell. Pt complains of head, back and right hip pain. Pt has a history of alzheimers, has a DNR, and is not mentally altered based on baseline, per squad and nursing home staff. HISTORY OF PRESENT ILLNESS   (Location, Timing/Onset, Context/Setting, Quality, Duration, Modifying Factors, Severity, Associated Signs and Symptoms)  Note limiting factors. Pelon Cuellar is a 80 y.o. female presents for evaluation of head and hip pain status post suspected mechanical fall. Patient was found on the ground by staff. Does have history of dementia, is poor historian. Also DNR. Nursing home reports that she is acting appropriate at her baseline mentally. Patient is complaining of headache but denies dizziness/lightheadedness, weakness or visual disturbances. She does not believe that she lost consciousness. She does not remember falling. She denies chest pain or shortness of breath. No abdominal pain nausea vomiting or diarrhea. No numbness tingling or weakness distally. She has no other injuries or complaints at this time. Nursing Notes were all reviewed and agreed with or any disagreements were addressed in the HPI. REVIEW OF SYSTEMS    (2-9 systems for level 4, 10 or more for level 5)     Review of Systems   Unable to perform ROS: Dementia   Constitutional: Negative for appetite change, chills and fever. HENT: Negative for congestion and rhinorrhea.     Respiratory: Negative for cough, shortness of breath and wheezing. Cardiovascular: Negative for chest pain. Gastrointestinal: Negative for abdominal pain, diarrhea, nausea and vomiting. Genitourinary: Negative for difficulty urinating, dysuria and hematuria. Musculoskeletal: Positive for arthralgias. Negative for neck pain and neck stiffness. Skin: Negative for rash. Neurological: Positive for headaches. Positives and Pertinent negatives as per HPI. Except as noted above in the ROS, all other systems were reviewed and negative. PAST MEDICAL HISTORY     Past Medical History:   Diagnosis Date    Abdominal wall pain     Arrhythmia     afib,bradycardia    Arthritis     left hip    Asthma     Atrial fibrillation ([facility] [address]     Broken nose 2016    CHF [facility]congestive heart failure) ([facility]) 08/2018    Cough variant asthma     Dementia ([facility])     Dyspnea     Essential hypertension 8/1/2018    Fatigue     Hematoma     Hyperlipidemia     Insomnia     Lumbar radiculopathy     Osteoporosis     Palpitation     Restless leg syndrome     Shingles     Sinusitis          SURGICAL HISTORY     Past Surgical History:   Procedure Laterality Date    ANKLE SURGERY  1997    left ankle surgery    APPENDECTOMY      CARDIOVERSION      COLONOSCOPY  10/25/10    normal dr Jt Nye    COLONOSCOPY  02/27/2018    polyps dr Dawson Savage, no repeat needed.  no malignancy    ELBOW SURGERY      HIP SURGERY Left 7/11/2020    HIP HEMIARTHROPLASTY performed by Shanice Beyer MD at [address] [place] [address]      right elbow    OVARY REMOVAL Left     UPPER GASTROINTESTINAL ENDOSCOPY  02/27/2018    normal dr Mercedez Carpio       Previous Medications    ACETAMINOPHEN (TYLENOL) 325 MG TABLET    Take 650 mg by mouth every 4 hours as needed for Pain or Fever    APIXABAN (ELIQUIS) 2.5 MG TABS TABLET    Take 2.5 mg by mouth 2 times daily     CHOLECALCIFEROL (VITAMIN D3) 5000 UNITS TABS    Take 1 tablet by mouth daily     COMPRESSION STOCKINGS MISC    by Does not apply route Bilateral lower extremity compression stockings, 15-20 mmHg    DILTIAZEM (CARDIZEM CD) 120 MG EXTENDED RELEASE CAPSULE    Take 1 capsule by mouth daily    DIVALPROEX (DEPAKOTE) 250 MG DR TABLET    Take 250 mg by mouth 2 times daily     DONEPEZIL (ARICEPT) 10 MG TABLET    Take 10 mg by mouth nightly     DULOXETINE HCL 40 MG CSDR    Take 40 mg by mouth daily     FERROUS SULFATE 325 (65 FE) MG TABLET    Take 325 mg by mouth daily (with breakfast)    FLUTICASONE (FLONASE) 50 MCG/ACT NASAL SPRAY    2 sprays by Each Nostril route daily    FUROSEMIDE (LASIX) 40 MG TABLET    Take 1 tablet by mouth 2 times daily    LEVOTHYROXINE (SYNTHROID) 88 MCG TABLET    TAKE 1 TABLET BY MOUTH DAILY    LIDOCAINE (HM LIDOCAINE PATCH) 4 % EXTERNAL PATCH    Place 1 patch onto the skin daily APPLY TO ABDOMEN    LINZESS 290 MCG CAPS CAPSULE    Take 290 mcg by mouth every morning (before breakfast)     METOPROLOL SUCCINATE (TOPROL XL) 100 MG EXTENDED RELEASE TABLET    Take 1 tablet by mouth daily    MOMETASONE-FORMOTEROL (DULERA) 200-5 MCG/ACT INHALER    Inhale 2 puffs into the lungs every 12 hours    MULTIPLE VITAMINS-MINERALS (THERAPEUTIC MULTIVITAMIN-MINERALS) TABLET    Take 1 tablet by mouth daily    PANTOPRAZOLE (PROTONIX) 40 MG TABLET    TAKE (1) TABLET BY MOUTH DAILY    POLYETHYLENE GLYCOL 3350 (MIRALAX PO)    Take 17 g by mouth daily     POTASSIUM CHLORIDE (KLOR-CON M) 20 MEQ EXTENDED RELEASE TABLET    Take 1 tablet by mouth 2 times daily for 5 days    PROAIR  (90 BASE) MCG/ACT INHALER    2 PUFFS INTO LUNGS EVERY 4 HOURS AS NEEDED FOR WHEEZING OR FORSHORTNESS OF BREATH    ROPINIROLE (REQUIP) 0.5 MG TABLET    Take 1 tablet by mouth nightly    SPACER/AERO-HOLDING CHAMBERS (E-Z SPACER) POLLY    1 Device by Does not apply route daily as needed.     SYMBICORT 160-4.5 MCG/ACT AERO    INHALE 2 PUFFS TWICE A DAY INTO THE LUNGS    THIAMINE MONONITRATE PO    Take 1,000 mcg by mouth daily         ALLERGIES     Gluten meal; Demerol; Lidocaine hcl; Other; Procaine; Tetanus toxoid, adsorbed; Vicodin [hydrocodone-acetaminophen]; Wheat bran; Augmentin [amoxicillin-pot clavulanate]; Hydrocodone-acetaminophen; Meperidine; Prednisone; and Tetanus toxoids    FAMILYHISTORY       Family History   Problem Relation Age of Onset    Cancer Mother     Stroke Father     Arthritis Other     Asthma Other     Diabetes Other     Heart Disease Other           SOCIAL HISTORY       Social History     Tobacco Use    Smoking status: Former Smoker     Packs/day: 0.25     Years: 20.00     Pack years: 5.00     Last attempt to quit: 1970     Years since quittin.2    Smokeless tobacco: Never Used   Substance Use Topics    Alcohol use: No     Frequency: Never     Binge frequency: Never    Drug use: No       SCREENINGS             PHYSICAL EXAM    (up to 7 for level 4, 8 or more for level 5)     ED Triage Vitals [10/28/20 2014]   BP Temp Temp Source Pulse Resp SpO2 Height Weight   (!) 114/58 98.2 °F (36.8 °C) Oral 93 18 96 % -- 125 lb (56.7 kg)       Physical Exam  Vitals signs and nursing note reviewed. Constitutional:       General: She is not in acute distress. Appearance: She is well-developed. She is not ill-appearing, toxic-appearing or diaphoretic. HENT:      Head: Normocephalic and atraumatic. No raccoon eyes, Menchaca's sign, abrasion, contusion or laceration. Right Ear: External ear normal.      Left Ear: External ear normal.      Nose: Nose normal.   Eyes:      General:         Right eye: No discharge. Left eye: No discharge. Extraocular Movements: Extraocular movements intact. Conjunctiva/sclera: Conjunctivae normal.      Pupils: Pupils are equal, round, and reactive to light. Neck:      Musculoskeletal: Normal range of motion and neck supple. No neck rigidity or muscular tenderness. Cardiovascular:      Rate and Rhythm: Normal rate and regular rhythm. Heart sounds: Normal heart sounds. Pulmonary:      Effort: Pulmonary effort is normal. No respiratory distress. Breath sounds: Normal breath sounds. Chest:      Chest wall: Tenderness present. No crepitus or edema. Abdominal:      General: There is no distension. Palpations: Abdomen is soft. Tenderness: There is no abdominal tenderness. Musculoskeletal: Normal range of motion. Right hip: She exhibits tenderness. She exhibits normal range of motion. Left hip: She exhibits tenderness. She exhibits normal range of motion. Lymphadenopathy:      Cervical: No cervical adenopathy. Skin:     General: Skin is warm and dry. Neurological:      Mental Status: She is alert. Mental status is at baseline. She is disoriented. GCS: GCS eye subscore is 4. GCS verbal subscore is 5. GCS motor subscore is 6. Psychiatric:         Behavior: Behavior normal.         DIAGNOSTIC RESULTS   LABS:    Labs Reviewed - No data to display    All other labs were within normal range or not returned as of this dictation. EKG: All EKG's are interpreted by the Emergency Department Physician in the absence of a cardiologist.  Please see their note for interpretation of EKG. RADIOLOGY:   Non-plain film images such as CT, Ultrasound and MRI are read by the radiologist. Plain radiographic images are visualized and preliminarily interpreted by the ED Provider with the below findings:        Interpretation per the Radiologist below, if available at the time of this note:    XR RIBS RIGHT INCLUDE CHEST (MIN 3 VIEWS)   Final Result   No acute fracture. XR HIP 3-4 VW W PELVIS BILATERAL   Final Result   Limited evaluation as described above especially of the right hip. No   displaced fracture. Moderate to severe right hip osteoarthrosis. CT CERVICAL SPINE WO CONTRAST   Final Result   No acute intracranial abnormality. No acute fracture of the cervical spine.          CT HEAD WO CONTRAST   Final Result   No acute intracranial abnormality. No acute fracture of the cervical spine. No results found. PROCEDURES   Unless otherwise noted below, none     Procedures    CRITICAL CARE TIME   N/A    CONSULTS:  None      EMERGENCY DEPARTMENT COURSE and DIFFERENTIAL DIAGNOSIS/MDM:   Vitals:    Vitals:    10/28/20 2014   BP: (!) 114/58   Pulse: 93   Resp: 18   Temp: 98.2 °F (36.8 °C)   TempSrc: Oral   SpO2: 96%   Weight: 125 lb (56.7 kg)       Patient was given the following medications:  Medications - No data to display        Patient presents for evaluation of hip pain and headache status post fall. On exam, she is resting comfortably in bed no acute distress and nontoxic. Vitals are stable and she is afebrile. She is alert and oriented to person only, consistent with baseline. He says 15. Cranial nerves II through XII are intact. Scalp is atraumatic, neck and back are nontender. Lungs are clear to auscultation bilaterally but she does have right lower lateral chest wall tenderness with palpation. No step-offs crepitus or emphysema. No edema erythema or ecchymoses. Abdomen is benign. She has bilateral hip tenderness with range of motion is intact with no step-offs crepitus obvious trauma or dislocation. She is neurovascularly intact distally. Declined need for any pain medication at this time. She will be reevaluated. CT the head shows no acute intracranial abnormality. CT the cervical spine is unremarkable. X-ray the right ribs shows no acute fracture. X-ray bilateral hips is limited with no evidence of obvious displaced fracture. Patient feels comfortable with discharge back to nursing home at this time. I estimate there is LOW risk for SKULL FRACTURE, INTRACRANIAL HEMORRHAGE, CERVICAL SPINE INJURY, SUBDURAL OR EPIDURAL HEMATOMA,  thus I consider the discharge disposition reasonable.  I estimate there is LOW risk for COMPARTMENT SYNDROME, DEEP VENOUS THROMBOSIS, SEPTIC ARTHRITIS, TENDON OR NEUROVASCULAR INJURY, thus I consider the discharge disposition reasonable. Conditions for return to the ED were discussed that is any new or worsening symptoms or signs of continued frequent falls, marketed injury, neurovascular compromise or intractable pain. She is agreeable to this plan and stable for discharge at this time. FINAL IMPRESSION      1. Fall, initial encounter    2. Hip pain    3.  Injury of head, initial encounter          DISPOSITION/PLAN   DISPOSITION        PATIENT REFERREDTO:  Pedro Stein MD  [address]  [phone]    Call   For a re-check as needed    [facility]  [address] [place] [address]  [phone]  Go to   If symptoms worsen      DISCHARGE MEDICATIONS:  New Prescriptions    No medications on file       DISCONTINUED MEDICATIONS:  Discontinued Medications    No medications on file              (Please note that portions of this note were completed with a voice recognition program.  Efforts were made to edit the dictations but occasionally words are mis-transcribed.)    Rohith Dominguez PA-C (electronically signed)           Ashwin Mathews PA-C  10/28/20 [id number]

## 2020-10-29 NOTE — ED NOTES
Bed: 18  Expected date:   Expected time:   Means of arrival:   Comments:  [id number] Floor, RN  10/28/20 2010

## 2020-11-05 NOTE — PROGRESS NOTES
Patient seen , discharge dictated scripts given , arrangements made , LAMBERTO completed .  Discussed with nursing staff  And   If applicable ,  Discussed with  Patient's family , all questions answered and concerns addressed  When applicable

## 2020-11-06 NOTE — DISCHARGE SUMMARY
[address]                     [address] [facility], [address]                               DISCHARGE SUMMARY    PATIENT NAME: Bandar Garvin                    :        1929  MED REC NO:   [id number]                          ROOM:       60  ACCOUNT NO:   [de-identified]                           ADMIT DATE: 2020  PROVIDER:     Melvin Hendrix MD                  DISCHARGE DATE:  2020    FINAL DIAGNOSES:  1. Acute-on-chronic combined heart failure. 2.  Hypokalemia. 3.  Senile dementia. 4.  Hypertension. 5.  Vitamin D deficiency. 6.  Uncomplicated asthma. 7.  Restless legs syndrome. 8.  Osteoporosis. 9.  Hypothyroidism. 10.  Prior history of COVID-19 viral infection. 11.  Essential hypertension. 12.  Dyspnea. 13.  Dementia without behavioral disturbances. 14.  Chronic combined systolic and diastolic heart failure. 15.  Cardiomyopathy. DISCHARGE MEDICATIONS:  1.  Morphine concentrate 0.5 mL every 4 hours p.r.n.  2.  Requip 0.5 nightly. 3.  Cardizem  mg once a day. 4.  Lasix 40 mg two times a day. 5.  Dulera 5/200 two puffs twice a day. 6.  Lidocaine 4% external patch every 12 hours. 7.  Metoprolol succinate 100 mg daily. 8.  Thiamine mononitrate _____ daily. 9.  Fluticasone nasal spray two spray each nostril daily. 10.  Apixaban 2.5 mg twice a day. 11.  Depakote 250 twice a day. 12.  Synthroid 88 mcg once a day. 13.  Protonix 40 mg once a day. 14.  Ferrous sulfate 325 daily. 15.  Multivitamin once a day. 16.  Duloxetine 40 mg daily. 17.  Linzess 290 mcg once a day. 18.  Aricept 10 mg once a day. 19.  MiraLax powder 17 gm once a day. 20.  Vitamin D3 5000 units daily. 21.  Tylenol 650 q. 4 hours p.r.n.  22.  Potassium chloride 20 mEq twice a day for five days. 23.  ProAir HFA two puffs every 4 hours p.r.n.     HOSPITAL COURSE:  This elderly woman came to the emergency room with  history of increasing shortness of

## 2020-11-29 ENCOUNTER — APPOINTMENT (OUTPATIENT)
Dept: CT IMAGING | Age: 85
End: 2020-11-29
Payer: MEDICARE

## 2020-11-29 ENCOUNTER — HOSPITAL ENCOUNTER (EMERGENCY)
Age: 85
Discharge: HOME | End: 2020-11-30
Payer: MEDICARE

## 2020-11-29 LAB
A/G RATIO: 1.5 (ref 1.1–2.2)
ALBUMIN SERPL-MCNC: 4.2 G/DL (ref 3.4–5)
ALP BLD-CCNC: 230 U/L (ref 40–129)
ALT SERPL-CCNC: 14 U/L (ref 10–40)
ANION GAP SERPL CALCULATED.3IONS-SCNC: 9 MMOL/L (ref 3–16)
AST SERPL-CCNC: 21 U/L (ref 15–37)
BACTERIA: ABNORMAL /HPF
BASOPHILS ABSOLUTE: 0.1 K/UL (ref 0–0.2)
BASOPHILS RELATIVE PERCENT: 1.2 %
BILIRUB SERPL-MCNC: 0.5 MG/DL (ref 0–1)
BILIRUBIN URINE: NEGATIVE
BLOOD, URINE: NEGATIVE
BUN BLDV-MCNC: 20 MG/DL (ref 7–20)
CALCIUM SERPL-MCNC: 9.3 MG/DL (ref 8.3–10.6)
CHLORIDE BLD-SCNC: 100 MMOL/L (ref 99–110)
CLARITY: ABNORMAL
CO2: 32 MMOL/L (ref 21–32)
COLOR: YELLOW
CREAT SERPL-MCNC: 0.7 MG/DL (ref 0.6–1.2)
EOSINOPHILS ABSOLUTE: 0.1 K/UL (ref 0–0.6)
EOSINOPHILS RELATIVE PERCENT: 1.5 %
EPITHELIAL CELLS, UA: 2 /HPF (ref 0–5)
GFR AFRICAN AMERICAN: >60
GFR NON-AFRICAN AMERICAN: >60
GLOBULIN: 2.8 G/DL
GLUCOSE BLD-MCNC: 99 MG/DL (ref 70–99)
GLUCOSE URINE: NEGATIVE MG/DL
HCT VFR BLD CALC: 33.9 % (ref 36–48)
HEMOGLOBIN: 10.8 G/DL (ref 12–16)
HYALINE CASTS: 2 /LPF (ref 0–8)
KETONES, URINE: NEGATIVE MG/DL
LEUKOCYTE ESTERASE, URINE: ABNORMAL
LYMPHOCYTES ABSOLUTE: 2.3 K/UL (ref 1–5.1)
LYMPHOCYTES RELATIVE PERCENT: 28.7 %
MCH RBC QN AUTO: 29.1 PG (ref 26–34)
MCHC RBC AUTO-ENTMCNC: 31.9 G/DL (ref 31–36)
MCV RBC AUTO: 91.2 FL (ref 80–100)
MICROSCOPIC EXAMINATION: YES
MONOCYTES ABSOLUTE: 1.1 K/UL (ref 0–1.3)
MONOCYTES RELATIVE PERCENT: 14.1 %
NEUTROPHILS ABSOLUTE: 4.4 K/UL (ref 1.7–7.7)
NEUTROPHILS RELATIVE PERCENT: 54.5 %
NITRITE, URINE: POSITIVE
PDW BLD-RTO: 20.6 % (ref 12.4–15.4)
PH UA: 6 (ref 5–8)
PLATELET # BLD: 130 K/UL (ref 135–450)
PMV BLD AUTO: 8.8 FL (ref 5–10.5)
POTASSIUM SERPL-SCNC: 3.4 MMOL/L (ref 3.5–5.1)
PROTEIN UA: NEGATIVE MG/DL
RBC # BLD: 3.72 M/UL (ref 4–5.2)
RBC UA: ABNORMAL /HPF (ref 0–4)
SODIUM BLD-SCNC: 141 MMOL/L (ref 136–145)
SPECIFIC GRAVITY UA: 1.01 (ref 1–1.03)
TOTAL PROTEIN: 7 G/DL (ref 6.4–8.2)
TROPONIN: <0.01 NG/ML
URINE REFLEX TO CULTURE: YES
URINE TYPE: ABNORMAL
UROBILINOGEN, URINE: 2 E.U./DL
WBC # BLD: 8.1 K/UL (ref 4–11)
WBC UA: 11 /HPF (ref 0–5)

## 2020-11-29 PROCEDURE — 80053 COMPREHEN METABOLIC PANEL: CPT

## 2020-11-29 PROCEDURE — 93005 ELECTROCARDIOGRAM TRACING: CPT | Performed by: PHYSICIAN ASSISTANT

## 2020-11-29 PROCEDURE — 87186 SC STD MICRODIL/AGAR DIL: CPT

## 2020-11-29 PROCEDURE — 36415 COLL VENOUS BLD VENIPUNCTURE: CPT

## 2020-11-29 PROCEDURE — 72125 CT NECK SPINE W/O DYE: CPT

## 2020-11-29 PROCEDURE — 70450 CT HEAD/BRAIN W/O DYE: CPT

## 2020-11-29 PROCEDURE — 84484 ASSAY OF TROPONIN QUANT: CPT

## 2020-11-29 PROCEDURE — 87077 CULTURE AEROBIC IDENTIFY: CPT

## 2020-11-29 PROCEDURE — 81001 URINALYSIS AUTO W/SCOPE: CPT

## 2020-11-29 PROCEDURE — 87086 URINE CULTURE/COLONY COUNT: CPT

## 2020-11-29 PROCEDURE — 85025 COMPLETE CBC W/AUTO DIFF WBC: CPT

## 2020-11-29 PROCEDURE — 99283 EMERGENCY DEPT VISIT LOW MDM: CPT

## 2020-11-29 PROCEDURE — 72131 CT LUMBAR SPINE W/O DYE: CPT

## 2020-11-29 RX ORDER — CEPHALEXIN 500 MG/1
500 CAPSULE ORAL 2 TIMES DAILY
Qty: 10 CAPSULE | Refills: 0 | Status: SHIPPED | OUTPATIENT
Start: 2020-11-29

## 2020-11-29 ASSESSMENT — PAIN SCALES - GENERAL: PAINLEVEL_OUTOF10: 5

## 2020-11-30 VITALS
SYSTOLIC BLOOD PRESSURE: 133 MMHG | TEMPERATURE: 98.7 F | OXYGEN SATURATION: 98 % | HEART RATE: 90 BPM | RESPIRATION RATE: 18 BRPM | DIASTOLIC BLOOD PRESSURE: 68 MMHG

## 2020-11-30 LAB
EKG ATRIAL RATE: 94 BPM
EKG DIAGNOSIS: NORMAL
EKG Q-T INTERVAL: 448 MS
EKG QRS DURATION: 150 MS
EKG QTC CALCULATION (BAZETT): 513 MS
EKG R AXIS: 8 DEGREES
EKG T AXIS: -29 DEGREES
EKG VENTRICULAR RATE: 79 BPM

## 2020-11-30 PROCEDURE — 93010 ELECTROCARDIOGRAM REPORT: CPT | Performed by: INTERNAL MEDICINE

## 2020-11-30 NOTE — ED NOTES
This RN called and gave report to pt nursing home facility. Other facility aware of the pt ETA. This RN went to check on pt and found pt resting on there left side. VSS. No symptoms of distress noted. Warm blankets given for comfort. This RN gave report to [employer] who will take over pt plan of care.       Rhonda Manley RN  11/30/20 [id number]

## 2020-11-30 NOTE — ED NOTES
Pt attempting to get out of bed. This RN placed a bed alarm under pt, bed is in the lowest position and both side rails are in place for pt safety. Door open for this RN to monitor.       Jeor Trevizo RN  11/29/20 [id number]

## 2020-11-30 NOTE — ED PROVIDER NOTES
[facility]        Pt Name: Franko Michele  MRN: [id number]  [place] 3/2/1929  Date of evaluation: 11/29/2020  Provider: Magdi Bernal PA-C  PCP: Mercedez Haque MD    RENE. I have evaluated this patient. My supervising physician was available for consultation. CHIEF COMPLAINT       Chief Complaint   Patient presents with    Fall     Pt presents to the ED from [facility] MENTAL [facility] SERVICES after stumbling this evening while ambulating with the walker. Per EMS, PT hit head and is complaining of headache and left arm. EMS said nursing staff denied any LOC . Pt does take Eliquis        HISTORY OF PRESENT ILLNESS   (Location, Timing/Onset, Context/Setting, Quality, Duration, Modifying Factors, Severity, Associated Signs and Symptoms)  Note limiting factors. Franko Michele is a 80 y.o. female that presents to the emergency department after a fall. EMS states that she was ambulating with a walker and stumbled falling and hitting her head. Patient is from [place] [address]. Patient is unable to tell me how old she is her date of birth or what month it is but she states that she lives at home with her family in [place]. She knew she was at the hospital but thought she was in [place] still. Patient is only complaining of some low back pain and headache to myself. Apparently was complaining of some left arm pain but she states that she has a wound on her left arm but denies any pain. Denies chest pain, shortness breath, abdominal pain. Rates the pain a 5 out of 10. Unsure of her medications but apparently she is on Eliquis. Nursing Notes were all reviewed and agreed with or any disagreements were addressed in the HPI. REVIEW OF SYSTEMS    (2-9 systems for level 4, 10 or more for level 5)     Review of Systems    Positives and Pertinent negatives as per HPI.   Except as noted above in the ROS, all other systems were reviewed and negative. PAST MEDICAL HISTORY     Past Medical History:   Diagnosis Date    Abdominal wall pain     Arrhythmia     afib,bradycardia    Arthritis     left hip    Asthma     Atrial fibrillation ([facility] [address]     Broken nose 2016    CHF (congestive heart failure) ([facility] 08/2018    Cough variant asthma     Dementia (HCC)     Dyspnea     Essential hypertension 8/1/2018    Fatigue     Hematoma     Hyperlipidemia     Insomnia     Lumbar radiculopathy     Osteoporosis     Palpitation     Restless leg syndrome     Shingles     Sinusitis          SURGICAL HISTORY     Past Surgical History:   Procedure Laterality Date    ANKLE SURGERY  1997    left ankle surgery    APPENDECTOMY      CARDIOVERSION      COLONOSCOPY  10/25/10    normal dr Fabiola Walton    COLONOSCOPY  02/27/2018    polyps dr Catarino Padilla, no repeat needed.  no malignancy    ELBOW SURGERY      HIP SURGERY Left 7/11/2020    HIP HEMIARTHROPLASTY performed by Kenna Seth MD at [address]      right elbow    OVARY REMOVAL Left     UPPER GASTROINTESTINAL ENDOSCOPY  02/27/2018    normal dr Nasim Hamlin       Previous Medications    ACETAMINOPHEN (TYLENOL) 325 MG TABLET    Take 650 mg by mouth every 4 hours as needed for Pain or Fever    APIXABAN (ELIQUIS) 2.5 MG TABS TABLET    Take 2.5 mg by mouth 2 times daily     CHOLECALCIFEROL (VITAMIN D3) 5000 UNITS TABS    Take 1 tablet by mouth daily     COMPRESSION STOCKINGS MISC    by Does not apply route Bilateral lower extremity compression stockings, 15-20 mmHg    DILTIAZEM (CARDIZEM CD) 120 MG EXTENDED RELEASE CAPSULE    Take 1 capsule by mouth daily    DIVALPROEX (DEPAKOTE) 250 MG DR TABLET    Take 250 mg by mouth 2 times daily     DONEPEZIL (ARICEPT) 10 MG TABLET    Take 10 mg by mouth nightly     DULOXETINE HCL 40 MG CSDR    Take 40 mg by mouth daily     FERROUS SULFATE 325 (65 FE) MG TABLET    Take 325 mg by mouth daily (with breakfast)    FLUTICASONE (FLONASE) 50 MCG/ACT NASAL SPRAY    2 sprays by Each Nostril route daily    FUROSEMIDE (LASIX) 40 MG TABLET    Take 1 tablet by mouth 2 times daily    LEVOTHYROXINE (SYNTHROID) 88 MCG TABLET    TAKE 1 TABLET BY MOUTH DAILY    LIDOCAINE (HM LIDOCAINE PATCH) 4 % EXTERNAL PATCH    Place 1 patch onto the skin daily APPLY TO ABDOMEN    LINZESS 290 MCG CAPS CAPSULE    Take 290 mcg by mouth every morning (before breakfast)     METOPROLOL SUCCINATE (TOPROL XL) 100 MG EXTENDED RELEASE TABLET    Take 1 tablet by mouth daily    MOMETASONE-FORMOTEROL (DULERA) 200-5 MCG/ACT INHALER    Inhale 2 puffs into the lungs every 12 hours    MULTIPLE VITAMINS-MINERALS (THERAPEUTIC MULTIVITAMIN-MINERALS) TABLET    Take 1 tablet by mouth daily    PANTOPRAZOLE (PROTONIX) 40 MG TABLET    TAKE (1) TABLET BY MOUTH DAILY    POLYETHYLENE GLYCOL 3350 (MIRALAX PO)    Take 17 g by mouth daily     POTASSIUM CHLORIDE (KLOR-CON M) 20 MEQ EXTENDED RELEASE TABLET    Take 1 tablet by mouth 2 times daily for 5 days    PROAIR  (90 BASE) MCG/ACT INHALER    2 PUFFS INTO LUNGS EVERY 4 HOURS AS NEEDED FOR WHEEZING OR FORSHORTNESS OF BREATH    ROPINIROLE (REQUIP) 0.5 MG TABLET    Take 1 tablet by mouth nightly    SPACER/AERO-HOLDING CHAMBERS (E-Z SPACER) POLLY    1 Device by Does not apply route daily as needed. SYMBICORT 160-4.5 MCG/ACT AERO    INHALE 2 PUFFS TWICE A DAY INTO THE LUNGS    THIAMINE MONONITRATE PO    Take 1,000 mcg by mouth daily         ALLERGIES     Gluten meal; Demerol; Lidocaine hcl; Other; Procaine; Tetanus toxoid, adsorbed; Vicodin [hydrocodone-acetaminophen]; Wheat bran; Augmentin [amoxicillin-pot clavulanate];  Hydrocodone-acetaminophen; Meperidine; Prednisone; and Tetanus toxoids    FAMILYHISTORY       Family History   Problem Relation Age of Onset    Cancer Mother     Stroke Father     Arthritis Other     Asthma Other     Diabetes Other     Heart Disease Other           SOCIAL HISTORY       Social General: Skin is warm and dry. Findings: No erythema or rash. Neurological:      Mental Status: She is alert. Cranial Nerves: No cranial nerve deficit. Psychiatric:         Behavior: Behavior normal.         DIAGNOSTIC RESULTS   LABS:    Labs Reviewed   CBC WITH AUTO DIFFERENTIAL - Abnormal; Notable for the following components:       Result Value    RBC 3.72 (*)     Hemoglobin 10.8 (*)     Hematocrit 33.9 (*)     RDW 20.6 (*)     Platelets 815 (*)     All other components within normal limits    Narrative:     Performed at:  [facility] [address], [facility], [address] [employer]   Phone [phone]   COMPREHENSIVE METABOLIC PANEL - Abnormal; Notable for the following components:    Potassium 3.4 (*)     Alkaline Phosphatase 230 (*)     All other components within normal limits    Narrative:     Performed at:  [facility] [address], [facility], [facility] [employer]   Phone [phone]   URINE RT REFLEX TO CULTURE - Abnormal; Notable for the following components:    Clarity, UA CLOUDY (*)     Urobilinogen, Urine 2.0 (*)     Nitrite, Urine POSITIVE (*)     Leukocyte Esterase, Urine SMALL (*)     All other components within normal limits    Narrative:     Performed at:  [facility] [address], [facility], [address] [place] [address]   Phone [phone]   MICROSCOPIC URINALYSIS - Abnormal; Notable for the following components:    Bacteria, UA 4+ (*)     WBC, UA 11 (*)     All other components within normal limits    Narrative:     Performed at:  [facility] [address], [facility], [facility] [employer]   Phone [phone]   CULTURE, URINE   TROPONIN    Narrative:     Performed at:  [facility] [address], [facility], [facility] [employer]   Phone [phone]       All other labs were within normal range or not returned as of this dictation. EKG:  All EKG's are interpreted by the Emergency Department Physician in the absence of a cardiologist.  Please see their note for interpretation of EKG. RADIOLOGY:   Non-plain film images such as CT, Ultrasound and MRI are read by the radiologist. Plain radiographic images are visualized and preliminarily interpreted by the ED Provider with the below findings:        Interpretation per the Radiologist below, if available at the time of this note:    CT LUMBAR SPINE WO CONTRAST   Final Result   No acute fracture visualized. CT HEAD WO CONTRAST   Final Result   1. CT HEAD: No acute intracranial abnormality. 2. Senescent changes. 3. CT CERVICAL SPINE: No cervical fracture or acute traumatic subluxation. 4. Degenerative changes involving the cervical spine described above. Note that if pain persists or worsens, or if clinically there is concern for   CT occult acute cervical abnormality, flexion/extension C-spine series or MRI   cervical spine may be considered for additional evaluation. CT CERVICAL SPINE WO CONTRAST   Final Result   1. CT HEAD: No acute intracranial abnormality. 2. Senescent changes. 3. CT CERVICAL SPINE: No cervical fracture or acute traumatic subluxation. 4. Degenerative changes involving the cervical spine described above. Note that if pain persists or worsens, or if clinically there is concern for   CT occult acute cervical abnormality, flexion/extension C-spine series or MRI   cervical spine may be considered for additional evaluation.                  PROCEDURES   Unless otherwise noted below, none     Procedures    CRITICAL CARE TIME   N/A    CONSULTS:  None      EMERGENCY DEPARTMENT COURSE and DIFFERENTIAL DIAGNOSIS/MDM:   Vitals:    Vitals:    11/29/20 2009 11/29/20 2257 11/29/20 2300 11/29/20 2330   BP: (!) 141/67 (!) 146/66 (!) 143/70 135/80   Pulse: 90      Resp: 18      Temp: 98.7 °F (37.1 °C)      TempSrc: Temporal      SpO2: 98%          Patient was given the following medications:  Medications - No data to display        Patient presented after a fall. Initial report was that she tripped using her walker and hit her head. CT imaging is unremarkable. She does have a skin tear just below her left elbow but has no bony tenderness and full range of motion. Low suspicion for acute fracture. Recheck the patient she denies any pain is sitting upright in bed stating she wants to go home. Patient has history of dementia, chronic atrial fibrillation anticoagulated on Eliquis. Gillian charge nurse discussed this with [address] [place] [address]. They are now stating that the fall was unwitnessed and they were concerned that she may have a UTI. This is all new information. Due to this work-up was performed she does have urinary tract infection. Has evidence of chronic atrial fibrillation and remainder laboratory testing is unremarkable. She remains nontoxic and continues to deny any pain. There is no reproducible bony tenderness. Low suspicion for subdural hematoma, epidural hematoma, vertebral fracture, cute abdomen or other emergent etiology. Patient will be started on Keflex. Follow-up as an outpatient return here for any worsening of symptoms or problems at home. FINAL IMPRESSION      1. Injury of head, initial encounter    2. Fall, initial encounter    3. UTI (urinary tract infection), uncomplicated    4. Chronic atrial fibrillation (HCC)    5.  Skin tear of left elbow without complication, initial encounter          DISPOSITION/PLAN   DISPOSITION Decision To Discharge 11/29/2020 08:57:53 PM      PATIENT REFERREDTO:  Estrella Bee MD  [address]  [phone]    Schedule an appointment as soon as possible for a visit in 3 days  For re-check    [facility] Emergency Department  [address] [facility]  [phone]    As needed      DISCHARGE MEDICATIONS:  New Prescriptions    CEPHALEXIN (KEFLEX) 500 MG CAPSULE    Take 1 capsule

## 2020-11-30 NOTE — ED NOTES
Pt placed on bed pan. This RN gave pericare. Urine collected and sent to lab. This RN gave pt a warm blanket for comfort.       Jose Oshea RN  11/29/20 8120

## 2020-12-01 LAB
ORGANISM: ABNORMAL
URINE CULTURE, ROUTINE: ABNORMAL

## 2020-12-21 ENCOUNTER — HOSPITAL ENCOUNTER (EMERGENCY)
Age: 85
Discharge: HOME | End: 2020-12-21
Attending: EMERGENCY MEDICINE
Payer: MEDICARE

## 2020-12-21 ENCOUNTER — APPOINTMENT (OUTPATIENT)
Dept: CT IMAGING | Age: 85
End: 2020-12-21
Payer: MEDICARE

## 2020-12-21 ENCOUNTER — APPOINTMENT (OUTPATIENT)
Dept: GENERAL RADIOLOGY | Age: 85
End: 2020-12-21
Payer: MEDICARE

## 2020-12-21 VITALS
WEIGHT: 125 LBS | SYSTOLIC BLOOD PRESSURE: 129 MMHG | HEIGHT: 64 IN | HEART RATE: 81 BPM | OXYGEN SATURATION: 100 % | BODY MASS INDEX: 21.34 KG/M2 | TEMPERATURE: 98.5 F | RESPIRATION RATE: 15 BRPM | DIASTOLIC BLOOD PRESSURE: 99 MMHG

## 2020-12-21 LAB
A/G RATIO: 1.4 (ref 1.1–2.2)
ALBUMIN SERPL-MCNC: 4.1 G/DL (ref 3.4–5)
ALP BLD-CCNC: 152 U/L (ref 40–129)
ALT SERPL-CCNC: 14 U/L (ref 10–40)
ANION GAP SERPL CALCULATED.3IONS-SCNC: 10 MMOL/L (ref 3–16)
APTT: 39.4 SEC (ref 24.2–36.2)
AST SERPL-CCNC: 25 U/L (ref 15–37)
BASOPHILS ABSOLUTE: 0.1 K/UL (ref 0–0.2)
BASOPHILS RELATIVE PERCENT: 1.2 %
BILIRUB SERPL-MCNC: 0.5 MG/DL (ref 0–1)
BUN BLDV-MCNC: 27 MG/DL (ref 7–20)
CALCIUM SERPL-MCNC: 8.9 MG/DL (ref 8.3–10.6)
CHLORIDE BLD-SCNC: 101 MMOL/L (ref 99–110)
CO2: 31 MMOL/L (ref 21–32)
CREAT SERPL-MCNC: 0.8 MG/DL (ref 0.6–1.2)
EOSINOPHILS ABSOLUTE: 0.1 K/UL (ref 0–0.6)
EOSINOPHILS RELATIVE PERCENT: 1 %
GFR AFRICAN AMERICAN: >60
GFR NON-AFRICAN AMERICAN: >60
GLOBULIN: 2.9 G/DL
GLUCOSE BLD-MCNC: 132 MG/DL (ref 70–99)
HCT VFR BLD CALC: 35.2 % (ref 36–48)
HEMOGLOBIN: 11.2 G/DL (ref 12–16)
INR BLD: 1.12 (ref 0.86–1.14)
LYMPHOCYTES ABSOLUTE: 3.4 K/UL (ref 1–5.1)
LYMPHOCYTES RELATIVE PERCENT: 46.1 %
MAGNESIUM: 2.2 MG/DL (ref 1.8–2.4)
MCH RBC QN AUTO: 29.1 PG (ref 26–34)
MCHC RBC AUTO-ENTMCNC: 31.7 G/DL (ref 31–36)
MCV RBC AUTO: 91.7 FL (ref 80–100)
MONOCYTES ABSOLUTE: 1.1 K/UL (ref 0–1.3)
MONOCYTES RELATIVE PERCENT: 14.3 %
NEUTROPHILS ABSOLUTE: 2.8 K/UL (ref 1.7–7.7)
NEUTROPHILS RELATIVE PERCENT: 37.4 %
PDW BLD-RTO: 20.1 % (ref 12.4–15.4)
PLATELET # BLD: 126 K/UL (ref 135–450)
PMV BLD AUTO: 9.6 FL (ref 5–10.5)
POTASSIUM REFLEX MAGNESIUM: 3.2 MMOL/L (ref 3.5–5.1)
PROTHROMBIN TIME: 13 SEC (ref 10–13.2)
RBC # BLD: 3.84 M/UL (ref 4–5.2)
SODIUM BLD-SCNC: 142 MMOL/L (ref 136–145)
TOTAL PROTEIN: 7 G/DL (ref 6.4–8.2)
WBC # BLD: 7.4 K/UL (ref 4–11)

## 2020-12-21 PROCEDURE — 6360000002 HC RX W HCPCS: Performed by: PHYSICIAN ASSISTANT

## 2020-12-21 PROCEDURE — 36415 COLL VENOUS BLD VENIPUNCTURE: CPT

## 2020-12-21 PROCEDURE — 83735 ASSAY OF MAGNESIUM: CPT

## 2020-12-21 PROCEDURE — 80053 COMPREHEN METABOLIC PANEL: CPT

## 2020-12-21 PROCEDURE — 73110 X-RAY EXAM OF WRIST: CPT

## 2020-12-21 PROCEDURE — 96376 TX/PRO/DX INJ SAME DRUG ADON: CPT

## 2020-12-21 PROCEDURE — 72125 CT NECK SPINE W/O DYE: CPT

## 2020-12-21 PROCEDURE — 70450 CT HEAD/BRAIN W/O DYE: CPT

## 2020-12-21 PROCEDURE — 99284 EMERGENCY DEPT VISIT MOD MDM: CPT

## 2020-12-21 PROCEDURE — 73080 X-RAY EXAM OF ELBOW: CPT

## 2020-12-21 PROCEDURE — 85025 COMPLETE CBC W/AUTO DIFF WBC: CPT

## 2020-12-21 PROCEDURE — 6370000000 HC RX 637 (ALT 250 FOR IP): Performed by: EMERGENCY MEDICINE

## 2020-12-21 PROCEDURE — 73560 X-RAY EXAM OF KNEE 1 OR 2: CPT

## 2020-12-21 PROCEDURE — 73502 X-RAY EXAM HIP UNI 2-3 VIEWS: CPT

## 2020-12-21 PROCEDURE — 72192 CT PELVIS W/O DYE: CPT

## 2020-12-21 PROCEDURE — 85610 PROTHROMBIN TIME: CPT

## 2020-12-21 PROCEDURE — 96374 THER/PROPH/DIAG INJ IV PUSH: CPT

## 2020-12-21 PROCEDURE — 85730 THROMBOPLASTIN TIME PARTIAL: CPT

## 2020-12-21 RX ORDER — DOCUSATE SODIUM 100 MG/1
100 CAPSULE, LIQUID FILLED ORAL 2 TIMES DAILY PRN
Qty: 30 CAPSULE | Refills: 0 | Status: SHIPPED | OUTPATIENT
Start: 2020-12-21

## 2020-12-21 RX ORDER — FENTANYL CITRATE 50 UG/ML
50 INJECTION, SOLUTION INTRAMUSCULAR; INTRAVENOUS ONCE
Status: COMPLETED | OUTPATIENT
Start: 2020-12-21 | End: 2020-12-21

## 2020-12-21 RX ORDER — OXYCODONE HYDROCHLORIDE AND ACETAMINOPHEN 5; 325 MG/1; MG/1
1 TABLET ORAL ONCE
Status: COMPLETED | OUTPATIENT
Start: 2020-12-21 | End: 2020-12-21

## 2020-12-21 RX ORDER — OXYCODONE HYDROCHLORIDE AND ACETAMINOPHEN 5; 325 MG/1; MG/1
.5-1 TABLET ORAL EVERY 6 HOURS PRN
Qty: 10 TABLET | Refills: 0 | Status: SHIPPED | OUTPATIENT
Start: 2020-12-21 | End: 2020-12-24

## 2020-12-21 RX ADMIN — FENTANYL CITRATE 50 MCG: 50 INJECTION, SOLUTION INTRAMUSCULAR; INTRAVENOUS at 14:18

## 2020-12-21 RX ADMIN — FENTANYL CITRATE 50 MCG: 50 INJECTION, SOLUTION INTRAMUSCULAR; INTRAVENOUS at 13:10

## 2020-12-21 RX ADMIN — OXYCODONE AND ACETAMINOPHEN 1 TABLET: 5; 325 TABLET ORAL at 17:49

## 2020-12-21 ASSESSMENT — PAIN SCALES - GENERAL
PAINLEVEL_OUTOF10: 10

## 2020-12-21 NOTE — ED NOTES
Patient continues to scream out in pain, this RN spoke with [facility], received new orders for medications. Patient given meds per [facility]. Will continue to monitor.       Oleksandr Lomax RN  12/21/20 7769

## 2020-12-21 NOTE — ED PROVIDER NOTES
I independently performed a history and physical on Estefania Forte. All diagnostic, treatment, and disposition decisions were made by myself in conjunction with the advanced practice provider. Briefly, this is a 80 y.o. female here for an unwitnessed fall at her skilled nursing facility. .  From [address] [place] [address] Avenue @ [address] [place] [address] Drive heard a *thud* and found patient crying in pain. Hx of dementia, A fib, on Hospice. On exam, hematoma to right frontal scalp and right knee with abrasions over each. Cries in pain when she is awake. Heart is regular rate but an irregular rate and rhythm. No obvious shortening or external rotation or clear deformities to extremities. Screenings                   MDM  Evaluate for acute traumatic injury with some imaging. Patient Referrals:  Opal Harkins MD  [address] [place] [address], [address] [place] [address],[address]  [address] [place] [address]  [phone]    Schedule an appointment as soon as possible for a visit   For re-check 3-5 days, For re-check    [facility] Emergency Department  [address] [facility]  [phone]    As needed      Discharge Medications:  Discharge Medication List as of 12/21/2020  5:08 PM      START taking these medications    Details   oxyCODONE-acetaminophen (PERCOCET) 5-325 MG per tablet Take 0.5-1 tablets by mouth every 6 hours as needed for Pain for up to 3 days. WARNING:  May cause drowsiness. May impair ability to operate vehicles or machinery. Do not use in combination with alcohol., Disp-10 tablet, R-0Print      docusate sodium (COLACE) 100 MG capsule Take 1 capsule by mouth 2 times daily as needed for Constipation, Disp-30 capsule, R-0Print             FINAL IMPRESSION  1. Closed fracture of distal end of right radius, unspecified fracture morphology, initial encounter    2. Effusion of joint of right upper arm    3. Effusion of right knee    4. Injury of head, initial encounter    5.  Laceration of scalp, initial encounter 6. Fall, initial encounter        Blood pressure (!) 129/99, pulse 81, temperature 98.5 °F (36.9 °C), resp. rate 15, height 5' 4\" (1.626 m), weight 125 lb (56.7 kg), SpO2 100 %, not currently breastfeeding. For further details of [facility] emergency department encounter, please see documentation by advanced practice provider, Mandie Peters.        Viry Alexandra MD  12/21/20 5507

## 2020-12-21 NOTE — ED PROVIDER NOTES
[address] [facility]        Pt Name: Vimal Purdy  MRN: [id number]  [place] 3/2/1929  Date of evaluation: 12/21/2020  Provider: Alida Quiroz PA-C  PCP: Dirk Garcia MD     I have seen and evaluated this patient with my supervising physician Melly Kelley MD.    [address] [place] [address]       Chief Complaint   Patient presents with    Fall     from [facility], by EMS, pt is a hospice pt and tripped and fell, hit head, c/o right arm, right hip pain and right knee pain, head pain, on eliquis. HISTORY OF PRESENT ILLNESS   (Location, Timing/Onset, Context/Setting, Quality, Duration, Modifying Factors, Severity, Associated Signs and Symptoms)  Note limiting factors. Vimal Purdy is a 80 y.o. female presents to the emergency department from hospice at [facility] ([facility]) for detail. I did discuss this with her nurse Brenna Hood who states that this fall was not witnessed and that a tech heard the patient fall and had to remove it was no obvious loss of consciousness. She is on hospice. Patient states that she fell but she is unable to tell me how she fell. Unable to really give a good history. She is alert to person but not place or time. She is on Eliquis. Per nurse she was complaining of some right arm and right leg pain. Has a laceration over the frontal scalp. Unable to really obtain any other history as patient just keeps yelling in pain. She is allergic to multiple different medications but she is unsure of her allergies and unsure what medication she can actually take. Nursing Notes were all reviewed and agreed with or any disagreements were addressed in the HPI.     REVIEW OF SYSTEMS    (2-9 systems for level 4, 10 or more for level 5)     Review of Systems   Unable to perform ROS: Dementia DONEPEZIL (ARICEPT) 10 MG TABLET    Take 10 mg by mouth nightly     DULOXETINE HCL 40 MG CSDR    Take 40 mg by mouth daily     FERROUS SULFATE 325 (65 FE) MG TABLET    Take 325 mg by mouth daily (with breakfast)    FLUTICASONE (FLONASE) 50 MCG/ACT NASAL SPRAY    2 sprays by Each Nostril route daily    FUROSEMIDE (LASIX) 40 MG TABLET    Take 1 tablet by mouth 2 times daily    LEVOTHYROXINE (SYNTHROID) 88 MCG TABLET    TAKE 1 TABLET BY MOUTH DAILY    LIDOCAINE (HM LIDOCAINE PATCH) 4 % EXTERNAL PATCH    Place 1 patch onto the skin daily APPLY TO ABDOMEN    LINZESS 290 MCG CAPS CAPSULE    Take 290 mcg by mouth every morning (before breakfast)     METOPROLOL SUCCINATE (TOPROL XL) 100 MG EXTENDED RELEASE TABLET    Take 1 tablet by mouth daily    MOMETASONE-FORMOTEROL (DULERA) 200-5 MCG/ACT INHALER    Inhale 2 puffs into the lungs every 12 hours    MULTIPLE VITAMINS-MINERALS (THERAPEUTIC MULTIVITAMIN-MINERALS) TABLET    Take 1 tablet by mouth daily    PANTOPRAZOLE (PROTONIX) 40 MG TABLET    TAKE (1) TABLET BY MOUTH DAILY    POLYETHYLENE GLYCOL 3350 (MIRALAX PO)    Take 17 g by mouth daily     POTASSIUM CHLORIDE (KLOR-CON M) 20 MEQ EXTENDED RELEASE TABLET    Take 1 tablet by mouth 2 times daily for 5 days    PROAIR  (90 BASE) MCG/ACT INHALER    2 PUFFS INTO LUNGS EVERY 4 HOURS AS NEEDED FOR WHEEZING OR FORSHORTNESS OF BREATH    ROPINIROLE (REQUIP) 0.5 MG TABLET    Take 1 tablet by mouth nightly    SPACER/AERO-HOLDING CHAMBERS (E-Z SPACER) POLLY    1 Device by Does not apply route daily as needed. SYMBICORT 160-4.5 MCG/ACT AERO    INHALE 2 PUFFS TWICE A DAY INTO THE LUNGS    THIAMINE MONONITRATE PO    Take 1,000 mcg by mouth daily         ALLERGIES     Gluten meal; Demerol; Lidocaine hcl; Other; Procaine; Tetanus toxoid, adsorbed; Vicodin [hydrocodone-acetaminophen]; Wheat bran; Augmentin [amoxicillin-pot clavulanate];  Hydrocodone-acetaminophen; Meperidine; Prednisone; and Tetanus toxoids    FAMILYHISTORY General: Tenderness present. No swelling. Comments: Generalized tenderness around the right elbow and right wrist.  There appears to be some mild ecchymosis of the lateral aspect of the right elbow. No tenderness over the right shoulder. No obvious deformity. There are some swelling over the right knee. No tenderness over the right ankle or foot. No reproducible midline tenderness or step-off in the neck or back. Skin:     General: Skin is warm and dry. Findings: No erythema or rash. Neurological:      Mental Status: She is alert. Cranial Nerves: No cranial nerve deficit. Comments: Alert to person but not place or time.    Psychiatric:         Behavior: Behavior normal.         DIAGNOSTIC RESULTS   LABS:    Labs Reviewed   CBC WITH AUTO DIFFERENTIAL - Abnormal; Notable for the following components:       Result Value    RBC 3.84 (*)     Hemoglobin 11.2 (*)     Hematocrit 35.2 (*)     RDW 20.1 (*)     Platelets 661 (*)     All other components within normal limits    Narrative:     Performed at:  [facility] [address], [place], [address]   Phone [phone]   APTT - Abnormal; Notable for the following components:    aPTT 39.4 (*)     All other components within normal limits    Narrative:     Performed at:  [facility] [address], [place], [facility] [place]   Phone [phone]   COMPREHENSIVE METABOLIC PANEL W/ REFLEX TO MG FOR LOW K - Abnormal; Notable for the following components:    Potassium reflex Magnesium 3.2 (*)     Glucose 132 (*)     BUN 27 (*)     Alkaline Phosphatase 152 (*)     All other components within normal limits    Narrative:     Performed at:  [facility] [address], [place], [facility] [employer]   Phone [phone]   PROTIME-INR    Narrative:     Performed at:  [facility] [address], [address]   Phone [phone]   MAGNESIUM    Narrative:     Performed at:  [facility]  [address], [address]   Phone [phone]       All other labs were within normal range or not returned as of this dictation. EKG: All EKG's are interpreted by the Emergency Department Physician in the absence of a cardiologist.  Please see their note for interpretation of EKG. RADIOLOGY:   Non-plain film images such as CT, Ultrasound and MRI are read by the radiologist. Plain radiographic images are visualized and preliminarily interpreted by the ED Provider with the below findings:        Interpretation per the Radiologist below, if available at the time of this note:    XR WRIST RIGHT (MIN 3 VIEWS)   Final Result   Comminuted distal radial fracture as above. Associated nondisplaced ulnar styloid avulsion. XR ELBOW RIGHT (MIN 3 VIEWS)   Final Result   No acute fracture. Postsurgical findings with metallic hardware, alignment unchanged as above. However, there may be some increased remodeling of the capitellum of the   distal humerus abutting the radial head prosthesis. Small joint effusion. XR HIP 2-3 VW W PELVIS RIGHT   Final Result   Moderate to severe degenerative change right hip joint. No acute fracture. Prior left hip arthroplasty unchanged. XR KNEE RIGHT (1-2 VIEWS)   Final Result   Large amount of anterior soft tissue swelling. No acute fracture. Possible   small joint effusion. Osteoporosis. CT PELVIS WO CONTRAST Additional Contrast? None   Final Result   No traumatic abnormality is identified. CT Cervical Spine WO Contrast   Final Result   No acute abnormality of the cervical spine. CT Head WO Contrast   Final Result   No acute intracranial abnormality.            Ct Head Wo Contrast    Result Date: 12/21/2020 EXAMINATION: CT OF THE HEAD WITHOUT CONTRAST  12/21/2020 12:38 pm TECHNIQUE: CT of the head was performed without the administration of intravenous contrast. Dose modulation, iterative reconstruction, and/or weight based adjustment of the mA/kV was utilized to reduce the radiation dose to as low as reasonably achievable. COMPARISON: None. HISTORY: ORDERING SYSTEM PROVIDED HISTORY: head injury TECHNOLOGIST PROVIDED HISTORY: Reason for exam:->head injury Has a \"code stroke\" or \"stroke alert\" been called? ->No Reason for Exam: head injury Acuity: Unknown Type of Exam: Unknown FINDINGS: BRAIN/VENTRICLES: There is no acute intracranial hemorrhage, mass effect or midline shift. No abnormal extra-axial fluid collection. The gray-white differentiation is maintained without evidence of an acute infarct. There is no evidence of hydrocephalus. ORBITS: The visualized portion of the orbits demonstrate no acute abnormality. SINUSES: The visualized paranasal sinuses and mastoid air cells demonstrate no acute abnormality. SOFT TISSUES/SKULL:  There is a large right frontal scalp hematoma without underlying fracture. No acute intracranial abnormality.      Ct Cervical Spine Wo Contrast    Result Date: 12/21/2020 EXAMINATION: CT OF THE CERVICAL SPINE WITHOUT CONTRAST 12/21/2020 12:38 pm TECHNIQUE: CT of the cervical spine was performed without the administration of intravenous contrast. Multiplanar reformatted images are provided for review. Dose modulation, iterative reconstruction, and/or weight based adjustment of the mA/kV was utilized to reduce the radiation dose to as low as reasonably achievable. COMPARISON: None. HISTORY: ORDERING SYSTEM PROVIDED HISTORY: head injury TECHNOLOGIST PROVIDED HISTORY: Reason for exam:->head injury Reason for Exam: head injury Acuity: Unknown Type of Exam: Unknown FINDINGS: BONES/ALIGNMENT: There is no acute fracture or traumatic malalignment. DEGENERATIVE CHANGES: There are minimal degenerative changes. SOFT TISSUES: There is no prevertebral soft tissue swelling. No acute abnormality of the cervical spine. Ct Pelvis Wo Contrast Additional Contrast? None    Result Date: 12/21/2020  EXAMINATION: CT OF THE PELVIS WITHOUT CONTRAST 12/21/2020 12:42 pm TECHNIQUE: CT of the pelvis was performed without the administration of intravenous contrast.  Multiplanar reformatted images are provided for review. Dose modulation, iterative reconstruction, and/or weight based adjustment of the mA/kV was utilized to reduce the radiation dose to as low as reasonably achievable. COMPARISON: None. HISTORY: ORDERING SYSTEM PROVIDED HISTORY: hip pain after injury TECHNOLOGIST PROVIDED HISTORY: Reason for exam:->hip pain after injury Additional Contrast?->None Reason for Exam: hip pain after injury Acuity: Unknown Type of Exam: Unknown FINDINGS: Patient is status post left hip arthroplasty. Hardware appears well seated. There is anatomic alignment. There is severe degenerative changes in the right hip joint. There is no evidence of fracture, dislocation, or other acute osseous abnormality. The visualized intrapelvic contents are unremarkable. No traumatic abnormality is identified. PROCEDURES   Unless otherwise noted below, none     Procedures    CRITICAL CARE TIME   N/A    CONSULTS:  None      EMERGENCY DEPARTMENT COURSE and DIFFERENTIAL DIAGNOSIS/MDM:   Vitals:    Vitals:    12/21/20 1300 12/21/20 1330 12/21/20 1500   BP: (!) 175/93 (!) 140/82 (!) 143/71   Pulse: 88 85 87   Resp: 18 18 18   Temp: 98.2 °F (36.8 °C)     SpO2: 98% 98% 100%   Weight:  125 lb (56.7 kg)    Height:  5' 4\" (1.626 m)        Patient was given the following medications:  Medications   fentaNYL (SUBLIMAZE) injection 50 mcg (50 mcg Intravenous Given 12/21/20 1310)   fentaNYL (SUBLIMAZE) injection 50 mcg (50 mcg Intravenous Given 12/21/20 1418)           Patient presented after fall. She is on hospice. She has some swelling over the right knee with some bruising and superficial scrape. Also has a abrasion over the right frontal scalp. Nothing that require suture repair. She has evidence of a distal radius fracture with joint effusion of her elbow and right knee. She was placed in a sugar tong splint to the right arm with a sling due to concern for possible small elbow fracture not seen on x-ray along with her distal radius fracture. Also placed in knee immobilizer. This was performed by emergency department technician. Splinting and immobilizer placement was checked by myself the patient is distally neurovascular intact in splints and immobilizer in good placement. Low suspicion for skull fracture, subdural hematoma, epidural hematoma. Do not visualize any other evidence of bony trauma or deformity. Patient will be discharged back. She will be given Percocet to take as needed for pain. She only has some adverse reaction to palpitations with opiates and was taking fentanyl here without any issues. Believe this is a low risk for her in obese wanted to have something for pain control at nursing home. Return here for any worsening symptoms or problems at home.     FINAL IMPRESSION 1. Closed fracture of distal end of right radius, unspecified fracture morphology, initial encounter    2. Effusion of joint of right upper arm    3. Effusion of right knee    4. Injury of head, initial encounter    5. Laceration of scalp, initial encounter    6. Fall, initial encounter          DISPOSITION/PLAN   DISPOSITION Decision To Discharge 12/21/2020 03:20:19 PM      PATIENT REFERREDTO:  Nav Kaur MD  [facility], [address] [place] [address]  [phone]    Schedule an appointment as soon as possible for a visit   For re-check 3-5 days, For re-check    [facility] Emergency Department  [address] [place] [address]  [phone]    As needed      DISCHARGE MEDICATIONS:  New Prescriptions    DOCUSATE SODIUM (COLACE) 100 MG CAPSULE    Take 1 capsule by mouth 2 times daily as needed for Constipation    OXYCODONE-ACETAMINOPHEN (PERCOCET) 5-325 MG PER TABLET    Take 0.5-1 tablets by mouth every 6 hours as needed for Pain for up to 3 days. WARNING:  May cause drowsiness. May impair ability to operate vehicles or machinery. Do not use in combination with alcohol.        DISCONTINUED MEDICATIONS:  Discontinued Medications    No medications on file              (Please note that portions of this note were completed with a voice recognition program.  Efforts were made to edit the dictations but occasionally words are mis-transcribed.)    Michael Vigil PA-C (electronically signed)           Michael Vigil PA-C  12/21/20 6376

## 2020-12-21 NOTE — ED NOTES
Patient into ER from [facility], Hospice nurse called about patient, pt reportedly got up and tripped and fell forward, where she hit her head, right arm, and right knee. Patient enters ER screaming, on backboard. Taken directly to CT. Patient then taken to room, where this RN cut rest of pants off, but was able to remove shirt, IV started, blood sent to lab, medicated per MAR. Patients right wrist and right knee both are very swollen, has a large hematoma noted to right side of forehead. Pt is on Eliquis. VSS at this time, will continue to monitor.       Ly Vega RN  12/21/20 6097

## 2020-12-21 NOTE — ED NOTES
Brian Clemente at bedside placing [facility] on right wrist, sling on right arm, and knee immobilizer on right knee.       Jose Lovell RN  12/21/20 1817

## 2020-12-22 ENCOUNTER — TELEPHONE (OUTPATIENT)
Dept: ORTHOPEDIC SURGERY | Age: 85
End: 2020-12-22

## 2021-03-18 NOTE — PROGRESS NOTES
Pt resting in bed. Oriented to self only. No complaints overnight. IVF running at 50 ml/hr. Fall precautions in place, call light and bedside table within reach. chronic, no ENT, uses Flonase/sinus symptoms/nasal congestion

## 2021-07-30 NOTE — PROGRESS NOTES
ESRD SOB, Rapid afib extended release tablet 100 mg, 100 mg, Oral, Daily  therapeutic multivitamin-minerals 1 tablet, 1 tablet, Oral, Daily  pantoprazole (PROTONIX) tablet 40 mg, 40 mg, Oral, QAM AC  polyethylene glycol (GLYCOLAX) packet 17 g, 17 g, Oral, Daily  potassium bicarb-citric acid (EFFER-K) effervescent tablet 40 mEq, 40 mEq, Oral, BID  ipratropium-albuterol (DUONEB) nebulizer solution 1 ampule, 1 ampule, Inhalation, Q4H PRN  rOPINIRole (REQUIP) tablet 0.5 mg, 0.5 mg, Oral, Nightly  budesonide-formoterol (SYMBICORT) 160-4.5 MCG/ACT inhaler 2 puff, 2 puff, Inhalation, BID PRN  vitamin B-1 (THIAMINE) tablet 50 mg, 50 mg, Oral, Daily  albuterol sulfate  (90 Base) MCG/ACT inhaler 2 puff, 2 puff, Inhalation, 4x daily  potassium chloride (KLOR-CON M) extended release tablet 40 mEq, 40 mEq, Oral, PRN **OR** potassium bicarb-citric acid (EFFER-K) effervescent tablet 40 mEq, 40 mEq, Oral, PRN **OR** potassium chloride 10 mEq/100 mL IVPB (Peripheral Line), 10 mEq, Intravenous, PRN  dilTIAZem (CARDIZEM CD) extended release capsule 120 mg, 120 mg, Oral, Daily    Physical      Vitals: /72   Pulse 77   Temp 97.4 °F (36.3 °C) (Oral)   Resp 16   Ht 5' 4\" (1.626 m)   Wt 161 lb 6.4 oz (73.2 kg)   SpO2 95%   BMI 27.70 kg/m²   Temp: Temp: 97.4 °F (36.3 °C)  Max: Temp  Av.3 °F (36.3 °C)  Min: 96.8 °F (36 °C)  Max: 97.6 °F (36.4 °C)  Respiration range:  Resp  Av  Min: 16  Max: 18  Pulse Range:  Pulse  Av.3  Min: 72  Max: 92  Blood pressure range:  Systolic (01ADQ), ZHK:018 , Min:123 , UHT:133   , Diastolic (92CHP), WJY:01, Min:70, Max:83    SpO2  Av.5 %  Min: 92 %  Max: 98 %    Intake/Output Summary (Last 24 hours) at 2020 1618  Last data filed at 2020 0545  Gross per 24 hour   Intake 360 ml   Output 100 ml   Net 260 ml       Vent settings:  Pulse  Av.8  Min: 66  Max: 142  Resp  Av.6  Min: 16  Max: 25  SpO2  Av.5 %  Min: 91 %  Max: 98 %    CONSTITUTIONAL:  fatigued, alert, sob/ hypoxia SOB shortness of breath Code Stroke this afternoon uncont dm

## 2021-10-11 NOTE — PROGRESS NOTES
Daughter Beata Cuellar called to update on the patient's status and let her know what room she was admitted to. No answer. Message left.  Electronically signed by Sanjuana Hodgkin, RN on 7/9/2020 at 4:58 PM High Dose Vitamin A Pregnancy And Lactation Text: High dose vitamin A therapy is contraindicated during pregnancy and breast feeding.

## 2023-08-21 NOTE — ED PROVIDER NOTES
was utilized to reduce the radiation dose to as low as reasonably achievable. COMPARISON: Brain CT dated 05/05/2018. HISTORY: ORDERING SYSTEM PROVIDED HISTORY: On Eliquis, fall, ? head contusion TECHNOLOGIST PROVIDED HISTORY: Has a \"code stroke\" or \"stroke alert\" been called? ->No Ordering Physician Provided Reason for Exam: fell down the stairs. no known head injury pt is on eliquist Acuity: Acute Type of Exam: Initial Mechanism of Injury: fell down the stairs PTA FINDINGS: BRAIN/VENTRICLES: There is no acute intracranial hemorrhage, mass effect or midline shift. No abnormal extra-axial fluid collection. The gray-white differentiation is maintained without evidence of an acute infarct. There is no evidence of hydrocephalus. Stable chronic microvascular ischemic changes in bilateral periventricular white matter distribution. ORBITS: The visualized portion of the orbits demonstrate no acute abnormality. SINUSES: The visualized paranasal sinuses and mastoid air cells demonstrate no acute abnormality. SOFT TISSUES/SKULL:  No acute abnormality of the visualized skull or soft tissues. No acute intracranial abnormality. No acute intracranial hemorrhage. PROCEDURES   Unless otherwise noted below, none     Procedures    CRITICAL CARE TIME   N/A    CONSULTS:  None    EMERGENCY DEPARTMENT COURSE and DIFFERENTIAL DIAGNOSIS/MDM:   Vitals:    Vitals:    11/17/18 1748   BP: (!) 146/79   Pulse: 80   Resp: 19   Temp: 97.2 °F (36.2 °C)   TempSrc: Oral   SpO2: 96%   Weight: 134 lb 11.2 oz (61.1 kg)   Height: 5' 4\" (1.626 m)       Patient was given the following medications:  Medications - No data to display    Stable      FINAL IMPRESSION      1. Fall, initial encounter    2. Contusion of buttock, initial encounter    3.  Contusion of right elbow, initial encounter          DISPOSITION/PLAN   DISPOSITION Decision To Discharge 11/17/2018 07:00:25 PM      PATIENT REFERRED TO:  Regina Grajeda MD  [address] [place] [address] Estlander Flap (Upper To Lower Lip) Text: The defect of the lower lip was assessed and measured.  Given the location and size of the defect, an Estlander flap was deemed most appropriate.  Using a sterile surgical marker, an appropriate Estlander flap was measured and drawn on the upper lip. Local anesthesia was then infiltrated. A scalpel was then used to incise the lateral aspect of the flap, through skin, muscle and mucosa, leaving the flap pedicled medially.  The flap was then rotated and positioned to fill the lower lip defect.  The flap was then sutured into place with a three layer technique, closing the orbicularis oris muscle layer with subcutaneous buried sutures, followed by a mucosal layer and an epidermal layer.

## 2025-02-14 NOTE — CARE COORDINATION
Care Transition phone outreach s/p acute care visit   Left HIPAA compliant vm requesting return callback. Provided & repeated direct contact number to reach this nurse.
Unable to reach pt after day/evening attempts to reach pt. No HIPAA consent to reference for alternate contact information. Noted in chart that pt is followed by visiting physician service. No further care transition outreach planned at this time.
Detail Level: Detailed
Quality 431: Preventive Care And Screening: Unhealthy Alcohol Use - Screening: Patient not identified as an unhealthy alcohol user when screened for unhealthy alcohol use using a systematic screening method
Quality 47: Advance Care Plan: Advance Care Planning discussed and documented in the medical record; patient did not wish or was not able to name a surrogate decision maker or provide an advance care plan.
Quality 226: Preventive Care And Screening: Tobacco Use: Screening And Cessation Intervention: Patient screened for tobacco use and is an ex/non-smoker

## (undated) DEVICE — SUTURE VCRL SZ 1 L18IN ABSRB UD L36MM CT-1 1/2 CIR J841D

## (undated) DEVICE — 450 ML BOTTLE OF 0.05% CHLORHEXIDINE GLUCONATE IN 99.95% STERILE WATER FOR IRRIGATION, USP AND APPLICATOR.: Brand: IRRISEPT ANTIMICROBIAL WOUND LAVAGE

## (undated) DEVICE — BASIC SINGLE BASIN 1-LF: Brand: MEDLINE INDUSTRIES, INC.

## (undated) DEVICE — 3M™ IOBAN™ 2 ANTIMICROBIAL INCISE DRAPE 6650EZ: Brand: IOBAN™ 2

## (undated) DEVICE — SOLUTION IV IRRIG 0.9% NACL 3000ML BAG 2B7477

## (undated) DEVICE — SUTURE VCRL SZ 2-0 L18IN ABSRB UD CT-1 L36MM 1/2 CIR J839D

## (undated) DEVICE — ELECTRODE BLDE L4IN NONINSULATED EDGE

## (undated) DEVICE — GOWN SIRUS NONREIN XL W/TWL: Brand: MEDLINE INDUSTRIES, INC.

## (undated) DEVICE — COVER LT HNDL BLU PLAS

## (undated) DEVICE — 3M™ STERI-DRAPE™ U-DRAPE 1015: Brand: STERI-DRAPE™

## (undated) DEVICE — STRIP,CLOSURE,WOUND,MEDI-STRIP,1/2X4: Brand: MEDLINE

## (undated) DEVICE — MERCY HEALTH WEST TURNOVER: Brand: MEDLINE INDUSTRIES, INC.

## (undated) DEVICE — GLOVE SURG SZ 8 L12IN FNGR THK79MIL GRN LTX FREE

## (undated) DEVICE — BANDAGE COMPR W6INXL10YD ST M E WHITE/BEIGE

## (undated) DEVICE — SOLUTION IV IRRIG POUR BRL 0.9% SODIUM CHL 2F7124

## (undated) DEVICE — 4-PORT MANIFOLD: Brand: NEPTUNE 2

## (undated) DEVICE — SUTURE MCRYL SZ 3-0 L18IN ABSRB UD L19MM PS-2 3/8 CIR PRIM Y497G

## (undated) DEVICE — DRAPE,U/ SHT,SPLIT,PLAS,STERIL: Brand: MEDLINE

## (undated) DEVICE — SPONGE LAP W18XL18IN WHT COT 4 PLY FLD STRUNG RADPQ DISP ST

## (undated) DEVICE — HYPODERMIC SAFETY NEEDLE: Brand: MAGELLAN

## (undated) DEVICE — ELECTRODE PT RET AD L9FT HI MOIST COND ADH HYDRGEL CORDED

## (undated) DEVICE — TOTAL BASIC PK

## (undated) DEVICE — SUTURE VCRL SZ 3-0 L18IN ABSRB UD L26MM SH 1/2 CIR J864D

## (undated) DEVICE — CHLORAPREP 26ML ORANGE

## (undated) DEVICE — SHEET,DRAPE,53X77,STERILE: Brand: MEDLINE

## (undated) DEVICE — KIT EVAC 0.13IN RECT TB DIA10FR 400CC PVC 3 SPR Y CONN DRN

## (undated) DEVICE — COVER,MAYO STAND,STERILE: Brand: MEDLINE

## (undated) DEVICE — GLOVE SURG SZ 75 CRM LTX FREE POLYISOPRENE POLYMER BEAD ANTI

## (undated) DEVICE — DECANTER BAG 9": Brand: MEDLINE INDUSTRIES, INC.

## (undated) DEVICE — STERILE TOTAL HIP DRAPE PK: Brand: CARDINAL HEALTH

## (undated) DEVICE — COVER,TABLE,77X90,STERILE: Brand: MEDLINE

## (undated) DEVICE — TUBE IRRIG HNDPC HI FLO TP INTRPULS W/SUCTION TUBE

## (undated) DEVICE — CANISTER, RIGID, 1200CC: Brand: MEDLINE INDUSTRIES, INC.

## (undated) DEVICE — DUAL CUT SAGITTAL BLADE

## (undated) DEVICE — SYRINGE MED 30ML STD CLR PLAS LUERLOCK TIP N CTRL DISP

## (undated) DEVICE — SUTURE TICRN BR BL NDL C-20 SZ 5 30 8886302779

## (undated) DEVICE — HANDPIECE SET WITH HIGH FLOW TIP AND SUCTION TUBE: Brand: INTERPULSE